# Patient Record
Sex: MALE | Race: WHITE | NOT HISPANIC OR LATINO | Employment: OTHER
[De-identification: names, ages, dates, MRNs, and addresses within clinical notes are randomized per-mention and may not be internally consistent; named-entity substitution may affect disease eponyms.]

---

## 2017-09-01 ENCOUNTER — RECORDS - HEALTHEAST (OUTPATIENT)
Dept: ADMINISTRATIVE | Facility: OTHER | Age: 62
End: 2017-09-01

## 2017-09-01 ENCOUNTER — RECORDS - HEALTHEAST (OUTPATIENT)
Dept: LAB | Facility: CLINIC | Age: 62
End: 2017-09-01

## 2017-09-01 LAB
CHOLEST SERPL-MCNC: 197 MG/DL
FASTING STATUS PATIENT QL REPORTED: NORMAL
HDLC SERPL-MCNC: 73 MG/DL
LDLC SERPL CALC-MCNC: 112 MG/DL
PSA SERPL-MCNC: 5.1 NG/ML (ref 0–4.5)
TRIGL SERPL-MCNC: 60 MG/DL

## 2017-09-11 ENCOUNTER — HOSPITAL ENCOUNTER (OUTPATIENT)
Dept: RESPIRATORY THERAPY | Facility: HOSPITAL | Age: 62
Discharge: HOME | End: 2017-09-11
Attending: FAMILY MEDICINE

## 2017-09-11 DIAGNOSIS — J44.9 COPD (CHRONIC OBSTRUCTIVE PULMONARY DISEASE) (H): ICD-10-CM

## 2017-10-23 ENCOUNTER — RECORDS - HEALTHEAST (OUTPATIENT)
Dept: ADMINISTRATIVE | Facility: OTHER | Age: 62
End: 2017-10-23

## 2017-11-30 ENCOUNTER — RECORDS - HEALTHEAST (OUTPATIENT)
Dept: ADMINISTRATIVE | Facility: OTHER | Age: 62
End: 2017-11-30

## 2017-11-30 ENCOUNTER — OFFICE VISIT - HEALTHEAST (OUTPATIENT)
Dept: PULMONOLOGY | Facility: OTHER | Age: 62
End: 2017-11-30

## 2017-11-30 DIAGNOSIS — J44.9 CHRONIC OBSTRUCTIVE PULMONARY DISEASE, UNSPECIFIED COPD TYPE (H): ICD-10-CM

## 2017-11-30 ASSESSMENT — MIFFLIN-ST. JEOR: SCORE: 1519.28

## 2017-12-14 ENCOUNTER — COMMUNICATION - HEALTHEAST (OUTPATIENT)
Dept: PULMONOLOGY | Facility: OTHER | Age: 62
End: 2017-12-14

## 2017-12-14 DIAGNOSIS — J44.9 COPD, SEVERE (H): ICD-10-CM

## 2017-12-19 ENCOUNTER — AMBULATORY - HEALTHEAST (OUTPATIENT)
Dept: PULMONOLOGY | Facility: OTHER | Age: 62
End: 2017-12-19

## 2017-12-22 ENCOUNTER — AMBULATORY - HEALTHEAST (OUTPATIENT)
Dept: PULMONOLOGY | Facility: OTHER | Age: 62
End: 2017-12-22

## 2017-12-27 ENCOUNTER — OFFICE VISIT - HEALTHEAST (OUTPATIENT)
Dept: PULMONOLOGY | Facility: OTHER | Age: 62
End: 2017-12-27

## 2017-12-27 DIAGNOSIS — J44.9 COPD, SEVERE (H): ICD-10-CM

## 2017-12-28 ENCOUNTER — AMBULATORY - HEALTHEAST (OUTPATIENT)
Dept: PULMONOLOGY | Facility: OTHER | Age: 62
End: 2017-12-28

## 2018-10-24 ENCOUNTER — RECORDS - HEALTHEAST (OUTPATIENT)
Dept: PULMONOLOGY | Facility: OTHER | Age: 63
End: 2018-10-24

## 2018-10-24 ENCOUNTER — OFFICE VISIT - HEALTHEAST (OUTPATIENT)
Dept: PULMONOLOGY | Facility: OTHER | Age: 63
End: 2018-10-24

## 2018-10-24 ENCOUNTER — RECORDS - HEALTHEAST (OUTPATIENT)
Dept: ADMINISTRATIVE | Facility: OTHER | Age: 63
End: 2018-10-24

## 2018-10-24 DIAGNOSIS — J44.9 COPD (CHRONIC OBSTRUCTIVE PULMONARY DISEASE) (H): ICD-10-CM

## 2018-10-24 DIAGNOSIS — J44.9 CHRONIC OBSTRUCTIVE PULMONARY DISEASE, UNSPECIFIED (H): ICD-10-CM

## 2018-10-24 RX ORDER — ALBUTEROL SULFATE 90 UG/1
1-2 AEROSOL, METERED RESPIRATORY (INHALATION) EVERY 4 HOURS PRN
Qty: 2 INHALER | Refills: 5 | Status: SHIPPED | OUTPATIENT
Start: 2018-10-24

## 2018-10-24 ASSESSMENT — MIFFLIN-ST. JEOR: SCORE: 1569.17

## 2018-12-24 ENCOUNTER — COMMUNICATION - HEALTHEAST (OUTPATIENT)
Dept: PULMONOLOGY | Facility: OTHER | Age: 63
End: 2018-12-24

## 2018-12-31 ENCOUNTER — COMMUNICATION - HEALTHEAST (OUTPATIENT)
Dept: PULMONOLOGY | Facility: OTHER | Age: 63
End: 2018-12-31

## 2018-12-31 DIAGNOSIS — J44.9 COPD, SEVERE (H): ICD-10-CM

## 2019-02-26 ENCOUNTER — RECORDS - HEALTHEAST (OUTPATIENT)
Dept: LAB | Facility: CLINIC | Age: 64
End: 2019-02-26

## 2019-02-26 ENCOUNTER — RECORDS - HEALTHEAST (OUTPATIENT)
Dept: ADMINISTRATIVE | Facility: OTHER | Age: 64
End: 2019-02-26

## 2019-02-27 LAB
ALBUMIN SERPL-MCNC: 3.7 G/DL (ref 3.5–5)
ANION GAP SERPL CALCULATED.3IONS-SCNC: 12 MMOL/L (ref 5–18)
BUN SERPL-MCNC: 22 MG/DL (ref 8–22)
CALCIUM SERPL-MCNC: 9.3 MG/DL (ref 8.5–10.5)
CHLORIDE BLD-SCNC: 106 MMOL/L (ref 98–107)
CO2 SERPL-SCNC: 23 MMOL/L (ref 22–31)
CREAT SERPL-MCNC: 1.14 MG/DL (ref 0.7–1.3)
GFR SERPL CREATININE-BSD FRML MDRD: >60 ML/MIN/1.73M2
GLUCOSE BLD-MCNC: 86 MG/DL (ref 70–125)
PHOSPHATE SERPL-MCNC: 3.5 MG/DL (ref 2.5–4.5)
POTASSIUM BLD-SCNC: 4.1 MMOL/L (ref 3.5–5)
SODIUM SERPL-SCNC: 141 MMOL/L (ref 136–145)

## 2019-03-01 ENCOUNTER — OFFICE VISIT - HEALTHEAST (OUTPATIENT)
Dept: PULMONOLOGY | Facility: OTHER | Age: 64
End: 2019-03-01

## 2019-03-01 DIAGNOSIS — J44.9 CHRONIC OBSTRUCTIVE PULMONARY DISEASE, UNSPECIFIED COPD TYPE (H): ICD-10-CM

## 2019-03-01 DIAGNOSIS — F17.200 TOBACCO DEPENDENCE SYNDROME: ICD-10-CM

## 2019-03-01 DIAGNOSIS — E88.01 ALPHA-1-ANTITRYPSIN DEFICIENCY (H): ICD-10-CM

## 2019-03-01 DIAGNOSIS — J43.9 PULMONARY EMPHYSEMA, UNSPECIFIED EMPHYSEMA TYPE (H): ICD-10-CM

## 2019-03-01 ASSESSMENT — MIFFLIN-ST. JEOR: SCORE: 1555.57

## 2019-03-12 ENCOUNTER — HOSPITAL ENCOUNTER (OUTPATIENT)
Dept: RESPIRATORY THERAPY | Facility: HOSPITAL | Age: 64
Discharge: HOME | End: 2019-03-12
Attending: INTERNAL MEDICINE

## 2019-03-12 ENCOUNTER — HOSPITAL ENCOUNTER (OUTPATIENT)
Dept: RESPIRATORY THERAPY | Facility: HOSPITAL | Age: 64
Discharge: HOME | End: 2019-03-12

## 2019-03-12 DIAGNOSIS — J44.9 CHRONIC OBSTRUCTIVE PULMONARY DISEASE, UNSPECIFIED COPD TYPE (H): ICD-10-CM

## 2019-03-12 DIAGNOSIS — E88.01 ALPHA-1-ANTITRYPSIN DEFICIENCY (H): ICD-10-CM

## 2019-03-12 DIAGNOSIS — J43.9 PULMONARY EMPHYSEMA, UNSPECIFIED EMPHYSEMA TYPE (H): ICD-10-CM

## 2019-03-12 LAB — HGB BLD-MCNC: 17.9 G/DL (ref 14–18)

## 2019-03-12 ASSESSMENT — MIFFLIN-ST. JEOR: SCORE: 1572.57

## 2019-03-16 ENCOUNTER — COMMUNICATION - HEALTHEAST (OUTPATIENT)
Dept: PULMONOLOGY | Facility: OTHER | Age: 64
End: 2019-03-16

## 2019-03-19 ENCOUNTER — ANESTHESIA - HEALTHEAST (OUTPATIENT)
Dept: SURGERY | Facility: HOSPITAL | Age: 64
End: 2019-03-19

## 2019-03-19 ENCOUNTER — SURGERY - HEALTHEAST (OUTPATIENT)
Dept: SURGERY | Facility: HOSPITAL | Age: 64
End: 2019-03-19

## 2019-03-19 ASSESSMENT — MIFFLIN-ST. JEOR
SCORE: 1582.78
SCORE: 1585.04

## 2019-03-20 ASSESSMENT — MIFFLIN-ST. JEOR: SCORE: 1625.41

## 2019-03-21 ASSESSMENT — MIFFLIN-ST. JEOR: SCORE: 1641.74

## 2019-07-10 ENCOUNTER — COMMUNICATION - HEALTHEAST (OUTPATIENT)
Dept: ONCOLOGY | Facility: CLINIC | Age: 64
End: 2019-07-10

## 2019-12-24 ENCOUNTER — AMBULATORY - HEALTHEAST (OUTPATIENT)
Dept: PULMONOLOGY | Facility: OTHER | Age: 64
End: 2019-12-24

## 2019-12-24 DIAGNOSIS — J44.9 COPD, SEVERE (H): ICD-10-CM

## 2020-10-29 ENCOUNTER — RECORDS - HEALTHEAST (OUTPATIENT)
Dept: LAB | Facility: CLINIC | Age: 65
End: 2020-10-29

## 2020-10-29 LAB — PSA SERPL-MCNC: 0.2 NG/ML (ref 0–4.5)

## 2020-11-11 ENCOUNTER — RECORDS - HEALTHEAST (OUTPATIENT)
Dept: LAB | Facility: CLINIC | Age: 65
End: 2020-11-11

## 2020-11-11 LAB — PSA SERPL-MCNC: 0.2 NG/ML (ref 0–4.5)

## 2021-01-06 ENCOUNTER — AMBULATORY - HEALTHEAST (OUTPATIENT)
Dept: PULMONOLOGY | Facility: OTHER | Age: 66
End: 2021-01-06

## 2021-01-06 DIAGNOSIS — J44.9 COPD, SEVERE (H): ICD-10-CM

## 2021-01-11 ENCOUNTER — AMBULATORY - HEALTHEAST (OUTPATIENT)
Dept: PULMONOLOGY | Facility: OTHER | Age: 66
End: 2021-01-11

## 2021-01-11 DIAGNOSIS — J44.9 COPD, SEVERE (H): ICD-10-CM

## 2021-03-08 ENCOUNTER — OFFICE VISIT - HEALTHEAST (OUTPATIENT)
Dept: PULMONOLOGY | Facility: OTHER | Age: 66
End: 2021-03-08

## 2021-03-08 DIAGNOSIS — F17.200 TOBACCO DEPENDENCE SYNDROME: ICD-10-CM

## 2021-03-08 DIAGNOSIS — J44.9 COPD, SEVERE (H): ICD-10-CM

## 2021-03-08 ASSESSMENT — MIFFLIN-ST. JEOR: SCORE: 1566.9

## 2021-03-09 RX ORDER — NICOTINE 4 MG/1
INHALANT RESPIRATORY (INHALATION)
Qty: 168 CARTRIDGE | Refills: 11 | Status: SHIPPED | OUTPATIENT
Start: 2021-03-09 | End: 2023-05-04

## 2021-04-12 ENCOUNTER — AMBULATORY - HEALTHEAST (OUTPATIENT)
Dept: PULMONOLOGY | Facility: OTHER | Age: 66
End: 2021-04-12

## 2021-04-12 DIAGNOSIS — J44.9 COPD, SEVERE (H): ICD-10-CM

## 2021-05-31 VITALS — BODY MASS INDEX: 21.98 KG/M2 | HEIGHT: 71 IN | WEIGHT: 157 LBS

## 2021-05-31 VITALS — WEIGHT: 161.2 LBS | BODY MASS INDEX: 22.48 KG/M2

## 2021-05-31 NOTE — TELEPHONE ENCOUNTER
I left a final message looking for interest in receiving a SCP. I again left my contact information should he have time/interest. I otherwise congratulated him on his survivorship. Love

## 2021-06-02 VITALS — HEIGHT: 72 IN | BODY MASS INDEX: 24.45 KG/M2 | WEIGHT: 180.5 LBS

## 2021-06-02 VITALS — HEIGHT: 71 IN | WEIGHT: 165 LBS | BODY MASS INDEX: 23.1 KG/M2

## 2021-06-02 VITALS — HEIGHT: 71 IN | BODY MASS INDEX: 23.52 KG/M2 | WEIGHT: 168 LBS

## 2021-06-05 VITALS
SYSTOLIC BLOOD PRESSURE: 110 MMHG | OXYGEN SATURATION: 95 % | DIASTOLIC BLOOD PRESSURE: 74 MMHG | HEIGHT: 72 IN | WEIGHT: 164 LBS | BODY MASS INDEX: 22.21 KG/M2 | HEART RATE: 86 BPM

## 2021-06-12 NOTE — PROGRESS NOTES
Cpft with pre and post spirometry done using albuterol 2.5 mg neb.  Ats standards met, patient terrence well, results scanned into patient chart.

## 2021-06-14 NOTE — PROGRESS NOTES
Left VM message for Noemy that we will need to submit a PA for Joon pina to find out what medicines Armin's insurance will cover.  This process may take a week (or longer with the Holidays).  If more samples are needed to get through this time, they could certainly come and pick more up at our clinic.  Instructed Noemy to just call if more sample needed.

## 2021-06-14 NOTE — PROGRESS NOTES
Call from patient's wife, Noemy.  Stated that the Trelegy ellipta that Dr. Kenney gave Alfred to try is working really well for him, but his insurance will not cover.  Asking for something else equivalent to this medication.  Spoke with Noemy.  Armin is private insurance only (no medicare) so mailed out a coupon card for them to try.  Will need to be activated and then pharmacy needs to run numbers on the card to receive for $10/month.  She will let me know if this does not work or if more samples needed until we can figure out what to do.

## 2021-06-14 NOTE — PROGRESS NOTES
Pulmonary Clinic Outpatient Consultation    Assessment and Plan:   Armin Terrell is a 62 y.o. male with a history of tobacco dependence in remission, elevated PSA, and COPD, presenting for evaluation of COPD.    Chronic obstructive pulmonary disease: GOLD group B, severe airflow obstruction and emphysema (FEV1 0.79 L [21% predicted], corrected DLCO 63% predicted).  CAT score today is 4+5+3+3+3+0+4/5+3=25/26  No urgent care/ED visits or prednisone that he is aware of, but severely symptomatic with frequent dyspnea and productive cough.  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    Plan:  - congratulated on quitting smoking 3 months ago; his wife smokes but he is committed to remaining tobacco-free  - stop umeclidinium-vilanterol and start fluticasone-umeclidinium-vilanterol one inhalation daily; rinse/gargle/spit water after use; 14 days of samples given and I will call him in 10 days to check in and prescribe it if good response  - continue nebulized or HFA albuterol as needed  - check A1AT level and genotype  - received his annual influenza vaccine  - administered Pneumovax 23 today in clinic  - pulmonary rehab will not be feasible given his full-time work schedule, which is quite physical already so he is getting daily exercise  - follow-up in 6 months or sooner as needed  - encouraged him to call any time with questions or concerning symptoms    I appreciate the opportunity to participate in the care of Mr. Terrell.  Please feel free to contact me at any time.    CCx: severe COPD    HPI: Armin Terrell is a 62 y.o. male with a history of tobacco dependence in remission, elevated PSA, and COPD, presenting for evaluation of COPD. Started smoking at age 15, up to 2 ppd, and quit 3 months ago.  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  Works full-time in a very physical job.  Sleeps poorly.  Lying down causes dyspnea, as does most activity, requiring frequent breaks.  Uses albuterol HFA or nebs as needed, and on umeclidinium-vilanterol.  Has ongoing frequent cough productive of \"lots of phlegm,\" which is yellow-white and like \"hairballs.\"  Denies any prior ED or urgent care visits due to respiratory symptoms, and does not recall having used prednisone.  Received his influenza vaccine for this season.  He is getting a prostate biopsy soon for elevated PSA.    ROS:  A 12-system review was obtained and was negative with the exception of the symptoms endorsed in the history of present illness.    PMH:  Severe COPD  Elevated PSA    PSH:  No past surgical history on file.    Allergies:  No Known Allergies    Family HX:  No family history on file.    Social Hx:  Social History     Social History     Marital status:      Spouse name: N/A     Number of children: N/A     Years of education: N/A     Occupational History     Not on file.     Social History Main Topics     Smoking status: Current Every Day Smoker     Smokeless tobacco: Not on file     Alcohol use 2.4 - 3.0 oz/week     4 - 5 Cans of beer per week      Comment: drinks on weekends.     Drug use: Not on file     Sexual activity: Not on file     Other Topics Concern     Not on file     Social History Narrative       Current Meds:  Current Outpatient Prescriptions   Medication Sig Dispense Refill     albuterol (PROAIR HFA) 90 mcg/actuation inhaler INHALE 1-2 PUFFS 3 TIMES A DAY FOR 2 WEEKS       albuterol (PROAIR HFA;PROVENTIL HFA;VENTOLIN HFA) 90 mcg/actuation inhaler Inhale 2 puffs every 6 (six) hours as needed for wheezing.       umeclidinium-vilanterol (ANORO ELLIPTA) 62.5-25 mcg/actuation inhaler 1 puff(s)       No current facility-administered medications for this visit.        Physical Exam:  BP (!) 122/92  Pulse 72  Resp 16  Ht 5' 11\" (1.803 m)  Wt 157 lb (71.2 " kg)  SpO2 96% Comment: RA at rest  BMI 21.9 kg/m2  Gen: alert, oriented, no distress  HEENT: nasal turbinates are unremarkable, no oropharyngeal lesions, no cervical or supraclavicular lymphadenopathy  CV: RRR, no M/G/R  Resp: moderately diminished d/t emphysema  Abd: soft, nontender, no palpable organomegaly  Skin: no apparent rashes  Ext: no cyanosis, clubbing or edema  Neuro: alert, nonfocal    Labs:  reviewed    Imaging studies:  CXR (July 2016):  - overall clear with no focal infiltrate    CT abdomen (July 2011):  - significant emphysema at bilateral lung bases    PFT's (September 2017):  FVC 2.69 FEV1 0.79 ratio 29%.  Significant improvement with bronchodilators.  TLC and RV to TLC ratio increased.  DLCO corrected decreased at 63% predicted.  Assessment: Very severe obstruction with significant response to bronchodilators.  Air trapping on lung volumes.  Mild to moderate diffusion defect.    Jono Kenney MD  [facility] [place]  Cell [phone]  Office [phone]  Pager [phone]

## 2021-06-15 NOTE — PROGRESS NOTES
Pulmonary Clinic Follow-up Visit    Assessment and Plan:   65 year old male with a history of longstanding and active tobacco dependence, elevated PSA, COPD, and alpha-1 antitrypsin deficiency, prostate cancer s/p robotic assisted radical prostatectomy and repair of right inguinal hernia, presenting for follow-up.      Tobacco dependence, chronic obstructive pulmonary disease, alpha-1 antitrypsin deficiency: Has severe A1AT deficiency (genotype ZZ with severely reduced A1AT level of 21.6 mg/dL by nephelometry). He continues to smoke 5-6 cigarettes on weekend days. His wife smokes inside the house and he states that she will not quit. We have previously discussed alpha-1 antitrypsin enzyme replacement and he has not been interested; his response is the same today. Works part-time at [employer] 4-5 hours daily with significant lifting and does okay with this. Occasional phlegm, clear to yellow, about once daily, but minimal cough. States that his breathing is okay. CAT score today is 13 (2,3,1,1,1,1,2,2).     Plan:  - use nicotine inhaler for cravings and quit smoking  - advised him to ask his wife not to smoke inside the house  - again discussed A1AT replacement therapy but he declines  - again advised consideration of A1AT replacement therapy, assuming he is still a candidate depending on upcoming PFTs; he will think about this  - based on his performance status, there is no clear contraindication to the planned procedure, acknowledging some increase in risk of perioperative pulmonary complications, though not prohibitive  - continue fluticasone furoate-umeclidinium-vilanterol 100-62.5-25 one inhalation daily; rinse/gargle/spit water after use  - albuterol HFA needed  - annual high-dose influenza vaccination; received for this season  - received Pneumovax-23 in November 2017; consider Prevnar-13 at follow-up visit if not already received  - advised him to receive COVID-19 vaccination when he is able  - he has  previously declined pulmonary rehab  - follow up in one year with spirometry/DLCO  - encouraged him to call any time with questions or concerning symptoms    Jono Kenney MD  Pulmonary and Critical Care Medicine  [facility]  Cell [phone]  Office [phone]  Pager [phone]    CCx: tobacco dependence, chronic obstructive pulmonary disease, alpha-1 antitrypsin deficiency    HPI: 65 year old male with a history of longstanding and active tobacco dependence, elevated PSA, COPD, and alpha-1 antitrypsin deficiency, prostate cancer s/p robotic assisted radical prostatectomy and repair of right inguinal hernia, presenting for follow-up. Has severe A1AT deficiency (genotype ZZ with severely reduced A1AT level of 21.6 mg/dL by nephelometry). He continues to smoke 5-6 cigarettes on weekend days. His wife smokes inside the house and he states that she will not quit. We have previously discussed alpha-1 antitrypsin enzyme replacement and he has not been interested; his response is the same today. Works part-time at [employer] 4-5 hours daily with significant lifting and does okay with this. Occasional phlegm, clear to yellow, about once daily, but minimal cough. States that his breathing is okay. CAT score today is 13 (2,3,1,1,1,1,2,2).    ROS:  A 12-system review was obtained and was negative with the exception of the symptoms endorsed in the history of present illness.    PMH:  Severe COPD  Elevated PSA  Active tobacco dependence  A1AT deficiency - ZZ phenotype with level 21.6 mg/dL by nephelometry    PSH:  Past Surgical History:   Procedure Laterality Date     IA LAP,PROSTATECTOMY,RADICAL,W/NERVE SPARE,INCL ROBOTIC Bilateral 3/19/2019    Procedure: ROBOTIC ASSISTED RADICAL RETROPUBIC PROSTATECTOMY BILATERAL PELVIC LYMPH NODE DISSECTION, LYSIS OF ADHESIONS, REPAIR OF RIGHT INGUINAL HERNIA;  Surgeon: Candido Angelo MD;  Location: [facility];  Service: Urology       Allergies:  No Known  Allergies    Family HX:  No family history on file.    Social Hx:  Social History     Socioeconomic History     Marital status:      Spouse name: Not on file     Number of children: Not on file     Years of education: Not on file     Highest education level: Not on file   Occupational History     Not on file   Social Needs     Financial resource strain: Not on file     Food insecurity     Worry: Not on file     Inability: Not on file     Transportation needs     Medical: Not on file     Non-medical: Not on file   Tobacco Use     Smoking status: Current Some Day Smoker     Packs/day: 0.50     Years: 30.00     Pack years: 15.00     Last attempt to quit: 9/27/2017     Years since quitting: 3.4     Smokeless tobacco: Never Used     Tobacco comment: quit 4 days ago   Substance and Sexual Activity     Alcohol use: Yes     Alcohol/week: 4.0 - 5.0 standard drinks     Types: 4 - 5 Cans of beer per week     Comment: drinks on weekends.     Drug use: No     Sexual activity: Not on file   Lifestyle     Physical activity     Days per week: Not on file     Minutes per session: Not on file     Stress: Not on file   Relationships     Social connections     Talks on phone: Not on file     Gets together: Not on file     Attends Yarsani service: Not on file     Active member of club or organization: Not on file     Attends meetings of clubs or organizations: Not on file     Relationship status: Not on file     Intimate partner violence     Fear of current or ex partner: Not on file     Emotionally abused: Not on file     Physically abused: Not on file     Forced sexual activity: Not on file   Other Topics Concern     Not on file   Social History Narrative     Not on file       Current Meds:  Current Outpatient Medications   Medication Sig Dispense Refill     albuterol (PROAIR HFA;PROVENTIL HFA;VENTOLIN HFA) 90 mcg/actuation inhaler Inhale 1-2 puffs every 4 (four) hours as needed for wheezing or shortness of breath. 2 Inhaler  5     fluticasone-umeclidinium-vilanterol (TRELEGY ELLIPTA) 100-62.5-25 mcg DsDv inhaler Inhale 1 Inhalation daily. OFFICE VISIT NEEDED FOR ANY FURTHER REFILLS 180 each 0     acetaminophen (TYLENOL) 500 MG tablet Take 1-2 tablets (500-1,000 mg total) by mouth every 4 (four) hours as needed.  0     albuterol (PROVENTIL) 2.5 mg /3 mL (0.083 %) nebulizer solution Take 2.5 mg by nebulization every 4 (four) hours as needed for wheezing (for wheezing).       nicotine (NICOTROL) 10 mg inhaler Inhale 1 puff as needed for smoking cessation. 168 Cartridge 11     No current facility-administered medications for this visit.        Physical Exam:  /74   Pulse 86   Ht 6' (1.829 m)   Wt 164 lb (74.4 kg)   SpO2 95%   BMI 22.24 kg/m    Gen: alert, oriented, no distress  HEENT: no cervical or supraclavicular lymphadenopathy  CV: RRR, no M/G/R  Resp: moderately diminished with prolonged expiratory phase  Abd: soft, nontender, no palpable organomegaly  Skin: no apparent rashes  Ext: no cyanosis, clubbing or edema  Neuro: alert, nonfocal    Labs:  genotype ZZ with severely reduced A1AT level of 21.6 mg/dL by nephelometry    Imaging studies:  CXR (July 2016):  - overall clear with no focal infiltrate      CT abdomen (July 2011):  - significant emphysema at bilateral lung bases      PFT's  September 2017:  FVC 2.69 FEV1 0.79 ratio 29%.  Significant improvement with bronchodilators.  TLC and RV to TLC ratio increased.  DLCO corrected decreased at 63% predicted.  Assessment: Very severe obstruction with significant response to bronchodilators.  Air trapping on lung volumes.  Mild to moderate diffusion defect.     October 2018:  Note the patient took albuterol prior to testing, therefore this represents post-bronchodilator spirometry.  FEV1/FVC is 0.36 and is reduced.  FEV1 is 46% predicted and is reduced.  FVC is 98% predicted and is normal.  DLCO is 49% predicted and is reduced when it   is corrected for hemoglobin.  The flow  volume loop is normal No. There is consistent flattening of the inspiratory limb.    March 2019:  FEV1/FVC is 28 and is reduced.  FEV1 is 38% predicted and is reduced.  FVC is 107% predicted and is normal.  There was no improvement in spirometry after a single inhaled dose of bronchodilator.  DLCO is 73% predicted and is reduced when it   is corrected for hemoglobin.    The patient walked a total of 450 meters. Breathing room air, SpO2 veronica was 86% and HRmax was 122 bpm. BP and HR responses were appropriate.

## 2021-06-15 NOTE — PROGRESS NOTES
Pulmonary Clinic Follow-up Visit    Assessment and Plan:   Armin Terrell is a 62 y.o. male with a history of tobacco dependence in remission, elevated PSA, COPD, and recently diagnosed alpha-1 antitrypsin deficiency, presenting for follow-up.     Chronic obstructive pulmonary disease and alpha-1 antitrypsin deficiency: GOLD group B, severe airflow obstruction and emphysema (FEV1 0.79 L [21% predicted], corrected DLCO 63% predicted).  A1AT assessment at last visit revealed genotype ZZ with severely reduced A1AT level of 21.6 mg/dL by nephelometry.  Unfortunately, the severity of his FEV1 reduction at this stage makes augmentation therapy less likely to be beneficial, though still a potential option given his young age.  However, after careful discussion, given the inconvenience and uncertain benefit, the patient elects not to pursue this at this point.  Will continue with flutcasone-umeclidinium-vilanterol 100-62.5-25 mcg one inhalation daily and see him in follow-up with spirometry and DLCO.     Plan:  - again congratulated on quitting smoking several months ago  - continue fluticasone-umeclidinium-vilanterol 100-62.5-25 one inhalation daily; rinse/gargle/spit water after use; gave more samples and still working on prior authorization  - continue nebulized or HFA albuterol as needed  - no A1AT augmentation at this point after careful discussion  - received his annual influenza vaccine  - received Pneumovax 23 today in November  - pulmonary rehab will not be feasible given his full-time work schedule, which is quite physical already so he is getting daily exercise  - follow-up in 6 months with pre/post spirometry and DLCO  - encouraged him to call any time with questions or concerning symptoms     I appreciate the opportunity to participate in the care of Mr. Terrell.  Please feel free to contact me at any time.     CCx: severe COPD and alpha-1 antitrypsin deficiency    HPI: Armin Terrell is a 62 y.o. male with a  history of tobacco dependence in remission, elevated PSA, COPD, and recently diagnosed alpha-1 antitrypsin deficiency, presenting for follow-up.  Had him come in because the A1AT assessment showed ZZ phenotype with severely decreased A1AT level of 21.6 mg/dL by nephelometry.  He has quit smoking so progression should be mitigated, but still may have faster progression with this severe deficiency even without smoking.  He works full-time in a physical job.  We discussed that with his severe FEV1 reduction, the benefit of augmentation is unproven, but still could be considered given his relative youth.  Given the inconvenience of infusions and uncertain benefit, he elects to defer at this time.  He has found benefit from the Trelegy but having trouble getting it approved by his insurance.    ROS:  A 12-system review was obtained and was negative with the exception of the symptoms endorsed in the history of present illness.    PMH:  Severe COPD  Elevated PSA  Tobacco dependence in remission  A1AT deficiency - ZZ phenotype with level 21.6 mg/dL by nephelometry    PSH:  No past surgical history on file.    Allergies:  No Known Allergies    Family HX:  No family history on file.    Social Hx:  Social History     Social History     Marital status:      Spouse name: N/A     Number of children: N/A     Years of education: N/A     Occupational History     Not on file.     Social History Main Topics     Smoking status: Former Smoker     Quit date: 9/27/2017     Smokeless tobacco: Never Used     Alcohol use 2.4 - 3.0 oz/week     4 - 5 Cans of beer per week      Comment: drinks on weekends.     Drug use: Not on file     Sexual activity: Not on file     Other Topics Concern     Not on file     Social History Narrative       Current Meds:  Current Outpatient Prescriptions   Medication Sig Dispense Refill     albuterol (PROAIR HFA;PROVENTIL HFA;VENTOLIN HFA) 90 mcg/actuation inhaler Inhale 2 puffs every 6 (six) hours as  needed for wheezing.       fluticasone-umeclidinium-vilanterol (TRELEGY ELLIPTA) 100-62.5-25 mcg DsDv Inhale 1 Inhalation daily. 60 each 11     No current facility-administered medications for this visit.        Physical Exam:  /76  Pulse 96  Resp 24  Wt 161 lb 3.2 oz (73.1 kg)  SpO2 93% Comment: RA  BMI 22.48 kg/m2  Gen: alert, oriented, no distress  HEENT: nasal turbinates are unremarkable, no oropharyngeal lesions, no cervical or supraclavicular lymphadenopathy  CV: tachy, regular, no M/G/R  Resp: moderately diminished bilaterally with prolonged expiratory phase  Abd: soft, nontender, no palpable organomegaly  Skin: no apparent rashes  Ext: no cyanosis, clubbing or edema  Neuro: alert, nonfocal    Labs:  reviewed    Imaging studies:  CXR (July 2016):  - overall clear with no focal infiltrate     CT abdomen (July 2011):  - significant emphysema at bilateral lung bases     PFT's (September 2017):  FVC 2.69 FEV1 0.79 ratio 29%.  Significant improvement with bronchodilators.  TLC and RV to TLC ratio increased.  DLCO corrected decreased at 63% predicted.  Assessment: Very severe obstruction with significant response to bronchodilators.  Air trapping on lung volumes.  Mild to moderate diffusion defect.    Jono Kenney MD  Pulmonary and Critical Care Medicine  [facility]  Cell [phone]  Office [phone]  Pager [phone]

## 2021-06-15 NOTE — PROGRESS NOTES
We received a PA for pt's Trelegy 100/62.5/25 mcg inhaler.  I started a PA on CMM's, will watch for response.

## 2021-06-15 NOTE — PATIENT INSTRUCTIONS - HE
It was good to see you in clinic today. This is what we discussed:    1. You do have severe COPD and alpha-1 antitrypsin deficiency (low level of an enzyme that helps repair the lungs) and we need to prevent the lung function from worsening.  2. Use the nicotine inhaler and quit smoking completely.  3. Your wife should not smoke inside the house.  4. Continue Trelegy one inhalation daily. Rinse, gargle, and spit water after use.  5. Use the albuterol inhaler as needed.  6. I recommend that you get a COVID-19 vaccine.  7. I will see you in one year with basic lung function testing.  8. Call any time with questions or concerning symptoms.    Jono Kenney MD  Pulmonary and Critical Care Medicine  [facility]  Office [phone]

## 2021-06-15 NOTE — PROGRESS NOTES
I called [facility] and they said that the formulary alternatives for the Trelegy Ellipta are: Advair, Anoro and Symbicort.  I sent a message to Dr. Kenney to see what he wants to do (and if he knows if pt's has tried and failed any of these alternatives).

## 2021-06-15 NOTE — PROGRESS NOTES
PA for the [facility] was approved from 1/5/18-1/5/19.  I called pharmacy to let them know this and they said that it did go through but they don't have any in stock right now so they will order it and it should be in on Monday.  I called pt and talked to his wife and informed her of this approval and to check with pharmacy on Monday (pt was given samples while in clinic this past week so he will have enough until after Monday).

## 2021-06-15 NOTE — PROGRESS NOTES
Dr. Kenney responded saying that the pt did not do as well on the Anoro as he has on the Trelegy.  The alternatives that were given are not the combination that he wants so he would like us to go ahead and try the PA.  Restarted the PA with this info included.  Will wait for response.

## 2021-06-16 PROBLEM — C61 PROSTATE CA (H): Status: ACTIVE | Noted: 2019-03-20

## 2021-06-16 NOTE — TELEPHONE ENCOUNTER
Telephone Encounter by Dona Washington at 1/7/2019 12:08 PM     Author: Dona Washington Service: -- Author Type: --    Filed: 1/7/2019 12:09 PM Encounter Date: 12/24/2018 Status: Signed    : Dona Washington       A PA is not needed at this time for medication.               - - -

## 2021-06-18 NOTE — LETTER
Letter by Jono Kenney MD at      Author: Jono Kenney MD Service: -- Author Type: --    Filed:  Encounter Date: 3/1/2019 Status: (Other)       Armin Woods MD  [address]  [facility] [address]                                  March 4, 2019    Patient: Armin Terrell   MR Number: [id number]   YOB: 1955   Date of Visit: 3/1/2019     Dear Jason Woods and Gi:    I saw Armin Terrell on the above date at the [facility]. Below are the relevant portions of my assessment and plan of care.    If you have questions, please do not hesitate to call me. I look forward to following Armin along with you.    Sincerely,        Jono Kenney MD          CC  MD Pablito Red Andrew P, MD  3/4/2019  3:25 PM  Sign at close encounter  Pulmonary Clinic Follow-up Visit    Assessment and Plan:   63 year old male with a history of longstanding and active tobacco dependence, elevated PSA, COPD, and recently diagnosed alpha-1 antitrypsin deficiency, recently diagnosed prostate cancer, presenting for preoperative pulmonary evaluation.      Chronic obstructive pulmonary disease and alpha-1 antitrypsin deficiency: In October 2018, post-bronchodilator FEV1 was 1.66 L (46%), DLCO 49% (down from 63% in October 2017). Has severe A1AT deficiency (genotype ZZ with severely reduced A1AT level of 21.6 mg/dL by nephelometry). He continues to smoke and has continued to decline A1AT replacement therapy. Currently smokes 1-2 cigarettes daily, and his wife smokes 0.5 ppd. Neither of them are very motivated to quit smoking. He endorses a good performance status, stating that with the fluticasone-umeclidinium-vilanterol (Trelegy Ellipta) he feels improved, and can shovel snow and walk without limitation (walked for miles at the [facility] [place] over the summer without difficulty. Resting SpO2 on room air is 91%. He is scheduled for robotic-asseded radical retropubic  prostatectomy and bilateral pelvic lymph node dissection on 3/19/19.     Plan:  - repeat pre- and post-bronchodilator spirometry and DLCO  - again advised him to quit smoking; he is noncommittal  - again advised consideration of A1AT replacement therapy, assuming he is still a candidate depending on upcoming PFTs; he will think about this  - based on his performance status, there is no clear contraindication to the planned procedure, acknowledging some increase in risk of perioperative pulmonary complications, though not prohibitive  - continue fluticasone-umeclidinium-vilanterol (Trelegy Ellipta) 100-62.5-25 one inhalation daily in the perioperative period, can use home supply; rinse/gargle/spit water after use  - continue nebulized or HFA albuterol as needed, including perioperatively  - advised annual influenza vaccination  - received Pneumovax-23 in November 2017  - he has previously declined pulmonary rehab  - follow up in 3 months  - encouraged him to call any time with questions or concerning symptoms      I appreciate the opportunity to participate in the care of Mr. Terrell.  Please feel free to contact me at any time.      CCx: severe COPD and alpha-1 antitrypsin deficiency    HPI: 63 year old male with a history of longstanding and active tobacco dependence, elevated PSA, COPD, and recently diagnosed alpha-1 antitrypsin deficiency, recently diagnosed prostate cancer, presenting for preoperative pulmonary evaluation. In October 2018, post-bronchodilator FEV1 was 1.66 L (46%), DLCO 49% (down from 63% in October 2017). Has severe A1AT deficiency (genotype ZZ with severely reduced A1AT level of 21.6 mg/dL by nephelometry). He continues to smoke and has continued to decline A1AT replacement therapy. Currently smokes 1-2 cigarettes daily, and his wife smokes 0.5 ppd. Neither of them are very motivated to quit smoking. He endorses a good performance status, stating that with the  fluticasone-umeclidinium-vilanterol (Trelegy Ellipta) he feels improved, and can shovel snow and walk without limitation (walked for miles at the [facility] [place] over the summer without difficulty. Resting SpO2 on room air is 91%. He is scheduled for robotic-asseded radical retropubic prostatectomy and bilateral pelvic lymph node dissection on 3/19/19.    ROS:  A 12-system review was obtained and was negative with the exception of the symptoms endorsed in the history of present illness.    PMH:  Severe COPD  Elevated PSA  Active tobacco dependence  A1AT deficiency - ZZ phenotype with level 21.6 mg/dL by nephelometry    PSH:  No past surgical history on file.    Allergies:  No Known Allergies    Family HX:  No family history on file.    Social Hx:  Social History     Socioeconomic History   ? Marital status:      Spouse name: Not on file   ? Number of children: Not on file   ? Years of education: Not on file   ? Highest education level: Not on file   Occupational History   ? Not on file   Social Needs   ? Financial resource strain: Not on file   ? Food insecurity:     Worry: Not on file     Inability: Not on file   ? Transportation needs:     Medical: Not on file     Non-medical: Not on file   Tobacco Use   ? Smoking status: Current Some Day Smoker     Last attempt to quit: 2017     Years since quittin.4   ? Smokeless tobacco: Never Used   Substance and Sexual Activity   ? Alcohol use: Yes     Alcohol/week: 2.4 - 3.0 oz     Types: 4 - 5 Cans of beer per week     Comment: drinks on weekends.   ? Drug use: Not on file   ? Sexual activity: Not on file   Lifestyle   ? Physical activity:     Days per week: Not on file     Minutes per session: Not on file   ? Stress: Not on file   Relationships   ? Social connections:     Talks on phone: Not on file     Gets together: Not on file     Attends Taoist service: Not on file     Active member of club or organization: Not on file     Attends meetings of clubs or  "organizations: Not on file     Relationship status: Not on file   ? Intimate partner violence:     Fear of current or ex partner: Not on file     Emotionally abused: Not on file     Physically abused: Not on file     Forced sexual activity: Not on file   Other Topics Concern   ? Not on file   Social History Narrative   ? Not on file       Current Meds:  Current Outpatient Medications   Medication Sig Dispense Refill   ? albuterol (PROAIR HFA;PROVENTIL HFA;VENTOLIN HFA) 90 mcg/actuation inhaler Inhale 1-2 puffs every 4 (four) hours as needed for wheezing or shortness of breath. 2 Inhaler 5   ? albuterol (PROVENTIL) 2.5 mg /3 mL (0.083 %) nebulizer solution Take 2.5 mg by nebulization every 4 (four) hours as needed for wheezing (for wheezing).     ? fluticasone-umeclidinium-vilanterol (TRELEGY ELLIPTA) 100-62.5-25 mcg DsDv inhaler Inhale 1 Inhalation daily. 60 each 11   ? nicotine (NICOTROL) 10 mg inhaler Inhale 1 puff as needed for smoking cessation. 168 Cartridge 11     No current facility-administered medications for this visit.        Physical Exam:  /70   Pulse 91   Resp 10   Ht 5' 11\" (1.803 m)   Wt 165 lb (74.8 kg)   SpO2 91%   BMI 23.01 kg/m     Gen: alert, oriented, no distress  HEENT: nasal turbinates are unremarkable, no oropharyngeal lesions, no cervical or supraclavicular lymphadenopathy  CV: tachy, regular, no M/G/R  Resp: diminished bilaterally with prolonged expiratory phase  Abd: soft, nontender, no palpable organomegaly  Skin: no apparent rashes  Ext: no cyanosis, clubbing or edema  Neuro: alert, nonfocal    Labs:  reviewed    Imaging studies:  CXR (July 2016):  - overall clear with no focal infiltrate      CT abdomen (July 2011):  - significant emphysema at bilateral lung bases      PFT's  September 2017:  FVC 2.69 FEV1 0.79 ratio 29%.  Significant improvement with bronchodilators.  TLC and RV to TLC ratio increased.  DLCO corrected decreased at 63% predicted.  Assessment: Very severe " obstruction with significant response to bronchodilators.  Air trapping on lung volumes.  Mild to moderate diffusion defect.     October 2018:  Note the patient took albuterol prior to testing, therefore this represents post-bronchodilator spirometry.  FEV1/FVC is 0.36 and is reduced.  FEV1 is 46% predicted and is reduced.  FVC is 98% predicted and is normal.  DLCO is 49% predicted and is reduced when it   is corrected for hemoglobin.  The flow volume loop is normal No. There is consistent flattening of the inspiratory limb.    Jono Kenney MD  Pulmonary and Critical Care Medicine  [facility]  Cell [phone]  Office [phone]  Pager [phone]

## 2021-06-19 NOTE — LETTER
Letter by Jono Kenney MD at      Author: Jono Kenney MD Service: -- Author Type: --    Filed:  Encounter Date: 3/16/2019 Status: (Other)       2019    Dear Jason Woods and Gi,    See below for documentation of a telephone conversation that I had today with Armin Terrell ( 1955).  Please feel free to call me at any time if needed.    Pulmonary Telephone Note     Called Mr. Terrell to discuss his PFT results. FEV1 is down from 2018 but still better than 2017, likely as a result of therapy. DLCO is actually up a little bit. He has reduced 6MWT distance and hypoxia with ambulation. He quit smoking after our clinic visit and just filled the nicotine inhaler but has not started using it; I encouraged him to use it for cravings and continue to stay away from cigarettes leading up to his surgery. He had no further questions. See my clinic notes for further details. Encouraged him to call any time with questions or concerning symptoms.    Sincerely,    Jono Kenney MD  Pulmonary and Critical Care Medicine  [facility]  Cell [phone]  Office [phone]  Pager [phone]

## 2021-06-21 NOTE — PROGRESS NOTES
Pulmonary Clinic Follow-up Visit    Assessment and Plan:   63 year old male with a history of longstanding and active tobacco dependence, elevated PSA, COPD, and recently diagnosed alpha-1 antitrypsin deficiency, presenting for follow-up.      Chronic obstructive pulmonary disease and alpha-1 antitrypsin deficiency:  Over the last year, post-bronchodilator FEV1 has improved from 1.17 L to 1.66 L, but DLCO has declined from 63% to 49%.  I am very worried about the DLCO change, especially with his severe A1AT deficiency (genotype ZZ with severely reduced A1AT level of 21.6 mg/dL by nephelometry) and ongoing tobacco dependence.  We discussed the absolute need to quit smoking, and his potential improvement from A1AT replacement therapy.  At this point, he is noncommittal on both points, and I emphasized the risk of severe decline in lung function over the next 5-10 years if he continues to smoke.  He currently feels that he is breathing well and denies limitation due to dyspnea; has minimal occasional cough.    Plan:  - I very strongly advised him to quit smoking, and offered pharmacologic interventions and counseling resources, which he declined  - advised consideration of A1AT replacement therapy; he wants to think about it at this point  - continue fluticasone-umeclidinium-vilanterol 100-62.5-25 one inhalation daily; rinse/gargle/spit water after use  - continue nebulized or HFA albuterol as needed  - advised annual influenza vaccination  - received Pneumovax 23 in November 2017  - pulmonary rehab will not be feasible given his full-time work schedule, but he is retiring in January  - follow-up in 6 months with pre/post spirometry and DLCO  - encouraged him to call any time with questions or concerning symptoms      I appreciate the opportunity to participate in the care of Mr. Terrell.  Please feel free to contact me at any time.      CCx: severe COPD and alpha-1 antitrypsin deficiency    HPI: 63 year old male with a  history of longstanding and active tobacco dependence, elevated PSA, COPD, and recently diagnosed alpha-1 antitrypsin deficiency, presenting for follow-up. Over the last year, post-bronchodilator FEV1 has improved from 1.17 L to 1.66 L, but DLCO has declined from 63% to 49%.  I am very worried about the DLCO change, especially with his severe A1AT deficiency (genotype ZZ with severely reduced A1AT level of 21.6 mg/dL by nephelometry) and ongoing tobacco dependence.  He is currently smoking, though not every day.  We discussed the absolute need to quit smoking, and his potential improvement from A1AT replacement therapy.  At this point, he is noncommittal on both points, and I emphasized the risk of severe decline in lung function over the next 5-10 years if he continues to smoke.  He currently feels that he is breathing well and denies limitation due to dyspnea; has minimal occasional cough.    ROS:  A 12-system review was obtained and was negative with the exception of the symptoms endorsed in the history of present illness.    PMH:  Severe COPD  Elevated PSA  Active tobacco dependence  A1AT deficiency - ZZ phenotype with level 21.6 mg/dL by nephelometry    PSH:  No past surgical history on file.    Allergies:  No Known Allergies    Family HX:  No family history on file.    Social Hx:  Social History     Social History     Marital status:      Spouse name: N/A     Number of children: N/A     Years of education: N/A     Occupational History     Not on file.     Social History Main Topics     Smoking status: Former Smoker     Quit date: 9/27/2017     Smokeless tobacco: Never Used     Alcohol use 2.4 - 3.0 oz/week     4 - 5 Cans of beer per week      Comment: drinks on weekends.     Drug use: Not on file     Sexual activity: Not on file     Other Topics Concern     Not on file     Social History Narrative       Current Meds:  Current Outpatient Prescriptions   Medication Sig Dispense Refill     albuterol  "(PROAIR HFA;PROVENTIL HFA;VENTOLIN HFA) 90 mcg/actuation inhaler Inhale 1-2 puffs every 4 (four) hours as needed for wheezing or shortness of breath. 2 Inhaler 5     fluticasone-umeclidinium-vilanterol (TRELEGY ELLIPTA) 100-62.5-25 mcg DsDv Inhale 1 Inhalation daily. 60 each 11     No current facility-administered medications for this visit.        Physical Exam:  /82  Pulse 70  Resp 10  Ht 5' 11\" (1.803 m)  Wt 168 lb (76.2 kg)  SpO2 95%  BMI 23.43 kg/m2  Gen: alert, oriented, no distress  HEENT: nasal turbinates are unremarkable, no oropharyngeal lesions, no cervical or supraclavicular lymphadenopathy  CV: RRR, no M/G/R  Resp: markedly diminished especially at the bases, prolonged expiratory phase, no crackles or wheezes  Abd: soft, nontender, no palpable organomegaly  Skin: no apparent rashes  Ext: no cyanosis, clubbing or edema  Neuro: alert, nonfocal    Labs:  reviewed    Imaging studies:  CXR (July 2016):  - overall clear with no focal infiltrate      CT abdomen (July 2011):  - significant emphysema at bilateral lung bases      PFT's  September 2017:  FVC 2.69 FEV1 0.79 ratio 29%.  Significant improvement with bronchodilators.  TLC and RV to TLC ratio increased.  DLCO corrected decreased at 63% predicted.  Assessment: Very severe obstruction with significant response to bronchodilators.  Air trapping on lung volumes.  Mild to moderate diffusion defect.    October 2018:  Note the patient took albuterol prior to testing, therefore this represents post-bronchodilator spirometry.  FEV1/FVC is 0.36 and is reduced.  FEV1 is 46% predicted and is reduced.  FVC is 98% predicted and is normal.  DLCO is 49% predicted and is reduced when it   is corrected for hemoglobin.  The flow volume loop is normal No. There is consistent flattening of the inspiratory limb.    Jono Kenney MD  Pulmonary and Critical Care Medicine  [facility]  Cell [phone]  Office [phone]  Pager [phone]    "

## 2021-06-21 NOTE — LETTER
Letter by Jono Kenney MD at      Author: Jono Kenney MD Service: -- Author Type: --    Filed:  Encounter Date: 3/8/2021 Status: (Other)         Armin Woods MD  [address] ,  [address]  [facility] [address]                                  March 8, 2021    Patient: Armin Terrell   MR Number: [id number]   YOB: 1955   Date of Visit: 3/8/2021     Dear Dr. Peggy MD:    I saw Armin Terrell at the [facility]. Below are the relevant portions of my assessment and plan of care.    If you have questions, please do not hesitate to call me. I look forward to following Armin along with you.    Sincerely,        Jono Kenney MD          CC  No Recipients  Jono Kenney MD  3/8/2021  6:58 PM  Sign when Signing Visit  Pulmonary Clinic Follow-up Visit    Assessment and Plan:   65 year old male with a history of longstanding and active tobacco dependence, elevated PSA, COPD, and alpha-1 antitrypsin deficiency, prostate cancer s/p robotic assisted radical prostatectomy and repair of right inguinal hernia, presenting for follow-up.      Tobacco dependence, chronic obstructive pulmonary disease, alpha-1 antitrypsin deficiency: Has severe A1AT deficiency (genotype ZZ with severely reduced A1AT level of 21.6 mg/dL by nephelometry). He continues to smoke 5-6 cigarettes on weekend days. His wife smokes inside the house and he states that she will not quit. We have previously discussed alpha-1 antitrypsin enzyme replacement and he has not been interested; his response is the same today. Works part-time at [employer] 4-5 hours daily with significant lifting and does okay with this. Occasional phlegm, clear to yellow, about once daily, but minimal cough. States that his breathing is okay. CAT score today is 13 (2,3,1,1,1,1,2,2).     Plan:  - use nicotine inhaler for cravings and quit smoking  - advised him to ask his wife not to smoke inside the house  - again discussed  A1AT replacement therapy but he declines  - again advised consideration of A1AT replacement therapy, assuming he is still a candidate depending on upcoming PFTs; he will think about this  - based on his performance status, there is no clear contraindication to the planned procedure, acknowledging some increase in risk of perioperative pulmonary complications, though not prohibitive  - continue fluticasone furoate-umeclidinium-vilanterol 100-62.5-25 one inhalation daily; rinse/gargle/spit water after use  - albuterol HFA needed  - annual high-dose influenza vaccination; received for this season  - received Pneumovax-23 in November 2017; consider Prevnar-13 at follow-up visit if not already received  - advised him to receive COVID-19 vaccination when he is able  - he has previously declined pulmonary rehab  - follow up in one year with spirometry/DLCO  - encouraged him to call any time with questions or concerning symptoms    Jono Kenney MD  Pulmonary and Critical Care Medicine  [facility]  Cell [phone]  Office [phone]  Pager [phone]    CCx: tobacco dependence, chronic obstructive pulmonary disease, alpha-1 antitrypsin deficiency    HPI: 65 year old male with a history of longstanding and active tobacco dependence, elevated PSA, COPD, and alpha-1 antitrypsin deficiency, prostate cancer s/p robotic assisted radical prostatectomy and repair of right inguinal hernia, presenting for follow-up. Has severe A1AT deficiency (genotype ZZ with severely reduced A1AT level of 21.6 mg/dL by nephelometry). He continues to smoke 5-6 cigarettes on weekend days. His wife smokes inside the house and he states that she will not quit. We have previously discussed alpha-1 antitrypsin enzyme replacement and he has not been interested; his response is the same today. Works part-time at [employer] 4-5 hours daily with significant lifting and does okay with this. Occasional phlegm, clear to yellow, about  once daily, but minimal cough. States that his breathing is okay. CAT score today is 13 (2,3,1,1,1,1,2,2).    ROS:  A 12-system review was obtained and was negative with the exception of the symptoms endorsed in the history of present illness.    PMH:  Severe COPD  Elevated PSA  Active tobacco dependence  A1AT deficiency - ZZ phenotype with level 21.6 mg/dL by nephelometry    PSH:  Past Surgical History:   Procedure Laterality Date   ? MS LAP,PROSTATECTOMY,RADICAL,W/NERVE SPARE,INCL ROBOTIC Bilateral 3/19/2019    Procedure: ROBOTIC ASSISTED RADICAL RETROPUBIC PROSTATECTOMY BILATERAL PELVIC LYMPH NODE DISSECTION, LYSIS OF ADHESIONS, REPAIR OF RIGHT INGUINAL HERNIA;  Surgeon: Candido Angelo MD;  Location: [facility];  Service: Urology       Allergies:  No Known Allergies    Family HX:  No family history on file.    Social Hx:  Social History     Socioeconomic History   ? Marital status:      Spouse name: Not on file   ? Number of children: Not on file   ? Years of education: Not on file   ? Highest education level: Not on file   Occupational History   ? Not on file   Social Needs   ? Financial resource strain: Not on file   ? Food insecurity     Worry: Not on file     Inability: Not on file   ? Transportation needs     Medical: Not on file     Non-medical: Not on file   Tobacco Use   ? Smoking status: Current Some Day Smoker     Packs/day: 0.50     Years: 30.00     Pack years: 15.00     Last attempt to quit: 9/27/2017     Years since quitting: 3.4   ? Smokeless tobacco: Never Used   ? Tobacco comment: quit 4 days ago   Substance and Sexual Activity   ? Alcohol use: Yes     Alcohol/week: 4.0 - 5.0 standard drinks     Types: 4 - 5 Cans of beer per week     Comment: drinks on weekends.   ? Drug use: No   ? Sexual activity: Not on file   Lifestyle   ? Physical activity     Days per week: Not on file     Minutes per session: Not on file   ? Stress: Not on file   Relationships   ? Social connections      Talks on phone: Not on file     Gets together: Not on file     Attends Congregation service: Not on file     Active member of club or organization: Not on file     Attends meetings of clubs or organizations: Not on file     Relationship status: Not on file   ? Intimate partner violence     Fear of current or ex partner: Not on file     Emotionally abused: Not on file     Physically abused: Not on file     Forced sexual activity: Not on file   Other Topics Concern   ? Not on file   Social History Narrative   ? Not on file       Current Meds:  Current Outpatient Medications   Medication Sig Dispense Refill   ? albuterol (PROAIR HFA;PROVENTIL HFA;VENTOLIN HFA) 90 mcg/actuation inhaler Inhale 1-2 puffs every 4 (four) hours as needed for wheezing or shortness of breath. 2 Inhaler 5   ? fluticasone-umeclidinium-vilanterol (TRELEGY ELLIPTA) 100-62.5-25 mcg DsDv inhaler Inhale 1 Inhalation daily. OFFICE VISIT NEEDED FOR ANY FURTHER REFILLS 180 each 0   ? acetaminophen (TYLENOL) 500 MG tablet Take 1-2 tablets (500-1,000 mg total) by mouth every 4 (four) hours as needed.  0   ? albuterol (PROVENTIL) 2.5 mg /3 mL (0.083 %) nebulizer solution Take 2.5 mg by nebulization every 4 (four) hours as needed for wheezing (for wheezing).     ? nicotine (NICOTROL) 10 mg inhaler Inhale 1 puff as needed for smoking cessation. 168 Cartridge 11     No current facility-administered medications for this visit.        Physical Exam:  /74   Pulse 86   Ht 6' (1.829 m)   Wt 164 lb (74.4 kg)   SpO2 95%   BMI 22.24 kg/m    Gen: alert, oriented, no distress  HEENT: no cervical or supraclavicular lymphadenopathy  CV: RRR, no M/G/R  Resp: moderately diminished with prolonged expiratory phase  Abd: soft, nontender, no palpable organomegaly  Skin: no apparent rashes  Ext: no cyanosis, clubbing or edema  Neuro: alert, nonfocal    Labs:  genotype ZZ with severely reduced A1AT level of 21.6 mg/dL by nephelometry    Imaging studies:  CXR (July  2016):  - overall clear with no focal infiltrate      CT abdomen (July 2011):  - significant emphysema at bilateral lung bases      PFT's  September 2017:  FVC 2.69 FEV1 0.79 ratio 29%.  Significant improvement with bronchodilators.  TLC and RV to TLC ratio increased.  DLCO corrected decreased at 63% predicted.  Assessment: Very severe obstruction with significant response to bronchodilators.  Air trapping on lung volumes.  Mild to moderate diffusion defect.     October 2018:  Note the patient took albuterol prior to testing, therefore this represents post-bronchodilator spirometry.  FEV1/FVC is 0.36 and is reduced.  FEV1 is 46% predicted and is reduced.  FVC is 98% predicted and is normal.  DLCO is 49% predicted and is reduced when it   is corrected for hemoglobin.  The flow volume loop is normal No. There is consistent flattening of the inspiratory limb.    March 2019:  FEV1/FVC is 28 and is reduced.  FEV1 is 38% predicted and is reduced.  FVC is 107% predicted and is normal.  There was no improvement in spirometry after a single inhaled dose of bronchodilator.  DLCO is 73% predicted and is reduced when it   is corrected for hemoglobin.    The patient walked a total of 450 meters. Breathing room air, SpO2 veronica was 86% and HRmax was 122 bpm. BP and HR responses were appropriate.

## 2021-06-22 NOTE — TELEPHONE ENCOUNTER
Central PA team  [phone]    PA has been initiated.   PA paperwork has been faxed to insurance.

## 2021-06-24 NOTE — PATIENT INSTRUCTIONS - HE
It was good to see you in clinic today. This is what we discussed:    1. I recommend that you quit smoking before your surgery.  2. Use the nicotine inhaler when you have a cigarette craving.  3. Continue Trelegy one inhalation daily. Rinse, gargle, and spit water after use.  4. We will repeat lung functions tests including a walking test.  5. I will call you with the results.  6. You have alpha-1 antitrypsin deficiency. This is a low protein level that makes your lungs much more susceptible to emphysema from cigarette smoke.  7. I will see you in about 3 months.  8. Call any time with questions or concerning symptoms.    Jono Kenney MD  Pulmonary and Critical Care Medicine  [facility]  Office [phone]

## 2021-06-24 NOTE — PROGRESS NOTES
Pt here for pre and post spirometry with DLCO.  Great pt efforts, best were saved.  HGB drawn.  Albuterol 2.5 mg nebulizer used for bronchodilation.  Results meet ATS standards for repeatability and acceptability.  Pt left without distress.

## 2021-06-24 NOTE — PROGRESS NOTES
Pt here for a six minute walk.  Pt was on room air, walked briskly without stops and any walking aide.  Pt walked a total of 450 meters.  Pt left in no distress.

## 2021-06-24 NOTE — PROGRESS NOTES
Pulmonary Clinic Follow-up Visit    Assessment and Plan:   63 year old male with a history of longstanding and active tobacco dependence, elevated PSA, COPD, and recently diagnosed alpha-1 antitrypsin deficiency, recently diagnosed prostate cancer, presenting for preoperative pulmonary evaluation.      Chronic obstructive pulmonary disease and alpha-1 antitrypsin deficiency: In October 2018, post-bronchodilator FEV1 was 1.66 L (46%), DLCO 49% (down from 63% in October 2017). Has severe A1AT deficiency (genotype ZZ with severely reduced A1AT level of 21.6 mg/dL by nephelometry). He continues to smoke and has continued to decline A1AT replacement therapy. Currently smokes 1-2 cigarettes daily, and his wife smokes 0.5 ppd. Neither of them are very motivated to quit smoking. He endorses a good performance status, stating that with the fluticasone-umeclidinium-vilanterol (Trelegy Ellipta) he feels improved, and can shovel snow and walk without limitation (walked for miles at the [facility] [place] over the summer without difficulty. Resting SpO2 on room air is 91%. He is scheduled for robotic-asseded radical retropubic prostatectomy and bilateral pelvic lymph node dissection on 3/19/19.     Plan:  - repeat pre- and post-bronchodilator spirometry and DLCO  - again advised him to quit smoking; he is noncommittal  - again advised consideration of A1AT replacement therapy, assuming he is still a candidate depending on upcoming PFTs; he will think about this  - based on his performance status, there is no clear contraindication to the planned procedure, acknowledging some increase in risk of perioperative pulmonary complications, though not prohibitive  - continue fluticasone-umeclidinium-vilanterol (Trelegy Ellipta) 100-62.5-25 one inhalation daily in the perioperative period, can use home supply; rinse/gargle/spit water after use  - continue nebulized or HFA albuterol as needed, including perioperatively  - advised annual influenza  vaccination  - received Pneumovax-23 in November 2017  - he has previously declined pulmonary rehab  - follow up in 3 months  - encouraged him to call any time with questions or concerning symptoms      I appreciate the opportunity to participate in the care of Mr. Terrell.  Please feel free to contact me at any time.      CCx: severe COPD and alpha-1 antitrypsin deficiency    HPI: 63 year old male with a history of longstanding and active tobacco dependence, elevated PSA, COPD, and recently diagnosed alpha-1 antitrypsin deficiency, recently diagnosed prostate cancer, presenting for preoperative pulmonary evaluation. In October 2018, post-bronchodilator FEV1 was 1.66 L (46%), DLCO 49% (down from 63% in October 2017). Has severe A1AT deficiency (genotype ZZ with severely reduced A1AT level of 21.6 mg/dL by nephelometry). He continues to smoke and has continued to decline A1AT replacement therapy. Currently smokes 1-2 cigarettes daily, and his wife smokes 0.5 ppd. Neither of them are very motivated to quit smoking. He endorses a good performance status, stating that with the fluticasone-umeclidinium-vilanterol (Trelegy Ellipta) he feels improved, and can shovel snow and walk without limitation (walked for miles at the [facility] over the summer without difficulty. Resting SpO2 on room air is 91%. He is scheduled for robotic-asseded radical retropubic prostatectomy and bilateral pelvic lymph node dissection on 3/19/19.    ROS:  A 12-system review was obtained and was negative with the exception of the symptoms endorsed in the history of present illness.    PMH:  Severe COPD  Elevated PSA  Active tobacco dependence  A1AT deficiency - ZZ phenotype with level 21.6 mg/dL by nephelometry    PSH:  No past surgical history on file.    Allergies:  No Known Allergies    Family HX:  No family history on file.    Social Hx:  Social History     Socioeconomic History     Marital status:      Spouse name: Not on file      Number of children: Not on file     Years of education: Not on file     Highest education level: Not on file   Occupational History     Not on file   Social Needs     Financial resource strain: Not on file     Food insecurity:     Worry: Not on file     Inability: Not on file     Transportation needs:     Medical: Not on file     Non-medical: Not on file   Tobacco Use     Smoking status: Current Some Day Smoker     Last attempt to quit: 2017     Years since quittin.4     Smokeless tobacco: Never Used   Substance and Sexual Activity     Alcohol use: Yes     Alcohol/week: 2.4 - 3.0 oz     Types: 4 - 5 Cans of beer per week     Comment: drinks on weekends.     Drug use: Not on file     Sexual activity: Not on file   Lifestyle     Physical activity:     Days per week: Not on file     Minutes per session: Not on file     Stress: Not on file   Relationships     Social connections:     Talks on phone: Not on file     Gets together: Not on file     Attends [employer] service: Not on file     Active member of club or organization: Not on file     Attends meetings of clubs or organizations: Not on file     Relationship status: Not on file     Intimate partner violence:     Fear of current or ex partner: Not on file     Emotionally abused: Not on file     Physically abused: Not on file     Forced sexual activity: Not on file   Other Topics Concern     Not on file   Social History Narrative     Not on file       Current Meds:  Current Outpatient Medications   Medication Sig Dispense Refill     albuterol (PROAIR HFA;PROVENTIL HFA;VENTOLIN HFA) 90 mcg/actuation inhaler Inhale 1-2 puffs every 4 (four) hours as needed for wheezing or shortness of breath. 2 Inhaler 5     albuterol (PROVENTIL) 2.5 mg /3 mL (0.083 %) nebulizer solution Take 2.5 mg by nebulization every 4 (four) hours as needed for wheezing (for wheezing).       fluticasone-umeclidinium-vilanterol (TRELEGY ELLIPTA) 100-62.5-25 mcg DsDv inhaler Inhale 1  "Inhalation daily. 60 each 11     nicotine (NICOTROL) 10 mg inhaler Inhale 1 puff as needed for smoking cessation. 168 Cartridge 11     No current facility-administered medications for this visit.        Physical Exam:  /70   Pulse 91   Resp 10   Ht 5' 11\" (1.803 m)   Wt 165 lb (74.8 kg)   SpO2 91%   BMI 23.01 kg/m    Gen: alert, oriented, no distress  HEENT: nasal turbinates are unremarkable, no oropharyngeal lesions, no cervical or supraclavicular lymphadenopathy  CV: tachy, regular, no M/G/R  Resp: diminished bilaterally with prolonged expiratory phase  Abd: soft, nontender, no palpable organomegaly  Skin: no apparent rashes  Ext: no cyanosis, clubbing or edema  Neuro: alert, nonfocal    Labs:  reviewed    Imaging studies:  CXR (July 2016):  - overall clear with no focal infiltrate      CT abdomen (July 2011):  - significant emphysema at bilateral lung bases      PFT's  September 2017:  FVC 2.69 FEV1 0.79 ratio 29%.  Significant improvement with bronchodilators.  TLC and RV to TLC ratio increased.  DLCO corrected decreased at 63% predicted.  Assessment: Very severe obstruction with significant response to bronchodilators.  Air trapping on lung volumes.  Mild to moderate diffusion defect.     October 2018:  Note the patient took albuterol prior to testing, therefore this represents post-bronchodilator spirometry.  FEV1/FVC is 0.36 and is reduced.  FEV1 is 46% predicted and is reduced.  FVC is 98% predicted and is normal.  DLCO is 49% predicted and is reduced when it   is corrected for hemoglobin.  The flow volume loop is normal No. There is consistent flattening of the inspiratory limb.    Jono Kenney MD  Pulmonary and Critical Care Medicine  [facility]  Cell [phone]  Office [phone]  Pager [phone]    "

## 2021-06-25 NOTE — TELEPHONE ENCOUNTER
Pulmonary Telephone Note    Called Mr. Terrell to discuss his PFT results. FEV1 is down from October 2018 but still better than September 2017, likely as a result of therapy. DLCO is actually up a little bit. He has reduced 6MWT distance and hypoxia with ambulation. He quit smoking after our clinic visit and just filled the nicotine inhaler but has not started using it; I encouraged him to use it for cravings and continue to stay away from cigarettes leading up to his surgery. He had no further questions. See my clinic notes for further details. Encouraged him to call any time with questions or concerning symptoms.    Jono Kenney MD  Pulmonary and Critical Care Medicine  [facility]  Cell [phone]  Office [phone]  Pager [phone]

## 2021-06-25 NOTE — ANESTHESIA CARE TRANSFER NOTE
Last vitals:   Vitals:    03/19/19 0724   BP: (!) 138/96   Pulse: 89   Resp: 18   Temp: 36.4  C (97.6  F)   SpO2: 95%     Patient's level of consciousness is drowsy  Spontaneous respirations: yes  Maintains airway independently: yes  Dentition unchanged: yes  Oropharynx: oropharynx clear of all foreign objects    QCDR Measures:  ASA# 20 - Surgical Safety Checklist: WHO surgical safety checklist completed prior to induction    PQRS# 430 - Adult PONV Prevention: 4558F - Pt received => 2 anti-emetic agents (different classes) preop & intraop  ASA# 8 - Peds PONV Prevention: NA - Not pediatric patient, not GA or 2 or more risk factors NOT present  PQRS# 424 - Criselda-op Temp Management: 4559F - At least one body temp DOCUMENTED => 35.5C or 95.9F within required timeframe  PQRS# 426 - PACU Transfer Protocol: - Transfer of care checklist used  ASA# 14 - Acute Post-op Pain: ASA14B - Patient did NOT experience pain >= 7 out of 10   19-May-2021 07:32:41

## 2021-06-25 NOTE — ANESTHESIA PREPROCEDURE EVALUATION
Anesthesia Evaluation      Patient summary reviewed   No history of anesthetic complications     Airway   Mallampati: II   Pulmonary - normal exam   (+) COPD moderate,                          Cardiovascular - negative ROS and normal exam  Exercise tolerance: > or = 4 METS  Rhythm: regular  Rate: normal,         Neuro/Psych - negative ROS     Endo/Other - negative ROS      GI/Hepatic/Renal - negative ROS           Dental - normal exam                        Anesthesia Plan  Planned anesthetic: general endotracheal  GAETT  Antiemetics  Toradol at the end of case if okay with surgeon  Soft bite block  ASA 2   Induction: intravenous   Anesthetic plan and risks discussed with: patient    Post-op plan: routine recovery

## 2021-07-04 NOTE — ADDENDUM NOTE
Addendum Note by Derek Haider MD at 3/8/2021 10:30 AM     Author: Derek Haider MD Service: -- Author Type: Physician    Filed: 3/9/2021  1:52 PM Encounter Date: 3/8/2021 Status: Signed    : Derek Haider MD (Physician)    Addended by: DEREK HAIDER on: 3/9/2021 01:52 PM        Modules accepted: Orders

## 2021-07-21 ENCOUNTER — TELEPHONE (OUTPATIENT)
Dept: PULMONOLOGY | Facility: OTHER | Age: 66
End: 2021-07-21

## 2021-07-21 NOTE — TELEPHONE ENCOUNTER
"Phone call from patient's wife, Trini. States the cost of Armin's inhaler has been going up each month, now over $400.  They cannot afford.  Asking about other replacements?    She states the price has been going up over the past couple of months, so my thought is that he is in the \"donut hole\" with their medicare.  Asked her to check in with her insurance provider to explain why the cost is increasing and if anything less expensive for them.  Explained that alternative may be nebulized medications if nothing affordable for him.      She will call insurance and let us know if something else may be more affordable for them.  "

## 2022-02-21 ENCOUNTER — LAB REQUISITION (OUTPATIENT)
Dept: LAB | Facility: CLINIC | Age: 67
End: 2022-02-21

## 2022-02-21 DIAGNOSIS — Z13.220 ENCOUNTER FOR SCREENING FOR LIPOID DISORDERS: ICD-10-CM

## 2022-02-21 DIAGNOSIS — R18.8 OTHER ASCITES: ICD-10-CM

## 2022-02-21 LAB
ALBUMIN SERPL-MCNC: 3.7 G/DL (ref 3.5–5)
ALP SERPL-CCNC: 105 U/L (ref 45–120)
ALT SERPL W P-5'-P-CCNC: 23 U/L (ref 0–45)
ANION GAP SERPL CALCULATED.3IONS-SCNC: 9 MMOL/L (ref 5–18)
AST SERPL W P-5'-P-CCNC: 24 U/L (ref 0–40)
BASOPHILS # BLD AUTO: 0.1 10E3/UL (ref 0–0.2)
BASOPHILS NFR BLD AUTO: 1 %
BILIRUB SERPL-MCNC: 1.7 MG/DL (ref 0–1)
BUN SERPL-MCNC: 25 MG/DL (ref 8–22)
CALCIUM SERPL-MCNC: 9.2 MG/DL (ref 8.5–10.5)
CHLORIDE BLD-SCNC: 106 MMOL/L (ref 98–107)
CHOLEST SERPL-MCNC: 194 MG/DL
CO2 SERPL-SCNC: 25 MMOL/L (ref 22–31)
CREAT SERPL-MCNC: 0.99 MG/DL (ref 0.7–1.3)
EOSINOPHIL # BLD AUTO: 0.4 10E3/UL (ref 0–0.7)
EOSINOPHIL NFR BLD AUTO: 6 %
ERYTHROCYTE [DISTWIDTH] IN BLOOD BY AUTOMATED COUNT: 13.2 % (ref 10–15)
FASTING STATUS PATIENT QL REPORTED: NORMAL
GFR SERPL CREATININE-BSD FRML MDRD: 84 ML/MIN/1.73M2
GLUCOSE BLD-MCNC: 89 MG/DL (ref 70–125)
HCT VFR BLD AUTO: 52.5 % (ref 40–53)
HDLC SERPL-MCNC: 55 MG/DL
HGB BLD-MCNC: 17.7 G/DL (ref 13.3–17.7)
IMM GRANULOCYTES # BLD: 0 10E3/UL
IMM GRANULOCYTES NFR BLD: 1 %
LDLC SERPL CALC-MCNC: 124 MG/DL
LYMPHOCYTES # BLD AUTO: 2.4 10E3/UL (ref 0.8–5.3)
LYMPHOCYTES NFR BLD AUTO: 38 %
MCH RBC QN AUTO: 31.2 PG (ref 26.5–33)
MCHC RBC AUTO-ENTMCNC: 33.7 G/DL (ref 31.5–36.5)
MCV RBC AUTO: 93 FL (ref 78–100)
MONOCYTES # BLD AUTO: 0.6 10E3/UL (ref 0–1.3)
MONOCYTES NFR BLD AUTO: 9 %
NEUTROPHILS # BLD AUTO: 2.8 10E3/UL (ref 1.6–8.3)
NEUTROPHILS NFR BLD AUTO: 45 %
NRBC # BLD AUTO: 0 10E3/UL
NRBC BLD AUTO-RTO: 0 /100
PLATELET # BLD AUTO: 239 10E3/UL (ref 150–450)
POTASSIUM BLD-SCNC: 4.3 MMOL/L (ref 3.5–5)
PROT SERPL-MCNC: 6.5 G/DL (ref 6–8)
RBC # BLD AUTO: 5.67 10E6/UL (ref 4.4–5.9)
SODIUM SERPL-SCNC: 140 MMOL/L (ref 136–145)
TRIGL SERPL-MCNC: 74 MG/DL
WBC # BLD AUTO: 6.2 10E3/UL (ref 4–11)

## 2022-02-21 PROCEDURE — 80061 LIPID PANEL: CPT | Performed by: STUDENT IN AN ORGANIZED HEALTH CARE EDUCATION/TRAINING PROGRAM

## 2022-02-21 PROCEDURE — 80053 COMPREHEN METABOLIC PANEL: CPT | Performed by: STUDENT IN AN ORGANIZED HEALTH CARE EDUCATION/TRAINING PROGRAM

## 2022-02-21 PROCEDURE — 85025 COMPLETE CBC W/AUTO DIFF WBC: CPT | Performed by: STUDENT IN AN ORGANIZED HEALTH CARE EDUCATION/TRAINING PROGRAM

## 2022-02-21 PROCEDURE — 86803 HEPATITIS C AB TEST: CPT | Performed by: STUDENT IN AN ORGANIZED HEALTH CARE EDUCATION/TRAINING PROGRAM

## 2022-02-22 LAB — HCV AB SERPL QL IA: NONREACTIVE

## 2022-03-09 DIAGNOSIS — J44.9 COPD (CHRONIC OBSTRUCTIVE PULMONARY DISEASE) (H): Primary | ICD-10-CM

## 2022-04-12 DIAGNOSIS — J44.9 COPD, SEVERE (H): ICD-10-CM

## 2022-05-03 ENCOUNTER — OFFICE VISIT (OUTPATIENT)
Dept: PULMONOLOGY | Facility: OTHER | Age: 67
End: 2022-05-03
Payer: COMMERCIAL

## 2022-05-03 ENCOUNTER — ALLIED HEALTH/NURSE VISIT (OUTPATIENT)
Dept: PULMONOLOGY | Facility: OTHER | Age: 67
End: 2022-05-03
Payer: COMMERCIAL

## 2022-05-03 VITALS
SYSTOLIC BLOOD PRESSURE: 171 MMHG | BODY MASS INDEX: 22.72 KG/M2 | DIASTOLIC BLOOD PRESSURE: 107 MMHG | OXYGEN SATURATION: 92 % | HEART RATE: 73 BPM | WEIGHT: 167.5 LBS

## 2022-05-03 DIAGNOSIS — J44.9 COPD (CHRONIC OBSTRUCTIVE PULMONARY DISEASE) (H): ICD-10-CM

## 2022-05-03 DIAGNOSIS — J44.9 CHRONIC OBSTRUCTIVE PULMONARY DISEASE, UNSPECIFIED COPD TYPE (H): Primary | ICD-10-CM

## 2022-05-03 LAB
DLCOCOR-%PRED-PRE: 40 %
DLCOCOR-PRE: 11.42 ML/MIN/MMHG
DLCOUNC-%PRED-PRE: 42 %
DLCOUNC-PRE: 12.04 ML/MIN/MMHG
DLCOUNC-PRED: 28.26 ML/MIN/MMHG
ERV-%PRED-PRE: 84 %
ERV-PRE: 1.34 L
ERV-PRED: 1.58 L
EXPTIME-PRE: 19.63 SEC
FEF2575-%PRED-POST: 12 %
FEF2575-%PRED-PRE: 11 %
FEF2575-POST: 0.36 L/SEC
FEF2575-PRE: 0.31 L/SEC
FEF2575-PRED: 2.78 L/SEC
FEFMAX-%PRED-PRE: 40 %
FEFMAX-PRE: 3.7 L/SEC
FEFMAX-PRED: 9.19 L/SEC
FEV1-%PRED-PRE: 33 %
FEV1-PRE: 1.2 L
FEV1FEV6-PRE: 38 %
FEV1FEV6-PRED: 78 %
FEV1FVC-PRE: 24 %
FEV1FVC-PRED: 76 %
FEV1SVC-PRE: 22 %
FEV1SVC-PRED: 69 %
FIFMAX-PRE: 3.48 L/SEC
FRCPLETH-%PRED-PRE: 159 %
FRCPLETH-PRE: 6.04 L
FRCPLETH-PRED: 3.78 L
FVC-%PRED-PRE: 104 %
FVC-PRE: 4.93 L
FVC-PRED: 4.72 L
HGB BLD-MCNC: 16.7 G/DL
IC-%PRED-PRE: 110 %
IC-PRE: 4.02 L
IC-PRED: 3.62 L
RVPLETH-%PRED-PRE: 175 %
RVPLETH-PRE: 4.61 L
RVPLETH-PRED: 2.62 L
TLCPLETH-%PRED-PRE: 133 %
TLCPLETH-PRE: 10.06 L
TLCPLETH-PRED: 7.53 L
VA-%PRED-PRE: 97 %
VA-PRE: 6.76 L
VC-%PRED-PRE: 104 %
VC-PRE: 5.45 L
VC-PRED: 5.2 L

## 2022-05-03 PROCEDURE — 94726 PLETHYSMOGRAPHY LUNG VOLUMES: CPT | Performed by: INTERNAL MEDICINE

## 2022-05-03 PROCEDURE — 94729 DIFFUSING CAPACITY: CPT | Performed by: INTERNAL MEDICINE

## 2022-05-03 PROCEDURE — 85018 HEMOGLOBIN: CPT

## 2022-05-03 PROCEDURE — 99214 OFFICE O/P EST MOD 30 MIN: CPT | Mod: 25 | Performed by: INTERNAL MEDICINE

## 2022-05-03 PROCEDURE — 94060 EVALUATION OF WHEEZING: CPT | Performed by: INTERNAL MEDICINE

## 2022-05-03 NOTE — PATIENT INSTRUCTIONS
It was good to see you in clinic today. This is what we discussed:    Continue Trelegy one inhalation daily. Rinse, gargle, and spit water after use.  Congratulations on quitting smoking! Continue to stay away from cigarettes!'  Use albuterol (Proair) as needed.  I will see you in one year.  Call any time with questions or concerns.    Jono Kenney MD  Pulmonary and Critical Care Medicine  [facility]  Office [phone]

## 2022-05-03 NOTE — PROGRESS NOTES
Pulmonary Clinic Follow-up Visit    Assessment and Plan:   66 year old male with a history of tobacco dependence in remission, COPD, and alpha-1 antitrypsin deficiency, prostate cancer s/p robotic assisted radical prostatectomy and repair of right inguinal hernia, presenting for follow-up.      Tobacco dependence, chronic obstructive pulmonary disease, alpha-1 antitrypsin deficiency: Quit smoking in March 2021 after our last visit for which he is congratulated! Continues to use nicotine inhaler occasionally but minimal cravings for cigarettes. Breathing feels stable, minimal cough. Feels that he can accomplish his daytime activities without significant limitation. Baseline severe A1AT deficiency (genotype ZZ with severely reduced A1AT level of 21.6 mg/dL by nephelometry) and has previously declined alpha-1 antitrypsin enzyme replacement therapy. Continues  fluticasone furoate-umeclidinium-vilanterol 100-62.5-25 one inhalation daily. Severe obstruction with FEV1 1.20 L (33%) and DLCOc 40%. At this level of FEV1 reduction, unlikely to benefit from enzyme replacement even if he changed his mind. CAT score today is 7 (2,2,0,0,0,0,2,1), improved from 13 one year ago.     Plan:  - congratulated on quitting smoking in March 2021!  - encouraged to stay tobacco-free; still uses nicotine inhaler occasionally for cravings  - previously declined A1AT enzyme replacement, and now that his FEV1 is <35% the benefit is unclear  - continue fluticasone furoate-umeclidinium-vilanterol 100-62.5-25 one inhalation daily; rinse/gargle/spit water after use  - albuterol HFA needed  - annual high-dose influenza vaccination  - up to date on pneumococcal vaccination  - COVID-19 vaccination: Moderna April 2021, May 2021, November 2021  - he has previously declined pulmonary rehab; does treadmill one mile, 4 times/week  - follow up in one year  - encouraged him to call any time with questions or concerning symptoms    Jono Kenney MD  Pulmonary  and Critical Care Medicine  [facility]  Office [phone]  Pager [phone]  (he/him/his)    CCx: tobacco dependence, chronic obstructive pulmonary disease, alpha-1 antitrypsin deficiency    HPI: 66 year old male with a history of tobacco dependence in remission, COPD, and alpha-1 antitrypsin deficiency, prostate cancer s/p robotic assisted radical prostatectomy and repair of right inguinal hernia, presenting for follow-up.66 year old male with a history of tobacco dependence in remission, COPD, and alpha-1 antitrypsin deficiency, prostate cancer s/p robotic assisted radical prostatectomy and repair of right inguinal hernia, presenting for follow-up. Quit smoking in March 2021 after our last visit for which he is congratulated! Continues to use nicotine inhaler occasionally but minimal cravings for cigarettes. Breathing feels stable, minimal cough. Feels that he can accomplish his daytime activities without significant limitation. Baseline severe A1AT deficiency (genotype ZZ with severely reduced A1AT level of 21.6 mg/dL by nephelometry) and has previously declined alpha-1 antitrypsin enzyme replacement therapy. Continues  fluticasone furoate-umeclidinium-vilanterol 100-62.5-25 one inhalation daily. Severe obstruction with FEV1 1.20 L (33%) and DLCOc 40%. At this level of FEV1 reduction, unlikely to benefit from enzyme replacement even if he changed his mind. CAT score today is 7 (2,2,0,0,0,0,2,1), improved from 13 one year ago.    ROS:  A 12-system review was obtained and was negative with the exception of the symptoms endorsed in the history of present illness.    PMH:  Severe COPD  Elevated PSA  Active tobacco dependence  A1AT deficiency - ZZ phenotype with level 21.6 mg/dL by nephelometry    PSH:  Past Surgical History:   Procedure Laterality Date     [place] LAP,PROSTATECTOMY,RADICAL,W/NERVE SPARE,INCL ROBOTIC Bilateral 3/19/2019    Procedure: ROBOTIC ASSISTED RADICAL RETROPUBIC PROSTATECTOMY  BILATERAL PELVIC LYMPH NODE DISSECTION, LYSIS OF ADHESIONS, REPAIR OF RIGHT INGUINAL HERNIA;  Surgeon: Candido Angelo MD;  Location: [facility];  Service: Urology       Allergies:  No Known Allergies    Family HX:  No family history on file.    Social Hx:  Social History     Socioeconomic History     Marital status:      Spouse name: Not on file     Number of children: Not on file     Years of education: Not on file     Highest education level: Not on file   Occupational History     Not on file   Tobacco Use     Smoking status: Current Some Day Smoker     Packs/day: 0.50     Years: 30.00     Pack years: 15.00     Last attempt to quit: 2017     Years since quittin.6     Smokeless tobacco: Never Used     Tobacco comment: quit 4 days ago   Substance and Sexual Activity     Alcohol use: Yes     Alcohol/week: 4.0 - 5.0 standard drinks     Comment: Alcoholic Drinks/day: drinks on weekends.     Drug use: No     Sexual activity: Not on file   Other Topics Concern     Not on file   Social History Narrative     Not on file     Social Determinants of Health     Financial Resource Strain: Not on file   Food Insecurity: Not on file   Transportation Needs: Not on file   Physical Activity: Not on file   Stress: Not on file   Social Connections: Not on file   Intimate Partner Violence: Not on file   Housing Stability: Not on file       Current Meds:  Current Outpatient Medications   Medication Sig Dispense Refill     acetaminophen (TYLENOL) 500 MG tablet [ACETAMINOPHEN (TYLENOL) 500 MG TABLET] Take 1-2 tablets (500-1,000 mg total) by mouth every 4 (four) hours as needed.  0     albuterol (PROAIR HFA;PROVENTIL HFA;VENTOLIN HFA) 90 mcg/actuation inhaler [ALBUTEROL (PROAIR HFA;PROVENTIL HFA;VENTOLIN HFA) 90 MCG/ACTUATION INHALER] Inhale 1-2 puffs every 4 (four) hours as needed for wheezing or shortness of breath. 2 Inhaler 5     Fluticasone-Umeclidin-Vilanterol (TRELEGY ELLIPTA) 100-62.5-25 MCG/INH oral  inhaler Inhale 1 puff into the lungs in the morning. 60 each 0     albuterol (PROVENTIL) 2.5 mg /3 mL (0.083 %) nebulizer solution [ALBUTEROL (PROVENTIL) 2.5 MG /3 ML (0.083 %) NEBULIZER SOLUTION] Take 2.5 mg by nebulization every 4 (four) hours as needed for wheezing (for wheezing). (Patient not taking: Reported on 5/3/2022)       nicotine (NICOTROL) 10 mg inhaler [NICOTINE (NICOTROL) 10 MG INHALER] Place a cartridge in the inhaler and take frequent puffs for 20 minutes as needed for tobacco craving. Use up to 16 cartridges per day. (Patient not taking: Reported on 5/3/2022) 168 Cartridge 11       Physical Exam:  BP (!) 171/107   Pulse 73   Wt 76 kg (167 lb 8 oz)   SpO2 92%   BMI 22.72 kg/m    Gen: alert, oriented, no distress  HEENT: no cervical or supraclavicular lymphadenopathy  CV: RRR, no M/G/R  Resp: moderately diminished with prolonged expiratory phase  Skin: no apparent rashes  Ext: no cyanosis, clubbing or edema  Neuro: alert, nonfocal    Labs:  genotype ZZ with severely reduced A1AT level of 21.6 mg/dL by nephelometry     Imaging studies:  CXR (July 2016):  - overall clear with no focal infiltrate      CT abdomen (July 2011):  - significant emphysema at bilateral lung bases      PFT's  September 2017:  FVC 2.69 FEV1 0.79 ratio 29%.  Significant improvement with bronchodilators.  TLC and RV to TLC ratio increased.  DLCO corrected decreased at 63% predicted.  Assessment: Very severe obstruction with significant response to bronchodilators.  Air trapping on lung volumes.  Mild to moderate diffusion defect.     October 2018:  Note the patient took albuterol prior to testing, therefore this represents post-bronchodilator spirometry.  FEV1/FVC is 0.36 and is reduced.  FEV1 is 46% predicted and is reduced.  FVC is 98% predicted and is normal.  DLCO is 49% predicted and is reduced when it   is corrected for hemoglobin.  The flow volume loop is normal No. There is consistent flattening of the inspiratory  limb.     March 2019:  FEV1/FVC is 28 and is reduced.  FEV1 is 38% predicted and is reduced.  FVC is 107% predicted and is normal.  There was no improvement in spirometry after a single inhaled dose of bronchodilator.  DLCO is 73% predicted and is reduced when it   is corrected for hemoglobin.     The patient walked a total of 450 meters. Breathing room air, SpO2 veronica was 86% and HRmax was 122 bpm. BP and HR responses were appropriate.    May 2022  FVC 4.93 (104%)  FEV1 1.20 (33%)  Ratio 0.24  RV 4.61 (175%)  TLC 10.06 (133%)  DLCOc 40%

## 2022-05-03 NOTE — LETTER
5/3/2022         RE: Armin Terrell  [address]  [facility] [address]        Dear Colleague,    Thank you for referring your patient, Armin Terrell, to the [facility]. Please see a copy of my visit note below.    Pulmonary Clinic Follow-up Visit    Assessment and Plan:   66 year old male with a history of tobacco dependence in remission, COPD, and alpha-1 antitrypsin deficiency, prostate cancer s/p robotic assisted radical prostatectomy and repair of right inguinal hernia, presenting for follow-up.      Tobacco dependence, chronic obstructive pulmonary disease, alpha-1 antitrypsin deficiency: Quit smoking in March 2021 after our last visit for which he is congratulated! Continues to use nicotine inhaler occasionally but minimal cravings for cigarettes. Breathing feels stable, minimal cough. Feels that he can accomplish his daytime activities without significant limitation. Baseline severe A1AT deficiency (genotype ZZ with severely reduced A1AT level of 21.6 mg/dL by nephelometry) and has previously declined alpha-1 antitrypsin enzyme replacement therapy. Continues  fluticasone furoate-umeclidinium-vilanterol 100-62.5-25 one inhalation daily. Severe obstruction with FEV1 1.20 L (33%) and DLCOc 40%. At this level of FEV1 reduction, unlikely to benefit from enzyme replacement even if he changed his mind. CAT score today is 7 (2,2,0,0,0,0,2,1), improved from 13 one year ago.     Plan:  - congratulated on quitting smoking in March 2021!  - encouraged to stay tobacco-free; still uses nicotine inhaler occasionally for cravings  - previously declined A1AT enzyme replacement, and now that his FEV1 is <35% the benefit is unclear  - continue fluticasone furoate-umeclidinium-vilanterol 100-62.5-25 one inhalation daily; rinse/gargle/spit water after use  - albuterol HFA needed  - annual high-dose influenza vaccination  - up to date on pneumococcal vaccination  - COVID-19 vaccination:  Moderna April 2021, May 2021, November 2021  - he has previously declined pulmonary rehab; does treadmill one mile, 4 times/week  - follow up in one year  - encouraged him to call any time with questions or concerning symptoms    Jono Kenney MD  Pulmonary and Critical Care Medicine  [facility]  Office [phone]  Pager [phone]  (he/him/his)    CCx: tobacco dependence, chronic obstructive pulmonary disease, alpha-1 antitrypsin deficiency    HPI: 66 year old male with a history of tobacco dependence in remission, COPD, and alpha-1 antitrypsin deficiency, prostate cancer s/p robotic assisted radical prostatectomy and repair of right inguinal hernia, presenting for follow-up.66 year old male with a history of tobacco dependence in remission, COPD, and alpha-1 antitrypsin deficiency, prostate cancer s/p robotic assisted radical prostatectomy and repair of right inguinal hernia, presenting for follow-up. Quit smoking in March 2021 after our last visit for which he is congratulated! Continues to use nicotine inhaler occasionally but minimal cravings for cigarettes. Breathing feels stable, minimal cough. Feels that he can accomplish his daytime activities without significant limitation. Baseline severe A1AT deficiency (genotype ZZ with severely reduced A1AT level of 21.6 mg/dL by nephelometry) and has previously declined alpha-1 antitrypsin enzyme replacement therapy. Continues  fluticasone furoate-umeclidinium-vilanterol 100-62.5-25 one inhalation daily. Severe obstruction with FEV1 1.20 L (33%) and DLCOc 40%. At this level of FEV1 reduction, unlikely to benefit from enzyme replacement even if he changed his mind. CAT score today is 7 (2,2,0,0,0,0,2,1), improved from 13 one year ago.    ROS:  A 12-system review was obtained and was negative with the exception of the symptoms endorsed in the history of present illness.    PMH:  Severe COPD  Elevated PSA  Active tobacco dependence  A1AT  deficiency - ZZ phenotype with level 21.6 mg/dL by nephelometry    PSH:  Past Surgical History:   Procedure Laterality Date     [place] LAP,PROSTATECTOMY,RADICAL,W/NERVE SPARE,INCL ROBOTIC Bilateral 3/19/2019    Procedure: ROBOTIC ASSISTED RADICAL RETROPUBIC PROSTATECTOMY BILATERAL PELVIC LYMPH NODE DISSECTION, LYSIS OF ADHESIONS, REPAIR OF RIGHT INGUINAL HERNIA;  Surgeon: Candido Angelo MD;  [place] [facility] [place]  Service: Urology       Allergies:  No Known Allergies    Family HX:  No family history on file.    Social Hx:  Social History     Socioeconomic History     Marital status:      Spouse name: Not on file     Number of children: Not on file     Years of education: Not on file     Highest education level: Not on file   Occupational History     Not on file   Tobacco Use     Smoking status: Current Some Day Smoker     Packs/day: 0.50     Years: 30.00     Pack years: 15.00     Last attempt to quit: 2017     Years since quittin.6     Smokeless tobacco: Never Used     Tobacco comment: quit 4 days ago   Substance and Sexual Activity     Alcohol use: Yes     Alcohol/week: 4.0 - 5.0 standard drinks     Comment: Alcoholic Drinks/day: drinks on weekends.     Drug use: No     Sexual activity: Not on file   Other Topics Concern     Not on file   Social History Narrative     Not on file     Social Determinants of Health     Financial Resource Strain: Not on file   Food Insecurity: Not on file   Transportation Needs: Not on file   Physical Activity: Not on file   Stress: Not on file   Social Connections: Not on file   Intimate Partner Violence: Not on file   Housing Stability: Not on file       Current Meds:  Current Outpatient Medications   Medication Sig Dispense Refill     acetaminophen (TYLENOL) 500 MG tablet [ACETAMINOPHEN (TYLENOL) 500 MG TABLET] Take 1-2 tablets (500-1,000 mg total) by mouth every 4 (four) hours as needed.  0     albuterol (PROAIR HFA;PROVENTIL HFA;VENTOLIN HFA) 90  mcg/actuation inhaler [ALBUTEROL (PROAIR HFA;PROVENTIL HFA;VENTOLIN HFA) 90 MCG/ACTUATION INHALER] Inhale 1-2 puffs every 4 (four) hours as needed for wheezing or shortness of breath. 2 Inhaler 5     Fluticasone-Umeclidin-Vilanterol (TRELEGY ELLIPTA) 100-62.5-25 MCG/INH oral inhaler Inhale 1 puff into the lungs in the morning. 60 each 0     albuterol (PROVENTIL) 2.5 mg /3 mL (0.083 %) nebulizer solution [ALBUTEROL (PROVENTIL) 2.5 MG /3 ML (0.083 %) NEBULIZER SOLUTION] Take 2.5 mg by nebulization every 4 (four) hours as needed for wheezing (for wheezing). (Patient not taking: Reported on 5/3/2022)       nicotine (NICOTROL) 10 mg inhaler [NICOTINE (NICOTROL) 10 MG INHALER] Place a cartridge in the inhaler and take frequent puffs for 20 minutes as needed for tobacco craving. Use up to 16 cartridges per day. (Patient not taking: Reported on 5/3/2022) 168 Cartridge 11       Physical Exam:  BP (!) 171/107   Pulse 73   Wt 76 kg (167 lb 8 oz)   SpO2 92%   BMI 22.72 kg/m    Gen: alert, oriented, no distress  HEENT: no cervical or supraclavicular lymphadenopathy  CV: RRR, no M/G/R  Resp: moderately diminished with prolonged expiratory phase  Skin: no apparent rashes  Ext: no cyanosis, clubbing or edema  Neuro: alert, nonfocal    Labs:  genotype ZZ with severely reduced A1AT level of 21.6 mg/dL by nephelometry     Imaging studies:  CXR (July 2016):  - overall clear with no focal infiltrate      CT abdomen (July 2011):  - significant emphysema at bilateral lung bases      PFT's  September 2017:  FVC 2.69 FEV1 0.79 ratio 29%.  Significant improvement with bronchodilators.  TLC and RV to TLC ratio increased.  DLCO corrected decreased at 63% predicted.  Assessment: Very severe obstruction with significant response to bronchodilators.  Air trapping on lung volumes.  Mild to moderate diffusion defect.     October 2018:  Note the patient took albuterol prior to testing, therefore this represents post-bronchodilator  spirometry.  FEV1/FVC is 0.36 and is reduced.  FEV1 is 46% predicted and is reduced.  FVC is 98% predicted and is normal.  DLCO is 49% predicted and is reduced when it   is corrected for hemoglobin.  The flow volume loop is normal No. There is consistent flattening of the inspiratory limb.     March 2019:  FEV1/FVC is 28 and is reduced.  FEV1 is 38% predicted and is reduced.  FVC is 107% predicted and is normal.  There was no improvement in spirometry after a single inhaled dose of bronchodilator.  DLCO is 73% predicted and is reduced when it   is corrected for hemoglobin.     The patient walked a total of 450 meters. Breathing room air, SpO2 veronica was 86% and HRmax was 122 bpm. BP and HR responses were appropriate.    May 2022  FVC 4.93 (104%)  FEV1 1.20 (33%)  Ratio 0.24  RV 4.61 (175%)  TLC 10.06 (133%)  DLCOc 40%      Again, thank you for allowing me to participate in the care of your patient.        Sincerely,        Jono Kenney MD

## 2022-05-23 DIAGNOSIS — J44.9 COPD, SEVERE (H): ICD-10-CM

## 2023-05-03 NOTE — PROGRESS NOTES
Pulmonary Clinic Follow-up Visit  May 4, 2023       Assessment and Plan:   67 year old male with a history of tobacco dependence in remission, COPD, and alpha-1 antitrypsin deficiency, prostate cancer s/p robotic assisted radical prostatectomy and repair of right inguinal hernia, presenting for follow-up.      Tobacco dependence, chronic obstructive pulmonary disease, alpha-1 antitrypsin deficiency: Severe obstruction with FEV1 1.20 L (33%) and DLCOc 40%. Quit smoking in March 2021.  Baseline severe A1AT deficiency and has previously declined alpha-1 antitrypsin enzyme replacement therapy, and now that his FEV1 is <35% the benefit is unclear. Continues fluticasone furoate-umeclidinium-vilanterol 100-62.5-25 one inhalation daily with good control and no exacerbations.     Plan:  - congratulated on quitting smoking in March 2021!  - continue fluticasone furoate-umeclidinium-vilanterol 100-62.5-25 one inhalation daily; rinse/gargle/spit water after use  - albuterol HFA needed  -Trial of guaifenesin 1-2 times daily when chest congestion seems difficult to clear.  - annual high-dose influenza vaccination  - up to date on pneumococcal vaccination  - encouraged him to try melatonin supplement at night before sleep.  He declines sleep referral at this time.  -Repeat PFTs before next visit  - COVID-19 vaccination: UTD with last booster in 04/2022. Suggest continued boosters.   - he has previously declined pulmonary rehab; does treadmill one mile, 4 times/week  - follow up in one year  - encouraged him to call any time with questions or concerning symptoms      COPD ACTION PLAN:  Continue using your inhaler medications as prescribed. Your maintenance medications should be used every day at the frequency they are prescribed. You should increase your use of the rescue medications (albuterol) -- up to 2-4 puffs every 4-6 hours. Drink plenty of fluids to help loosen the mucus in your chest and get plenty of rest.  If you are  not feeling better within a day, you should start you COPD Action Plan:    Start PREDNISONE 40 mg once a day for 5 days    Start your prescribed antibiotic: azithromycin z pack for 5 days    Continue taking your steroid and antibiotic for the full course even if you start to feel better or return back to normal.    You should expect for your symptoms to slowly start improving in 2-3 days after starting your prednisone and antibiotic.    You should call our clinic at [phone] to notify us that you are having a COPD exacerbation. Let us know if you are feeling better or not, and we will determine if there are any more steps you would need to take or if you should be seen in clinic (not always necessary).    If your breathing gets worse despite treatment and you cannot breathe despite using your albuterol nebulizer, call 911 or go to the Emergency Room for an evaluation.    Giovana Lopes, [facility]  Pulmonary Medicine  [facility]   [phone]    CCx: alpha-1 antitrypsin deficiency  Chief Complaint   Patient presents with     Follow Up     COPD        HPI:  Armin has been feeling well since his last visit 1 year ago. He quit smoking in March 2021. Minimal cough that is occasionally productive of thick white/clear sputum.  This is very mild for the most part but he does admit to occasionally having difficulty clearing secretions. He will become winded after about 30 mins on the treadmill, this will happen much a quicker with exertion but will rebound quickly with breaks. He reports specific difficulty doing something like mowing the lawn where there is an incline and he has to push the mower. Feels that he can accomplish his daytime activities without significant limitation.   Continues  fluticasone furoate-umeclidinium-vilanterol 100-62.5-25 one inhalation daily.   He is also complaining of difficulty staying asleep at night.  Denies waking with gasping, being told he snores, daytime headaches, or significant  daytime fatigue.  He worked night shift for 50 years and believes this to be related to issues with his sleep rhythm.    Patient supplied answers from flow sheet for:  COPD Assessment Test (CAT)   [employer]. All rights reserved.      3/8/2021    10:00 AM 5/3/2022     9:00 AM 2023     7:00 AM   COPD assessment test (CAT)   Cough 2 2 1   Phlegm 3 2 3   Chest tightness 1 0 1   Walk up hill 1 0 1   Limited activities 1 0 3   Leaving my home 1 0 0   Sleep 2 2 0   Energy 2 1 3   Total Score 13 7 12      ROS:  A 10-system review was obtained and was negative with the exception of the symptoms endorsed in the history of present illness.    PMH:  Severe COPD  Elevated PSA  Active tobacco dependence  A1AT deficiency - ZZ phenotype with level 21.6 mg/dL by nephelometry    PSH:  Past Surgical History:   Procedure Laterality Date     [place] LAP,PROSTATECTOMY,RADICAL,W/NERVE SPARE,INCL ROBOTIC Bilateral 3/19/2019    Procedure: ROBOTIC ASSISTED RADICAL RETROPUBIC PROSTATECTOMY BILATERAL PELVIC LYMPH NODE DISSECTION, LYSIS OF ADHESIONS, REPAIR OF RIGHT INGUINAL HERNIA;  Surgeon: Candido Angelo MD;  Location: [facility] [place];  Service: Urology       Allergies:  No Known Allergies    Family HX:  No family history on file.    Social Hx:  Social History     Socioeconomic History     Marital status:      Spouse name: Not on file     Number of children: Not on file     Years of education: Not on file     Highest education level: Not on file   Occupational History     Not on file   Tobacco Use     Smoking status: Former     Packs/day: 0.50     Years: 30.00     Pack years: 15.00     Types: Cigarettes     Quit date: 2017     Years since quittin.6     Smokeless tobacco: Former   Vaping Use     Vaping status: Never Used   Substance and Sexual Activity     Alcohol use: Yes     Alcohol/week: 4.0 - 5.0 standard drinks of alcohol     Comment: Alcoholic Drinks/day: drinks on weekends.     Drug use: No     Sexual activity: Not  on file   Other Topics Concern     Not on file   Social History Narrative     Not on file     Social Determinants of Health     Financial Resource Strain: Not on file   Food Insecurity: Not on file   Transportation Needs: Not on file   Physical Activity: Not on file   Stress: Not on file   Social Connections: Not on file   Intimate Partner Violence: Not on file   Housing Stability: Not on file       Current Meds:  Current Outpatient Medications   Medication Sig Dispense Refill     acetaminophen (TYLENOL) 500 MG tablet [ACETAMINOPHEN (TYLENOL) 500 MG TABLET] Take 1-2 tablets (500-1,000 mg total) by mouth every 4 (four) hours as needed.  0     albuterol (PROAIR HFA;PROVENTIL HFA;VENTOLIN HFA) 90 mcg/actuation inhaler [ALBUTEROL (PROAIR HFA;PROVENTIL HFA;VENTOLIN HFA) 90 MCG/ACTUATION INHALER] Inhale 1-2 puffs every 4 (four) hours as needed for wheezing or shortness of breath. 2 Inhaler 5     Fluticasone-Umeclidin-Vilanterol (TRELEGY ELLIPTA) 100-62.5-25 MCG/INH oral inhaler Inhale 1 puff into the lungs daily 60 each 11     albuterol (PROVENTIL) 2.5 mg /3 mL (0.083 %) nebulizer solution Take 2.5 mg by nebulization every 4 hours as needed       nicotine (NICOTROL) 10 mg inhaler [NICOTINE (NICOTROL) 10 MG INHALER] Place a cartridge in the inhaler and take frequent puffs for 20 minutes as needed for tobacco craving. Use up to 16 cartridges per day. 168 Cartridge 11       Physical Exam:  /86 (BP Location: Right arm, Patient Position: Chair, Cuff Size: Adult Regular)   Pulse 80   Wt 73.9 kg (163 lb)   SpO2 95%   BMI 22.11 kg/m      Physical Exam  Constitutional:       General: He is not in acute distress.     Appearance: He is not ill-appearing or diaphoretic.   HENT:      Nose: Nose normal.   Cardiovascular:      Rate and Rhythm: Normal rate and regular rhythm.      Pulses: Normal pulses.      Heart sounds: Normal heart sounds.   Pulmonary:      Effort: Pulmonary effort is normal. No respiratory distress.       Breath sounds: Normal breath sounds. No wheezing or rhonchi.   Musculoskeletal:      Right lower leg: No edema.      Left lower leg: No edema.   Skin:     General: Skin is warm and dry.      Findings: No rash.   Neurological:      Mental Status: He is alert.   Psychiatric:         Behavior: Behavior normal.       Labs:  genotype ZZ with severely reduced A1AT level of 21.6 mg/dL by nephelometry     Imaging studies:  CXR (July 2016):  - overall clear with no focal infiltrate      CT abdomen (July 2011):  - significant emphysema at bilateral lung bases      PFT's  September 2017:  FVC 2.69 FEV1 0.79 ratio 29%.  Significant improvement with bronchodilators.  TLC and RV to TLC ratio increased.  DLCO corrected decreased at 63% predicted.  Assessment: Very severe obstruction with significant response to bronchodilators.  Air trapping on lung volumes.  Mild to moderate diffusion defect.     October 2018:  Note the patient took albuterol prior to testing, therefore this represents post-bronchodilator spirometry.  FEV1/FVC is 0.36 and is reduced.  FEV1 is 46% predicted and is reduced.  FVC is 98% predicted and is normal.  DLCO is 49% predicted and is reduced when it   is corrected for hemoglobin.  The flow volume loop is normal No. There is consistent flattening of the inspiratory limb.     March 2019:  FEV1/FVC is 28 and is reduced.  FEV1 is 38% predicted and is reduced.  FVC is 107% predicted and is normal.  There was no improvement in spirometry after a single inhaled dose of bronchodilator.  DLCO is 73% predicted and is reduced when it   is corrected for hemoglobin.  6MWT: The patient walked a total of 450 meters. Breathing room air, SpO2 veronica was 86% and HRmax was 122 bpm. BP and HR responses were appropriate.    May 2022  FVC 4.93 (104%)  FEV1 1.20 (33%)  Ratio 0.24  RV 4.61 (175%)  TLC 10.06 (133%)  DLCOc 40%

## 2023-05-04 ENCOUNTER — OFFICE VISIT (OUTPATIENT)
Dept: PULMONOLOGY | Facility: CLINIC | Age: 68
End: 2023-05-04
Payer: COMMERCIAL

## 2023-05-04 VITALS
SYSTOLIC BLOOD PRESSURE: 128 MMHG | WEIGHT: 163 LBS | DIASTOLIC BLOOD PRESSURE: 86 MMHG | HEART RATE: 80 BPM | OXYGEN SATURATION: 95 % | BODY MASS INDEX: 22.11 KG/M2

## 2023-05-04 DIAGNOSIS — E88.01 ALPHA-1-ANTITRYPSIN DEFICIENCY (H): ICD-10-CM

## 2023-05-04 DIAGNOSIS — J44.9 CHRONIC OBSTRUCTIVE PULMONARY DISEASE, UNSPECIFIED COPD TYPE (H): Primary | ICD-10-CM

## 2023-05-04 PROCEDURE — 99214 OFFICE O/P EST MOD 30 MIN: CPT | Performed by: NURSE PRACTITIONER

## 2023-05-04 RX ORDER — GUAIFENESIN 600 MG/1
1200 TABLET, EXTENDED RELEASE ORAL 2 TIMES DAILY
Qty: 60 TABLET | Refills: 1 | Status: SHIPPED | OUTPATIENT
Start: 2023-05-04 | End: 2023-07-17

## 2023-05-04 NOTE — PATIENT INSTRUCTIONS
- if your chest congestion is hard to clear try OTC guaifenesin (mucinex) twice daily for a few days. This will thin your mucous.   - try melatonin for sleep. Take this 1-2 hours before you go to bed.   -  a oxygen monitor for your finger. When you are feeing short of breath, check this.     If you have worsening symptoms, questions, or need to speak with the nurse please call [phone].

## 2023-07-17 DIAGNOSIS — J44.9 CHRONIC OBSTRUCTIVE PULMONARY DISEASE, UNSPECIFIED COPD TYPE (H): ICD-10-CM

## 2023-07-17 RX ORDER — GUAIFENESIN 600 MG/1
TABLET ORAL
Qty: 60 TABLET | Refills: 1 | Status: SHIPPED | OUTPATIENT
Start: 2023-07-17 | End: 2024-05-02

## 2023-09-26 ENCOUNTER — LAB REQUISITION (OUTPATIENT)
Dept: LAB | Facility: CLINIC | Age: 68
End: 2023-09-26

## 2023-09-26 DIAGNOSIS — Z13.1 ENCOUNTER FOR SCREENING FOR DIABETES MELLITUS: ICD-10-CM

## 2023-09-26 PROCEDURE — 80053 COMPREHEN METABOLIC PANEL: CPT | Performed by: STUDENT IN AN ORGANIZED HEALTH CARE EDUCATION/TRAINING PROGRAM

## 2023-09-27 LAB
ALBUMIN SERPL BCG-MCNC: 3.4 G/DL (ref 3.5–5.2)
ALP SERPL-CCNC: 147 U/L (ref 40–129)
ALT SERPL W P-5'-P-CCNC: 24 U/L (ref 0–70)
ANION GAP SERPL CALCULATED.3IONS-SCNC: 14 MMOL/L (ref 7–15)
AST SERPL W P-5'-P-CCNC: 33 U/L (ref 0–45)
BILIRUB SERPL-MCNC: 0.9 MG/DL
BUN SERPL-MCNC: 21.2 MG/DL (ref 8–23)
CALCIUM SERPL-MCNC: 8.6 MG/DL (ref 8.8–10.2)
CHLORIDE SERPL-SCNC: 103 MMOL/L (ref 98–107)
CREAT SERPL-MCNC: 0.96 MG/DL (ref 0.67–1.17)
DEPRECATED HCO3 PLAS-SCNC: 21 MMOL/L (ref 22–29)
EGFRCR SERPLBLD CKD-EPI 2021: 87 ML/MIN/1.73M2
GLUCOSE SERPL-MCNC: 88 MG/DL (ref 70–99)
POTASSIUM SERPL-SCNC: 4 MMOL/L (ref 3.4–5.3)
PROT SERPL-MCNC: 6.6 G/DL (ref 6.4–8.3)
SODIUM SERPL-SCNC: 138 MMOL/L (ref 135–145)

## 2024-05-02 DIAGNOSIS — J44.9 CHRONIC OBSTRUCTIVE PULMONARY DISEASE, UNSPECIFIED COPD TYPE (H): ICD-10-CM

## 2024-05-02 RX ORDER — GUAIFENESIN 600 MG/1
TABLET ORAL
Qty: 60 TABLET | Refills: 1 | Status: SHIPPED | OUTPATIENT
Start: 2024-05-02 | End: 2024-05-06

## 2024-05-06 ENCOUNTER — OFFICE VISIT (OUTPATIENT)
Dept: PULMONOLOGY | Facility: CLINIC | Age: 69
End: 2024-05-06
Payer: COMMERCIAL

## 2024-05-06 VITALS
HEART RATE: 95 BPM | SYSTOLIC BLOOD PRESSURE: 124 MMHG | BODY MASS INDEX: 21.73 KG/M2 | OXYGEN SATURATION: 92 % | WEIGHT: 160.2 LBS | DIASTOLIC BLOOD PRESSURE: 80 MMHG

## 2024-05-06 DIAGNOSIS — J44.9 CHRONIC OBSTRUCTIVE PULMONARY DISEASE, UNSPECIFIED COPD TYPE (H): ICD-10-CM

## 2024-05-06 DIAGNOSIS — Z87.891 PERSONAL HISTORY OF TOBACCO USE: ICD-10-CM

## 2024-05-06 DIAGNOSIS — R06.09 DYSPNEA ON EXERTION: ICD-10-CM

## 2024-05-06 DIAGNOSIS — J44.9 CHRONIC OBSTRUCTIVE PULMONARY DISEASE, UNSPECIFIED COPD TYPE (H): Primary | ICD-10-CM

## 2024-05-06 LAB
DLCOCOR-%PRED-PRE: 42 %
DLCOCOR-PRE: 11.63 ML/MIN/MMHG
DLCOUNC-%PRED-PRE: 45 %
DLCOUNC-PRE: 12.64 ML/MIN/MMHG
DLCOUNC-PRED: 27.66 ML/MIN/MMHG
ERV-%PRED-PRE: 108 %
ERV-PRE: 1.73 L
ERV-PRED: 1.59 L
EXPTIME-PRE: 16.03 SEC
FEF2575-%PRED-POST: 15 %
FEF2575-%PRED-PRE: 14 %
FEF2575-POST: 0.4 L/SEC
FEF2575-PRE: 0.35 L/SEC
FEF2575-PRED: 2.51 L/SEC
FEFMAX-%PRED-PRE: 41 %
FEFMAX-PRE: 3.7 L/SEC
FEFMAX-PRED: 8.96 L/SEC
FEV1-%PRED-PRE: 35 %
FEV1-PRE: 1.16 L
FEV1FEV6-PRE: 38 %
FEV1FEV6-PRED: 78 %
FEV1FVC-PRE: 26 %
FEV1FVC-PRED: 77 %
FEV1SVC-PRE: 23 %
FEV1SVC-PRED: 73 %
FIFMAX-PRE: 4.27 L/SEC
FRCPLETH-%PRED-PRE: 162 %
FRCPLETH-PRE: 6.19 L
FRCPLETH-PRED: 3.81 L
FVC-%PRED-PRE: 104 %
FVC-PRE: 4.41 L
FVC-PRED: 4.24 L
HGB BLD-MCNC: 18.1 G/DL
IC-%PRED-PRE: 103 %
IC-PRE: 3.3 L
IC-PRED: 3.18 L
RVPLETH-%PRED-PRE: 166 %
RVPLETH-PRE: 4.46 L
RVPLETH-PRED: 2.67 L
TLCPLETH-%PRED-PRE: 125 %
TLCPLETH-PRE: 9.49 L
TLCPLETH-PRED: 7.53 L
VA-%PRED-PRE: 91 %
VA-PRE: 6.34 L
VC-%PRED-PRE: 112 %
VC-PRE: 5.03 L
VC-PRED: 4.46 L

## 2024-05-06 PROCEDURE — 85018 HEMOGLOBIN: CPT | Mod: QW | Performed by: INTERNAL MEDICINE

## 2024-05-06 PROCEDURE — 94060 EVALUATION OF WHEEZING: CPT | Mod: 26 | Performed by: INTERNAL MEDICINE

## 2024-05-06 PROCEDURE — G0296 VISIT TO DETERM LDCT ELIG: HCPCS | Performed by: NURSE PRACTITIONER

## 2024-05-06 PROCEDURE — 94729 DIFFUSING CAPACITY: CPT | Mod: 26 | Performed by: INTERNAL MEDICINE

## 2024-05-06 PROCEDURE — 94726 PLETHYSMOGRAPHY LUNG VOLUMES: CPT | Mod: 26 | Performed by: INTERNAL MEDICINE

## 2024-05-06 PROCEDURE — 99214 OFFICE O/P EST MOD 30 MIN: CPT | Performed by: NURSE PRACTITIONER

## 2024-05-06 RX ORDER — GUAIFENESIN 600 MG/1
1200 TABLET, EXTENDED RELEASE ORAL 2 TIMES DAILY
Qty: 60 TABLET | Refills: 11 | Status: SHIPPED | OUTPATIENT
Start: 2024-05-06

## 2024-05-06 NOTE — PROGRESS NOTES
Pulmonary Clinic Follow-up Visit  May 6, 2024      Assessment and Plan:   68 year old male with a history of tobacco dependence in remission, COPD, and alpha-1 antitrypsin deficiency, prostate cancer s/p robotic assisted radical prostatectomy and repair of right inguinal hernia, presenting for follow-up.      Tobacco dependence, chronic obstructive pulmonary disease, alpha-1 antitrypsin deficiency: Severe obstruction with FEV1 1.20 L (33%) and DLCOc 40%. Quit smoking in March 2021.  Baseline severe A1AT deficiency and has previously declined alpha-1 antitrypsin enzyme replacement therapy, and now that his FEV1 is <35% the benefit is unclear. Continues fluticasone furoate-umeclidinium-vilanterol 100-62.5-25 one inhalation daily with good control and no exacerbations. CAT is 7 today.   Quit smoking 6 years ago. Was not previously interested in lung cancer screening but as his wife did have a nodule that required intervention he would like to have this done. See MDM below     PFTs show some improvement since 2022. FEV1 is 35% with a DLCOc of 42%.      Plan:  - congratulated on quitting smoking in March 2021!  - continue fluticasone furoate-umeclidinium-vilanterol 100-62.5-25 one inhalation daily; rinse/gargle/spit water after use  - albuterol HFA needed  -Continue guaifenesin 1-2 times daily when chest congestion seems difficult to clear.  - annual high-dose influenza vaccination  - up to date on pneumococcal vaccination  - COVID-19 vaccination: UTD with last booster in 04/2022. Suggest continued boosters.   - he has previously declined pulmonary rehab; does treadmill one mile, 4 times/week  - encouraged him to call any time with questions or concerning symptoms    Follow up: 1 year    COPD ACTION PLAN:  Continue using your inhaler medications as prescribed. Your maintenance medications should be used every day at the frequency they are prescribed. You should increase your use of the rescue medications (albuterol) --  up to 2-4 puffs every 4-6 hours. Drink plenty of fluids to help loosen the mucus in your chest and get plenty of rest.  If you are not feeling better within a day, you should start you COPD Action Plan:  Start PREDNISONE 40 mg once a day for 5 days  Start your prescribed antibiotic: azithromycin z pack for 5 days  Continue taking your steroid and antibiotic for the full course even if you start to feel better or return back to normal.  You should expect for your symptoms to slowly start improving in 2-3 days after starting your prednisone and antibiotic.  You should call our clinic at [phone] to notify us that you are having a COPD exacerbation. Let us know if you are feeling better or not, and we will determine if there are any more steps you would need to take or if you should be seen in clinic (not always necessary).  If your breathing gets worse despite treatment and you cannot breathe despite using your albuterol nebulizer, call 911 or go to the Emergency Room for an evaluation.    Giovana Lopes, CNP  Pulmonary Medicine  [facility]   [phone]    CCx: alpha-1 antitrypsin deficiency  Chief Complaint   Patient presents with    Follow Up    COPD       HPI:  Armin was last seen in clinic in 05/2023.  Since that time he has done well and reports stable breathing with no exacerbations.    He quit smoking in March 2021.   Continues to have minimal cough that is occasionally productive of thick white/clear sputum.  He has been taking guaifenesin 1-2 times daily and finds it very helpful to thin mucus.    He will become winded after about 30 mins on the treadmill, this will happen much a quicker with exertion but will rebound quickly with breaks. He reports specific difficulty doing something like mowing the lawn where there is an incline and he has to push the mower. Feels that he can accomplish his daytime activities without significant limitation.     He is also complaining of difficulty staying asleep  at night.  Denies waking with gasping, being told he snores, daytime headaches, or significant daytime fatigue.  He worked night shift for 50 years and believes this to be related to issues with his sleep rhythm.    Patient supplied answers from flow sheet for:  COPD Assessment Test (CAT)   [employer]. All rights reserved.      3/8/2021    10:00 AM 5/3/2022     9:00 AM 2023     7:00 AM 2024    10:58 AM   COPD assessment test (CAT)   Cough 2 2 1 2   Phlegm 3 2 3 3   Chest tightness 1 0 1 0   Walk up hill 1 0 1 1   Limited activities 1 0 3 0   Leaving my home 1 0 0 0   Sleep 2 2 0 0   Energy 2 1 3 1   Total Score 13 7 12 7      ROS:  A 10-system review was obtained and was negative with the exception of the symptoms endorsed in the history of present illness.    PMH:  Severe COPD  Elevated PSA  Active tobacco dependence  A1AT deficiency - ZZ phenotype with level 21.6 mg/dL by nephelometry    PSH:  Past Surgical History:   Procedure Laterality Date    [place] LAP,PROSTATECTOMY,RADICAL,W/NERVE SPARE,INCL ROBOTIC Bilateral 3/19/2019    Procedure: ROBOTIC ASSISTED RADICAL RETROPUBIC PROSTATECTOMY BILATERAL PELVIC LYMPH NODE DISSECTION, LYSIS OF ADHESIONS, REPAIR OF RIGHT INGUINAL HERNIA;  Surgeon: Candido Angelo MD;  Location: [facility] [place];  Service: Urology     Allergies:  No Known Allergies    Family HX:  No family history on file.    Social Hx:  Social History     Socioeconomic History    Marital status:      Spouse name: Not on file    Number of children: Not on file    Years of education: Not on file    Highest education level: Not on file   Occupational History    Not on file   Tobacco Use    Smoking status: Former     Current packs/day: 0.00     Average packs/day: 0.8 packs/day for 30.0 years (22.5 ttl pk-yrs)     Types: Cigarettes     Start date: 1987     Quit date: 2017     Years since quittin.6    Smokeless tobacco: Former   Vaping Use    Vaping status: Never Used   Substance and  Sexual Activity    Alcohol use: Yes     Alcohol/week: 4.0 - 5.0 standard drinks of alcohol     Comment: Alcoholic Drinks/day: drinks on weekends.    Drug use: No    Sexual activity: Not on file   Other Topics Concern    Not on file   Social History Narrative    Not on file     Social Determinants of Health     Financial Resource Strain: Not on file   Food Insecurity: Not on file   Transportation Needs: Not on file   Physical Activity: Not on file   Stress: Not on file   Social Connections: Not on file   Interpersonal Safety: Not on file   Housing Stability: Not on file     Current Meds:  Current Outpatient Medications   Medication Sig Dispense Refill    acetaminophen (TYLENOL) 500 MG tablet [ACETAMINOPHEN (TYLENOL) 500 MG TABLET] Take 1-2 tablets (500-1,000 mg total) by mouth every 4 (four) hours as needed.  0    albuterol (PROAIR HFA;PROVENTIL HFA;VENTOLIN HFA) 90 mcg/actuation inhaler [ALBUTEROL (PROAIR HFA;PROVENTIL HFA;VENTOLIN HFA) 90 MCG/ACTUATION INHALER] Inhale 1-2 puffs every 4 (four) hours as needed for wheezing or shortness of breath. 2 Inhaler 5    albuterol (PROVENTIL) 2.5 mg /3 mL (0.083 %) nebulizer solution Take 2.5 mg by nebulization every 4 hours as needed      Fluticasone-Umeclidin-Vilant (TRELEGY ELLIPTA) 100-62.5-25 MCG/ACT oral inhaler Inhale 1 puff into the lungs daily 60 each 11    guaiFENesin (SM MUCUS RELIEF) 600 MG 12 hr tablet Take 2 tablets (1,200 mg) by mouth 2 times daily 60 tablet 11     Physical Exam:  /80 (BP Location: Left arm, Patient Position: Sitting, Cuff Size: Adult Regular)   Pulse 95   Wt 72.7 kg (160 lb 3.2 oz)   SpO2 92%   BMI 21.73 kg/m      Physical Exam  Constitutional:       General: He is not in acute distress.     Appearance: He is not ill-appearing or diaphoretic.   HENT:      Nose: Nose normal.   Cardiovascular:      Rate and Rhythm: Normal rate and regular rhythm.      Pulses: Normal pulses.      Heart sounds: Normal heart sounds.   Pulmonary:       Effort: Pulmonary effort is normal. No respiratory distress.      Breath sounds: Normal breath sounds. No wheezing or rhonchi.   Musculoskeletal:      Right lower leg: No edema.      Left lower leg: No edema.   Skin:     General: Skin is warm and dry.      Findings: No rash.   Neurological:      Mental Status: He is alert.   Psychiatric:         Behavior: Behavior normal.       Labs:  genotype ZZ with severely reduced A1AT level of 21.6 mg/dL by nephelometry     Imaging studies:  CXR (July 2016):  - overall clear with no focal infiltrate      CT abdomen (July 2011):  - significant emphysema at bilateral lung bases      PFT's  September 2017:  FVC 2.69 FEV1 0.79 ratio 29%.  Significant improvement with bronchodilators.  TLC and RV to TLC ratio increased.  DLCO corrected decreased at 63% predicted.  Assessment: Very severe obstruction with significant response to bronchodilators.  Air trapping on lung volumes.  Mild to moderate diffusion defect.     October 2018:  Note the patient took albuterol prior to testing, therefore this represents post-bronchodilator spirometry.  FEV1/FVC is 0.36 and is reduced.  FEV1 is 46% predicted and is reduced.  FVC is 98% predicted and is normal.  DLCO is 49% predicted and is reduced when it   is corrected for hemoglobin.  The flow volume loop is normal No. There is consistent flattening of the inspiratory limb.     March 2019:  FEV1/FVC is 28 and is reduced.  FEV1 is 38% predicted and is reduced.  FVC is 107% predicted and is normal.  There was no improvement in spirometry after a single inhaled dose of bronchodilator.  DLCO is 73% predicted and is reduced when it   is corrected for hemoglobin.  6MWT: The patient walked a total of 450 meters. Breathing room air, SpO2 veronica was 86% and HRmax was 122 bpm. BP and HR responses were appropriate.    May 2022  FVC 4.93 (104%)  FEV1 1.20 (33%)  Ratio 0.24  RV 4.61 (175%)  TLC 10.06 (133%)  DLCOc 40%    May 2024:  FVC 4.41L, 104%  predicted  FEV1 1.16L, 35% predicted  FEV1/FVC 26% predicted  There was no significant response postbronchodilator  RV 4.46L, 166% predicted  TLC 9.49L, 125% predicted  DLCOc 42%    Lung Cancer Screening Shared Decision Making Visit     Armin Terrell, a 68 year old male, is eligible for lung cancer screening    History   Smoking Status    Former    Types: Cigarettes   Smokeless Tobacco    Former       I have discussed with patient the risks and benefits of screening for lung cancer with low-dose CT.     The risks include:    radiation exposure: one low dose chest CT has as much ionizing radiation as about 15 chest x-rays, or 6 months of background radiation living in [place]      false positives: most findings/nodules are NOT cancer, but some might still require additional diagnostic evaluation, including biopsy    over-diagnosis: some slow growing cancers that might never have been clinically significant will be detected and treated unnecessarily     The benefit of early detection of lung cancer is contingent upon adherence to annual screening or more frequent follow up if indicated.     Furthermore, to benefit from screening, Armin must be willing and able to undergo diagnostic procedures, if indicated. Although no specific guide is available for determining severity of comorbidities, it is reasonable to withhold screening in patients who have greater mortality risk from other diseases.     We did discuss that the best way to prevent lung cancer is to not smoke.    Some patients may value a numeric estimation of lung cancer risk when evaluating if lung cancer screening is right for them, here is one calculator:    ShouldIScreen

## 2024-05-06 NOTE — PATIENT INSTRUCTIONS
It was a pleasure seeing you in clinic today. This is what we discussed:    We will get a CT scan to screen for lung cancer.  Continue the Trelegy every day no matter what. Remember to rinse and gargle after each use.   Use your albuterol every 4 hours as needed for cough, shortness of breath, wheeze, or chest tightness.   Follow up 1 year, sooner if needed   If you have worsening symptoms, questions, or need to speak with the nurse please call [phone].     Lung Cancer Screening   Frequently Asked Questions  If you are at high-risk for lung cancer, getting screened with low-dose computed tomography (LDCT) every year can help save your life. This handout offers answers to some of the most common questions about lung cancer screening. If you have other questions, please call [phone] ([phone]).     What is it?  Lung cancer screening uses special X-ray technology to create an image of your lung tissue. The exam is quick and easy and takes less than 10 seconds. We don t give you any medicine or use any needles. You can eat before and after the exam. You don t need to change your clothes as long as the clothing on your chest doesn t contain metal. But, you do need to be able to hold your breath for at least 6 seconds during the exam.    What is the goal of lung cancer screening?  The goal of lung cancer screening is to save lives. Many times, lung cancer is not found until a person starts having physical symptoms. Lung cancer screening can help detect lung cancer in the earliest stages when it may be easier to treat.    Who should be screened for lung cancer?  We suggest lung cancer screening for anyone who is at high-risk for lung cancer. You are in the high-risk group if you:     are between the ages of 55 and 79, and   have smoked at least 1 pack of cigarettes a day for 20 or more years, and   still smoke or have quit within the past 15 years.    However, if you have a new cough or shortness of  breath, you should talk to your doctor before being screened.    Why does it matter if I have symptoms?  Certain symptoms can be a sign that you have a condition in your lungs that should be checked and treated by your doctor. These symptoms include fever, chest pain, a new or changing cough, shortness of breath that you have never felt before, coughing up blood or unexplained weight loss. Having any of these symptoms can greatly affect the results of lung cancer screening.       Should all smokers get an LDCT lung cancer screening exam?  It depends. Lung cancer screening is for a very specific group of men and women who have a history of heavy smoking over a long period of time (see  Who should be screened for lung cancer  above).  I am in the high-risk group, but have been diagnosed with cancer in the past. Is LDCT lung cancer screening right for me?  In some cases, you should not have LDCT lung screening, such as when your doctor is already following your cancer with CT scan studies. Your doctor will help you decide if LDCT lung screening is right for you.  Do I need to have a screening exam every year?  Yes. If you are in the high-risk group described earlier, you should get an LDCT lung cancer screening exam every year until you are 79, or are no longer willing or able to undergo screening and possible procedures to diagnose and treat lung cancer.  How effective is LDCT at preventing death from lung cancer?  Studies have shown that LDCT lung cancer screening can lower the risk of death from lung cancer by 20 percent in people who are at high-risk.  What are the risks?  There are some risks and limitations of LDCT lung cancer screening. We want to make sure you understand the risks and benefits, so please let us know if you have any questions. Your doctor may want to talk with you more about these risks.   Radiation exposure: As with any exam that uses radiation, there is a very small increased risk of cancer.  The amount of radiation in LDCT is small--about the same amount a person would get from a mammogram. Your doctor orders the exam when he or she feels the potential benefits outweigh the risks.   False negatives: No test is perfect, including LDCT. It is possible that you may have a medical condition, including lung cancer, that is not found during your exam. This is called a false negative result.   False positives and more testing: LDCT very often finds something in the lung that could be cancer, but in fact is not. This is called a false positive result. False positive tests often cause anxiety. To make sure these findings are not cancer, you may need to have more tests. These tests will be done only if you give us permission. Sometimes patients need a treatment that can have side effects, such as a biopsy. For more information on false positives, see  What can I expect from the results?    Findings not related to lung cancer: Your LDCT exam also takes pictures of areas of your body next to your lungs. In a very small number of cases, the CT scan will show an abnormal finding in one of these areas, such as your kidneys, adrenal glands, liver or thyroid. This finding may not be serious, but you may need more tests. Your doctor can help you decide what other tests you may need, if any.  What can I expect from the results?  About 1 out of 4 LDCT exams will find something that may need more tests. Most of the time, these findings are lung nodules. Lung nodules are very small collections of tissue in the lung. These nodules are very common, and the vast majority--more than 97 percent--are not cancer (benign). Most are normal lymph nodes or small areas of scarring from past infections.  But, if a small lung nodule is found to be cancer, the cancer can be cured more than 90 percent of the time. To know if the nodule is cancer, we may need to get more images before your next yearly screening exam. If the nodule has  suspicious features (for example, it is large, has an odd shape or grows over time), we will refer you to a specialist for further testing.  Will my doctor also get the results?  Yes. Your doctor will get a copy of your results.  Is it okay to keep smoking now that there s a cancer screening exam?  No. Tobacco is one of the strongest cancer-causing agents. It causes not only lung cancer, but other cancers and cardiovascular (heart) diseases as well. The damage caused by smoking builds over time. This means that the longer you smoke, the higher your risk of disease. While it is never too late to quit, the sooner you quit, the better.  Where can I find help to quit smoking?  The best way to prevent lung cancer is to stop smoking. If you have already quit smoking, congratulations and keep it up! For help on quitting smoking, please call [employer] at 3-618-QUITNOW (1-567.746.5494) or the [employer] at 1-422.271.2968 to find local resources near you.  One-on-one health coaching:  If you d prefer to work individually with a health care provider on tobacco cessation, we offer:     Medication Therapy Management:  Our specially trained pharmacists work closely with you and your doctor to help you quit smoking.  Call [phone] or [phone] (toll free).

## 2024-05-14 ENCOUNTER — HOSPITAL ENCOUNTER (OUTPATIENT)
Dept: CT IMAGING | Facility: HOSPITAL | Age: 69
Discharge: HOME | End: 2024-05-14
Attending: NURSE PRACTITIONER | Admitting: NURSE PRACTITIONER
Payer: COMMERCIAL

## 2024-05-14 ENCOUNTER — TELEPHONE (OUTPATIENT)
Dept: PULMONOLOGY | Facility: CLINIC | Age: 69
End: 2024-05-14
Payer: COMMERCIAL

## 2024-05-14 DIAGNOSIS — Z87.891 PERSONAL HISTORY OF TOBACCO USE: ICD-10-CM

## 2024-05-14 PROCEDURE — 71271 CT THORAX LUNG CANCER SCR C-: CPT

## 2024-05-14 NOTE — TELEPHONE ENCOUNTER
[facility] Call Center    Phone Message    May a detailed message be left on voicemail: yes     Reason for Call: Other: .     Yoli is wanting to get a call back. Yoli is calling to report an Urgent result. Writer contacted backline and reached nurses voicemail. Please advise.     Action Taken: Message routed to:  [facility] ([facility]): Lung    Travel Screening: Not Applicable

## 2024-05-14 NOTE — TELEPHONE ENCOUNTER
Spoke with Armin. Set up phone visit for him tomorrow  at 8:30am with Giovana Lopes CNP to review results of CT scan .

## 2024-05-15 ENCOUNTER — VIRTUAL VISIT (OUTPATIENT)
Dept: PULMONOLOGY | Facility: CLINIC | Age: 69
End: 2024-05-15
Payer: COMMERCIAL

## 2024-05-15 DIAGNOSIS — R91.8 PULMONARY NODULES: Primary | ICD-10-CM

## 2024-05-15 DIAGNOSIS — Z87.891 PERSONAL HISTORY OF TOBACCO USE: ICD-10-CM

## 2024-05-15 PROCEDURE — 99441 PR PHYSICIAN TELEPHONE EVALUATION 5-10 MIN: CPT | Mod: 93 | Performed by: NURSE PRACTITIONER

## 2024-05-15 ASSESSMENT — PAIN SCALES - GENERAL: PAINLEVEL: NO PAIN (0)

## 2024-05-15 NOTE — PROGRESS NOTES
Virtual Visit Details    Type of service:  Telephone Visit   Phone call duration: 10 minutes   Originating Location (pt. Location): Home    Distant Location (provider location):  On-site    Spoke with Armin regarding his chest CT results.  As there are multiple nodules including a left upper lobe 8 x 13 mm solid nodule and a 4 x 6 left lower lobe nodule, he has a history of prostate cancer, and has a 23-pack-year smoking history he is keen to proceed with PET scan.  Risk of malignancy calculated to 12 to 16% based on age and history.  Denies any current exacerbation symptoms.  At his baseline shortness of breath with activity and occasional cough.  He has no further questions at this time and is agreeable to this plan.  I will contact him with PET scan results.    Givoana Lopes, CNP  Pulmonary Medicine  [facility]   [phone]        EXAM: LOW DOSE LUNG CANCER SCREENING CT CHEST  LOCATION: [facility]  DATE: 5/14/2024     INDICATION: Lung cancer screening. History of smoking. High risk patient.  COMPARISON: PA and lateral views of the chest 7/31/2016; no prior cross-sectional imaging of the chest.     TECHNIQUE: Low-dose lung cancer screening non-contrast CT chest. Dose reduction techniques were used. Images assessed using LungRADS 2022 criteria.     FINDINGS:     NODULES: Solid, circumscribed nodule in the anterior left lower lobe measures 8 x 13 mm (series 4, image 174). Several smaller nodules are also present within the anterior basal segment of the left lower lobe the next largest of which is 4 x 6 mm (series   4, image 187).     LUNGS AND PLEURA: Advanced emphysema and related hyperinflation. Subsegmental scarring in the anterior basal left lower lobe. A more substantial band of atelectasis is present in the in the basilar right lower lobe involving multiple segments. Mild wall   thickening of central airways. No pleural effusion.     MEDIASTINUM: Cardiac chambers are normal in  size. No pericardial effusion. Nonaneurysmal thoracic aorta. Trace aortic and great vessel atheromatous calcifications. Esophagus is decompressed. No lymphadenopathy.     CORONARY ARTERY CALCIFICATION: None.     LIMITED UPPER ABDOMEN: No actionable findings in the imaged upper abdomen.     MUSCULOSKELETAL: Mild generalized bone demineralization. Slight anterior wedging of L1. No aggressive or destructive bone lesions.                                                                      IMPRESSION:     1.  Several solid pulmonary nodules are present in the left lower lobe largest of which measures 8 x 13 mm.  2.  Advanced emphysema.     LungRADS CATEGORY: 4A : Suspicious.     RADIOLOGIST RECOMMENDATION(S): Low-dose CT chest versus PET/CT in 3 months. August 2024.

## 2024-05-15 NOTE — NURSING NOTE
Is the patient currently in the state of [place]? YES    Visit mode:TELEPHONE    If the visit is dropped, the patient can be reconnected by: TELEPHONE VISIT: Phone number:   Telephone Information:   Mobile [phone]       Will anyone else be joining the visit? YES: How would they like to receive their invitation? Text to cell phone: NA  (If patient encounters technical issues they should call [phone])    How would you like to obtain your AVS? MyChart    Are changes needed to the allergy or medication list? Yes No longer using Proventil neb or guaifenesin and Pt stated no changes to allergies    Are refills needed on medications prescribed by this physician? NO    Reason for visit: RECHECK    Rocael RAMSEYF

## 2024-06-04 ENCOUNTER — HOSPITAL ENCOUNTER (OUTPATIENT)
Dept: PET IMAGING | Facility: HOSPITAL | Age: 69
Discharge: HOME | End: 2024-06-04
Attending: NURSE PRACTITIONER | Admitting: NURSE PRACTITIONER
Payer: COMMERCIAL

## 2024-06-04 DIAGNOSIS — R91.8 PULMONARY NODULES: ICD-10-CM

## 2024-06-04 DIAGNOSIS — Z87.891 PERSONAL HISTORY OF TOBACCO USE: ICD-10-CM

## 2024-06-04 LAB — GLUCOSE BLDC GLUCOMTR-MCNC: 82 MG/DL (ref 70–99)

## 2024-06-04 PROCEDURE — 78816 PET IMAGE W/CT FULL BODY: CPT | Mod: PI

## 2024-06-04 PROCEDURE — 343N000001 HC RX 343: Performed by: NURSE PRACTITIONER

## 2024-06-04 PROCEDURE — A9552 F18 FDG: HCPCS | Performed by: NURSE PRACTITIONER

## 2024-06-04 PROCEDURE — 82962 GLUCOSE BLOOD TEST: CPT

## 2024-06-04 RX ORDER — FLUDEOXYGLUCOSE F 18 200 MCI/ML
7-16 INJECTION, SOLUTION INTRAVENOUS ONCE
Status: COMPLETED | OUTPATIENT
Start: 2024-06-04 | End: 2024-06-04

## 2024-06-04 RX ADMIN — FLUDEOXYGLUCOSE F 18 10.18 MILLICURIE: 200 INJECTION, SOLUTION INTRAVENOUS at 08:20

## 2024-06-05 ENCOUNTER — TELEPHONE (OUTPATIENT)
Dept: PULMONOLOGY | Facility: CLINIC | Age: 69
End: 2024-06-05
Payer: COMMERCIAL

## 2024-06-05 NOTE — TELEPHONE ENCOUNTER
----- Message from Pippa Womack NP sent at 6/4/2024  3:44 PM CDT -----  Can you contact patient and let him know Giovana is out of town, but his PET scan does not show any concern for cancer, as the nodules did not 'light up'.  The nodules may need further chest CT scans in the future, but he can discuss the timing of these with Giovana.  Thanks.  ----- Message -----  From: Logan Trejo Ib  Sent: 6/4/2024   1:32 PM CDT  To: JORDAN Demarco CNP

## 2024-06-05 NOTE — TELEPHONE ENCOUNTER
Per Pippa Womack CNP:  Can you contact patient and let him know Giovana is out of town, but his PET scan does not show any concern for cancer, as the nodules did not 'light up'.  The nodules may need further chest CT scans in the future, but he can discuss the timing of these with Giovana.  Thanks.          Spoke with Armin. Informed him of PET scan results and follow up recommendations. Will send to Giovana Moses NUNES to follow up with him when she returns.

## 2024-06-11 ENCOUNTER — TELEPHONE (OUTPATIENT)
Dept: PULMONOLOGY | Facility: CLINIC | Age: 69
End: 2024-06-11
Payer: COMMERCIAL

## 2024-06-11 DIAGNOSIS — R91.8 PULMONARY NODULES: Primary | ICD-10-CM

## 2024-06-11 NOTE — TELEPHONE ENCOUNTER
Attempted to contact patient regarding PET scan results.  Left voicemail with plans for 6-month follow-up CT scan per recommendations.  He was contacted by a RN in the clinic last week regarding results.  I asked that he call the clinic with specific questions or concerns if he has any.  Will order follow-up scan in 6 months now.    Giovana Lopes, CNP  Pulmonary Medicine  [facility]   [phone]    EXAM: PET ONCOLOGY WHOLE BODY  LOCATION: [facility]  DATE: 6/4/2024     INDICATION: Other nonspecific abnormal finding of lung field. Solitary pulmonary nodule. Pulmonary nodules, left lower lobe. Initial treatment strategy.  COMPARISON: CT chest 5/14/2024.  TECHNIQUE: Serum glucose level 82 mg/dL. One hour post intravenous administration of 10.2 mCi F-18 FDG, PET imaging was performed from the skull vertex to feet, utilizing attenuation correction with concurrent axial CT and PET/CT image fusion. Dose   reduction techniques were used.     FINDINGS: An irregular circumscribed solid pulmonary nodule in the anterior left lower lobe measuring 0.8 x 1.3 cm, unchanged from 5/14/2024, demonstrates no focal FDG activity above background level. An additional smaller subcentimeter nodule in the   more inferior left lower lobe also demonstrates no increased FDG uptake above background. Background advanced emphysema. Moderate bronchial wall thickening with areas of bandlike scarring and/or atelectasis in the lower lobes, similar. No FDG avid   adenopathy in the chest or elsewhere. No suspicious focal uptake in the liver or skeleton.     Mild senescent intracranial changes. Minimal mucosal thickening left maxillary sinus. Mild atherosclerotic calcifications. Few tiny gallstones. Few colonic diverticula. Prostatectomy. Tiny bilateral knee joint effusions. Mild thoracolumbar curve.   Moderate scattered hypertrophic degenerative changes in the spine.                                                                       IMPRESSION:  1. No evidence of FDG avid malignancy.  2. A couple of solid irregular nodules in the left lower lobe demonstrate no increased FDG activity, favoring a benign or indolent process. However, these remain indeterminate and continued chest CT imaging surveillance is recommended, consider next in   3-6 months.

## 2024-06-13 LAB — RADIOLOGIST FLAGS: ABNORMAL

## 2024-07-10 ENCOUNTER — MEDICAL CORRESPONDENCE (OUTPATIENT)
Dept: HEALTH INFORMATION MANAGEMENT | Facility: CLINIC | Age: 69
End: 2024-07-10

## 2024-07-10 ENCOUNTER — OFFICE VISIT (OUTPATIENT)
Dept: PULMONOLOGY | Facility: CLINIC | Age: 69
End: 2024-07-10
Payer: COMMERCIAL

## 2024-07-10 ENCOUNTER — TRANSFERRED RECORDS (OUTPATIENT)
Dept: HEALTH INFORMATION MANAGEMENT | Facility: CLINIC | Age: 69
End: 2024-07-10

## 2024-07-10 VITALS
OXYGEN SATURATION: 90 % | WEIGHT: 150 LBS | DIASTOLIC BLOOD PRESSURE: 74 MMHG | SYSTOLIC BLOOD PRESSURE: 116 MMHG | BODY MASS INDEX: 20.34 KG/M2 | HEART RATE: 68 BPM

## 2024-07-10 DIAGNOSIS — R91.8 PULMONARY NODULES: ICD-10-CM

## 2024-07-10 DIAGNOSIS — E88.01 ALPHA-1-ANTITRYPSIN DEFICIENCY (H): ICD-10-CM

## 2024-07-10 DIAGNOSIS — R06.09 DYSPNEA ON EXERTION: Primary | ICD-10-CM

## 2024-07-10 DIAGNOSIS — J44.9 CHRONIC OBSTRUCTIVE PULMONARY DISEASE, UNSPECIFIED COPD TYPE (H): ICD-10-CM

## 2024-07-10 DIAGNOSIS — J43.9 PULMONARY EMPHYSEMA, UNSPECIFIED EMPHYSEMA TYPE (H): ICD-10-CM

## 2024-07-10 PROCEDURE — 99214 OFFICE O/P EST MOD 30 MIN: CPT | Performed by: NURSE PRACTITIONER

## 2024-07-10 RX ORDER — FLUTICASONE FUROATE, UMECLIDINIUM BROMIDE AND VILANTEROL TRIFENATATE 200; 62.5; 25 UG/1; UG/1; UG/1
1 POWDER RESPIRATORY (INHALATION) DAILY
Qty: 60 EACH | Refills: 3 | Status: SHIPPED | OUTPATIENT
Start: 2024-07-10

## 2024-07-10 NOTE — PATIENT INSTRUCTIONS
It was a pleasure seeing you in clinic today. This is what we discussed:    We will increase the dose of your Trelegy. Try this for 2 months. If there is no difference we can go back to the lower dose.   Schedule the CT scan for Nov-Dec. With a follow up appointment afterward.   Use your albuterol every 4 hours as needed for cough, shortness of breath, wheeze, or chest tightness.   If you have worsening symptoms, questions, or need to speak with the nurse please call [phone].

## 2024-07-10 NOTE — PROGRESS NOTES
Patient instructed in use of Overnight Oximetry Testing equipment from [facility].  Patient states good understanding ofhow to use the Overnight Testing equipment.  All paperwork signed, faxed to [facility], and copy scanned to chart.   Phone numbers given to patient to call with any questions.

## 2024-07-10 NOTE — PROGRESS NOTES
Patient oxygen saturation on RA at rest is 92% pulse 74  Oxygen saturation after ambulating 400ft on RA is 90% pulse 61        Patient is ambulatory within his/her home.      Latoya Harvey LPN

## 2024-07-10 NOTE — PROGRESS NOTES
Pulmonary Clinic Follow-up Visit  July 10, 2024      Assessment and Plan:   68 year old male with a history of tobacco dependence in remission, COPD, and alpha-1 antitrypsin deficiency, prostate cancer s/p robotic assisted radical prostatectomy and repair of right inguinal hernia, presenting for follow-up.      Tobacco dependence, chronic obstructive pulmonary disease, alpha-1 antitrypsin deficiency, pulmonary nodules: Severe obstruction with FEV1 1.20 L (33%) and DLCOc 40%. Quit smoking in March 2021.  Baseline severe A1AT deficiency and has previously declined alpha-1 antitrypsin enzyme replacement therapy, and now that his FEV1 is <35% the benefit is unclear. Continues fluticasone furoate-umeclidinium-vilanterol 100-62.5-25 one inhalation daily with good control and no exacerbations.   Quit smoking in March 2021    PFTs show some improvement since 2022. FEV1 is 35% with a DLCOc of 42%.   CT chest in 05/2024 showed several solid pulmonary nodules are present in the left lower lobe largest of which measures 8 x 13 mm. PET showed no FDG avidity. Will monitor with repeat imaging in 6 months (due 11/2024).      Plan:  - echocardiogram and cardiology referral to investigate sudden worsening of SOB with activity.   - MICHELLE to assess for nocturnal hypoxia. If abnormal pursue supplemental oxygen at night.   UPDATE 7/16/2024: MICHELLE showed SpO2 <88% for 4.5 hours. Given extensive emphysema he requires supplemantal oxygen at night. He is agreeable to start this.   Oxygen Documentation  I certify that this patient, Armin Terrell has been under my care (or a nurse practitioner or physican's assistant working with me). This is the face-to-face encounter for oxygen medical necessity.    At the time of this encounter, I have reviewed the qualifying testing and have determined that supplemental oxygen is reasonable and necessary and is expected to improve the patient's condition in a home setting.       Patient has continued  oxygen desaturation due to COPD J44.9  Emphysema J43.9.  If portability is ordered, is the patient mobile within the home? yes  Was this visit performed as a telehealth visit: No  - consider pulmonary rehab referral at follow up. He has previously declined pulmonary rehab.   - Increase dose of fluticasone furoate-umeclidinium-vilanterol to 200-62.5-25 one inhalation daily; rinse/gargle/spit water after use  - albuterol HFA needed  -Continue guaifenesin 1-2 times daily when chest congestion seems difficult to clear.  - annual high-dose influenza vaccination  - up to date on pneumococcal vaccination  - COVID-19 vaccination: UTD with last booster in 04/2022. Suggest continued boosters.   - encouraged him to call any time with questions or concerning symptoms    Follow up: following CT scan in Nov or Dec    COPD ACTION PLAN:  Start PREDNISONE 40 mg once a day for 5 days  Start your prescribed antibiotic: azithromycin z pack for 5 days  Continue taking your steroid and antibiotic for the full course even if you start to feel better or return back to normal.  You should expect for your symptoms to slowly start improving in 2-3 days after starting your prednisone and antibiotic.  You should call our clinic at [phone] to notify us that you are having a COPD exacerbation. Let us know if you are feeling better or not, and we will determine if there are any more steps you would need to take or if you should be seen in clinic (not always necessary).  If your breathing gets worse despite treatment and you cannot breathe despite using your albuterol nebulizer, call 911 or go to the Emergency Room for an evaluation.    Giovana Lopes, DES  Pulmonary Medicine  [facility]   [phone]    CCx: alpha-1 antitrypsin deficiency  Chief Complaint   Patient presents with    Follow Up     Increased shortness of breath x 2 month        HPI:  Armin was last seen in clinic in 06/2023. He was last seen via video visit. Since that  time he has noticed worsening SOB over the past few months. This is only with activity. Even with minimal activity. Is now having difficulty with simple ADLs. He reports specific difficulty doing something like mowing the lawn where there is an incline and he has to push the mower. He has had to stop mowing and hire this out.    Also reporting early satiety and increased SOB when eating. He is feeling bloated. Has lost 10lbs since May 6th at his last in office visit. Denies dysphagia or reflux.     He quit smoking in March 2021.   Continues to have minimal cough that is occasionally productive of thick white/clear sputum.  He has been taking guaifenesin 1-2 times daily and finds it very helpful to thin mucus.    He is also complaining of difficulty staying asleep at night.  Denies waking with gasping, being told he snores, daytime headaches, or significant daytime fatigue.  He worked night shift for 50 years and believes this to be related to issues with his sleep rhythm.    Patient supplied answers from flow sheet for:  COPD Assessment Test (CAT)  2009 [employer]. All rights reserved.      3/8/2021    10:00 AM 5/3/2022     9:00 AM 5/4/2023     7:00 AM 5/6/2024    10:58 AM   COPD assessment test (CAT)   Cough 2 2 1 2   Phlegm 3 2 3 3   Chest tightness 1 0 1 0   Walk up hill 1 0 1 1   Limited activities 1 0 3 0   Leaving my home 1 0 0 0   Sleep 2 2 0 0   Energy 2 1 3 1   Total Score 13 7 12 7      ROS:  A 10-system review was obtained and was negative with the exception of the symptoms endorsed in the history of present illness.    PMH:  Severe COPD  Elevated PSA  Active tobacco dependence  A1AT deficiency - ZZ phenotype with level 21.6 mg/dL by nephelometry    PSH:  Past Surgical History:   Procedure Laterality Date    SC LAP,PROSTATECTOMY,RADICAL,W/NERVE SPARE,INCL ROBOTIC Bilateral 3/19/2019    Procedure: ROBOTIC ASSISTED RADICAL RETROPUBIC PROSTATECTOMY BILATERAL PELVIC LYMPH NODE DISSECTION, LYSIS OF ADHESIONS, REPAIR  OF RIGHT INGUINAL HERNIA;  Surgeon: Candido Angelo MD;  Location: [facility];  Service: Urology     Allergies:  No Known Allergies    Family HX:  No family history on file.    Social Hx:  Social History     Socioeconomic History    Marital status:      Spouse name: Not on file    Number of children: Not on file    Years of education: Not on file    Highest education level: Not on file   Occupational History    Not on file   Tobacco Use    Smoking status: Former     Current packs/day: 0.00     Average packs/day: 0.8 packs/day for 30.0 years (22.5 ttl pk-yrs)     Types: Cigarettes     Start date: 1987     Quit date: 2017     Years since quittin.7    Smokeless tobacco: Former   Vaping Use    Vaping status: Never Used   Substance and Sexual Activity    Alcohol use: Yes     Alcohol/week: 4.0 - 5.0 standard drinks of alcohol     Comment: Alcoholic Drinks/day: drinks on weekends.    Drug use: No    Sexual activity: Not on file   Other Topics Concern    Not on file   Social History Narrative    Not on file     Social Determinants of Health     Financial Resource Strain: Not on file   Food Insecurity: Not on file   Transportation Needs: Not on file   Physical Activity: Not on file   Stress: Not on file   Social Connections: Not on file   Interpersonal Safety: Not on file   Housing Stability: Not on file     Current Meds:  Current Outpatient Medications   Medication Sig Dispense Refill    acetaminophen (TYLENOL) 500 MG tablet [ACETAMINOPHEN (TYLENOL) 500 MG TABLET] Take 1-2 tablets (500-1,000 mg total) by mouth every 4 (four) hours as needed.  0    albuterol (PROAIR HFA;PROVENTIL HFA;VENTOLIN HFA) 90 mcg/actuation inhaler [ALBUTEROL (PROAIR HFA;PROVENTIL HFA;VENTOLIN HFA) 90 MCG/ACTUATION INHALER] Inhale 1-2 puffs every 4 (four) hours as needed for wheezing or shortness of breath. 2 Inhaler 5    Fluticasone-Umeclidin-Vilant (TRELEGY ELLIPTA) 100-62.5-25 MCG/ACT oral inhaler Inhale 1 puff into  the lungs daily 60 each 11    Fluticasone-Umeclidin-Vilanterol (TRELEGY ELLIPTA) 200-62.5-25 MCG/ACT oral inhaler Inhale 1 puff into the lungs daily 60 each 3    albuterol (PROVENTIL) 2.5 mg /3 mL (0.083 %) nebulizer solution Take 2.5 mg by nebulization every 4 hours as needed (Patient not taking: Reported on 5/15/2024)      guaiFENesin (SM MUCUS RELIEF) 600 MG 12 hr tablet Take 2 tablets (1,200 mg) by mouth 2 times daily (Patient not taking: Reported on 5/15/2024) 60 tablet 11     Physical Exam:  /74   Pulse 68   Wt 68 kg (150 lb)   SpO2 90%   BMI 20.34 kg/m      Physical Exam  Constitutional:       General: He is not in acute distress.     Appearance: He is not ill-appearing or diaphoretic.   HENT:      Nose: Nose normal.   Cardiovascular:      Rate and Rhythm: Normal rate and regular rhythm.      Pulses: Normal pulses.      Heart sounds: Normal heart sounds.   Pulmonary:      Effort: Pulmonary effort is normal. No respiratory distress.      Breath sounds: Normal breath sounds. No wheezing or rhonchi.   Musculoskeletal:      Right lower leg: No edema.      Left lower leg: No edema.   Skin:     General: Skin is warm and dry.      Findings: No rash.   Neurological:      Mental Status: He is alert.   Psychiatric:         Behavior: Behavior normal.       Labs:  genotype ZZ with severely reduced A1AT level of 21.6 mg/dL by nephelometry     Imaging studies:    PET ONCOLOGY WHOLE BODY, DATE: 6/4/2024  INDICATION: Other nonspecific abnormal finding of lung field. Solitary pulmonary nodule. Pulmonary nodules, left lower lobe. Initial treatment strategy.  COMPARISON: CT chest 5/14/2024.  FINDINGS: An irregular circumscribed solid pulmonary nodule in the anterior left lower lobe measuring 0.8 x 1.3 cm, unchanged from 5/14/2024, demonstrates no focal FDG activity above background level. An additional smaller subcentimeter nodule in the   more inferior left lower lobe also demonstrates no increased FDG uptake above  background. Background advanced emphysema. Moderate bronchial wall thickening with areas of bandlike scarring and/or atelectasis in the lower lobes, similar. No FDG avid adenopathy in the chest or elsewhere. No suspicious focal uptake in the liver or skeleton.  Mild senescent intracranial changes. Minimal mucosal thickening left maxillary sinus. Mild atherosclerotic calcifications. Few tiny gallstones. Few colonic diverticula. Prostatectomy. Tiny bilateral knee joint effusions. Mild thoracolumbar curve. Moderate scattered hypertrophic degenerative changes in the spine.                                                                IMPRESSION:  1. No evidence of FDG avid malignancy.  2. A couple of solid irregular nodules in the left lower lobe demonstrate no increased FDG activity, favoring a benign or indolent process. However, these remain indeterminate and continued chest CT imaging surveillance is recommended, consider next in   3-6 months.    LOW DOSE LUNG CANCER SCREENING CT CHEST, DATE: 5/14/2024  INDICATION: Lung cancer screening. History of smoking. High risk patient.  COMPARISON: PA and lateral views of the chest 7/31/2016; no prior cross-sectional imaging of the chest.  FINDINGS:  NODULES: Solid, circumscribed nodule in the anterior left lower lobe measures 8 x 13 mm (series 4, image 174). Several smaller nodules are also present within the anterior basal segment of the left lower lobe the next largest of which is 4 x 6 mm (series 4, image 187).  LUNGS AND PLEURA: Advanced emphysema and related hyperinflation. Subsegmental scarring in the anterior basal left lower lobe. A more substantial band of atelectasis is present in the in the basilar right lower lobe involving multiple segments. Mild wall thickening of central airways. No pleural effusion.  MEDIASTINUM: Cardiac chambers are normal in size. No pericardial effusion. Nonaneurysmal thoracic aorta. Trace aortic and great vessel atheromatous  calcifications. Esophagus is decompressed. No lymphadenopathy.                                                       IMPRESSION:  1.  Several solid pulmonary nodules are present in the left lower lobe largest of which measures 8 x 13 mm.  2.  Advanced emphysema.    CXR (July 2016):  - overall clear with no focal infiltrate      CT abdomen (July 2011):  - significant emphysema at bilateral lung bases      PFT's  September 2017:  FVC 2.69 FEV1 0.79 ratio 29%.  Significant improvement with bronchodilators.  TLC and RV to TLC ratio increased.  DLCO corrected decreased at 63% predicted.  Assessment: Very severe obstruction with significant response to bronchodilators.  Air trapping on lung volumes.  Mild to moderate diffusion defect.     October 2018:  Note the patient took albuterol prior to testing, therefore this represents post-bronchodilator spirometry.  FEV1/FVC is 0.36 and is reduced.  FEV1 is 46% predicted and is reduced.  FVC is 98% predicted and is normal.  DLCO is 49% predicted and is reduced when it   is corrected for hemoglobin.  The flow volume loop is normal No. There is consistent flattening of the inspiratory limb.     March 2019:  FEV1/FVC is 28 and is reduced.  FEV1 is 38% predicted and is reduced.  FVC is 107% predicted and is normal.  There was no improvement in spirometry after a single inhaled dose of bronchodilator.  DLCO is 73% predicted and is reduced when it   is corrected for hemoglobin.  6MWT: The patient walked a total of 450 meters. Breathing room air, SpO2 veronica was 86% and HRmax was 122 bpm. BP and HR responses were appropriate.    May 2022  FVC 4.93 (104%)  FEV1 1.20 (33%)  Ratio 0.24  RV 4.61 (175%)  TLC 10.06 (133%)  DLCOc 40%    May 2024:  FVC 4.41L, 104% predicted  FEV1 1.16L, 35% predicted  FEV1/FVC 26% predicted  There was no significant response postbronchodilator  RV 4.46L, 166% predicted  TLC 9.49L, 125% predicted  DLCOc 42%

## 2024-07-16 ENCOUNTER — TELEPHONE (OUTPATIENT)
Dept: PULMONOLOGY | Facility: CLINIC | Age: 69
End: 2024-07-16
Payer: COMMERCIAL

## 2024-07-16 NOTE — TELEPHONE ENCOUNTER
Per Giovana Lopes CNP:  MICHELLE results: can you call and let him know his oxygen was low with sleep (<88% for 4.5 hours). We should start oxygen at night if he is ok with it.    Called and spoke with Armin. He is ok with starting the oxygen at night. He was a little reluctant at first and wanted to see if the increase in Trelegy would be effective. Will send to Giovana NUNES to order. [facility] is ok him.

## 2024-07-17 ENCOUNTER — TELEPHONE (OUTPATIENT)
Dept: PULMONOLOGY | Facility: CLINIC | Age: 69
End: 2024-07-17
Payer: COMMERCIAL

## 2024-07-17 NOTE — TELEPHONE ENCOUNTER
DATE: 07/17/2024  DME PROVIDER: Sandy   SUPPLY ORDERED: nocturnal oxygen  PROVIDER: Moses    Called customer service and spoke to Lelia.    Latoya Harvey LPN

## 2024-07-18 ENCOUNTER — TELEPHONE (OUTPATIENT)
Dept: PULMONOLOGY | Facility: CLINIC | Age: 69
End: 2024-07-18
Payer: COMMERCIAL

## 2024-07-18 NOTE — TELEPHONE ENCOUNTER
Armin called today to ask how long he would have to wear the nocturnal oxygen he received today. Informed him he will need to wear this until his follow up appt with Giovana NUNES in November 2024 and it can be discussed then. He is ok with this plan.    Neuro: Patient alert and oriented x4.    Cardiac: NSR 90's, BP's stable, afebrile.   Respiratory: Sating >95% on RA. Required 2L O2 overnight.   GI/: GUOP. 1 BM this shift.   Diet/appetite: NPO. TF has been on hold throughout the day and will continue to be on hold tonight. D5 1/2 NS running at 50ml/hr in place of TF for now. All meds in tube. No nausea/vomiting this shift.   Activity: Stand by assist. Steady on feet.   Pain: Complaining of lower abdominal pain. Oxycodone and atarax given.   Skin: No new deficits noted. Preventative mepilex on coccyx.   LDA's: 1 PIV - IVF infusing.      Plan: Mag replaced. Video swallow and GI emptying study completed. Will continue with plan of care.

## 2024-07-30 ENCOUNTER — HOSPITAL ENCOUNTER (OUTPATIENT)
Dept: CARDIOLOGY | Facility: HOSPITAL | Age: 69
Discharge: HOME | End: 2024-07-30
Attending: NURSE PRACTITIONER | Admitting: NURSE PRACTITIONER
Payer: COMMERCIAL

## 2024-07-30 ENCOUNTER — TELEPHONE (OUTPATIENT)
Dept: PULMONOLOGY | Facility: CLINIC | Age: 69
End: 2024-07-30

## 2024-07-30 DIAGNOSIS — R06.09 DYSPNEA ON EXERTION: ICD-10-CM

## 2024-07-30 LAB — LVEF ECHO: NORMAL

## 2024-07-30 PROCEDURE — 93306 TTE W/DOPPLER COMPLETE: CPT | Mod: 26 | Performed by: INTERNAL MEDICINE

## 2024-07-30 PROCEDURE — 93306 TTE W/DOPPLER COMPLETE: CPT

## 2024-11-03 DIAGNOSIS — J44.9 CHRONIC OBSTRUCTIVE PULMONARY DISEASE, UNSPECIFIED COPD TYPE (H): ICD-10-CM

## 2024-11-04 ENCOUNTER — HOSPITAL ENCOUNTER (OUTPATIENT)
Dept: CT IMAGING | Facility: HOSPITAL | Age: 69
Discharge: HOME | End: 2024-11-04
Attending: NURSE PRACTITIONER | Admitting: NURSE PRACTITIONER
Payer: COMMERCIAL

## 2024-11-04 DIAGNOSIS — R91.8 PULMONARY NODULES: ICD-10-CM

## 2024-11-04 PROCEDURE — 71250 CT THORAX DX C-: CPT

## 2024-11-04 RX ORDER — FLUTICASONE FUROATE, UMECLIDINIUM BROMIDE AND VILANTEROL TRIFENATATE 200; 62.5; 25 UG/1; UG/1; UG/1
1 POWDER RESPIRATORY (INHALATION) DAILY
Qty: 60 EACH | Refills: 8 | Status: SHIPPED | OUTPATIENT
Start: 2024-11-04 | End: 2024-11-07

## 2024-11-07 ENCOUNTER — OFFICE VISIT (OUTPATIENT)
Dept: PULMONOLOGY | Facility: CLINIC | Age: 69
End: 2024-11-07
Attending: NURSE PRACTITIONER
Payer: COMMERCIAL

## 2024-11-07 VITALS
SYSTOLIC BLOOD PRESSURE: 130 MMHG | WEIGHT: 155.6 LBS | OXYGEN SATURATION: 95 % | DIASTOLIC BLOOD PRESSURE: 78 MMHG | HEART RATE: 68 BPM | BODY MASS INDEX: 21.1 KG/M2

## 2024-11-07 DIAGNOSIS — J44.9 CHRONIC OBSTRUCTIVE PULMONARY DISEASE, UNSPECIFIED COPD TYPE (H): ICD-10-CM

## 2024-11-07 PROCEDURE — 99214 OFFICE O/P EST MOD 30 MIN: CPT | Performed by: NURSE PRACTITIONER

## 2024-11-07 RX ORDER — FLUTICASONE FUROATE, UMECLIDINIUM BROMIDE AND VILANTEROL TRIFENATATE 200; 62.5; 25 UG/1; UG/1; UG/1
1 POWDER RESPIRATORY (INHALATION) DAILY
Qty: 60 EACH | Refills: 11 | Status: SHIPPED | OUTPATIENT
Start: 2024-11-07

## 2024-11-07 RX ORDER — SIMETHICONE 80 MG
TABLET,CHEWABLE ORAL PRN
COMMUNITY
Start: 2024-10-03

## 2024-11-07 NOTE — PROGRESS NOTES
Pulmonary Clinic Follow-up Visit  November 7, 2024             Assessment and Plan:   69 year old male with a history of tobacco dependence in remission, COPD, and alpha-1 antitrypsin deficiency, prostate cancer s/p robotic assisted radical prostatectomy and repair of right inguinal hernia, presenting for follow-up.      Tobacco dependence, chronic obstructive pulmonary disease, alpha-1 antitrypsin deficiency, pulmonary nodules: Severe obstruction with FEV1 1.20 L (33%) and DLCOc 40%. Quit smoking in March 2021.  Baseline severe A1AT deficiency and has previously declined alpha-1 antitrypsin enzyme replacement therapy, and now that his FEV1 is <35% the benefit is unclear. Continues fluticasone furoate-umeclidinium-vilanterol 200-62.5-25 one inhalation daily with good control and no exacerbations.   MICHELLE showed SpO2 <88% for 4.5 hours. Given extensive emphysema he requires supplemantal oxygen at night.  Has not noticed much change in symptoms since starting this.  Quit smoking in March 2021    PFTs show some improvement since 2022. FEV1 is 35% with a DLCOc of 42%.   Echocardiogram in 07/2024 was normal.   CT chest in 05/2024 showed several solid pulmonary nodules are present in the left lower lobe largest of which measures 8 x 13 mm. PET showed no FDG avidity. Repeat imaging on 11/4 showed increase in nodule. Images reviewed by Dr. Genao who has scheduled his case to be discussed at team conference on 11/15.  We will contact him with neck steps after this.     Plan:  - consider pulmonary rehab referral at follow up. He has previously declined pulmonary rehab.   -Continue fluticasone furoate-umeclidinium-vilanterol 200-62.5-25 one inhalation daily; rinse/gargle/spit water after use  - albuterol HFA needed  -Continue guaifenesin 1-2 times daily when chest congestion seems difficult to clear.  - annual high-dose influenza vaccination  - up to date on pneumococcal vaccination  - COVID-19 vaccination: UTD with last  booster in 04/2022. Suggest continued boosters.   - encouraged him to call any time with questions or concerning symptoms    Follow up: In 6 months, sooner if needed    COPD ACTION PLAN:  Start PREDNISONE 40 mg once a day for 5 days  Start your prescribed antibiotic: azithromycin z pack for 5 days  Continue taking your steroid and antibiotic for the full course even if you start to feel better or return back to normal.  You should expect for your symptoms to slowly start improving in 2-3 days after starting your prednisone and antibiotic.  You should call our clinic at [phone] to notify us that you are having a COPD exacerbation. Let us know if you are feeling better or not, and we will determine if there are any more steps you would need to take or if you should be seen in clinic (not always necessary).  If your breathing gets worse despite treatment and you cannot breathe despite using your albuterol nebulizer, call 911 or go to the Emergency Room for an evaluation.    Giovana Lopes, DES  Pulmonary Medicine  [facility]   [phone]    CCx:   Chief Complaint   Patient presents with    Follow Up     3 month follow up:  Dyspnea on exertion  Chronic obstructive pulmonary disease, unspecified COPD type (H)  Alpha-1-antitrypsin deficiency (H)  Pulmonary nodules  Pulmonary emphysema, unspecified emphysema type (H)  CT CHEST 11/4/24  ECHO COMPLETE 7/30/24  MICHELLE 7/10/24          HPI:  Armin was last seen in clinic in 05/2024.  Using albuterol 2-3 times per day. Feels like it helps loosen stuff up in lungs.   He continues to have worse SOB. This is only with activity. Even with minimal activity. Is now having difficulty with simple ADLs.  Will have to take frequent breaks but is able to complete tasks.  Also reporting early satiety and increased SOB when eating. He is feeling bloated.  Continues to have minimal cough that is occasionally productive of thick white/clear sputum.  He has been taking guaifenesin  1-2 times daily and finds it very helpful to thin mucus.      He quit smoking in March 2021.   He is also complaining of difficulty staying asleep at night.  Denies waking with gasping, being told he snores, daytime headaches, or significant daytime fatigue.  He worked night shift for 50 years and believes this to be related to issues with his sleep rhythm.    Patient supplied answers from flow sheet for:  COPD Assessment Test (CAT)  2009 [place] [employer]. All rights reserved.      3/8/2021    10:00 AM 5/3/2022     9:00 AM 5/4/2023     7:00 AM 5/6/2024    10:58 AM 11/7/2024     9:01 AM   COPD assessment test (CAT)   Cough 2 2 1 2  2    Phlegm 3 2 3 3  3    Chest tightness 1 0 1 0  1    Walk up hill 1 0 1 1  2    Limited activities 1 0 3 0  4    Leaving my home 1 0 0 0  0    Sleep 2 2 0 0  3    Energy 2 1 3 1  3    Total Score 13 7 12 7 18        Patient-reported      ROS:  A 10-system review was obtained and was negative with the exception of the symptoms endorsed in the history of present illness.    PMH:  Severe COPD  Elevated PSA  Active tobacco dependence  A1AT deficiency - ZZ phenotype with level 21.6 mg/dL by nephelometry    PSH:  Past Surgical History:   Procedure Laterality Date    [place] LAP,PROSTATECTOMY,RADICAL,W/NERVE SPARE,INCL ROBOTIC Bilateral 3/19/2019    Procedure: ROBOTIC ASSISTED RADICAL RETROPUBIC PROSTATECTOMY BILATERAL PELVIC LYMPH NODE DISSECTION, LYSIS OF ADHESIONS, REPAIR OF RIGHT INGUINAL HERNIA;  Surgeon: Candido Angelo MD;  Location: [facility] - [place];  Service: Urology     Allergies:  No Known Allergies    Family HX:  No family history on file.    Social Hx:  Social History     Socioeconomic History    Marital status:      Spouse name: Not on file    Number of children: Not on file    Years of education: Not on file    Highest education level: Not on file   Occupational History    Not on file   Tobacco Use    Smoking status: Former     Current packs/day: 0.00     Average packs/day: 0.8  packs/day for 30.0 years (22.5 ttl pk-yrs)     Types: Cigarettes     Start date: 1987     Quit date: 2017     Years since quittin.1     Passive exposure: Current (Wife smokes)    Smokeless tobacco: Former   Vaping Use    Vaping status: Never Used   Substance and Sexual Activity    Alcohol use: Yes     Alcohol/week: 4.0 - 5.0 standard drinks of alcohol     Comment: Alcoholic Drinks/day: drinks on weekends.    Drug use: No    Sexual activity: Not on file   Other Topics Concern    Not on file   Social History Narrative    Not on file     Social Drivers of Health     Financial Resource Strain: Not on file   Food Insecurity: Not on file   Transportation Needs: Not on file   Physical Activity: Not on file   Stress: Not on file   Social Connections: Not on file   Interpersonal Safety: Not on file   Housing Stability: Not on file     Current Meds:  Current Outpatient Medications   Medication Sig Dispense Refill    acetaminophen (TYLENOL) 500 MG tablet [ACETAMINOPHEN (TYLENOL) 500 MG TABLET] Take 1-2 tablets (500-1,000 mg total) by mouth every 4 (four) hours as needed.  0    albuterol (PROAIR HFA;PROVENTIL HFA;VENTOLIN HFA) 90 mcg/actuation inhaler [ALBUTEROL (PROAIR HFA;PROVENTIL HFA;VENTOLIN HFA) 90 MCG/ACTUATION INHALER] Inhale 1-2 puffs every 4 (four) hours as needed for wheezing or shortness of breath. 2 Inhaler 5    albuterol (PROVENTIL) 2.5 mg /3 mL (0.083 %) nebulizer solution Take 2.5 mg by nebulization every 4 hours as needed.      Fluticasone-Umeclidin-Vilanterol (TRELEGY ELLIPTA) 200-62.5-25 MCG/ACT oral inhaler TAKE 1 PUFF BY MOUTH EVERY DAY 60 each 8    guaiFENesin (SM MUCUS RELIEF) 600 MG 12 hr tablet Take 2 tablets (1,200 mg) by mouth 2 times daily 60 tablet 11    omeprazole (PRILOSEC) 20 MG DR capsule TAKE 1 CAPSULE BY MOUTH EVERY DAY 30 MINUTES BEFORE MORNING MEAL      simethicone (MYLICON) 80 MG chewable tablet as needed.      Fluticasone-Umeclidin-Vilant (TRELEGY ELLIPTA) 100-62.5-25 MCG/ACT  oral inhaler Inhale 1 puff into the lungs daily (Patient not taking: Reported on 11/7/2024) 60 each 11     Physical Exam:  /78 (BP Location: Left arm, Patient Position: Sitting, Cuff Size: Adult Regular)   Pulse 68   Wt 70.6 kg (155 lb 9.6 oz)   SpO2 95%   BMI 21.10 kg/m      Physical Exam  Constitutional:       General: He is not in acute distress.     Appearance: He is not ill-appearing or diaphoretic.   HENT:      Nose: Nose normal.   Cardiovascular:      Rate and Rhythm: Normal rate and regular rhythm.      Pulses: Normal pulses.      Heart sounds: Normal heart sounds.   Pulmonary:      Effort: Pulmonary effort is normal. No respiratory distress.      Breath sounds: Normal breath sounds. No wheezing or rhonchi.   Musculoskeletal:      Right lower leg: No edema.      Left lower leg: No edema.   Skin:     General: Skin is warm and dry.      Findings: No rash.   Neurological:      Mental Status: He is alert.   Psychiatric:         Behavior: Behavior normal.     Labs:  genotype ZZ with severely reduced A1AT level of 21.6 mg/dL by nephelometry     Imaging studies:    CT CHEST W/O CONTRAST, DATE: 11/4/2024  INDICATION: follow up nodules  COMPARISON: 5/14/2024  FINDINGS:   LUNGS AND PLEURA: Advanced emphysematous change. Increase in size of the previously described nodule in the anterior left lower lobe measuring 1.8 x 1.5 cm to 1.3 x 0.8 cm. There is now surrounding fibronodular soft tissue extending to the pleural surface there is some new regions of mucous plugging in the lung bases with atelectasis. New fibronodular atelectasis within the right upper lobe. New 5 mm nodule in the subpleural left lower lobe (image 197). Previously described clustered nodules in the anterior left lower lobe have largely resolved with a 4 mm nodule remaining. Additional new 5 mm nodule in the left lower lobe cephalad to the dominant nodule.  MEDIASTINUM/AXILLAE: No lymphadenopathy. No thoracic aortic aneurysm.                                                                IMPRESSION:   1.  Increase in size of an irregular nodule in the left lower lobe now measuring 1.8 x 1.5 cm in comparison to 1.3 x 0.8 cm. In addition there are are fibronodular regions of extension now seen extending to the pleural surface.  2.  New 5 mm subpleural nodule in the left lower lobe with an additional new 5 mm nodule cephalad to the larger described nodule in the left lower lobe.  3.  Advanced emphysematous change. New regions of mucous plugging in the lung bases.  4.  New fibronodular atelectasis in the right upper lobe.  5.  The previously described clustered nodularity in the anterior left lower lobeh as largely resolved with a 4 mm nodule remaining.  6.  Cholelithiasis.    PET ONCOLOGY WHOLE BODY, DATE: 6/4/2024  INDICATION: Other nonspecific abnormal finding of lung field. Solitary pulmonary nodule. Pulmonary nodules, left lower lobe. Initial treatment strategy.  COMPARISON: CT chest 5/14/2024.  FINDINGS: An irregular circumscribed solid pulmonary nodule in the anterior left lower lobe measuring 0.8 x 1.3 cm, unchanged from 5/14/2024, demonstrates no focal FDG activity above background level. An additional smaller subcentimeter nodule in the   more inferior left lower lobe also demonstrates no increased FDG uptake above background. Background advanced emphysema. Moderate bronchial wall thickening with areas of bandlike scarring and/or atelectasis in the lower lobes, similar. No FDG avid adenopathy in the chest or elsewhere. No suspicious focal uptake in the liver or skeleton.  Mild senescent intracranial changes. Minimal mucosal thickening left maxillary sinus. Mild atherosclerotic calcifications. Few tiny gallstones. Few colonic diverticula. Prostatectomy. Tiny bilateral knee joint effusions. Mild thoracolumbar curve. Moderate scattered hypertrophic degenerative changes in the spine.                                                                 IMPRESSION:  1. No evidence of FDG avid malignancy.  2. A couple of solid irregular nodules in the left lower lobe demonstrate no increased FDG activity, favoring a benign or indolent process. However, these remain indeterminate and continued chest CT imaging surveillance is recommended, consider next in   3-6 months.    LOW DOSE LUNG CANCER SCREENING CT CHEST, DATE: 5/14/2024  INDICATION: Lung cancer screening. History of smoking. High risk patient.  COMPARISON: PA and lateral views of the chest 7/31/2016; no prior cross-sectional imaging of the chest.  FINDINGS:  NODULES: Solid, circumscribed nodule in the anterior left lower lobe measures 8 x 13 mm (series 4, image 174). Several smaller nodules are also present within the anterior basal segment of the left lower lobe the next largest of which is 4 x 6 mm (series 4, image 187).  LUNGS AND PLEURA: Advanced emphysema and related hyperinflation. Subsegmental scarring in the anterior basal left lower lobe. A more substantial band of atelectasis is present in the in the basilar right lower lobe involving multiple segments. Mild wall thickening of central airways. No pleural effusion.  MEDIASTINUM: Cardiac chambers are normal in size. No pericardial effusion. Nonaneurysmal thoracic aorta. Trace aortic and great vessel atheromatous calcifications. Esophagus is decompressed. No lymphadenopathy.                                                       IMPRESSION:  1.  Several solid pulmonary nodules are present in the left lower lobe largest of which measures 8 x 13 mm.  2.  Advanced emphysema.    CXR (July 2016):  - overall clear with no focal infiltrate      CT abdomen (July 2011):  - significant emphysema at bilateral lung bases      PFT's  September 2017:  FVC 2.69 FEV1 0.79 ratio 29%.  Significant improvement with bronchodilators.  TLC and RV to TLC ratio increased.  DLCO corrected decreased at 63% predicted.  Assessment: Very severe obstruction with significant  response to bronchodilators.  Air trapping on lung volumes.  Mild to moderate diffusion defect.     October 2018:  Note the patient took albuterol prior to testing, therefore this represents post-bronchodilator spirometry.  FEV1/FVC is 0.36 and is reduced.  FEV1 is 46% predicted and is reduced.  FVC is 98% predicted and is normal.  DLCO is 49% predicted and is reduced when it   is corrected for hemoglobin.  The flow volume loop is normal No. There is consistent flattening of the inspiratory limb.     March 2019:  FEV1/FVC is 28 and is reduced.  FEV1 is 38% predicted and is reduced.  FVC is 107% predicted and is normal.  There was no improvement in spirometry after a single inhaled dose of bronchodilator.  DLCO is 73% predicted and is reduced when it   is corrected for hemoglobin.  6MWT: The patient walked a total of 450 meters. Breathing room air, SpO2 veronica was 86% and HRmax was 122 bpm. BP and HR responses were appropriate.    May 2022  FVC 4.93 (104%)  FEV1 1.20 (33%)  Ratio 0.24  RV 4.61 (175%)  TLC 10.06 (133%)  DLCOc 40%    May 2024:  FVC 4.41L, 104% predicted  FEV1 1.16L, 35% predicted  FEV1/FVC 26% predicted  There was no significant response postbronchodilator  RV 4.46L, 166% predicted  TLC 9.49L, 125% predicted  DLCOc 42%

## 2024-11-07 NOTE — PATIENT INSTRUCTIONS
It was a pleasure seeing you in clinic today. This is what we discussed:    Continue the Trelegy as you have been. Rinse and gargle.   Use your albuterol every 4 hours as needed for cough, shortness of breath, wheeze, or chest tightness.   Follow up 6 months, sooner if needed   If you have worsening symptoms, questions, or need to speak with the nurse please call [phone].

## 2024-11-12 ENCOUNTER — TELEPHONE (OUTPATIENT)
Dept: PULMONOLOGY | Facility: CLINIC | Age: 69
End: 2024-11-12
Payer: COMMERCIAL

## 2024-11-12 PROBLEM — N52.9 ERECTILE DYSFUNCTION: Status: ACTIVE | Noted: 2024-11-12

## 2024-11-12 PROBLEM — J44.9 SEVERE CHRONIC OBSTRUCTIVE PULMONARY DISEASE (H): Status: ACTIVE | Noted: 2024-11-12

## 2024-11-12 PROBLEM — C61 CARCINOMA OF PROSTATE (H): Status: ACTIVE | Noted: 2024-11-12

## 2024-11-12 NOTE — TELEPHONE ENCOUNTER
----- Message from Giovana Lopes sent at 11/12/2024  2:57 PM CST -----  Can you call and let Alfred know that Dr. Genao will review his images on Friday with a team of pulmonology and radiology doctors.  They will talk about things together and form a plan and should be in touch.  Did not want him sitting all week waiting for a call. Thanks!

## 2024-11-13 NOTE — NURSING NOTE
Pulmonary Nodule Conference - 11/15/2024    Patient: Armin Terrell    Referring Provider: Dr. Robert Genao    History: 69 year old. Male. Former smoker - quit 7 years ago, 22.5 ppy history. Hx of COPD, alpha-1 antitrypsin deficiency, prostate cancer s/p robotic assisted radical prostatectomy and repair of right inguinal hernia     Imaging:   - May 2024: CT chest showed several solid nodules in the LLL, largest measuring 1.3 x 0.8 cm.   - June 2024: PET showed NO avidity in any nodules.   - Nov. 2024: repeat CT chest showed LLL nodule increased in size to 1.8 x 1.5 cm.     PFT: May 2024       Pertinent Histology:  Hx of prostate cancer s/p resection.    Question: Dr. Genao is wondering if the team agrees that the LLL nodules in the two CT scans are the same and that they are getting larger?      Team Consensus:  Team agrees there is a waxing/waning component to the nodules, so would recommend a short term follow-up (3 months) to keep an eye on them.       Presented by: Dr. Robert Genao / Nidia Velez, RNCC    Multidisciplinary Team:  Interventional Pulmonology: Dr. Robert Genao  Thoracic Surgery: Dr. Bradford  Interventional Radiology:  Dr. Alla Low  Radiologist: Dr. Curtis

## 2024-11-15 ENCOUNTER — TEAM CONFERENCE (OUTPATIENT)
Dept: PULMONOLOGY | Facility: CLINIC | Age: 69
End: 2024-11-15
Payer: COMMERCIAL

## 2024-11-15 DIAGNOSIS — R91.8 PULMONARY NODULES: Primary | ICD-10-CM

## 2024-11-18 ENCOUNTER — TELEPHONE (OUTPATIENT)
Dept: PULMONOLOGY | Facility: CLINIC | Age: 69
End: 2024-11-18
Payer: COMMERCIAL

## 2024-11-18 NOTE — TELEPHONE ENCOUNTER
Phone call from patient.  States he has been scheduled for CT chest and follow up with Dr. Genao but does not know what is going on?      Called and spoke with patient.  Read him results of tumor conference from last Friday.  Recommendation was for 3 month CT chest and follow up with Dr. Genao.  Both have been scheduled..   He had not further questions.

## 2024-12-04 ENCOUNTER — APPOINTMENT (OUTPATIENT)
Dept: CT IMAGING | Facility: HOSPITAL | Age: 69
DRG: 871 | End: 2024-12-04
Attending: EMERGENCY MEDICINE
Payer: COMMERCIAL

## 2024-12-04 ENCOUNTER — HOSPITAL ENCOUNTER (INPATIENT)
Facility: HOSPITAL | Age: 69
DRG: 871 | End: 2024-12-04
Attending: EMERGENCY MEDICINE | Admitting: STUDENT IN AN ORGANIZED HEALTH CARE EDUCATION/TRAINING PROGRAM
Payer: COMMERCIAL

## 2024-12-04 DIAGNOSIS — J44.1 COPD EXACERBATION (H): ICD-10-CM

## 2024-12-04 DIAGNOSIS — J18.9 MULTIFOCAL PNEUMONIA: ICD-10-CM

## 2024-12-04 DIAGNOSIS — A41.9 SEPSIS, DUE TO UNSPECIFIED ORGANISM, UNSPECIFIED WHETHER ACUTE ORGAN DYSFUNCTION PRESENT (H): Primary | ICD-10-CM

## 2024-12-04 DIAGNOSIS — J44.9 SEVERE CHRONIC OBSTRUCTIVE PULMONARY DISEASE (H): ICD-10-CM

## 2024-12-04 DIAGNOSIS — J96.01 ACUTE RESPIRATORY FAILURE WITH HYPOXIA (H): ICD-10-CM

## 2024-12-04 LAB
ANION GAP SERPL CALCULATED.3IONS-SCNC: 15 MMOL/L (ref 7–15)
BASE EXCESS BLDV CALC-SCNC: -3.9 MMOL/L (ref -3–3)
BASOPHILS # BLD AUTO: 0 10E3/UL (ref 0–0.2)
BASOPHILS NFR BLD AUTO: 0 %
BUN SERPL-MCNC: 49.1 MG/DL (ref 8–23)
CALCIUM SERPL-MCNC: 8.7 MG/DL (ref 8.8–10.4)
CHLORIDE SERPL-SCNC: 99 MMOL/L (ref 98–107)
CREAT SERPL-MCNC: 2.19 MG/DL (ref 0.67–1.17)
EGFRCR SERPLBLD CKD-EPI 2021: 32 ML/MIN/1.73M2
EOSINOPHIL # BLD AUTO: 0 10E3/UL (ref 0–0.7)
EOSINOPHIL NFR BLD AUTO: 0 %
ERYTHROCYTE [DISTWIDTH] IN BLOOD BY AUTOMATED COUNT: 12.6 % (ref 10–15)
FLUAV RNA SPEC QL NAA+PROBE: NEGATIVE
FLUBV RNA RESP QL NAA+PROBE: NEGATIVE
GLUCOSE SERPL-MCNC: 105 MG/DL (ref 70–99)
HCO3 BLDV-SCNC: 24 MMOL/L (ref 21–28)
HCO3 SERPL-SCNC: 21 MMOL/L (ref 22–29)
HCT VFR BLD AUTO: 54.2 % (ref 40–53)
HGB BLD-MCNC: 18.1 G/DL (ref 13.3–17.7)
IMM GRANULOCYTES # BLD: 0.4 10E3/UL
IMM GRANULOCYTES NFR BLD: 2 %
LACTATE SERPL-SCNC: 1.6 MMOL/L (ref 0.7–2)
LACTATE SERPL-SCNC: 2.8 MMOL/L (ref 0.7–2)
LYMPHOCYTES # BLD AUTO: 1.1 10E3/UL (ref 0.8–5.3)
LYMPHOCYTES NFR BLD AUTO: 4 %
MAGNESIUM SERPL-MCNC: 2.1 MG/DL (ref 1.7–2.3)
MCH RBC QN AUTO: 29.7 PG (ref 26.5–33)
MCHC RBC AUTO-ENTMCNC: 33.4 G/DL (ref 31.5–36.5)
MCV RBC AUTO: 89 FL (ref 78–100)
MONOCYTES # BLD AUTO: 2 10E3/UL (ref 0–1.3)
MONOCYTES NFR BLD AUTO: 7 %
NEUTROPHILS # BLD AUTO: 26.6 10E3/UL (ref 1.6–8.3)
NEUTROPHILS NFR BLD AUTO: 88 %
NRBC # BLD AUTO: 0 10E3/UL
NRBC BLD AUTO-RTO: 0 /100
NT-PROBNP SERPL-MCNC: 227 PG/ML (ref 0–900)
O2/TOTAL GAS SETTING VFR VENT: 45 %
OXYHGB MFR BLDV: 19 % (ref 70–75)
PCO2 BLDV: 52 MM HG (ref 40–50)
PH BLDV: 7.27 [PH] (ref 7.32–7.43)
PLAT MORPH BLD: NORMAL
PLATELET # BLD AUTO: 385 10E3/UL (ref 150–450)
PO2 BLDV: 18 MM HG (ref 25–47)
POTASSIUM SERPL-SCNC: 4.2 MMOL/L (ref 3.4–5.3)
PROCALCITONIN SERPL IA-MCNC: 4.03 NG/ML
RBC # BLD AUTO: 6.09 10E6/UL (ref 4.4–5.9)
RBC MORPH BLD: NORMAL
RSV RNA SPEC NAA+PROBE: NEGATIVE
SAO2 % BLDV: 19.3 % (ref 70–75)
SARS-COV-2 RNA RESP QL NAA+PROBE: NEGATIVE
SODIUM SERPL-SCNC: 135 MMOL/L (ref 135–145)
TSH SERPL DL<=0.005 MIU/L-ACNC: 2.76 UIU/ML (ref 0.3–4.2)
WBC # BLD AUTO: 30.2 10E3/UL (ref 4–11)

## 2024-12-04 PROCEDURE — 83735 ASSAY OF MAGNESIUM: CPT | Performed by: EMERGENCY MEDICINE

## 2024-12-04 PROCEDURE — 250N000009 HC RX 250: Performed by: EMERGENCY MEDICINE

## 2024-12-04 PROCEDURE — 94640 AIRWAY INHALATION TREATMENT: CPT | Mod: 76

## 2024-12-04 PROCEDURE — 87040 BLOOD CULTURE FOR BACTERIA: CPT | Performed by: EMERGENCY MEDICINE

## 2024-12-04 PROCEDURE — 96375 TX/PRO/DX INJ NEW DRUG ADDON: CPT

## 2024-12-04 PROCEDURE — 71250 CT THORAX DX C-: CPT

## 2024-12-04 PROCEDURE — 99285 EMERGENCY DEPT VISIT HI MDM: CPT | Mod: 25

## 2024-12-04 PROCEDURE — 83605 ASSAY OF LACTIC ACID: CPT | Performed by: EMERGENCY MEDICINE

## 2024-12-04 PROCEDURE — 87637 SARSCOV2&INF A&B&RSV AMP PRB: CPT | Performed by: EMERGENCY MEDICINE

## 2024-12-04 PROCEDURE — 258N000003 HC RX IP 258 OP 636: Performed by: EMERGENCY MEDICINE

## 2024-12-04 PROCEDURE — 83880 ASSAY OF NATRIURETIC PEPTIDE: CPT | Performed by: EMERGENCY MEDICINE

## 2024-12-04 PROCEDURE — 80048 BASIC METABOLIC PNL TOTAL CA: CPT | Performed by: EMERGENCY MEDICINE

## 2024-12-04 PROCEDURE — 84443 ASSAY THYROID STIM HORMONE: CPT | Performed by: EMERGENCY MEDICINE

## 2024-12-04 PROCEDURE — 999N000157 HC STATISTIC RCP TIME EA 10 MIN

## 2024-12-04 PROCEDURE — 84145 PROCALCITONIN (PCT): CPT | Performed by: EMERGENCY MEDICINE

## 2024-12-04 PROCEDURE — 99223 1ST HOSP IP/OBS HIGH 75: CPT | Mod: AI | Performed by: STUDENT IN AN ORGANIZED HEALTH CARE EDUCATION/TRAINING PROGRAM

## 2024-12-04 PROCEDURE — 93005 ELECTROCARDIOGRAM TRACING: CPT | Performed by: EMERGENCY MEDICINE

## 2024-12-04 PROCEDURE — 96361 HYDRATE IV INFUSION ADD-ON: CPT

## 2024-12-04 PROCEDURE — 250N000011 HC RX IP 250 OP 636: Performed by: EMERGENCY MEDICINE

## 2024-12-04 PROCEDURE — 120N000001 HC R&B MED SURG/OB

## 2024-12-04 PROCEDURE — 258N000003 HC RX IP 258 OP 636: Performed by: STUDENT IN AN ORGANIZED HEALTH CARE EDUCATION/TRAINING PROGRAM

## 2024-12-04 PROCEDURE — 36415 COLL VENOUS BLD VENIPUNCTURE: CPT | Performed by: EMERGENCY MEDICINE

## 2024-12-04 PROCEDURE — 250N000013 HC RX MED GY IP 250 OP 250 PS 637: Performed by: STUDENT IN AN ORGANIZED HEALTH CARE EDUCATION/TRAINING PROGRAM

## 2024-12-04 PROCEDURE — 82805 BLOOD GASES W/O2 SATURATION: CPT | Performed by: EMERGENCY MEDICINE

## 2024-12-04 PROCEDURE — 250N000011 HC RX IP 250 OP 636: Performed by: STUDENT IN AN ORGANIZED HEALTH CARE EDUCATION/TRAINING PROGRAM

## 2024-12-04 PROCEDURE — 85025 COMPLETE CBC W/AUTO DIFF WBC: CPT | Performed by: EMERGENCY MEDICINE

## 2024-12-04 PROCEDURE — 96365 THER/PROPH/DIAG IV INF INIT: CPT | Mod: 59

## 2024-12-04 RX ORDER — ALBUTEROL SULFATE 90 UG/1
1-2 INHALANT RESPIRATORY (INHALATION) EVERY 4 HOURS PRN
Status: DISCONTINUED | OUTPATIENT
Start: 2024-12-04 | End: 2024-12-05

## 2024-12-04 RX ORDER — METHYLPREDNISOLONE SODIUM SUCCINATE 125 MG/2ML
125 INJECTION INTRAMUSCULAR; INTRAVENOUS ONCE
Status: COMPLETED | OUTPATIENT
Start: 2024-12-04 | End: 2024-12-04

## 2024-12-04 RX ORDER — GUAIFENESIN 600 MG/1
1200 TABLET, EXTENDED RELEASE ORAL 2 TIMES DAILY PRN
Status: DISCONTINUED | OUTPATIENT
Start: 2024-12-04 | End: 2024-12-05

## 2024-12-04 RX ORDER — METHYLPREDNISOLONE SODIUM SUCCINATE 40 MG/ML
40 INJECTION INTRAMUSCULAR; INTRAVENOUS EVERY 12 HOURS
Status: DISCONTINUED | OUTPATIENT
Start: 2024-12-05 | End: 2024-12-05

## 2024-12-04 RX ORDER — ENOXAPARIN SODIUM 100 MG/ML
40 INJECTION SUBCUTANEOUS EVERY 24 HOURS
Status: DISCONTINUED | OUTPATIENT
Start: 2024-12-04 | End: 2024-12-05

## 2024-12-04 RX ORDER — IPRATROPIUM BROMIDE AND ALBUTEROL SULFATE 2.5; .5 MG/3ML; MG/3ML
3 SOLUTION RESPIRATORY (INHALATION) ONCE
Status: COMPLETED | OUTPATIENT
Start: 2024-12-04 | End: 2024-12-04

## 2024-12-04 RX ORDER — LIDOCAINE 40 MG/G
CREAM TOPICAL
Status: DISCONTINUED | OUTPATIENT
Start: 2024-12-04 | End: 2024-12-05

## 2024-12-04 RX ORDER — AMOXICILLIN 250 MG
2 CAPSULE ORAL 2 TIMES DAILY PRN
Status: DISCONTINUED | OUTPATIENT
Start: 2024-12-04 | End: 2024-12-05

## 2024-12-04 RX ORDER — FLUTICASONE FUROATE AND VILANTEROL 200; 25 UG/1; UG/1
1 POWDER RESPIRATORY (INHALATION) DAILY
Status: DISCONTINUED | OUTPATIENT
Start: 2024-12-05 | End: 2024-12-05

## 2024-12-04 RX ORDER — PANTOPRAZOLE SODIUM 40 MG/1
40 TABLET, DELAYED RELEASE ORAL DAILY PRN
Status: DISCONTINUED | OUTPATIENT
Start: 2024-12-04 | End: 2024-12-05

## 2024-12-04 RX ORDER — CEFTRIAXONE 2 G/1
2 INJECTION, POWDER, FOR SOLUTION INTRAMUSCULAR; INTRAVENOUS ONCE
Status: COMPLETED | OUTPATIENT
Start: 2024-12-04 | End: 2024-12-04

## 2024-12-04 RX ORDER — AMOXICILLIN 250 MG
1 CAPSULE ORAL 2 TIMES DAILY PRN
Status: DISCONTINUED | OUTPATIENT
Start: 2024-12-04 | End: 2024-12-05

## 2024-12-04 RX ORDER — ACETAMINOPHEN 650 MG/1
650 SUPPOSITORY RECTAL EVERY 4 HOURS PRN
Status: DISCONTINUED | OUTPATIENT
Start: 2024-12-04 | End: 2024-12-05

## 2024-12-04 RX ORDER — ACETAMINOPHEN 325 MG/1
650 TABLET ORAL EVERY 4 HOURS PRN
Status: DISCONTINUED | OUTPATIENT
Start: 2024-12-04 | End: 2024-12-05

## 2024-12-04 RX ORDER — AZITHROMYCIN 250 MG/1
500 TABLET, FILM COATED ORAL DAILY
Status: DISCONTINUED | OUTPATIENT
Start: 2024-12-05 | End: 2024-12-05

## 2024-12-04 RX ORDER — ACETAMINOPHEN 500 MG
500-1000 TABLET ORAL EVERY 4 HOURS PRN
COMMUNITY

## 2024-12-04 RX ORDER — GUAIFENESIN 600 MG/1
1200 TABLET, EXTENDED RELEASE ORAL 2 TIMES DAILY PRN
COMMUNITY

## 2024-12-04 RX ORDER — CEFTRIAXONE 1 G/1
1 INJECTION, POWDER, FOR SOLUTION INTRAMUSCULAR; INTRAVENOUS EVERY 24 HOURS
Status: DISCONTINUED | OUTPATIENT
Start: 2024-12-05 | End: 2024-12-05

## 2024-12-04 RX ORDER — SIMETHICONE 80 MG
80 TABLET,CHEWABLE ORAL 4 TIMES DAILY PRN
Status: DISCONTINUED | OUTPATIENT
Start: 2024-12-04 | End: 2024-12-05

## 2024-12-04 RX ORDER — SODIUM CHLORIDE 9 MG/ML
INJECTION, SOLUTION INTRAVENOUS CONTINUOUS
Status: DISCONTINUED | OUTPATIENT
Start: 2024-12-04 | End: 2024-12-05

## 2024-12-04 RX ORDER — CALCIUM CARBONATE 500 MG/1
1000 TABLET, CHEWABLE ORAL 4 TIMES DAILY PRN
Status: DISCONTINUED | OUTPATIENT
Start: 2024-12-04 | End: 2024-12-05

## 2024-12-04 RX ADMIN — IPRATROPIUM BROMIDE AND ALBUTEROL SULFATE 3 ML: .5; 3 SOLUTION RESPIRATORY (INHALATION) at 13:29

## 2024-12-04 RX ADMIN — ENOXAPARIN SODIUM 40 MG: 40 INJECTION SUBCUTANEOUS at 18:32

## 2024-12-04 RX ADMIN — METHYLPREDNISOLONE SODIUM SUCCINATE 125 MG: 125 INJECTION, POWDER, FOR SOLUTION INTRAMUSCULAR; INTRAVENOUS at 13:37

## 2024-12-04 RX ADMIN — AZITHROMYCIN MONOHYDRATE 500 MG: 500 INJECTION, POWDER, LYOPHILIZED, FOR SOLUTION INTRAVENOUS at 13:24

## 2024-12-04 RX ADMIN — CEFTRIAXONE SODIUM 2 G: 2 INJECTION, POWDER, FOR SOLUTION INTRAMUSCULAR; INTRAVENOUS at 14:35

## 2024-12-04 RX ADMIN — SODIUM CHLORIDE: 9 INJECTION, SOLUTION INTRAVENOUS at 21:09

## 2024-12-04 RX ADMIN — ACETAMINOPHEN 650 MG: 325 TABLET ORAL at 20:18

## 2024-12-04 RX ADMIN — SODIUM CHLORIDE 2082 ML: 9 INJECTION, SOLUTION INTRAVENOUS at 13:39

## 2024-12-04 ASSESSMENT — ACTIVITIES OF DAILY LIVING (ADL)
ADLS_ACUITY_SCORE: 42
ADLS_ACUITY_SCORE: 41

## 2024-12-04 ASSESSMENT — COLUMBIA-SUICIDE SEVERITY RATING SCALE - C-SSRS
1. IN THE PAST MONTH, HAVE YOU WISHED YOU WERE DEAD OR WISHED YOU COULD GO TO SLEEP AND NOT WAKE UP?: NO
2. HAVE YOU ACTUALLY HAD ANY THOUGHTS OF KILLING YOURSELF IN THE PAST MONTH?: NO
6. HAVE YOU EVER DONE ANYTHING, STARTED TO DO ANYTHING, OR PREPARED TO DO ANYTHING TO END YOUR LIFE?: NO

## 2024-12-04 NOTE — ED TRIAGE NOTES
Pt states he has had flu sx x 9 days.  Has had to increase home O2 use from 2L at hs to 4 L at all times.  Went to primary clinic and was found to be hypoxic so he was sent here by EMS     Triage Assessment (Adult)       Row Name 12/04/24 1254          Triage Assessment    Airway WDL WDL        Respiratory WDL    Respiratory WDL X;all        Skin Circulation/Temperature WDL    Skin Circulation/Temperature WDL WDL        Cardiac WDL    Cardiac WDL X;chest pain        Chest Pain Assessment    Chest Pain Location anterior chest, left

## 2024-12-04 NOTE — ED TRIAGE NOTES
Patient here with shortness of breath and chest pain for the past 9 days. History of COPD, uses oxygen at home at nighttime, has been having to use it during the day. In triage sats 80% on RA, tachycardic in 120s. Reports fever and chills,  appetite, generalized fatigue for the past 9 days.      Triage Assessment (Adult)       Row Name 24 1254          Triage Assessment    Airway WDL WDL        Respiratory WDL    Respiratory WDL X;all        Skin Circulation/Temperature WDL    Skin Circulation/Temperature WDL WDL        Cardiac WDL    Cardiac WDL X;chest pain        Chest Pain Assessment    Chest Pain Location anterior chest, left

## 2024-12-04 NOTE — H&P
[facility]    History and Physical - Hospitalist Service       Date of Admission:  12/4/2024    Assessment & Plan    Armin eTrrell is a 69 year old male admitted on 12/4/2024. He presented with cough, generalized malaise and weakness of few days duration.  Found to have a multi focal pneumonia. Past medical history significant for COPD, severe emphysema, alpha-1 antitrypsin deficiency, history of prostatic CA s/p radical prostatectomy.    Sepsis  Acute hypoxic respiratory failure due to COPD exacerbation  Multifocal pneumonia  Lactic acidosis  -Upon presentation patient was hypoxic saturating in the 70s.  -On 5 L oxygen via nasal cannula.  -VBG : with mildly increased pCO2  -CT chest reported with multifocal pneumonia areas of consolidation in the left lower lobe, lingula and right lower lobe  -patient had lactic acidosis, leukocytosis and increased procalcitonin  -Received a dose of ceftriaxone and azithromycin.  Will continue same antibiotic  -Continue Solu-Medrol 40 mg IV twice daily  -PTA inhaler and DuoNeb nebulization as needed  -Pulmonary hygiene  -Follow blood culture, sputum culture and respiratory panel PCR  -Pulmonary consult for patient has a history of severe emphysema and alpha-1 antitrypsin deficiency  -Patient is an appropriate patient for hospital at home. He can possibly transfer tomorrow a.m.    SHAKIRA  -Creatinine has increased to 2.19 from a normal baseline  -Appears to have a prerenal pattern  -IV fluid infusion  -Monitor input and output  -Check BMP tomorrow a.m.    Pulmonary nodule  History of severe emphysema  Alpha-1 antitrypsin deficiency  -Follows with pulmonary as an outpatient          Diet: Combination Diet Regular Diet AdultRegular diet  DVT Prophylaxis: Enoxaparin (Lovenox) SQ  Neff Catheter: Not present  Lines: None     Cardiac Monitoring: ACTIVE order. Indication: hypoxia  Code Status: Full CodeFull code    Clinically Significant Risk Factors Present on  Admission                                        Disposition Plan     Medically Ready for Discharge: Anticipated in 2-4 Days           Cordell Solano MD  Hospitalist Service  [facility]  Securely message with [employer] (more info)  Text page via [employer] Paging/Directory     ______________________________________________________________________    Chief Complaint   Cough, malaise/few days duration    History is obtained from the patient    History of Present Illness   Armin Terrell is a 69 year old male who presented with the above complaint.Past medical history significant for COPD, severe emphysema, alpha-1 antitrypsin deficiency, history of prostatic CA s/p radical prostatectomy.  Patient presented with cough productive of scanty sputum, malaise, generalized body weakness of few days duration.  He also reports having pleuritic chest pain.  He has chronic lung issues including pulmonary nodule, severe emphysema and alpha-1 antitrypsin deficiency syndrome.  CT chest was significant for multifocal pneumonia.  Labs significant for leukocytosis, elevated lactic acid, elevated procalcitonin and elevated creatinine.  Patient will be admitted for management of COPD exacerbation      Past Medical History    No past medical history on file.    Past Surgical History   Past Surgical History:   Procedure Laterality Date    PA LAP,PROSTATECTOMY,RADICAL,W/NERVE SPARE,INCL ROBOTIC Bilateral 3/19/2019    Procedure: ROBOTIC ASSISTED RADICAL RETROPUBIC PROSTATECTOMY BILATERAL PELVIC LYMPH NODE DISSECTION, LYSIS OF ADHESIONS, REPAIR OF RIGHT INGUINAL HERNIA;  Surgeon: Candido Angelo MD;  [place] [facility] [place]  Service: Urology       Prior to Admission Medications   Prior to Admission Medications   Prescriptions Last Dose Informant Patient Reported? Taking?   Fluticasone-Umeclidin-Vilanterol (TRELEGY ELLIPTA) 200-62.5-25 MCG/ACT oral inhaler 12/4/2024 Morning  No Yes   Sig: Inhale 1 puff into  the lungs daily.   acetaminophen (TYLENOL) 500 MG tablet   Yes Yes   Sig: Take 500-1,000 mg by mouth every 4 hours as needed for mild pain.   albuterol (PROAIR HFA;PROVENTIL HFA;VENTOLIN HFA) 90 mcg/actuation inhaler   No Yes   Sig: [ALBUTEROL (PROAIR HFA;PROVENTIL HFA;VENTOLIN HFA) 90 MCG/ACTUATION INHALER] Inhale 1-2 puffs every 4 (four) hours as needed for wheezing or shortness of breath.   guaiFENesin (MUCINEX) 600 MG 12 hr tablet   Yes Yes   Sig: Take 1,200 mg by mouth 2 times daily as needed for cough.   omeprazole (PRILOSEC) 20 MG DR capsule   Yes Yes   Sig: Take 20 mg by mouth daily as needed.   simethicone (MYLICON) 80 MG chewable tablet   Yes Yes   Sig: Take 80 mg by mouth 4 times daily as needed for flatulence.      Facility-Administered Medications: None           Physical Exam   Vital Signs: Temp: 98  F (36.7  C) Temp src: Oral BP: 127/77 Pulse: 92   Resp: 28 SpO2: 91 % O2 Device: Oxymask Oxygen Delivery: 5 LPM  Weight: 153 lbs 0 oz    General Appearance: Mild to moderate respiratory distress noted  Respiratory: Good air entry bilaterally.  No wheezes  Cardiovascular: S1 and S2 well heard, no murmur or gallop  GI: Soft abdomen, no tenderness, normoactive bowel sounds  Skin: Intact and warm       Medical Decision Making       75 MINUTES SPENT BY ME on the date of service doing chart review, history, exam, documentation & further activities per the note.      Data

## 2024-12-04 NOTE — ED NOTES
Expected Patient Referral to ED  12:19 PM    Referring Clinic/Provider:  [facility]    Reason for referral/Clinical facts:  LLL pneumonia on imaging and hypoxia with exertion.  Has not gotten any meds    Recommendations provided:  Send to ED for further evaluation    Caller was informed that this institution does possess the capabilities and/or resources to provide for patient and should be transferred to our facility.    Discussed that if direct admit is sought and any hurdles are encountered, this ED would be happy to see the patient and evaluate.    Informed caller that recommendations provided are recommendations based only on the facts provided and that they responsible to accept or reject the advice, or to seek a formal in person consultation as needed and that this ED will see/treat patient should they arrive.      Angelica Reid DO  [facility] EMERGENCY DEPARTMENT  [address] [place] [id number] [phone]       Angelica Reid DO  12/04/24 [id number]

## 2024-12-04 NOTE — ED PROVIDER NOTES
"EMERGENCY DEPARTMENT ENCOUNTER      NAME: Armin Terrell  AGE: 69 year old male  YOB: 1955  MRN: [id number]  EVALUATION DATE & TIME: 12/4/2024 12:59 PM    PCP: Júnior Salvador    ED PROVIDER: Melina Schaefer M.D.      Chief Complaint   Patient presents with    Flu Symptoms    Shortness of Breath         FINAL IMPRESSION:  1. COPD exacerbation (H)    2. Acute respiratory failure with hypoxia (H)    3. Multifocal pneumonia    4. Sepsis, due to unspecified organism, unspecified whether acute organ dysfunction present (H)          ED COURSE & MEDICAL DECISION MAKING:    ED Course as of 12/04/24 1550   Wed Dec 04, 2024   1254 Pt with known COPD and denies smoking (smells of tobacco) BIB wife with SOB, used his Triligy today but did not use his nebs, uses O2 at night with COPD and with occasional exacerbations, no recent steroid therapy or antibiotics, went today to [facility] and with known LLL lung nodule, Xray there with written read by [facility] of \"LLL pneumonia\" but with known LLL lung nodule most recently appreciated per my chart review 11/4/2024 and follows with pulmonology with planned f/u in the next 3 months per their note, with SOB with sat 77% reported in their clinic, not nearly that low here reassuringly, pending nebs, steroid therapy, CT chest withotu contrast to help differentiate between knwon nodule vs. New PNA, IVF and abx begun with nebs and steroid therapy with lactic acid and cultures pending, BNP and EKG although no leg edema, JVD or HJR to suggest acute CHF and no rales. Viral PCR pending with TSH as well, patietn and wife ok with plan. Supplemental 2L NC O2 begun here with 30cc/kg IVF with tachycardia and and IV antibiotic therapy ordered ceftriaxone and azithromycin for likely CAP   1524 VS markedly improving with CT showing expected multifocal PNA with WBC 30, viral PCR negative, procalcitonin elevated and no wheezing currently, hosptialist paged for admission, highest risk " group for mortality with PNA with CURB 65 score = 4 with 27.8% risk of mortality in 30 days, patient and family updated   1549 Pt endorsed to hospitalist to med surg tele       Pertinent Labs & Imaging studies reviewed. (See chart for details)    Medical Decision Making  Obtained supplemental history:Supplemental history obtained?: Documented in chart and Family Member/Significant Other  Reviewed external records: External records reviewed?: Documented in chart and Other: Chest CT done at [facility] on 11/7/2024 and Visit to [facility] for a pulmonary nodule conference regarding her pulmonary nodule diagnosis on 11/15/2024  Care impacted by chronic illness:Chronic Lung Disease and Other: prostate cancer history  Did you consider but not order tests?: Work up considered but not performed and documented in chart, if applicable  Did you interpret images independently?: Independent interpretation of ECG and images noted in documentation, when applicable.  Consultation discussion with other provider:Did you involve another provider (consultant, , pharmacy, etc.)?: I discussed the care with another health care provider, see documentation for details.  Admit.    Asthma or COPD Exacerbation:Smoking Cessation Counseling: Patient is NOT a current smoker.      Critical Care time was 45 minutes for this patient excluding procedures.    At the conclusion of the encounter I discussed the results of all of the tests and the disposition. The questions were answered. The patient or family acknowledged understanding and was agreeable with the care plan.     MEDICATIONS GIVEN IN THE EMERGENCY:  Medications   ipratropium - albuterol 0.5 mg/2.5 mg/3 mL (DUONEB) neb solution 3 mL (3 mLs Nebulization $Given 12/4/24 1329)   methylPREDNISolone Na Suc (solu-MEDROL) injection 125 mg (125 mg Intravenous $Given 12/4/24 1337)   cefTRIAXone (ROCEPHIN) 2 g vial to attach to  ml bag  "for ADULTS or NS 50 ml bag for PEDS (0 g Intravenous Stopped 12/4/24 1517)   azithromycin (ZITHROMAX) 500 mg in sodium chloride 0.9 % 250 mL intermittent infusion (0 mg Intravenous Stopped 12/4/24 1435)   sodium chloride 0.9% BOLUS 2,082 mL (2,082 mLs Intravenous $New Bag 12/4/24 1339)       NEW PRESCRIPTIONS STARTED AT TODAY'S ER VISIT  New Prescriptions    No medications on file          =================================================================    HPI      Armin Terrell is a 69 year old male with PMHx of COPD and prostate cancer, who presents to the ED today with wife via walk-in with flu symptoms and shortness of breath.    Patient comes in from [facility] and was reported to have a LLL pneumonia on imaging and hypoxia with exertion. He wasn't given any medications. Patient was referred to go to the ER. We are unable to see the note and imaging done at [facility].       Per chart review, the patient had a chest CT done at [facility] on 11/7/2024. This CT chest showed an \"increase in size of an irregular nodule in the left lower lobe now measuring 1.8 x 1.5 cm in comparison to 1.3 x 0.8 cm. In addition there are are fibronodular regions of extension now seen extending to the pleural surface. New 5 mm subpleural nodule in the left lower lobe with an additional new 5 mm nodule cephalad to the larger described nodule in the left lower lobe. Advanced emphysematous change. New regions of mucous plugging in the lung bases. New fibronodular atelectasis in the right upper lobe. The previously described clustered nodularity in the anterior left lower lobeh as largely resolved with a 4 mm nodule remaining. Cholelithiasis.\"    Per chart review, the patient presented to [facility] for a pulmonary nodule conference regarding her pulmonary nodule diagnosis on 11/15/2024. It was noted that the team \"agrees there is a waxing/waning component to the nodules, so " "would recommend a short term follow-up (3 months) to keep an eye on them.\"    Patient reports he's been experiencing shortness of breath, no appetite, fever, and chills. Notes he went to [facility] clinic earlier today and had chest imaging done. Per wife, when the patient walks, his O2 saturations drop very low to the 70s. At the clinic the patient was at earlier today, it was noted the patient's O2 sats dropped to 77% with exertion. Patient denies he still smokes. He's been using his Trelegy and notes he didn't try nebulizer treatments today. He denies he's been using abx or steroids. He indicates he's been coughing and shortness of breath the last 8-9 days. No sick contacts at home, per patient. States he hasn't been tested for COVID recently and notes he's been vaccinated for COVID. He usually uses oxygen supplementation normally at night but he's been using O2 supplementation during the day due to increase of shortness of breath. He endorses chills and shakes and then becomes super warm later. No measured fevers at home. He last took liquid Advil at 3:30-4 AM. No other reported complaints or concerns at this time.      REVIEW OF SYSTEMS   All other systems reviewed and are negative except as noted above in HPI.    PAST MEDICAL HISTORY:  No past medical history on file.    PAST SURGICAL HISTORY:  Past Surgical History:   Procedure Laterality Date    TN LAP,PROSTATECTOMY,RADICAL,W/NERVE SPARE,INCL ROBOTIC Bilateral 3/19/2019    Procedure: ROBOTIC ASSISTED RADICAL RETROPUBIC PROSTATECTOMY BILATERAL PELVIC LYMPH NODE DISSECTION, LYSIS OF ADHESIONS, REPAIR OF RIGHT INGUINAL HERNIA;  Surgeon: Candido Angelo MD;  Location: [facility];  Service: Urology       CURRENT MEDICATIONS:    acetaminophen (TYLENOL) 500 MG tablet  albuterol (PROAIR HFA;PROVENTIL HFA;VENTOLIN HFA) 90 mcg/actuation inhaler  Fluticasone-Umeclidin-Vilanterol (TRELEGY ELLIPTA) 200-62.5-25 MCG/ACT oral inhaler  guaiFENesin (MUCINEX) 600 MG 12 " hr tablet  omeprazole (PRILOSEC) 20 MG DR capsule  simethicone (MYLICON) 80 MG chewable tablet        ALLERGIES:  No Known Allergies    FAMILY HISTORY:  No family history on file.    SOCIAL HISTORY:   Social History     Socioeconomic History    Marital status:    Tobacco Use    Smoking status: Former     Current packs/day: 0.00     Average packs/day: 0.8 packs/day for 30.0 years (22.5 ttl pk-yrs)     Types: Cigarettes     Start date: 1987     Quit date: 2017     Years since quittin.1     Passive exposure: Current (Wife smokes)    Smokeless tobacco: Former   Vaping Use    Vaping status: Never Used   Substance and Sexual Activity    Alcohol use: Yes     Alcohol/week: 4.0 - 5.0 standard drinks of alcohol     Comment: Alcoholic Drinks/day: drinks on weekends.    Drug use: No       VITALS:  Patient Vitals for the past 24 hrs:   BP Temp Temp src Pulse Resp SpO2 Height Weight   24 1530 120/66 -- -- 97 26 91 % -- --   24 1356 118/73 -- -- 108 25 96 % -- --   24 1345 110/75 -- -- 107 (!) 32 94 % -- --   24 1330 110/76 -- -- 109 17 94 % -- --   24 1300 130/80 -- -- -- -- -- -- --   24 1257 -- -- -- -- -- (!) 88 % -- --   24 1252 127/81 98.5  F (36.9  C) Temporal (!) 123 30 (!) 80 % 1.829 m (6') 69.4 kg (153 lb)       PHYSICAL EXAM    GENERAL: Awake, alert.  In no acute distress.   HEENT: Normocephalic, atraumatic.  Pupils equal, round and reactive.  Conjunctiva normal.  EOMI.  NECK: No stridor or apparent deformity.  PULMONARY: Lungs clear to auscultation bilaterally without rhonchi or rales. Prolonged expiratory phrase with wheezing and diminished breath sounds.  CARDIO: Regular rate and rhythm.  No significant murmur, rub or gallop.  Radial pulses strong and symmetrical.  ABDOMINAL: Abdomen soft, non-distended and non-tender to palpation.  No CVAT, no palpable hepatosplenomegaly.  EXTREMITIES: No lower extremity swelling or edema.    NEURO: Alert and oriented to  person, place and time.  Cranial nerves grossly intact.  No focal motor deficit.  PSYCH: Normal mood and affect  SKIN: No rashes      LAB:  All pertinent labs reviewed and interpreted.  Results for orders placed or performed during the hospital encounter of 12/04/24   Chest CT w/o contrast    Impression    IMPRESSION:   1.  Multifocal pneumonia with areas of consolidation in the left lower lobe, lingula and right lower lobe. This obscures previously seen left lower lobe nodule.  2.  Severe emphysema.  3.  Small pericardial effusion.       Basic metabolic panel   Result Value Ref Range    Sodium 135 135 - 145 mmol/L    Potassium 4.2 3.4 - 5.3 mmol/L    Chloride 99 98 - 107 mmol/L    Carbon Dioxide (CO2) 21 (L) 22 - 29 mmol/L    Anion Gap 15 7 - 15 mmol/L    Urea Nitrogen 49.1 (H) 8.0 - 23.0 mg/dL    Creatinine 2.19 (H) 0.67 - 1.17 mg/dL    GFR Estimate 32 (L) >60 mL/min/1.73m2    Calcium 8.7 (L) 8.8 - 10.4 mg/dL    Glucose 105 (H) 70 - 99 mg/dL   Result Value Ref Range    Procalcitonin 4.03 (H) <0.50 ng/mL   Result Value Ref Range    Magnesium 2.1 1.7 - 2.3 mg/dL   TSH with free T4 reflex   Result Value Ref Range    TSH 2.76 0.30 - 4.20 uIU/mL   Blood gas venous   Result Value Ref Range    pH Venous 7.27 (L) 7.32 - 7.43    pCO2 Venous 52 (H) 40 - 50 mm Hg    pO2 Venous 18 (L) 25 - 47 mm Hg    Bicarbonate Venous 24 21 - 28 mmol/L    Base Excess/Deficit Venous -3.9 (L) -3.0 - 3.0 mmol/L    FIO2 45     Oxyhemoglobin Venous 19 (L) 70 - 75 %    O2 Sat, Venous 19.3 (L) 70.0 - 75.0 %   Nt probnp inpatient (BNP)   Result Value Ref Range    N terminal Pro BNP Inpatient 227 0 - 900 pg/mL   Lactic acid whole blood with 1x repeat in 2 hr when >2   Result Value Ref Range    Lactic Acid, Initial 2.8 (H) 0.7 - 2.0 mmol/L   Influenza A/B, RSV and SARS-CoV2 PCR (COVID-19) Nasopharyngeal    Specimen: Nasopharyngeal; Swab   Result Value Ref Range    Influenza A PCR Negative Negative    Influenza B PCR Negative Negative    RSV PCR  Negative Negative    SARS CoV2 PCR Negative Negative   CBC with platelets and differential   Result Value Ref Range    WBC Count 30.2 (H) 4.0 - 11.0 10e3/uL    RBC Count 6.09 (H) 4.40 - 5.90 10e6/uL    Hemoglobin 18.1 (H) 13.3 - 17.7 g/dL    Hematocrit 54.2 (H) 40.0 - 53.0 %    MCV 89 78 - 100 fL    MCH 29.7 26.5 - 33.0 pg    MCHC 33.4 31.5 - 36.5 g/dL    RDW 12.6 10.0 - 15.0 %    Platelet Count 385 150 - 450 10e3/uL    % Neutrophils 88 %    % Lymphocytes 4 %    % Monocytes 7 %    % Eosinophils 0 %    % Basophils 0 %    % Immature Granulocytes 2 %    NRBCs per 100 WBC 0 <1 /100    Absolute Neutrophils 26.6 (H) 1.6 - 8.3 10e3/uL    Absolute Lymphocytes 1.1 0.8 - 5.3 10e3/uL    Absolute Monocytes 2.0 (H) 0.0 - 1.3 10e3/uL    Absolute Eosinophils 0.0 0.0 - 0.7 10e3/uL    Absolute Basophils 0.0 0.0 - 0.2 10e3/uL    Absolute Immature Granulocytes 0.4 <=0.4 10e3/uL    Absolute NRBCs 0.0 10e3/uL   Lactic acid whole blood   Result Value Ref Range    Lactic Acid 1.6 0.7 - 2.0 mmol/L   RBC and Platelet Morphology   Result Value Ref Range    RBC Morphology Confirmed RBC Indices     Platelet Assessment  Automated Count Confirmed. Platelet morphology is normal.     Automated Count Confirmed. Platelet morphology is normal.       RADIOLOGY:  Reviewed all pertinent imaging. Please see official radiology report.  Chest CT w/o contrast   Final Result   IMPRESSION:    1.  Multifocal pneumonia with areas of consolidation in the left lower lobe, lingula and right lower lobe. This obscures previously seen left lower lobe nodule.   2.  Severe emphysema.   3.  Small pericardial effusion.                  EKG:    Reviewed and interpreted as: 12/4/2024 13:17:32. Vent. Rate: 108 BPM. Sinus tachycardia. Otherwise normal ECG. When compared with ECG of 12/4/2024 13:08. It is similar appearing.      I have independently reviewed and interpreted the EKG(s) documented above.        I, Galilea Perry, am serving as a scribe to document services  personally performed by Dr. Melina Schaefer based on my observation and the provider's statements to me. I, Melina Schaefer MD attest that Galilea Perry is acting in a scribe capacity, has observed my performance of the services and has documented them in accordance with my direction.      Melina Schaefer MD  12/04/24 [id number]

## 2024-12-04 NOTE — PHARMACY-ADMISSION MEDICATION HISTORY
Pharmacist Admission Medication History    Admission medication history is complete. The information provided in this note is only as accurate as the sources available at the time of the update.    Information Source(s): Patient, Family member, Clinic records, and CareEverywhere/SureScripts via in-person    Pertinent Information: none    Changes made to PTA medication list:  Added: None  Deleted: albuterol nebulizer solution  Changed: guaifenesin, omeprazole, simethicone     Allergies reviewed with patient and updates made in EHR: yes    Medication History Completed By: Chaim Paulino LAURO 12/4/2024 2:19 PM    PTA Med List   Medication Sig Last Dose/Taking    acetaminophen (TYLENOL) 500 MG tablet Take 500-1,000 mg by mouth every 4 hours as needed for mild pain. Taking As Needed    albuterol (PROAIR HFA;PROVENTIL HFA;VENTOLIN HFA) 90 mcg/actuation inhaler [ALBUTEROL (PROAIR HFA;PROVENTIL HFA;VENTOLIN HFA) 90 MCG/ACTUATION INHALER] Inhale 1-2 puffs every 4 (four) hours as needed for wheezing or shortness of breath. Taking As Needed    Fluticasone-Umeclidin-Vilanterol (TRELEGY ELLIPTA) 200-62.5-25 MCG/ACT oral inhaler Inhale 1 puff into the lungs daily. 12/4/2024 Morning    guaiFENesin (MUCINEX) 600 MG 12 hr tablet Take 1,200 mg by mouth 2 times daily as needed for cough. Taking As Needed    omeprazole (PRILOSEC) 20 MG DR capsule Take 20 mg by mouth daily as needed. Taking As Needed    simethicone (MYLICON) 80 MG chewable tablet Take 80 mg by mouth 4 times daily as needed for flatulence. Taking As Needed

## 2024-12-05 VITALS
DIASTOLIC BLOOD PRESSURE: 87 MMHG | WEIGHT: 153 LBS | HEART RATE: 105 BPM | HEIGHT: 72 IN | RESPIRATION RATE: 25 BRPM | SYSTOLIC BLOOD PRESSURE: 136 MMHG | BODY MASS INDEX: 20.72 KG/M2 | OXYGEN SATURATION: 91 % | TEMPERATURE: 97.9 F

## 2024-12-05 LAB
ALBUMIN SERPL BCG-MCNC: 2.4 G/DL (ref 3.5–5.2)
ALP SERPL-CCNC: 128 U/L (ref 40–150)
ALT SERPL W P-5'-P-CCNC: 28 U/L (ref 0–70)
ANION GAP SERPL CALCULATED.3IONS-SCNC: 11 MMOL/L (ref 7–15)
AST SERPL W P-5'-P-CCNC: 47 U/L (ref 0–45)
BACTERIA BLD CULT: NORMAL
BILIRUB SERPL-MCNC: 0.4 MG/DL
BUN SERPL-MCNC: 53.1 MG/DL (ref 8–23)
C PNEUM DNA SPEC QL NAA+PROBE: NOT DETECTED
CALCIUM SERPL-MCNC: 7.8 MG/DL (ref 8.8–10.4)
CHLORIDE SERPL-SCNC: 108 MMOL/L (ref 98–107)
CREAT SERPL-MCNC: 1.53 MG/DL (ref 0.67–1.17)
EGFRCR SERPLBLD CKD-EPI 2021: 49 ML/MIN/1.73M2
ERYTHROCYTE [DISTWIDTH] IN BLOOD BY AUTOMATED COUNT: 12.8 % (ref 10–15)
FLUAV H1 2009 PAND RNA SPEC QL NAA+PROBE: NOT DETECTED
FLUAV H1 RNA SPEC QL NAA+PROBE: NOT DETECTED
FLUAV H3 RNA SPEC QL NAA+PROBE: NOT DETECTED
FLUAV RNA SPEC QL NAA+PROBE: NOT DETECTED
FLUBV RNA SPEC QL NAA+PROBE: NOT DETECTED
GLUCOSE SERPL-MCNC: 117 MG/DL (ref 70–99)
GRAM STAIN RESULT: NORMAL
HADV DNA SPEC QL NAA+PROBE: NOT DETECTED
HCO3 SERPL-SCNC: 19 MMOL/L (ref 22–29)
HCOV PNL SPEC NAA+PROBE: NOT DETECTED
HCT VFR BLD AUTO: 44.3 % (ref 40–53)
HGB BLD-MCNC: 14.9 G/DL (ref 13.3–17.7)
HMPV RNA SPEC QL NAA+PROBE: NOT DETECTED
HPIV1 RNA SPEC QL NAA+PROBE: NOT DETECTED
HPIV2 RNA SPEC QL NAA+PROBE: NOT DETECTED
HPIV3 RNA SPEC QL NAA+PROBE: NOT DETECTED
HPIV4 RNA SPEC QL NAA+PROBE: NOT DETECTED
L PNEUMO1 AG UR QL IA: NEGATIVE
M PNEUMO DNA SPEC QL NAA+PROBE: NOT DETECTED
MCH RBC QN AUTO: 29.9 PG (ref 26.5–33)
MCHC RBC AUTO-ENTMCNC: 33.6 G/DL (ref 31.5–36.5)
MCV RBC AUTO: 89 FL (ref 78–100)
PLATELET # BLD AUTO: 332 10E3/UL (ref 150–450)
POTASSIUM SERPL-SCNC: 4.2 MMOL/L (ref 3.4–5.3)
PROT SERPL-MCNC: 5.2 G/DL (ref 6.4–8.3)
RBC # BLD AUTO: 4.99 10E6/UL (ref 4.4–5.9)
RSV RNA SPEC QL NAA+PROBE: NOT DETECTED
RSV RNA SPEC QL NAA+PROBE: NOT DETECTED
RV+EV RNA SPEC QL NAA+PROBE: NOT DETECTED
S PNEUM AG SPEC QL: NEGATIVE
SODIUM SERPL-SCNC: 138 MMOL/L (ref 135–145)
SPECIMEN TYPE: NORMAL
WBC # BLD AUTO: 25.9 10E3/UL (ref 4–11)

## 2024-12-05 PROCEDURE — 87899 AGENT NOS ASSAY W/OPTIC: CPT | Performed by: STUDENT IN AN ORGANIZED HEALTH CARE EDUCATION/TRAINING PROGRAM

## 2024-12-05 PROCEDURE — 85041 AUTOMATED RBC COUNT: CPT | Performed by: STUDENT IN AN ORGANIZED HEALTH CARE EDUCATION/TRAINING PROGRAM

## 2024-12-05 PROCEDURE — 99222 1ST HOSP IP/OBS MODERATE 55: CPT | Performed by: INTERNAL MEDICINE

## 2024-12-05 PROCEDURE — 94640 AIRWAY INHALATION TREATMENT: CPT

## 2024-12-05 PROCEDURE — 999N000157 HC STATISTIC RCP TIME EA 10 MIN

## 2024-12-05 PROCEDURE — 94799 UNLISTED PULMONARY SVC/PX: CPT

## 2024-12-05 PROCEDURE — 84155 ASSAY OF PROTEIN SERUM: CPT | Performed by: STUDENT IN AN ORGANIZED HEALTH CARE EDUCATION/TRAINING PROGRAM

## 2024-12-05 PROCEDURE — 82040 ASSAY OF SERUM ALBUMIN: CPT | Performed by: STUDENT IN AN ORGANIZED HEALTH CARE EDUCATION/TRAINING PROGRAM

## 2024-12-05 PROCEDURE — 120N000001 HC R&B MED SURG/OB

## 2024-12-05 PROCEDURE — 250N000009 HC RX 250: Performed by: PHYSICIAN ASSISTANT

## 2024-12-05 PROCEDURE — 87581 M.PNEUMON DNA AMP PROBE: CPT | Performed by: INTERNAL MEDICINE

## 2024-12-05 PROCEDURE — 87385 HISTOPLASMA CAPSUL AG IA: CPT | Performed by: INTERNAL MEDICINE

## 2024-12-05 PROCEDURE — 250N000013 HC RX MED GY IP 250 OP 250 PS 637: Performed by: STUDENT IN AN ORGANIZED HEALTH CARE EDUCATION/TRAINING PROGRAM

## 2024-12-05 PROCEDURE — 36415 COLL VENOUS BLD VENIPUNCTURE: CPT | Performed by: STUDENT IN AN ORGANIZED HEALTH CARE EDUCATION/TRAINING PROGRAM

## 2024-12-05 PROCEDURE — 85014 HEMATOCRIT: CPT | Performed by: STUDENT IN AN ORGANIZED HEALTH CARE EDUCATION/TRAINING PROGRAM

## 2024-12-05 PROCEDURE — 87070 CULTURE OTHR SPECIMN AEROBIC: CPT | Performed by: STUDENT IN AN ORGANIZED HEALTH CARE EDUCATION/TRAINING PROGRAM

## 2024-12-05 PROCEDURE — 82247 BILIRUBIN TOTAL: CPT | Performed by: STUDENT IN AN ORGANIZED HEALTH CARE EDUCATION/TRAINING PROGRAM

## 2024-12-05 PROCEDURE — 87486 CHLMYD PNEUM DNA AMP PROBE: CPT | Performed by: INTERNAL MEDICINE

## 2024-12-05 PROCEDURE — 272N000202 HC AEROBIKA WITH MANOMETER

## 2024-12-05 PROCEDURE — 250N000011 HC RX IP 250 OP 636: Performed by: STUDENT IN AN ORGANIZED HEALTH CARE EDUCATION/TRAINING PROGRAM

## 2024-12-05 PROCEDURE — 84520 ASSAY OF UREA NITROGEN: CPT | Performed by: STUDENT IN AN ORGANIZED HEALTH CARE EDUCATION/TRAINING PROGRAM

## 2024-12-05 PROCEDURE — 250N000009 HC RX 250: Performed by: HOSPITALIST

## 2024-12-05 PROCEDURE — 999N000156 HC STATISTIC RCP CONSULT EA 30 MIN

## 2024-12-05 PROCEDURE — 94640 AIRWAY INHALATION TREATMENT: CPT | Mod: 76

## 2024-12-05 RX ORDER — IPRATROPIUM BROMIDE AND ALBUTEROL SULFATE 2.5; .5 MG/3ML; MG/3ML
3 SOLUTION RESPIRATORY (INHALATION)
Status: DISCONTINUED
Start: 2024-12-05 | End: 2024-12-08 | Stop reason: HOSPADM

## 2024-12-05 RX ORDER — IPRATROPIUM BROMIDE AND ALBUTEROL SULFATE 2.5; .5 MG/3ML; MG/3ML
3 SOLUTION RESPIRATORY (INHALATION) EVERY 4 HOURS PRN
Status: DISCONTINUED | OUTPATIENT
Start: 2024-12-05 | End: 2024-12-05

## 2024-12-05 RX ORDER — CEFTRIAXONE 1 G/1
1 INJECTION, POWDER, FOR SOLUTION INTRAMUSCULAR; INTRAVENOUS EVERY 24 HOURS
Status: DISCONTINUED
Start: 2024-12-06 | End: 2024-12-07

## 2024-12-05 RX ORDER — METHYLPREDNISOLONE SODIUM SUCCINATE 40 MG/ML
40 INJECTION INTRAMUSCULAR; INTRAVENOUS EVERY 12 HOURS
Status: DISCONTINUED
Start: 2024-12-06 | End: 2024-12-06 | Stop reason: ALTCHOICE

## 2024-12-05 RX ORDER — ALBUTEROL SULFATE 90 UG/1
1-2 INHALANT RESPIRATORY (INHALATION) EVERY 4 HOURS PRN
Status: DISCONTINUED
Start: 2024-12-05 | End: 2024-12-08 | Stop reason: HOSPADM

## 2024-12-05 RX ORDER — IPRATROPIUM BROMIDE AND ALBUTEROL SULFATE 2.5; .5 MG/3ML; MG/3ML
3 SOLUTION RESPIRATORY (INHALATION)
Status: DISCONTINUED | OUTPATIENT
Start: 2024-12-05 | End: 2024-12-05

## 2024-12-05 RX ORDER — AZITHROMYCIN 250 MG/1
500 TABLET, FILM COATED ORAL DAILY
Qty: 4 TABLET | Refills: 1 | Status: COMPLETED
Start: 2024-12-06 | End: 2024-12-08

## 2024-12-05 RX ORDER — PANTOPRAZOLE SODIUM 40 MG/1
40 TABLET, DELAYED RELEASE ORAL DAILY PRN
Status: DISCONTINUED
Start: 2024-12-05 | End: 2024-12-08 | Stop reason: HOSPADM

## 2024-12-05 RX ORDER — FLUTICASONE FUROATE AND VILANTEROL 200; 25 UG/1; UG/1
1 POWDER RESPIRATORY (INHALATION) DAILY
Status: DISCONTINUED
Start: 2024-12-06 | End: 2024-12-08 | Stop reason: HOSPADM

## 2024-12-05 RX ORDER — SIMETHICONE 80 MG
80 TABLET,CHEWABLE ORAL 4 TIMES DAILY PRN
Status: DISCONTINUED
Start: 2024-12-05 | End: 2024-12-08 | Stop reason: HOSPADM

## 2024-12-05 RX ORDER — GUAIFENESIN 600 MG/1
1200 TABLET, EXTENDED RELEASE ORAL 2 TIMES DAILY PRN
Status: DISCONTINUED
Start: 2024-12-05 | End: 2024-12-07

## 2024-12-05 RX ORDER — ACETAMINOPHEN 500 MG
500 TABLET ORAL EVERY 6 HOURS PRN
Qty: 8 TABLET | Refills: 0 | Status: DISCONTINUED | OUTPATIENT
Start: 2024-12-05 | End: 2024-12-08 | Stop reason: HOSPADM

## 2024-12-05 RX ADMIN — FLUTICASONE FUROATE AND VILANTEROL TRIFENATATE 1 PUFF: 200; 25 POWDER RESPIRATORY (INHALATION) at 08:14

## 2024-12-05 RX ADMIN — IPRATROPIUM BROMIDE AND ALBUTEROL SULFATE 3 ML: .5; 3 SOLUTION RESPIRATORY (INHALATION) at 11:16

## 2024-12-05 RX ADMIN — METHYLPREDNISOLONE SODIUM SUCCINATE 40 MG: 40 INJECTION, POWDER, FOR SOLUTION INTRAMUSCULAR; INTRAVENOUS at 00:42

## 2024-12-05 RX ADMIN — AZITHROMYCIN DIHYDRATE 500 MG: 250 TABLET ORAL at 08:14

## 2024-12-05 RX ADMIN — UMECLIDINIUM 1 PUFF: 62.5 AEROSOL, POWDER ORAL at 08:15

## 2024-12-05 RX ADMIN — IPRATROPIUM BROMIDE AND ALBUTEROL SULFATE 3 ML: .5; 3 SOLUTION RESPIRATORY (INHALATION) at 15:06

## 2024-12-05 RX ADMIN — CEFTRIAXONE SODIUM 1 G: 1 INJECTION, POWDER, FOR SOLUTION INTRAMUSCULAR; INTRAVENOUS at 14:06

## 2024-12-05 RX ADMIN — METHYLPREDNISOLONE SODIUM SUCCINATE 40 MG: 40 INJECTION, POWDER, FOR SOLUTION INTRAMUSCULAR; INTRAVENOUS at 14:04

## 2024-12-05 RX ADMIN — IPRATROPIUM BROMIDE AND ALBUTEROL SULFATE 3 ML: .5; 3 SOLUTION RESPIRATORY (INHALATION) at 19:49

## 2024-12-05 ASSESSMENT — ACTIVITIES OF DAILY LIVING (ADL)
ADLS_ACUITY_SCORE: 42
ADLS_ACUITY_SCORE: 41
ADLS_ACUITY_SCORE: 42

## 2024-12-05 NOTE — PROGRESS NOTES
Report called to [facility]. Pt's son transporting pt home. All belongings sent with pt. Answering any questions son and pt have.     SEDA RAINES RN

## 2024-12-05 NOTE — PROGRESS NOTES
Pt A&Ox4. VSS. Denies pain. On 5 L of Oxygen via oxymask. Oxygen saturation in low 90s. NS infusing 75 ml per hour. Regular diet. SBA.

## 2024-12-05 NOTE — CONSULTS
.. [facility]    Liaison Communication     Patient: Armin Terrell        MRN: [id number]  : 1955  Age: 69 year old     Hospital [facility] Home service location and phone number:   [phone]  [facility] [address]  [phone] (home)     Is this a secure facility and can you get to the door to receive visits i.e., medication delivery? Yes pvt home     to schedule appointments: Patient or his wife Trini.  Can the pt make a cell phone call in the home or have a cellular connection?(must have cellular connection or WiFi):  Yes  Does the patient or a 24-hour caregiver have a smartphone with video capability that works inside the residence?   YesTHIS IS A REQUIREMENT- PATIENT OR FAMILY MEMBER IN THE HOME (that resides in the home  ) MUST HAVE A WORKING SMART-PHONE AND VERBALLY CONFIRM THEY ARE WILLING TO USE AS BACKUP FOR VIDEO VISITS (WAIVER ONLY)   Patient caregiver during Home Hospital episode: Self and spouse Trini.  Patient consented to discuss care with caregiver: Yes  Verified caregiver willing/able to assist with patient cares and biometrics, if needed: Yes  Patient/caregiver able to  prescription medications after hospital discharge, if needed: Yes  Does patient have transportation to medical appointments, if needed: Yes son Jamey will transport.     Patient was informed and in agreement that [employer] medical transportation is the required mode for transport home  Yes     Does patient have transportation to home and to medical appointments, if needed: yes    Primary care provider verified:  Júnior Salvador   [phone]      Reason patient admitting to Home Hospital:  COPD exacerbation, CAP, Sepsis d/t unspecified organism.  [facility] consult provider that reviewed case: Jennifer Denney NP  Admitting physician (Hospitalist): Antoinette Finnegan MD    Additional Medical Needs:   IV antibiotic, Iv steroid,increase O2 needs. PT  (CHF, hx CHF pts or pts that need daily weights)  DOES THE PATIENT HAVE A SCALE USED CONSISTENTLY TO OBTAIN DAILY WEIGHT IF NEEDED: n/a    Additional Care Management Needs:  Are there pets in the home:   Yes  Patient was informed that [facility] policy is that all pets and fire arms are secured prior to our staff coming to your home:   Yes If patient is not agreeable to securing pets or firearms, the clinical team were notified. Pt has a cat name shellie.no firearms.  Patient's Primary Language:  English   required:  No    Does patient have a home care agency providing services prior to admission? No    Chart review completed. Met with  patient and his wife Trini, and son Jamey to discuss purpose and process of [facility] in Home. Answered patient/family questions regarding program. Patient verbally consents to Hospital in Home program: Yes    Car Skelton RN  Hospital in Home Liaison

## 2024-12-05 NOTE — PROGRESS NOTES
"[facility] ([facility]) TRANSFER NOTE     Patient transferred to [facility] at Home today. Transfer was discussed and coordinated with attending Dr. Finnegan, as well as pulmonologist Dr. Morales.  Please see their notes regarding full A/P.     Orders and transfer details:   IV medications:  Will continue IV Solumedrol 40 mgs BID with next dose scheduled at 0800 tomorrow. Pending clinical status, consider transitioning to oral prednisone 12/6.   Will continue IV ceftriaxone 1 gram Q24 H, next dose tomorrow at 1400.  Also continue to assess for transition to oral cephalosporin. Consider 7 day course?   Other medications  Continue oral Zithromax for three more doses (total 5 days of therapy)   Breo Ellipta and Incruse Ellipta ordered per IP formulary. Patient takes Trelegy Ellipta at home.   Continue Q4H while awake Duonebs. Nebulizer machine will be sent with patient at transport.    Continue PTA PRN Mucinex, simethicone, and PPI (omeprazole changed to pantoprazole per formulary)  Other respiratory treatments  Continue flutter valve 4x daily per RT and Pulm. Patient sent with flutter valve.   Transferring on 3 L of supplemental oxygen via nasal canula . Titrate to keep O2 90-93% (COPD). Patient is on O2 at home at night. PTA, was utilizing up to 4 L during the day. Will be sent with transport oxygen from [facility].   Mobility  \"Easy\" 1 assist per RN, wife able to support at home. Patient's son is transferring home.  Will arrange PT to see in the home.   Labs  CBC and BMP tomorrow, following creatinine and WBC particularly. SHAKIRA thought to be pre-renal, creatinine responding to fluids. IV fluids were discontinued this AM. WBC downtrending (25 from 30). Will likely remain elevated while on steroids.   Pending labs  Blood cultures drawn in ED (12/4 at 13:23) NGTD  Histoplasma Urine Antigen ordered by Pulm today. This was sent to [place] lab and will not result for a day or two. In discussion with " Dr. Morales, she has low suspision for histoplasmosis.     I will be available until 17:00 today for questions regarding this patient via Teams or secure chat. My colleague Nova Moon will be available this evening and overnight for provider needs, then my colleague Nava Lorenz will see the patient tomorrow.      Jennifer Denney PA-C  [facility]     [phone]

## 2024-12-05 NOTE — PROGRESS NOTES
[facility] Coordination Note    Report received from SAULO bangura    Diagnosis: COPD exacerbation    Oxygen needs: Yes Currently Using 3 LPM    Scheduled/standing labs: Yes, tbd    Therapy orders: Physical Therapy    DME needs: Nebulizer    Ambulation status: independently    Lung sounds: diminished    IV medications:Yes IV Catheter type: L AC    TPN: No    IV access: Yes Peripheral     Pain: No    Wound Care: No    Tube Feeding: No     Food Insecurities: No      60 y/o male full code, came in yesterday after having SOB at clinic and sating in the 70s. Currently on 3L NC, baseline RA but uses 2L NC at night. DX PNA and getting antibx. Ambulates independently. Going home with wife. Denies pain. Getting nebs. Left AC currently leaking but RN will see if it can be used, if not another IV will be placed. No other concerns at this time.     Tere Rush RN on 12/5/2024 at 3:54 PM

## 2024-12-05 NOTE — PROGRESS NOTES
[facility]    Medicine Progress Note - Hospitalist Service    Date of Admission:  12/4/2024    Assessment & Plan                Armin Terrell is a 69 year old male with COPD, severe emphysema, alpha-1 antitrypsin deficiency, history of prostatic CA s/p radical prostatectomy here with cough, generalized malaise and weakness found to have a multi focal pneumonia and COPD exacerbation. Transferring to [facility] today. Hospital Day: 2       Sepsis present on admission  Acute hypoxic respiratory failure due to COPD exacerbation  Multifocal pneumonia  Lactic acidosis  Alpha-1 antitrypsin deficiency  -Upon presentation patient was hypoxic saturating in the 70s.  -On 5 L oxygen via nasal cannula.  -VBG : with mildly increased pCO2  -CT chest reported with multifocal pneumonia areas of consolidation in the left lower lobe, lingula and right lower lobe  -patient had lactic acidosis, leukocytosis and increased procalcitonin  -on ceftriaxone and azithromycin  -Continue Solu-Medrol 40 mg IV twice daily  -scheduled Duonebs  -Pulmonary hygiene, mucinex  -Follow blood culture, sputum culture and respiratory panel PCR  -Trend WBC- prelim coming down  -Pulmonary saw here for patient has a history of severe emphysema and alpha-1 antitrypsin deficiency     SHAKIRA  -Creatinine has increased to 2.19 from a normal baseline  -Appears to have a prerenal pattern  -received IVF, Cr better now 1.5  -Monitor input and output  -recommend following BMP     Pulmonary nodule  -Follows with pulmonary as an outpatient, repeat CT scan planned in February    Hypoalbuminemia  -2.4 likely mild malnutrition, appetite has been poor          Diet: Combination Diet Regular Diet Adult    DVT Prophylaxis: Moderate risk.   Enoxaparin (Lovenox) SQ  Neff Catheter: Not present  Lines: None     Cardiac Monitoring: None  Code Status: Full Code      Clinically Significant Risk Factors Present on Admission          # Hyperchloremia:  Highest Cl = 108 mmol/L in last 2 days, will monitor as appropriate          # Hypoalbuminemia: Lowest albumin = 2.4 g/dL at 12/5/2024  7:49 AM, will monitor as appropriate                          Disposition Plan     Medically Ready for Discharge: Anticipated in 2-4 Days         Discharge barrier(s): IV abx, steroids, hypoxia  Care discussed with: patient, spouse, RN      Antoinette Finnegan MD  Hospitalist Service  [facility]  Securely message with [email or web address] (more info)  Text page via [employer] Paging/Directory   ______________________________________________________________________      Physical Exam   Vital Signs: Temp: 97.4  F (36.3  C) Temp src: Oral BP: (!) 145/78 Pulse: 97   Resp: 27 SpO2: 91 % O2 Device: Nasal cannula Oxygen Delivery: 6 LPM  Weight: 153 lbs 0 oz    General: in no apparent distress, non-toxic, and alert male lying in hospital bed oriented x3  HEENT: Head normocephalic atraumatic, oral mucosa moist. Sclerae anicteric  CV: Regular rhythm, normal rate, no murmurs  Resp: No wheezes, diminished BS bilateral bases  GI: Belly soft, nondistended, nontender, bowel sounds present  Skin: No rashes or lesions  Extremities: No peripheral edema  Psych: Normal affect, mood euthymic  Neuro: Grossly normal      Medical Decision Making               Data   Recent Results (from the past 16 hours)   Legionella Urinary Antigen and Streptococcus pneumoniae antigen    Collection Time: 12/05/24 12:39 AM    Specimen: Urine, Voided   Result Value Ref Range    Legionella pneumophila serogroup 1 urinary antigen Negative Negative    Streptococcus pneumoniae antigen Negative Negative    Legionella pneumophila Urinary/Strep pneumoniae Antigen Specimen Type Urine    CBC with platelets    Collection Time: 12/05/24  7:49 AM   Result Value Ref Range    WBC Count 25.9 (H) 4.0 - 11.0 10e3/uL    RBC Count 4.99 4.40 - 5.90 10e6/uL    Hemoglobin 14.9 13.3 - 17.7 g/dL    Hematocrit 44.3 40.0 - 53.0 %    MCV 89 78 - 100  fL    MCH 29.9 26.5 - 33.0 pg    MCHC 33.6 31.5 - 36.5 g/dL    RDW 12.8 10.0 - 15.0 %    Platelet Count 332 150 - 450 10e3/uL   Comprehensive metabolic panel    Collection Time: 12/05/24  7:49 AM   Result Value Ref Range    Sodium 138 135 - 145 mmol/L    Potassium 4.2 3.4 - 5.3 mmol/L    Carbon Dioxide (CO2) 19 (L) 22 - 29 mmol/L    Anion Gap 11 7 - 15 mmol/L    Urea Nitrogen 53.1 (H) 8.0 - 23.0 mg/dL    Creatinine 1.53 (H) 0.67 - 1.17 mg/dL    GFR Estimate 49 (L) >60 mL/min/1.73m2    Calcium 7.8 (L) 8.8 - 10.4 mg/dL    Chloride 108 (H) 98 - 107 mmol/L    Glucose 117 (H) 70 - 99 mg/dL    Alkaline Phosphatase 128 40 - 150 U/L    AST 47 (H) 0 - 45 U/L    ALT 28 0 - 70 U/L    Protein Total 5.2 (L) 6.4 - 8.3 g/dL    Albumin 2.4 (L) 3.5 - 5.2 g/dL    Bilirubin Total 0.4 <=1.2 mg/dL       Interval History     Patient doing well this morning.   is currently on 5 to 6 L nasal cannula.  Tolerated breakfast, not much appetite.  Appears in no distress and seems to have a good energy level.  We discussed hospital at home, patient is open to this.  Wife updated at bedside.  Patient to be cleared by pulmonology and also I would like him to ambulate to the bathroom and make sure his oxygen saturation does not plummet but otherwise he would be cleared for hospital at home.

## 2024-12-05 NOTE — TREATMENT PLAN
RCAT Treatment Plan    Patient Score: 13  Patient Acuity: 3    Clinical Indication for Therapy: history of bronchospasm, productive cough, and history of mucous producing disease    Therapy Ordered: Duoneb Q4 while awake, Duoneb Q6 prn, Flutter QID.    Assessment Summary: PT currently on 3L NC with an SpO2 of 93%  Congested cough. Encourage to cough up secretions.  RT will re-eval RCAT in x72 hours.    Chapo Cortez, RT  12/5/2024

## 2024-12-05 NOTE — PROGRESS NOTES
Ambulated pt approximately 150ft. Maintained 93% on 3L O2 via nasal canula, pt tolerated okay. Continuing to work on titration O2.    SEDA RAINES RN

## 2024-12-05 NOTE — PLAN OF CARE
Problem: Pneumonia  Goal: Effective Oxygenation and Ventilation  Outcome: Progressing  Intervention: Promote Airway Secretion Clearance  Pt remains on 5L O2 via oxymask. Oxygen saturations in low-mid 90s. Pt having a infrequent, nonproductive, dry cough. Lung sounds diminished.    VSS. Tele NSR. A&Ox4. SBA. Pt updated on POC, any questions he had answered.     Still needing urine and respiratory sample.     SEDA RAINES RN

## 2024-12-06 LAB
ANION GAP SERPL CALCULATED.3IONS-SCNC: 11 MMOL/L (ref 7–15)
BUN SERPL-MCNC: 55.9 MG/DL (ref 8–23)
CALCIUM SERPL-MCNC: 8.8 MG/DL (ref 8.8–10.4)
CHLORIDE SERPL-SCNC: 105 MMOL/L (ref 98–107)
CREAT SERPL-MCNC: 1.29 MG/DL (ref 0.67–1.17)
EGFRCR SERPLBLD CKD-EPI 2021: 60 ML/MIN/1.73M2
ERYTHROCYTE [DISTWIDTH] IN BLOOD BY AUTOMATED COUNT: 13.1 % (ref 10–15)
GLUCOSE SERPL-MCNC: 97 MG/DL (ref 70–99)
H CAPSUL AG UR QL IA: NOT DETECTED
H CAPSUL AG UR-MCNC: NOT DETECTED NG/ML
HCO3 SERPL-SCNC: 23 MMOL/L (ref 22–29)
HCT VFR BLD AUTO: 47.5 % (ref 40–53)
HGB BLD-MCNC: 16.5 G/DL (ref 13.3–17.7)
MCH RBC QN AUTO: 31 PG (ref 26.5–33)
MCHC RBC AUTO-ENTMCNC: 34.7 G/DL (ref 31.5–36.5)
MCV RBC AUTO: 89 FL (ref 78–100)
PLATELET # BLD AUTO: 432 10E3/UL (ref 150–450)
POTASSIUM SERPL-SCNC: 4.4 MMOL/L (ref 3.4–5.3)
RBC # BLD AUTO: 5.33 10E6/UL (ref 4.4–5.9)
SODIUM SERPL-SCNC: 139 MMOL/L (ref 135–145)
WBC # BLD AUTO: 29.6 10E3/UL (ref 4–11)

## 2024-12-06 PROCEDURE — 250N000009 HC RX 250: Performed by: PHYSICIAN ASSISTANT

## 2024-12-06 PROCEDURE — 99418 PROLNG IP/OBS E/M EA 15 MIN: CPT | Performed by: NURSE PRACTITIONER

## 2024-12-06 PROCEDURE — 84295 ASSAY OF SERUM SODIUM: CPT | Performed by: PHYSICIAN ASSISTANT

## 2024-12-06 PROCEDURE — 250N000013 HC RX MED GY IP 250 OP 250 PS 637: Performed by: PHYSICIAN ASSISTANT

## 2024-12-06 PROCEDURE — 120N000001 HC R&B MED SURG/OB

## 2024-12-06 PROCEDURE — 85027 COMPLETE CBC AUTOMATED: CPT | Performed by: PHYSICIAN ASSISTANT

## 2024-12-06 PROCEDURE — 99233 SBSQ HOSP IP/OBS HIGH 50: CPT | Performed by: NURSE PRACTITIONER

## 2024-12-06 PROCEDURE — 250N000011 HC RX IP 250 OP 636: Performed by: PHYSICIAN ASSISTANT

## 2024-12-06 PROCEDURE — 80048 BASIC METABOLIC PNL TOTAL CA: CPT | Performed by: PHYSICIAN ASSISTANT

## 2024-12-06 RX ORDER — PREDNISONE 20 MG/1
40 TABLET ORAL DAILY
Status: DISCONTINUED
Start: 2024-12-07 | End: 2024-12-08 | Stop reason: HOSPADM

## 2024-12-06 RX ADMIN — IPRATROPIUM BROMIDE AND ALBUTEROL SULFATE 3 ML: .5; 3 SOLUTION RESPIRATORY (INHALATION) at 19:57

## 2024-12-06 RX ADMIN — UMECLIDINIUM 1 PUFF: 62.5 AEROSOL, POWDER ORAL at 09:02

## 2024-12-06 RX ADMIN — IPRATROPIUM BROMIDE AND ALBUTEROL SULFATE 3 ML: .5; 3 SOLUTION RESPIRATORY (INHALATION) at 09:04

## 2024-12-06 RX ADMIN — FLUTICASONE FUROATE AND VILANTEROL TRIFENATATE 1 PUFF: 200; 25 POWDER RESPIRATORY (INHALATION) at 08:59

## 2024-12-06 RX ADMIN — IPRATROPIUM BROMIDE AND ALBUTEROL SULFATE 3 ML: .5; 3 SOLUTION RESPIRATORY (INHALATION) at 12:45

## 2024-12-06 RX ADMIN — METHYLPREDNISOLONE SODIUM SUCCINATE 40 MG: 40 INJECTION INTRAMUSCULAR; INTRAVENOUS at 09:16

## 2024-12-06 RX ADMIN — CEFTRIAXONE 1 G: 1 INJECTION, POWDER, FOR SOLUTION INTRAMUSCULAR; INTRAVENOUS at 14:46

## 2024-12-06 RX ADMIN — AZITHROMYCIN 500 MG: 250 TABLET, FILM COATED ORAL at 09:01

## 2024-12-06 RX ADMIN — IPRATROPIUM BROMIDE AND ALBUTEROL SULFATE 3 ML: .5; 3 SOLUTION RESPIRATORY (INHALATION) at 15:30

## 2024-12-06 ASSESSMENT — ACTIVITIES OF DAILY LIVING (ADL)
ADLS_ACUITY_SCORE: 19
ADLS_ACUITY_SCORE: 42
ADLS_ACUITY_SCORE: 19
ADLS_ACUITY_SCORE: 42
ADLS_ACUITY_SCORE: 42
ADLS_ACUITY_SCORE: 19
ADLS_ACUITY_SCORE: 42
ADLS_ACUITY_SCORE: 19
ADLS_ACUITY_SCORE: 42
ADLS_ACUITY_SCORE: 19
ADLS_ACUITY_SCORE: 42
ADLS_ACUITY_SCORE: 19
ADLS_ACUITY_SCORE: 19
ADLS_ACUITY_SCORE: 42
ADLS_ACUITY_SCORE: 42

## 2024-12-06 NOTE — PROGRESS NOTES
Home Hospital Care Note    Yuliana DURAN contacted triage to ensure ipad is working properly. Writer contacted pt ensure video call was working properly. Pt and spouse have no questions/concerns at this time.     Tere Rush RN on 12/5/2024 at 7:47 PM

## 2024-12-06 NOTE — PROGRESS NOTES
Home Hospital Care:    Reason for Visit: Medication Administration    Video visit with patient for medication administration. Successful self-administration of medications. Duoneb  Most recent biometrics reviewed. Patient without acute concerns.  MAR updated.     Jessie Crow RN on 12/6/2024 at 12:48 PM

## 2024-12-06 NOTE — PROGRESS NOTES
Home Hospital Care:    Reason for Visit: Medication Administration    Video visit with patient for medication administration. Successful self-administration of medication:duoneb.   Most recent biometrics reviewed. Patient without acute concerns.  MAR updated.     Tere Rush RN on 12/5/2024 at 7:50 PM

## 2024-12-06 NOTE — PROGRESS NOTES
[facility] in Home    Date of Service: 12/06/2024    Background:  Armin Terrell is a 69 year old male who was admitted on 12/4/2024. Past medical history significant for COPD, severe emphysema, alpha-1 antitrypsin deficiency, history of prostatic CA s/p radical prostatectomy.  Patient presented with cough productive of scanty sputum, malaise, generalized body weakness of few days duration.  He also reports having pleuritic chest pain.  He has chronic lung issues including pulmonary nodule, severe emphysema and alpha-1 antitrypsin deficiency syndrome.  CT chest was significant for multifocal pneumonia.  Labs significant for leukocytosis, elevated lactic acid, elevated procalcitonin and elevated creatinine.  Patient admitted for management of COPD exacerbation. Required 5L NC oxygen, started on ceftriaxone and azith. Provided with IV solumedrol, continued on neb therapy and in PTA inhalers. Micah noted, received IVF. Transferred to hospital at home for ongoing management on 12/5.        Interval History:     Doing well today, up in video in good spirits.     Blood cultures: NGTD  Histoplasmosis pending.   Labs today:  CBC WBC up to 29K in setting of IV steroid therapy, no acute decompensation- no fever.BMP Cr down trending lytes stbale.   Home health: Declines home health   Shower this am didg great  Change to prednisone in am.   3LPM NC oxygen, no acute wheezing, using flutter valve.   Not much for mucous, harsh cough noted on video, didn't cough overnight.   Denies chest pressure or pain   No abd pain   Voiding urine ok   Last BM this am- Looser and darker- just started last night. Check HGB today at 16.5. Will watch stools, no bright red blood. Darker color possibly food/med related.   Feet and ankles this am a little edema  At home walks per self, will elevate legs. No unsteady events. PT today at 1330. Pt declines home health need on discharge.     Confirmed full code status.     Discussed appt time  "tomorrow.     Wife in home for support.     All questions answered.       Exam:   Vitals:        GENERAL: alert and no distress  EYES: Eyes grossly normal to inspection.  No discharge or erythema, or obvious scleral/conjunctival abnormalities.  RESP: No audible wheeze, cough, or visible cyanosis.    SKIN: Visible skin clear. No significant rash, abnormal pigmentation or lesions.  NEURO: Cranial nerves grossly intact.  Mentation and speech appropriate for age.  PSYCH: Appropriate affect, tone, and pace of words    Data:    Recent Labs   Lab 12/06/24  0942 12/05/24  0749 12/04/24  1317   WBC 29.6* 25.9* 30.2*   HGB 16.5 14.9 18.1*   MCV 89 89 89    332 385    138 135   POTASSIUM 4.4 4.2 4.2   CHLORIDE 105 108* 99   CO2 23 19* 21*   BUN 55.9* 53.1* 49.1*   CR 1.29* 1.53* 2.19*   ANIONGAP 11 11 15   PIYUSH 8.8 7.8* 8.7*   GLC 97 117* 105*   ALBUMIN  --  2.4*  --    PROTTOTAL  --  5.2*  --    BILITOTAL  --  0.4  --    ALKPHOS  --  128  --    ALT  --  28  --    AST  --  47*  --        Imaging:  Results for orders placed or performed during the hospital encounter of 12/04/24   Chest CT w/o contrast    Narrative    EXAM: CT CHEST W/O CONTRAST  LOCATION: [facility]  DATE: 12/4/2024    INDICATION: Known LLL pulmonary nodule with last CT 11 4 2024, followed by pulmonology, with COPD, with SOB and hypoxia and cough, [facility] not [facility] by me with \"LLL Pneumonia\", may represent new PNA vs. known nodule, please assess for PNA   vs. known  COMPARISON: Chest CT 11/4/2024, chest x-ray 12/4/2024  TECHNIQUE: CT chest without IV contrast. Multiplanar reformats were obtained. Dose reduction techniques were used.  CONTRAST: None.    FINDINGS:   LUNGS AND PLEURA: Large areas of consolidation worst in the left lower lobe, also involving the lingula and right lower lobe. This obscures previously seen left lower lobe nodule. No pleural effusion or pneumothorax. Severe centrilobular " emphysema.    MEDIASTINUM/AXILLAE: No lymphadenopathy. Small pericardial effusion. No thoracic aortic aneurysm.    CORONARY ARTERY CALCIFICATION: None.    UPPER ABDOMEN: Tiny calcified gallstones.    MUSCULOSKELETAL: Unremarkable.      Impression    IMPRESSION:   1.  Multifocal pneumonia with areas of consolidation in the left lower lobe, lingula and right lower lobe. This obscures previously seen left lower lobe nodule.  2.  Severe emphysema.  3.  Small pericardial effusion.           ASSESSMENT/PLAN:  Armin Terrell is a 69 year old male with COPD, severe emphysema, alpha-1 antitrypsin deficiency, history of prostatic CA s/p radical prostatectomy here with cough, generalized malaise and weakness found to have a multi focal pneumonia and COPD exacerbation. Transferried to Hospital [facility] Home on 12/5.         Sepsis present on admission  Acute hypoxic respiratory failure due to COPD exacerbation  Multifocal pneumonia  Leukocytosis  Lactic acidosis  Alpha-1 antitrypsin deficiency  -Upon presentation patient was hypoxic saturating in the 70s.  -On 5 L oxygen via nasal cannula, weaned down to 3lpm today   -VBG : with mildly increased pCO2  -CT chest reported with multifocal pneumonia areas of consolidation in the left lower lobe, lingula and right lower lobe  -patient had lactic acidosis, leukocytosis and increased procalcitonin  -on ceftriaxone and azithromycin, continue   -Solu-Medrol 40 mg IV twice daily- last dose am 12/6. Start prednisone 40mg daily in am on 12/7   -scheduled Duonebs  -Pulmonary hygiene, mucinex  -Follow blood culture, sputum culture and respiratory panel PCR  -Trend WBC- up today post IV steroid, no decompensation in sx or fever today   -CBC in am   -Pulmonary saw here for patient has a history of severe emphysema and alpha-1 antitrypsin deficiency     SHAKIRA  -Creatinine has increased to 2.19 from a normal baseline  -Appears to have a prerenal pattern  -received IVF, Cr improved to 1.5-->  1.29  -Monitor input and output  -BMP in am     Dark stools  -x 2 last night and this am, no other s/s of bleeding, no acute abd pain, no fever HGB with out drop in 24 hours. Vitals stable.   -Likely diet/med related given stability in HGB  -CBC in am  -Monitor for change in stools      Pulmonary nodule  -Follows with pulmonary as an outpatient, repeat CT scan planned in February     Hypoalbuminemia  -2.4 likely mild malnutrition, appetite has been poor  -Discuss  with PCP, consider protien supplement      Diet:  Combination Diet Regular Diet  Neff Catheter: Neff Catheter: Not present     IV/Lines: PIV  DVT Prophylaxis: Mobilize  Code Status: Full Code  Disposition: Anticipated in 2-4 Days 12/8 planned self care, patient declines home health on discharge    Telehealth Visit Details  Patient/Surrogate participated in the telehealth encounter via audiovisual communication. Identity of patient was verified. Verbal consent was provided for the visit by patient/surrogate.   Video Start Time: 0930  Video End Time: 0950   Originating Location (pt. location): Home  Distant Location (provider location): Home    Medical Decision Making       65 MINUTES SPENT BY ME on the date of service doing chart review, history, exam, documentation & further activities per the note.        Nava Lorenz, [facility] in Home  [phone]

## 2024-12-06 NOTE — PROGRESS NOTES
[facility]     Assessment Location: Home  Admit diagnosis:  Pneumonia/COPD  Admission date: 12/5/24 to Home Hospital     Time arrived: 1820  Time left: 1955    Environment: patient resides With Family Has assist of Spouse    Physical Assessment:  Vital signs:  Blood Pressure: 124/87  Heart Rate: 100  Pulse Ox: 92%  Temperature: 98.8    Physical assessment completed. Notable findings: Pt has no new complaints. Pts HR , SPO2 running low 90's, normal per pt.    Activity: Ambulates Independently     Medications:  Current medications: See MAR    New Medications: Yes  Medication Access: Medications sent home with patient  Medications bundled and placed in a waiver med box:  All medications were checked against the MAR and were present. PRN meds bundled together, AM med marked and place towards front of waiver med box. Pt understood to contact Nurse Triage via red button on tablet or by personal phone . Pt does have early AM appt with Inbound RN.   Waiver lockbox left with patient:  yes  Glucometer left with patient:  no  Medication Instructions reviewed with patient or caregiver Yes:   Further review needed: Possibly. Somewhat overwhelming for pt and spouse. Refresher.    Additional Comments:  Understanding of illness: Yes  Safety concerns: No  Number of steps into home (accessibility for equipment): 2  Are there pets in the home:  Yes: cat-stays to itself  Patient informed that all pets and fire arms are secured prior to our staff coming into home:  No.  If patient is not agreeable to securing pets or firearms, the clinical team is notified  Patient Vulnerable: No    InHome technology:  Patient/family demonstrates understanding of biometric kit use: Yes  Tablet connected to patient WiFi Yes Where is the patient located? Living room/kitchen  Doxy.Me setup and demonstrated on patient smartphone Yes   Guided Access Mode to lock tablet: Yes   Clinical Coordination:  Patient advised of  upcoming nurse and telehealth provider appointments: Yes Where is the patient located? Home  Provided Home Hospital triage phone number [phone]: Yes Where is the patient located? home  Called and spoke with Inbound Triage [phone] for handoff of patient care: Yes Where is the patient located? home      Yuliana Cornejo NRP, CP on 12/5/2024 at 10:00 PM

## 2024-12-06 NOTE — PROGRESS NOTES
[facility]ospital [facility] Home Biometric Monitoring Alert    Biometric monitoring alert for: Low Oxygen      Most Recent Vitals  Pulse Ox: 86% 89%    Vitals Recheck  Oxygen: 92% on 3L    Symptoms  NO SYMPTOMS    Assessment  Patient is currently on home oxygen: Currently using 3 LPM  Baseline: 3 LPM    Additional Assessment  Rest with deep breaths, came up to 92% on 3L. Dx: COPD as well. Denies any SOB. Fingers are warm.    Action taken  Patient and/or caregiver instructed to call triage with any new or worsening symptoms [phone]. and Resolved      Jessie Crow RN on 12/6/2024 at 7:24 AM

## 2024-12-07 LAB
ANION GAP SERPL CALCULATED.3IONS-SCNC: 11 MMOL/L (ref 7–15)
BACTERIA SPT CULT: NORMAL
BUN SERPL-MCNC: 42.7 MG/DL (ref 8–23)
CALCIUM SERPL-MCNC: 8.7 MG/DL (ref 8.8–10.4)
CHLORIDE SERPL-SCNC: 101 MMOL/L (ref 98–107)
CREAT SERPL-MCNC: 1 MG/DL (ref 0.67–1.17)
EGFRCR SERPLBLD CKD-EPI 2021: 81 ML/MIN/1.73M2
ERYTHROCYTE [DISTWIDTH] IN BLOOD BY AUTOMATED COUNT: 13.2 % (ref 10–15)
GLUCOSE SERPL-MCNC: 93 MG/DL (ref 70–99)
GRAM STAIN RESULT: NORMAL
HCO3 SERPL-SCNC: 24 MMOL/L (ref 22–29)
HCT VFR BLD AUTO: 46.4 % (ref 40–53)
HGB BLD-MCNC: 15.9 G/DL (ref 13.3–17.7)
MCH RBC QN AUTO: 30.6 PG (ref 26.5–33)
MCHC RBC AUTO-ENTMCNC: 34.3 G/DL (ref 31.5–36.5)
MCV RBC AUTO: 89 FL (ref 78–100)
PLATELET # BLD AUTO: 416 10E3/UL (ref 150–450)
POTASSIUM SERPL-SCNC: 4.6 MMOL/L (ref 3.4–5.3)
RBC # BLD AUTO: 5.19 10E6/UL (ref 4.4–5.9)
SODIUM SERPL-SCNC: 136 MMOL/L (ref 135–145)
WBC # BLD AUTO: 23.9 10E3/UL (ref 4–11)

## 2024-12-07 PROCEDURE — 250N000013 HC RX MED GY IP 250 OP 250 PS 637: Performed by: NURSE PRACTITIONER

## 2024-12-07 PROCEDURE — 120N000001 HC R&B MED SURG/OB

## 2024-12-07 PROCEDURE — 85014 HEMATOCRIT: CPT | Performed by: NURSE PRACTITIONER

## 2024-12-07 PROCEDURE — 80048 BASIC METABOLIC PNL TOTAL CA: CPT | Performed by: NURSE PRACTITIONER

## 2024-12-07 PROCEDURE — 250N000009 HC RX 250: Performed by: PHYSICIAN ASSISTANT

## 2024-12-07 PROCEDURE — 250N000013 HC RX MED GY IP 250 OP 250 PS 637: Performed by: PHYSICIAN ASSISTANT

## 2024-12-07 PROCEDURE — 250N000011 HC RX IP 250 OP 636: Performed by: NURSE PRACTITIONER

## 2024-12-07 PROCEDURE — 84132 ASSAY OF SERUM POTASSIUM: CPT | Performed by: NURSE PRACTITIONER

## 2024-12-07 PROCEDURE — 99233 SBSQ HOSP IP/OBS HIGH 50: CPT | Performed by: NURSE PRACTITIONER

## 2024-12-07 PROCEDURE — 250N000012 HC RX MED GY IP 250 OP 636 PS 637: Performed by: NURSE PRACTITIONER

## 2024-12-07 RX ORDER — GUAIFENESIN 600 MG/1
1200 TABLET, EXTENDED RELEASE ORAL 2 TIMES DAILY
Status: DISCONTINUED
Start: 2024-12-07 | End: 2024-12-08 | Stop reason: HOSPADM

## 2024-12-07 RX ORDER — CEFTRIAXONE 1 G/1
1 INJECTION, POWDER, FOR SOLUTION INTRAMUSCULAR; INTRAVENOUS EVERY 24 HOURS
Status: COMPLETED
Start: 2024-12-07 | End: 2024-12-08

## 2024-12-07 RX ADMIN — IPRATROPIUM BROMIDE AND ALBUTEROL SULFATE 3 ML: .5; 3 SOLUTION RESPIRATORY (INHALATION) at 20:31

## 2024-12-07 RX ADMIN — AZITHROMYCIN 500 MG: 250 TABLET, FILM COATED ORAL at 08:46

## 2024-12-07 RX ADMIN — CEFTRIAXONE 1 G: 1 INJECTION, POWDER, FOR SOLUTION INTRAMUSCULAR; INTRAVENOUS at 14:38

## 2024-12-07 RX ADMIN — FLUTICASONE FUROATE AND VILANTEROL TRIFENATATE 1 PUFF: 200; 25 POWDER RESPIRATORY (INHALATION) at 08:38

## 2024-12-07 RX ADMIN — GUAIFENESIN 1200 MG: 600 TABLET, EXTENDED RELEASE ORAL at 20:29

## 2024-12-07 RX ADMIN — IPRATROPIUM BROMIDE AND ALBUTEROL SULFATE 3 ML: .5; 3 SOLUTION RESPIRATORY (INHALATION) at 08:43

## 2024-12-07 RX ADMIN — IPRATROPIUM BROMIDE AND ALBUTEROL SULFATE 3 ML: .5; 3 SOLUTION RESPIRATORY (INHALATION) at 15:32

## 2024-12-07 RX ADMIN — IPRATROPIUM BROMIDE AND ALBUTEROL SULFATE 3 ML: .5; 3 SOLUTION RESPIRATORY (INHALATION) at 11:59

## 2024-12-07 RX ADMIN — UMECLIDINIUM 1 PUFF: 62.5 AEROSOL, POWDER ORAL at 08:40

## 2024-12-07 RX ADMIN — PREDNISONE 40 MG: 20 TABLET ORAL at 08:46

## 2024-12-07 ASSESSMENT — ACTIVITIES OF DAILY LIVING (ADL)
ADLS_ACUITY_SCORE: 19

## 2024-12-07 NOTE — PROGRESS NOTES
Home Hospital Care:    Reason for Visit: Medication Administration    Video visit with patient for medication administration. Successful self-administration of medications.   Most recent biometrics reviewed. Patient without acute concerns.  MAR updated.     Madhavi Real RN on 12/6/2024 at 8:01 PM

## 2024-12-07 NOTE — PLAN OF CARE
Goal Outcome Evaluation:      Plan of Care Reviewed With: patient, spouse    Overall Patient Progress: improvingOverall Patient Progress: improving    Full head to toe assessment including skin check performed. Medications administered. Assisted with telehealth visit with NP. Education provided. Patient denies pain, pleased with progress. Slept well last night and felt rested this morning. Shows good safety awareness with regard to oxygen equipment both for self and others in environment. Patient has active spouse and couple demonstrates an easy rapport with each other. Surroundings are neat, clean, and uncluttered. Education provided, including having spouse listen to breath sounds before and after nebulizer treatment and explanation of adventitious sounds including wheezing and crackles. Patient and spouse deny further needs or concerns at this time. Left in stable condition.

## 2024-12-07 NOTE — PLAN OF CARE
Goal Outcome Evaluation:      Plan of Care Reviewed With: patient, spouse    Overall Patient Progress: improvingOverall Patient Progress: improving    Head to toe assessment provided, including skin check. Medications administered including IV Abx, and nebulizer. Pharmacy contacted to request 2 additional doses of Abx due to container failure and unremovable air in line. Administered without further incident. Education provided. Patient and spouse have no further needs or concerns at this time. Left in stable condition.

## 2024-12-07 NOTE — PROGRESS NOTES
[facility]    Date of Service: 12/07/2024    Background:  Armin Terrell is a 69 year old male who was admitted on 12/4/2024. Past medical history significant for COPD, severe emphysema, alpha-1 antitrypsin deficiency, history of prostatic CA s/p radical prostatectomy.  Patient presented with cough productive of scanty sputum, malaise, generalized body weakness of few days duration.  He also reports having pleuritic chest pain.  He has chronic lung issues including pulmonary nodule, severe emphysema and alpha-1 antitrypsin deficiency syndrome.  CT chest was significant for multifocal pneumonia.  Labs significant for leukocytosis, elevated lactic acid, elevated procalcitonin and elevated creatinine.  Patient admitted for management of COPD exacerbation. Required 5L NC oxygen, started on ceftriaxone and azith. Provided with IV solumedrol, continued on neb therapy and in PTA inhalers. Micah noted, received IVF. Transferred to hospital at home for ongoing management on 12/5.      Interval History:     Doing well today, up in video in good spirits.     Blood cultures: NGTD  Sputum: negative  Leg/Strep P: negative   Histoplasmosis Negative  Labs today,  did not , RN re-drawing at PM visit.   CBC WBC down trending  BMP MICAH resolved.    Home health: Declines home health, passed PT   3LPM NC oxygen, no acute wheezing, using flutter valve.   Not much for mucous, didn't cough overnight, would like BID mucinex- ordered  Denies chest pressure or pain   No abd pain   Voiding urine ok   Last BM this am. More formed today.  Feet and ankles- no edema  At home walks per self, will elevate legs. No unsteady events.     Confirmed full code status 12/6 Monday 2pm [employer] PCP appt    Wife in home for support.     All questions answered.     Post visit RN notes oxygen tank with error satting 84-87%, air is flowing, gave ok to turn up to get sats to 88% or better, DME company called by [place] to trouble  "shoot in home.     RN was able to troubleshoot sats at 88% on 3LPM at end of visit, patient denied CP, dizziness, alert and oriented at that time as well, discussed with RN post visit.     Afternoon new oxygen in home goes to 10L, Current on 4L satting 92%. Feeling good. Per RN lungs clear this late afternoon.       Exam:   Vitals:        GENERAL: alert and no distress  EYES: Eyes grossly normal to inspection.  No discharge or erythema, or obvious scleral/conjunctival abnormalities.  RESP: No audible wheeze, cough, or visible cyanosis.    SKIN: Visible skin clear. No significant rash, abnormal pigmentation or lesions.  NEURO: Cranial nerves grossly intact.  Mentation and speech appropriate for age.  PSYCH: Appropriate affect, tone, and pace of words    Data:    Recent Labs   Lab 12/07/24  1522 12/07/24  0940 12/06/24  0942 12/05/24  0749   WBC  --  23.9* 29.6* 25.9*   HGB  --  15.9 16.5 14.9   MCV  --  89 89 89   PLT  --  416 432 332     --  139 138   POTASSIUM 4.6  --  4.4 4.2   CHLORIDE 101  --  105 108*   CO2 24  --  23 19*   BUN 42.7*  --  55.9* 53.1*   CR 1.00  --  1.29* 1.53*   ANIONGAP 11  --  11 11   PIYUSH 8.7*  --  8.8 7.8*   GLC 93  --  97 117*   ALBUMIN  --   --   --  2.4*   PROTTOTAL  --   --   --  5.2*   BILITOTAL  --   --   --  0.4   ALKPHOS  --   --   --  128   ALT  --   --   --  28   AST  --   --   --  47*       Imaging:  Results for orders placed or performed during the hospital encounter of 12/04/24   Chest CT w/o contrast    Narrative    EXAM: CT CHEST W/O CONTRAST  LOCATION: [facility]  DATE: 12/4/2024    INDICATION: Known LLL pulmonary nodule with last CT 11 4 2024, followed by pulmonology, with COPD, with SOB and hypoxia and cough, XR at [facility] today not viewable by me with \"LLL Pneumonia\", may represent new PNA vs. known nodule, please assess for PNA   vs. known  COMPARISON: Chest CT 11/4/2024, chest x-ray 12/4/2024  TECHNIQUE: CT chest without IV contrast. " Multiplanar reformats were obtained. Dose reduction techniques were used.  CONTRAST: None.    FINDINGS:   LUNGS AND PLEURA: Large areas of consolidation worst in the left lower lobe, also involving the lingula and right lower lobe. This obscures previously seen left lower lobe nodule. No pleural effusion or pneumothorax. Severe centrilobular emphysema.    MEDIASTINUM/AXILLAE: No lymphadenopathy. Small pericardial effusion. No thoracic aortic aneurysm.    CORONARY ARTERY CALCIFICATION: None.    UPPER ABDOMEN: Tiny calcified gallstones.    MUSCULOSKELETAL: Unremarkable.      Impression    IMPRESSION:   1.  Multifocal pneumonia with areas of consolidation in the left lower lobe, lingula and right lower lobe. This obscures previously seen left lower lobe nodule.  2.  Severe emphysema.  3.  Small pericardial effusion.           ASSESSMENT/PLAN:  Armin Terrell is a 69 year old male with COPD, severe emphysema, alpha-1 antitrypsin deficiency, history of prostatic CA s/p radical prostatectomy here with cough, generalized malaise and weakness found to have a multi focal pneumonia and COPD exacerbation. Transferried to Hospital at Home on 12/5.         Sepsis present on admission  Acute hypoxic respiratory failure due to COPD exacerbation  Multifocal pneumonia  Leukocytosis  Lactic acidosis  Alpha-1 antitrypsin deficiency  -Upon presentation patient was hypoxic saturating in the 70s.  -On 5 L oxygen via nasal cannula, weaned down to 3lpm  -Keep sats 88-94%   -VBG : with mildly increased pCO2  -CT chest reported with multifocal pneumonia areas of consolidation in the left lower lobe, lingula and right lower lobe  -patient had lactic acidosis, leukocytosis and increased procalcitonin  -on ceftriaxone and azithromycin, continue through 12/8, transition to Po abx x 5 days for 10 full days of coverage  -Solu-Medrol 40 mg IV twice daily- last dose am 12/6. Start prednisone 40mg daily in am on 12/7   -scheduled Duonebs  -Pulmonary  hygiene, mucinex  -Follow blood culture, sputum culture and respiratory panel PCR  -Trend WBC, down tending daily,  no decompensation in sx or fever today   -CBC with PCP visit on Monday   -Pulmonary saw here for patient has a history of severe emphysema and alpha-1 antitrypsin deficiency     SHAKIRA  -Creatinine has increased to 2.19 from a normal baseline  -Appears to have a prerenal pattern  -received IVF, Cr improved to 1.5--> 1.29-->1.00  -Monitor input and output      Darker stools-Resolved   no other s/s of bleeding, no acute abd pain, no fever, HGB with out drop in 24 hours. Vitals stable.   -Likely diet/med related given stability in HGB  -HGB today stable  -Monitor for change in stools      Pulmonary nodule  -Follows with pulmonary as an outpatient, repeat CT scan planned in February       Hypoalbuminemia  -2.4 likely mild malnutrition, appetite has been poor  -Discuss  with PCP, consider protien supplement      Diet:  Combination Diet Regular Diet  Neff Catheter: Neff Catheter: Not present     IV/Lines: PIV  DVT Prophylaxis: Mobilize  Code Status: Full Code  Disposition: Anticipated Tomorrow 12/8 planned self care, patient declines home health on discharge has PCP appt on Monday in place     Telehealth Visit Details  Patient/Surrogate participated in the telehealth encounter via audiovisual communication. Identity of patient was verified. Verbal consent was provided for the visit by patient/surrogate.   Video Start Time: 0900  Video End Time: 0933   Originating Location (pt. location): Home  Distant Location (provider location): Home    Medical Decision Making       65 MINUTES SPENT BY ME on the date of service doing chart review, history, exam, documentation & further activities per the note.        Nava Lorenz, [facility] in Home  [phone]

## 2024-12-07 NOTE — PROGRESS NOTES
Home Hospital Care:    Reason for Visit: Medication Administration Nebulizer    Video visit with patient for medication administration. Successful self-administration of Duoneb. Patient without acute concerns.  MAR updated.     Paula Castillo RN on 12/7/2024 at 12:03 PM

## 2024-12-08 ENCOUNTER — TELEPHONE (OUTPATIENT)
Dept: MEDSURG UNIT | Facility: HOSPITAL | Age: 69
End: 2024-12-08
Payer: COMMERCIAL

## 2024-12-08 VITALS
DIASTOLIC BLOOD PRESSURE: 102 MMHG | SYSTOLIC BLOOD PRESSURE: 154 MMHG | HEIGHT: 72 IN | HEART RATE: 89 BPM | BODY MASS INDEX: 20.72 KG/M2 | TEMPERATURE: 97.7 F | RESPIRATION RATE: 20 BRPM | WEIGHT: 153 LBS | OXYGEN SATURATION: 89 %

## 2024-12-08 PROBLEM — J18.9 MULTIFOCAL PNEUMONIA: Status: RESOLVED | Noted: 2024-12-04 | Resolved: 2024-12-08

## 2024-12-08 PROBLEM — J96.01 ACUTE RESPIRATORY FAILURE WITH HYPOXIA (H): Status: RESOLVED | Noted: 2024-12-04 | Resolved: 2024-12-08

## 2024-12-08 PROBLEM — A41.9 SEPSIS, DUE TO UNSPECIFIED ORGANISM, UNSPECIFIED WHETHER ACUTE ORGAN DYSFUNCTION PRESENT (H): Status: RESOLVED | Noted: 2024-12-04 | Resolved: 2024-12-08

## 2024-12-08 PROBLEM — J44.1 COPD EXACERBATION (H): Status: RESOLVED | Noted: 2024-12-04 | Resolved: 2024-12-08

## 2024-12-08 PROCEDURE — 250N000012 HC RX MED GY IP 250 OP 636 PS 637: Performed by: NURSE PRACTITIONER

## 2024-12-08 PROCEDURE — 250N000013 HC RX MED GY IP 250 OP 250 PS 637: Performed by: NURSE PRACTITIONER

## 2024-12-08 PROCEDURE — 250N000009 HC RX 250: Performed by: PHYSICIAN ASSISTANT

## 2024-12-08 PROCEDURE — 250N000011 HC RX IP 250 OP 636: Performed by: NURSE PRACTITIONER

## 2024-12-08 PROCEDURE — 99239 HOSP IP/OBS DSCHRG MGMT >30: CPT | Performed by: NURSE PRACTITIONER

## 2024-12-08 PROCEDURE — 250N000013 HC RX MED GY IP 250 OP 250 PS 637: Performed by: PHYSICIAN ASSISTANT

## 2024-12-08 RX ORDER — CEFDINIR 300 MG/1
300 CAPSULE ORAL 2 TIMES DAILY
Qty: 10 CAPSULE | Refills: 0 | Status: SHIPPED | OUTPATIENT
Start: 2024-12-09 | End: 2024-12-14

## 2024-12-08 RX ORDER — IPRATROPIUM BROMIDE AND ALBUTEROL SULFATE 2.5; .5 MG/3ML; MG/3ML
3 SOLUTION RESPIRATORY (INHALATION) EVERY 6 HOURS PRN
Qty: 90 ML | Refills: 0 | Status: SHIPPED | OUTPATIENT
Start: 2024-12-08

## 2024-12-08 RX ORDER — PREDNISONE 20 MG/1
40 TABLET ORAL DAILY
Qty: 2 TABLET | Refills: 0 | Status: SHIPPED | OUTPATIENT
Start: 2024-12-09

## 2024-12-08 RX ADMIN — GUAIFENESIN 1200 MG: 600 TABLET, EXTENDED RELEASE ORAL at 08:47

## 2024-12-08 RX ADMIN — PREDNISONE 40 MG: 20 TABLET ORAL at 08:46

## 2024-12-08 RX ADMIN — AZITHROMYCIN 500 MG: 250 TABLET, FILM COATED ORAL at 08:47

## 2024-12-08 RX ADMIN — FLUTICASONE FUROATE AND VILANTEROL TRIFENATATE 1 PUFF: 200; 25 POWDER RESPIRATORY (INHALATION) at 08:49

## 2024-12-08 RX ADMIN — IPRATROPIUM BROMIDE AND ALBUTEROL SULFATE 3 ML: .5; 3 SOLUTION RESPIRATORY (INHALATION) at 08:20

## 2024-12-08 RX ADMIN — IPRATROPIUM BROMIDE AND ALBUTEROL SULFATE 3 ML: .5; 3 SOLUTION RESPIRATORY (INHALATION) at 12:09

## 2024-12-08 RX ADMIN — CEFTRIAXONE 1 G: 1 INJECTION, POWDER, FOR SOLUTION INTRAMUSCULAR; INTRAVENOUS at 13:45

## 2024-12-08 RX ADMIN — UMECLIDINIUM 1 PUFF: 62.5 AEROSOL, POWDER ORAL at 08:50

## 2024-12-08 RX ADMIN — IPRATROPIUM BROMIDE AND ALBUTEROL SULFATE 3 ML: .5; 3 SOLUTION RESPIRATORY (INHALATION) at 15:01

## 2024-12-08 ASSESSMENT — ACTIVITIES OF DAILY LIVING (ADL)
ADLS_ACUITY_SCORE: 19
ADLS_ACUITY_SCORE: 23
ADLS_ACUITY_SCORE: 19
ADLS_ACUITY_SCORE: 23
ADLS_ACUITY_SCORE: 19
ADLS_ACUITY_SCORE: 23
DEPENDENT_IADLS:: INDEPENDENT
ADLS_ACUITY_SCORE: 19
ADLS_ACUITY_SCORE: 23
ADLS_ACUITY_SCORE: 19

## 2024-12-08 NOTE — DISCHARGE INSTRUCTIONS
You have a post hospital follow up appointment with Dr. Salvador at [facility] at 1400 on 12/9   Have your blood pressure checked at PCP visit SBP has been 140-150  DBP 90-100s yesterday and this morning, given no acute symptoms we will not start on antihypertension medication. Try to monitor sodium intake and keep around 2000mg per day as this may improve your blood pressure readings.

## 2024-12-08 NOTE — PROGRESS NOTES
Home Hospital Care:    Reason for Visit: Duoneb Administration    Video visit with patient for medication administration. Successful self-administration of medication.   Patient appears without acute concerns.  MAR updated.     Paula Castillo RN on 12/8/2024 at 12:10 PM

## 2024-12-08 NOTE — PROGRESS NOTES
Hospital at Home Biometric Monitoring Alert    Biometric monitoring alert for: Elevated Blood Pressure      Most Recent Vitals  Blood Pressure: 156/113    Vitals Recheck  Blood Pressure: 154/102 . Patient said he will wait until the nurse comes out to take it again.    Symptoms  NO SYMPTOMS    Assessment  Activity level before checking: Patient was walking around before he took his blood pressure. Patient also said he was having trouble with operating the blood pressure cuff. He will ask the nurse for help when she comes out today for a visit      Action taken  Patient and/or caregiver instructed to call triage with any new or worsening symptoms [phone].      Wandy Juarez RN on 12/8/2024 at 6:48 AM

## 2024-12-08 NOTE — PROGRESS NOTES
Home Hospital Care Discharge    Patient discharged from Home Hospital Care. [employer] notified to call and coordinate pickup of biometric equipment.    Neli Driscoll RN on 12/8/2024 at 3:45 PM

## 2024-12-08 NOTE — DISCHARGE SUMMARY
HOSPITAL IN HOME DISCHARGE SUMMARY    [facility]     Admission Date: 12/4/2024  Discharge Date: 12/8/2024   PCP:  Júnior Salvador  Code Status: Full Code  Discharge Disposition: Discharged to home.    Principal Diagnosis    COPD exacerbation (H) [J44.1]  Acute respiratory failure with hypoxia (H) [J96.01]  Multifocal pneumonia [J18.9]  Sepsis, due to unspecified organism, unspecified whether acute organ dysfunction present (H) [A41.9]     Hospital Problem List   Active Problems:  COPD exacerbation (H) [J44.1]  Acute respiratory failure with hypoxia (H) [J96.01]  Multifocal pneumonia [J18.9]  Sepsis, due to unspecified organism, unspecified whether acute organ dysfunction present (H) [A41.9]       Hospital In Home Course   Armin Terrell is a 69 year old male who was admitted on 12/4/2024. Past medical history significant for COPD, severe emphysema, alpha-1 antitrypsin deficiency, history of prostatic CA s/p radical prostatectomy.  Patient presented with cough productive of scanty sputum, malaise, generalized body weakness of few days duration.  He also reports having pleuritic chest pain.  He has chronic lung issues including pulmonary nodule, severe emphysema and alpha-1 antitrypsin deficiency syndrome.  CT chest was significant for multifocal pneumonia.  Labs significant for leukocytosis, elevated lactic acid, elevated procalcitonin and elevated creatinine.  Patient admitted for management of COPD exacerbation. Required 5L NC oxygen, started on ceftriaxone and azith. Provided with IV solumedrol, continued on neb therapy and in PTA inhalers. Micah resolved post IVF. Transferred to hospital at home for ongoing management on 12/5. Weaned down to 3-4L NC- notes that home baseline oxygen is 3lpm. Completed IV ceftriaxone on 12/8 and po azithromycin on 12.8. To start cefdinir on 12/9 for 5 days to complete 10 days of therapy total. Transitioned to prednisone on 12/7, last dose on 12/9. Passed therapy, moving well  in home. Declines home health on discharge. Has to  home pulse ox to continue to monitor oxygen levels and titrate oxygen liter flow if needed, wife will  at [facility] today with his meds. WBC down trending on discharge, will require repeat CBC with pcp visit tomorrow. Will follow up as planned with pulmonology. Blood cultures with NGTD, final pending. Follow up PCP, -150 DBP 90-100s, not on antihypertensive at home, ate many chips yesterday- discussed cutting back on salty foods which may improve his pressures. He has no sx at all with these pressures and feels comfortable with his breathing, pulm exercises, medications on discharge today during our visit.     Portable oxygen required in home testing results with RN today:         1. On room air at rest while awake O2 sats 86% 2. On 4 liters/minute at rest O2 sats 90%  3. On room air during activity/with exercise O2 sats 81% 4. On 4 liters/minute during with activity O2 sats 84%-- recovered to 88% with in one minute of rest after activity on 4LPM       Discharge vitals:   BP/HR/MAP/O2 sat/Temp       Sepsis present on admission  Acute hypoxic respiratory failure due to COPD exacerbation  Multifocal pneumonia  Leukocytosis  Lactic acidosis  Alpha-1 antitrypsin deficiency  -Upon presentation patient was hypoxic saturating in the 70s.  -On 5 L oxygen via nasal cannula, weaned down to 3-4LPM by discharge   -Cool fingers contribute to lower oxygen readings at time  -Keep sats 88-94% with underlying obstructive disease  -CT chest reported with multifocal pneumonia areas of consolidation in the left lower lobe, lingula and right lower lobe  -patient had lactic acidosis, leukocytosis and increased procalcitonin  -on ceftriaxone and azithromycin, continue through 12/8, transition to Po abx x 5 days for 10 full days of coverage  -Solu-Medrol 40 mg IV twice daily- last dose am 12/6. Start prednisone 40mg daily in am on 12/7, last dose on 12/9 in am  -scheduled  Duonebs, in patient, discharged with Q 6hour PRN therapy orders   -Pulmonary hygiene  -Mucinex BID PRN  -Blood culture, sputum culture, respiratory panel PCR, strep p/legion all negative to date- final BC pending.   -Trend WBC, down tending daily,  no decompensation in sx or fever on day of discharge  -CBC with PCP visit on Monday   -Pulse ox for home wife  today at [facility]  -Pulmonary consulted inpatient for patient has a history of severe emphysema and alpha-1 antitrypsin deficiency  -Follow up outpatient pulmonology as planned in Feb, may want to see pt earlier, sent inbasket message to his provider Giovana Lopes for follow up- pt would like to discuss inogen use as well.     SHAKIRA- Resolved   -Creatinine has increased to 2.19 from a normal baseline  -received IVF, Cr improved to 1.5--> 1.29-->1.00    Darker stools-Resolved   no other s/s of bleeding, no acute abd pain, no fever, HGB with out drop in 24 hours. Vitals stable.   -Likely diet/med related given stability in HGB  -HGB remained stable in hospital episode   -Monitor for change in stools      Pulmonary nodule  -Follows with pulmonary as an outpatient, repeat CT scan planned in February     Hypoalbuminemia  -2.4 likely mild malnutrition, appetite has been poor  -Discuss  with PCP, consider protien supplement     Hypertension  -Intermittent -150, DBP 90-100s  -Not on antihypertension therapy  -Monitor at home and discuss with PCP, has appt tomorrow          Follow-Up        Specific recommendations to be addressed at the follow up visit: Follow up oxygen needs/resp status, albumin low- consider protein sup. Follow up final blood culture, so far NGTD. Declined home health.     Medication changes: Added cefdinir x 5 days post discharge, PRN duoneb, prednisone 40mg daily last dose on 12/9     Follow up labs/imaging: CBC at post hospital visit, has planned CT chest in place       Other specialty follow-up not included in DC orders:   Follow up as  planned with pulmonology in Feb, sent inbasket to Giovana Lopes, pulm provider with update on current status, request for post hospital check in with pt, and consideration for inogen device.     Discharge Medications         Review of your medicines        START taking        Dose / Directions   cefdinir 300 MG capsule  Commonly known as: OMNICEF      Dose: 300 mg  Start taking on: December 9, 2024  Take 1 capsule (300 mg) by mouth 2 times daily for 5 days.  Quantity: 10 capsule  Refills: 0     ipratropium - albuterol 0.5 mg/2.5 mg/3 mL 0.5-2.5 (3) MG/3ML neb solution  Commonly known as: DUONEB      Dose: 3 mL  Take 1 vial (3 mLs) by nebulization every 6 hours as needed for shortness of breath, wheezing or cough.  Quantity: 90 mL  Refills: 0     predniSONE 20 MG tablet  Commonly known as: DELTASONE      Dose: 40 mg  Start taking on: December 9, 2024  Take 2 tablets (40 mg) by mouth daily.  Quantity: 2 tablet  Refills: 0            CONTINUE these medicines which have NOT CHANGED        Dose / Directions   acetaminophen 500 MG tablet  Commonly known as: TYLENOL      Dose: 500-1,000 mg  Take 500-1,000 mg by mouth every 4 hours as needed for mild pain.  Refills: 0     albuterol 108 (90 Base) MCG/ACT inhaler  Commonly known as: PROAIR HFA/PROVENTIL HFA/VENTOLIN HFA  Used for: COPD (chronic obstructive pulmonary disease) (H)      Dose: 1-2 puff  [ALBUTEROL (PROAIR HFA;PROVENTIL HFA;VENTOLIN HFA) 90 MCG/ACTUATION INHALER] Inhale 1-2 puffs every 4 (four) hours as needed for wheezing or shortness of breath.  Quantity: 2 Inhaler  Refills: 5     guaiFENesin 600 MG 12 hr tablet  Commonly known as: MUCINEX      Dose: 1,200 mg  Take 1,200 mg by mouth 2 times daily as needed for cough.  Refills: 0     omeprazole 20 MG DR capsule  Commonly known as: PriLOSEC      Dose: 20 mg  Take 20 mg by mouth daily as needed.  Refills: 0     simethicone 80 MG chewable tablet  Commonly known as: MYLICON      Dose: 80 mg  Take 80 mg by mouth 4  "times daily as needed for flatulence.  Refills: 0     Trelegy Ellipta 200-62.5-25 MCG/ACT oral inhaler  Used for: Chronic obstructive pulmonary disease, unspecified COPD type (H)  Generic drug: Fluticasone-Umeclidin-Vilanterol      Dose: 1 puff  Inhale 1 puff into the lungs daily.  Quantity: 60 each  Refills: 11               Where to get your medicines        These medications were sent to [facility]/pharmacy #[id number] - [place], [place] - [address]  [address], [facility] [address]      Phone: [phone]   cefdinir 300 MG capsule  ipratropium - albuterol 0.5 mg/2.5 mg/3 mL 0.5-2.5 (3) MG/3ML neb solution  predniSONE 20 MG tablet              Pertinent Findings / Procedures     Results for orders placed or performed during the hospital encounter of 12/04/24   Chest CT w/o contrast    Narrative    EXAM: CT CHEST W/O CONTRAST  LOCATION: [facility]  DATE: 12/4/2024    INDICATION: Known LLL pulmonary nodule with last CT 11 4 2024, followed by pulmonology, with COPD, with SOB and hypoxia and cough, XR at [facility] today not viewable by me with \"LLL Pneumonia\", may represent new PNA vs. known nodule, please assess for PNA   vs. known  COMPARISON: Chest CT 11/4/2024, chest x-ray 12/4/2024  TECHNIQUE: CT chest without IV contrast. Multiplanar reformats were obtained. Dose reduction techniques were used.  CONTRAST: None.    FINDINGS:   LUNGS AND PLEURA: Large areas of consolidation worst in the left lower lobe, also involving the lingula and right lower lobe. This obscures previously seen left lower lobe nodule. No pleural effusion or pneumothorax. Severe centrilobular emphysema.    MEDIASTINUM/AXILLAE: No lymphadenopathy. Small pericardial effusion. No thoracic aortic aneurysm.    CORONARY ARTERY CALCIFICATION: None.    UPPER ABDOMEN: Tiny calcified gallstones.    MUSCULOSKELETAL: Unremarkable.      Impression    IMPRESSION:   1.  Multifocal pneumonia with areas of consolidation in the " left lower lobe, lingula and right lower lobe. This obscures previously seen left lower lobe nodule.  2.  Severe emphysema.  3.  Small pericardial effusion.         Recent Labs   Lab 12/07/24  1522 12/07/24  0940 12/06/24  0942 12/05/24  0749   WBC  --  23.9* 29.6* 25.9*   HGB  --  15.9 16.5 14.9   MCV  --  89 89 89   PLT  --  416 432 332     --  139 138   POTASSIUM 4.6  --  4.4 4.2   CHLORIDE 101  --  105 108*   CO2 24  --  23 19*   BUN 42.7*  --  55.9* 53.1*   CR 1.00  --  1.29* 1.53*   ANIONGAP 11  --  11 11   PIYUSH 8.7*  --  8.8 7.8*   GLC 93  --  97 117*   ALBUMIN  --   --   --  2.4*   PROTTOTAL  --   --   --  5.2*   BILITOTAL  --   --   --  0.4   ALKPHOS  --   --   --  128   ALT  --   --   --  28   AST  --   --   --  47*                       Diet / Activity / Follow-Up     Discharge diet: Regular   Discharge activity: Activity as tolerated   Discharge follow-up: Follow up with primary care provider at planned on 12/9 at 1400      Pending Studies      Unresulted Labs Ordered in the Past 30 Days of this Admission       Date and Time Order Name Status Description    12/4/2024 12:54 PM Blood Culture Peripheral Blood Preliminary             TELEHEALTH ENCOUNTER ATTESTATION: As the provider for this telehealth service, I attest that I introduced myself to the patient, provided my credentials, disclosed my location, and determined that, based on a review of the patient's chart and/or a discussion with members of the patient's treatment team, telemedicine via a real-time, two-way, interactive audio and video platform is an appropriate and effective means of providing this service. The patient and I mutually agree that this visit is appropriate for telemedicine as well.  Originating site (patient location): Patient home, [place]  Distant site (telehealth provider location): Provider home/office, [place]  Visit start time: 0858  Visit end time: 0931  Total time spent on discharge coordination:  71 minutes.  Patient was seen  and examined today.    Nava Lorenz, DNP   [facility] IN HOME

## 2024-12-08 NOTE — PLAN OF CARE
Goal Outcome Evaluation:      Plan of Care Reviewed With: patient, spouse    Overall Patient Progress: improvingOverall Patient Progress: improving    Full head to toe assessment including skin check performed. Results in flowsheets/assessment.     Medications administered per MAR.     Patient and spouse noted that concentrator was beeping. Warning light indicating low O2 level delivery was on. F equipment contacted- [facility] dispatched technician who delivered new unit and provided cleaning instructions to patient.      Blood drawn for labs and labeled per policy. CA contacted, and  company called for pickup and delivery to lab.     Education provided. Instructed on when to call triage, when to call doctor, when to call 911. Patient denies pain, further needs or concerns. Left in stable condition.

## 2024-12-08 NOTE — PROGRESS NOTES
Home Hospital Care:    Reason for Visit: Medication Administration    Unsuccessful video visit attempted x3 within in-home platform and unsuccessful doximity video attempt. RNPCM had patient read off medication label for medication administration. Successful self-administration of medications.   Most recent biometrics reviewed. Patient without acute concerns.  MAR updated.     Paula Castillo RN on 12/7/2024 at 8:32 PM

## 2024-12-08 NOTE — PROGRESS NOTES
Oxygen Documentation  I certify that this patient, Armin Terrell has been under my care (or a nurse practitioner or physican's assistant working with me). This is the face-to-face encounter for oxygen medical necessity.      At the time of this encounter, I have reviewed the qualifying testing and have determined that supplemental oxygen is reasonable and necessary and is expected to improve the patient's condition in a home setting.         Patient has continued oxygen desaturation due to COPD J44.9.    If portability is ordered, is the patient mobile within the home? Yes      1. On room air at rest while awake O2 sats 86% 2. On 4 liters/minute at rest O2 sats 90%  3. On room air during activity/with exercise O2 sats 81% 4. On 4 liters/minute during with activity O2 sats 84%-- recovered to 88% with in one minute of rest after activity on 4LPM     Was this visit performed as a telehealth visit: Yes: What is the reason an in-person visit could not be done: Hospital in home. RN did in home assessment.       Nava Lorenz NP on 12/8/2024 at 4:15 PM

## 2024-12-08 NOTE — PLAN OF CARE
Goal Outcome Evaluation:      Plan of Care Reviewed With: patient, spouse    Overall Patient Progress: improvingOverall Patient Progress: improving    Outcome Evaluation: Progressing towards discharge- plan for afternoon shift today.    Full Head to toe assessment provided including skin check. Medications administered. VS show slightly elevated BP, questionable O2 ratings due to cold hands.  PT is A&Ox4, denies dizziness, vertigo, blurred vision. Provider aware. Parameters explained to patient re: when to call triage, dietary changes to help lower BP. Portable O2 arranged for Monday appointment. Patient left instable condition. Plan for afternoon visit is to discharge pending continued medical stability.

## 2024-12-08 NOTE — PLAN OF CARE
Goal Outcome Evaluation:      Plan of Care Reviewed With: patient, spouse    Overall Patient Progress: improvingOverall Patient Progress: improving    Outcome Evaluation: Progressing towards discharge- plan for afternoon shift today.    Patient is medically ready for discharge. Completed Head to toe assessment including skin check. Final dose of IV Abx administered, final neb treatment administered.  Peripheral IV removed from L forearm. No s/s of infection , infiltration/extravasation. Covered with bandaid. Reviewed printed AVS with patient and wife. They indicate understanding of instructions, follow up appointments, and medication administration. Wife has already obtained discharge prescriptions. O2 tolerance test performed and results given to provider.  (86% at rest on room air, 90 % on 4L at rest; 81% with activity on 4L, 84% on 4L with activity. Printed copy of AVS left with patient and spouse. Medications removed for destruction. Biometric equipment gathered for pickup. Patient and spouse deny further need or questions at this time.  Patient left in stable condition.

## 2024-12-08 NOTE — PLAN OF CARE
Goal Outcome Evaluation:      Plan of Care Reviewed With: patient, spouse    Overall Patient Progress: improvingOverall Patient Progress: improving    Outcome Evaluation: Patient is progressing towards discharge.    Full head to toe assessment including skin check performed. Results in flowsheets. Medications administered per MAR. Vitals taken- diastolic noted to be a bit high- provider notified, and patient instructed to monitor and call with any changes. Patient is asymptomatic- denies headache, chest pain, vision change, etc.     Discovered that  had not picked up sample from AM visit. Contacted lab to determine if any samples were still considered viable, and if any needed to be redrawn. Redrew green top per lab, notified provider who re-ordered to permit processing.     Patient denied any further needs or concerns at this time. Patient left in stable condition, lab samples delivered to lab for processing by this nurse.

## 2024-12-08 NOTE — PROGRESS NOTES
Update  2139  Video in again with pt d/t pulse ox at 80 . RN in home at this time. Pt still looks great, speaking full sentences, no obvious tachypnea, no use of accessory muscles noted on video, calm, pink skin. Had been moving around prior to the repeat BP which pulled the 108. Repeat 103 denies chest pain, dizziness, palpitations at this time. Did do neb tx this am.     Struggling with the pulse ox today to  readings. Does not clinically appear to be at 80%. Suspect device error, no alternative to compare at this time.     Wife picking up a new home pulse ox at [facility] today.     RN will be back for repeat afternoon in home assessment.     Afebrile today. In good spirits, patient is ready to discharge from hospital and see PCP tomorrow. As long as he remains stable, this is not a true low oxygen saturation, this is the plan for post IV abx this afternoon. Will continue to monitor. Patient and spouse verbalize understanding of s/s to call triage for.     Vitals update:         Nava Lorenz, DNP

## 2024-12-09 ENCOUNTER — TRANSFERRED RECORDS (OUTPATIENT)
Dept: HEALTH INFORMATION MANAGEMENT | Facility: CLINIC | Age: 69
End: 2024-12-09
Payer: COMMERCIAL

## 2024-12-09 ENCOUNTER — TELEPHONE (OUTPATIENT)
Dept: PULMONOLOGY | Facility: CLINIC | Age: 69
End: 2024-12-09
Payer: COMMERCIAL

## 2024-12-09 LAB — BACTERIA BLD CULT: NO GROWTH

## 2024-12-09 NOTE — TELEPHONE ENCOUNTER
[facility] Call Center    Phone Message    May a detailed message be left on voicemail: yes     Reason for Call: Other: Pt's wife Trini is calling to see if they can see Giovana or Dr Genao soon. Next available is not until Jan.  Pt was just recently discharged from the hospital. Trini is also wondering about getting a portable oxygen concentrator. Please call Trini back today at ph: [phone]. .     Action Taken: Message routed to:  Other: [facility]     Travel Screening: Not Applicable     Date of Service: 12/9

## 2025-01-01 ENCOUNTER — APPOINTMENT (OUTPATIENT)
Dept: GENERAL RADIOLOGY | Facility: CLINIC | Age: 70
DRG: 870 | End: 2025-01-01
Payer: COMMERCIAL

## 2025-01-01 ENCOUNTER — APPOINTMENT (OUTPATIENT)
Dept: ULTRASOUND IMAGING | Facility: CLINIC | Age: 70
DRG: 870 | End: 2025-01-01
Payer: COMMERCIAL

## 2025-01-01 ENCOUNTER — APPOINTMENT (OUTPATIENT)
Dept: GENERAL RADIOLOGY | Facility: CLINIC | Age: 70
DRG: 870 | End: 2025-01-01
Attending: SURGERY
Payer: COMMERCIAL

## 2025-01-01 ENCOUNTER — APPOINTMENT (OUTPATIENT)
Dept: GENERAL RADIOLOGY | Facility: CLINIC | Age: 70
DRG: 870 | End: 2025-01-01
Attending: NURSE PRACTITIONER
Payer: COMMERCIAL

## 2025-01-01 ENCOUNTER — APPOINTMENT (OUTPATIENT)
Dept: GENERAL RADIOLOGY | Facility: CLINIC | Age: 70
DRG: 870 | End: 2025-01-01
Attending: STUDENT IN AN ORGANIZED HEALTH CARE EDUCATION/TRAINING PROGRAM
Payer: COMMERCIAL

## 2025-01-01 ENCOUNTER — APPOINTMENT (OUTPATIENT)
Dept: CT IMAGING | Facility: CLINIC | Age: 70
DRG: 870 | End: 2025-01-01
Attending: STUDENT IN AN ORGANIZED HEALTH CARE EDUCATION/TRAINING PROGRAM
Payer: COMMERCIAL

## 2025-01-01 ENCOUNTER — APPOINTMENT (OUTPATIENT)
Dept: OCCUPATIONAL THERAPY | Facility: CLINIC | Age: 70
DRG: 870 | End: 2025-01-01
Payer: COMMERCIAL

## 2025-01-01 ENCOUNTER — REFERRAL (OUTPATIENT)
Dept: TRANSPLANT | Facility: CLINIC | Age: 70
End: 2025-01-01

## 2025-01-01 ENCOUNTER — APPOINTMENT (OUTPATIENT)
Dept: CT IMAGING | Facility: CLINIC | Age: 70
DRG: 870 | End: 2025-01-01
Payer: COMMERCIAL

## 2025-01-01 ENCOUNTER — APPOINTMENT (OUTPATIENT)
Dept: PHYSICAL THERAPY | Facility: CLINIC | Age: 70
DRG: 870 | End: 2025-01-01
Payer: COMMERCIAL

## 2025-01-01 PROCEDURE — 93970 EXTREMITY STUDY: CPT

## 2025-01-01 PROCEDURE — 71045 X-RAY EXAM CHEST 1 VIEW: CPT | Mod: 26 | Performed by: RADIOLOGY

## 2025-01-01 PROCEDURE — 999N000065 XR CHEST PORT 1 VIEW

## 2025-01-01 PROCEDURE — 93970 EXTREMITY STUDY: CPT | Mod: 26 | Performed by: RADIOLOGY

## 2025-01-01 PROCEDURE — 71045 X-RAY EXAM CHEST 1 VIEW: CPT | Mod: 26 | Performed by: STUDENT IN AN ORGANIZED HEALTH CARE EDUCATION/TRAINING PROGRAM

## 2025-01-01 PROCEDURE — 71250 CT THORAX DX C-: CPT | Mod: 26 | Performed by: RADIOLOGY

## 2025-01-01 PROCEDURE — 71045 X-RAY EXAM CHEST 1 VIEW: CPT | Mod: 77

## 2025-01-01 PROCEDURE — 71250 CT THORAX DX C-: CPT

## 2025-01-01 PROCEDURE — 71045 X-RAY EXAM CHEST 1 VIEW: CPT | Mod: 76

## 2025-01-01 PROCEDURE — 71045 X-RAY EXAM CHEST 1 VIEW: CPT

## 2025-01-01 PROCEDURE — 71275 CT ANGIOGRAPHY CHEST: CPT | Mod: 26 | Performed by: RADIOLOGY

## 2025-01-01 PROCEDURE — 74018 RADEX ABDOMEN 1 VIEW: CPT

## 2025-01-01 PROCEDURE — 999N000065 XR ABDOMEN PORT 1 VIEW

## 2025-01-01 PROCEDURE — 74018 RADEX ABDOMEN 1 VIEW: CPT | Mod: 26 | Performed by: RADIOLOGY

## 2025-01-01 PROCEDURE — 71275 CT ANGIOGRAPHY CHEST: CPT

## 2025-02-24 ENCOUNTER — HOSPITAL ENCOUNTER (OUTPATIENT)
Dept: CT IMAGING | Facility: HOSPITAL | Age: 70
Discharge: HOME | End: 2025-02-24
Attending: INTERNAL MEDICINE | Admitting: INTERNAL MEDICINE
Payer: COMMERCIAL

## 2025-02-24 DIAGNOSIS — R91.8 PULMONARY NODULES: ICD-10-CM

## 2025-02-24 PROCEDURE — 71250 CT THORAX DX C-: CPT

## 2025-05-05 ENCOUNTER — HOSPITAL ENCOUNTER (OUTPATIENT)
Dept: CT IMAGING | Facility: HOSPITAL | Age: 70
Discharge: HOME | End: 2025-05-05
Attending: INTERNAL MEDICINE | Admitting: INTERNAL MEDICINE
Payer: COMMERCIAL

## 2025-05-05 DIAGNOSIS — R91.8 PULMONARY INFILTRATES: ICD-10-CM

## 2025-05-05 DIAGNOSIS — R91.8 PULMONARY NODULES: ICD-10-CM

## 2025-05-05 PROCEDURE — 71250 CT THORAX DX C-: CPT

## 2025-05-07 ENCOUNTER — OFFICE VISIT (OUTPATIENT)
Dept: PULMONOLOGY | Facility: CLINIC | Age: 70
End: 2025-05-07
Attending: NURSE PRACTITIONER
Payer: COMMERCIAL

## 2025-05-07 ENCOUNTER — TELEPHONE (OUTPATIENT)
Dept: PULMONOLOGY | Facility: CLINIC | Age: 70
End: 2025-05-07

## 2025-05-07 VITALS
SYSTOLIC BLOOD PRESSURE: 132 MMHG | WEIGHT: 166 LBS | DIASTOLIC BLOOD PRESSURE: 74 MMHG | HEART RATE: 70 BPM | BODY MASS INDEX: 22.51 KG/M2 | OXYGEN SATURATION: 91 %

## 2025-05-07 DIAGNOSIS — R13.10 DYSPHAGIA, UNSPECIFIED TYPE: ICD-10-CM

## 2025-05-07 DIAGNOSIS — J44.9 CHRONIC OBSTRUCTIVE PULMONARY DISEASE, UNSPECIFIED COPD TYPE (H): Primary | ICD-10-CM

## 2025-05-07 DIAGNOSIS — R91.8 PULMONARY INFILTRATES: ICD-10-CM

## 2025-05-07 PROCEDURE — 3075F SYST BP GE 130 - 139MM HG: CPT | Performed by: NURSE PRACTITIONER

## 2025-05-07 PROCEDURE — 3078F DIAST BP <80 MM HG: CPT | Performed by: NURSE PRACTITIONER

## 2025-05-07 PROCEDURE — 99214 OFFICE O/P EST MOD 30 MIN: CPT | Performed by: NURSE PRACTITIONER

## 2025-05-07 NOTE — Clinical Note
His CT showed resolution of RUL opacity but he has a new one in RLL.  I am getting a video swallow study and esophagram to check for reflux or aspiration.  When do think we should do repeat imaging?  Symptomatically he is at his baseline currently.

## 2025-05-07 NOTE — TELEPHONE ENCOUNTER
DATE: 05/07/2025  DME PROVIDER: Sandy   SUPPLY ORDERED: nebulizer supply   PROVIDER: Moses    Faxed order    Latoya Harvey LPN

## 2025-05-07 NOTE — PROGRESS NOTES
Pulmonary Clinic Follow-up Visit  May 7, 2025     Assessment and Plan:   69 year old male with a history of tobacco dependence in remission, COPD, and alpha-1 antitrypsin deficiency, prostate cancer s/p robotic assisted radical prostatectomy and repair of right inguinal hernia, presenting for follow-up.      Tobacco dependence, chronic obstructive pulmonary disease, alpha-1 antitrypsin deficiency, pulmonary nodules: Severe obstruction with FEV1 1.20 L (33%) and DLCOc 40%. Quit smoking in March 2021.  Baseline severe A1AT deficiency and has previously declined alpha-1 antitrypsin enzyme replacement therapy, and now that his FEV1 is <35% the benefit is unclear. Continues fluticasone furoate-umeclidinium-vilanterol 200-62.5-25 one inhalation daily with good control and no exacerbations.   MICHELLE showed SpO2 <88% for 4.5 hours. Previous in clinic walk did not demonstrate need for oxygen with activity.  He continues to use 2 L at night. Has not noticed much change in symptoms since starting this.  PFTs show some improvement since 2022. FEV1 is 35% with a DLCOc of 42%.   Echocardiogram in 07/2024 was normal.     Has waxing and waning multiple pulmonary lung nodules. Per Dr. Genao's assessment thought to be intermediate (6-65%) risk for cancer.  Most recent scan showed a new RLL spiculated opacity with improvement of previously noted LLL opacity and resolution of RUL opacity.  Appear to be infectious/inflammatory.  Denies any acute illness symptoms or changes in respiratory symptoms.  As he has never had a swallow assessment and complains of some symptoms of dysphagia we will obtain BSS and esophagram.    Lung exam and SpO2 on room air are normal today.    Plan:  - I will check with Dr. Genao to see when we should do follow-up chest imaging.  Low threshold to obtain this if his symptoms worsen.  - consider pulmonary rehab referral at follow up. He has previously declined pulmonary rehab.   - VSS and esophagram to assess  for aspiration or reflux as cause for waxing and waning lung opacities.  -Continue fluticasone furoate-umeclidinium-vilanterol 200-62.5-25 one inhalation daily; rinse/gargle/spit water after use  -Monitor SpO2 with activity at home.  Encouraged supplemental oxygen use with readings less than 88%.  Continue 2 L continuous at night.  - albuterol as needed.  Encouraged more frequent nebulizer and flutter valve use to promote bronchial hygiene.  -Continue guaifenesin 1-2 times daily when chest congestion seems difficult to clear.  - annual high-dose influenza vaccination  - up to date on pneumococcal vaccination  - COVID-19 vaccination: UTD with last booster in 04/2022. Suggest continued boosters.   - encouraged him to call any time with questions or concerning symptoms    Follow up: In 4 months, sooner if needed    COPD ACTION PLAN: PREDNISONE 40 mg once a day for 5 days.  If sputum amount or thickness increased add azithromycin z pack x 5 days    Giovana Lopes CNP  Pulmonary Medicine  [facility]   [phone]    CCx:   Chief Complaint   Patient presents with    Follow Up     CT and COPD        HPI:  Armin was last seen in clinic in 02/2025 by Dr. Genao for a lung nodule evaluation.  At that time it was thought his opacities were infectious/inflammatory are being managed by this.  Plan was for repeat CT in 3 months which he presents today to go over.  Reports stable breathing symptoms since her last visit.  No changes or concern.    Still coughing up some sputum with activity and after nebs. Otherwise not much cough.   Using DuoNebs 2-3 times per day. Feels like it helps loosen stuff up in lungs.   He continues to have worse SOB. This is only with activity. Even with minimal activity. Is now having difficulty with simple ADLs.  Will have to take frequent breaks but is able to complete tasks. Will get SpO2 in the 90's at home.   He has been taking guaifenesin 1-2 times daily and finds it very helpful to thin  mucus.    He quit smoking in March 2021.     His case was previously presented at tumor board with plan to repeat imaging and follow-up with Dr. Genao in 3 months.  Unfortunately, follow-up imaging again shows a resolution of one opacity with a new RLL opacity.    He is also complaining of difficulty staying asleep at night.  Denies waking with gasping, being told he snores, daytime headaches, or significant daytime fatigue.  He worked night shift for 50 years and believes this to be related to issues with his sleep rhythm.    Previously hospitalized from 12/4-12/8 with COPD exacerbation, sepsis, and acute respiratory failure secondary to pneumonia. Blood culture negative, urine legion/strep Pneumo negative, sputum cultures negative, histoplama negative.  Required 5L NC oxygen, weaned down to 3-4L NC prior to discharge.     Patient supplied answers from flow sheet for:  COPD Assessment Test (CAT)  2009 [employer]. All rights reserved.      5/3/2022     9:00 AM 5/4/2023     7:00 AM 5/6/2024    10:58 AM 11/7/2024     9:01 AM 12/20/2024     9:42 AM 2/24/2025     2:57 PM 5/7/2025     8:01 AM   COPD assessment test (CAT)   Cough 2 1 2 2 2 2 1   Phlegm 2 3 3 3 2 2 1   Chest tightness 0 1 0 1 1 2 0   Walk up hill 0 1 1 2 2 2 2   Limited activities 0 3 0 4 3 3 2   Leaving my home 0 0 0 0 2 2 0   Sleep 2 0 0 3 2 2 3   Energy 1 3 1 3 2 2 4   Total Score 7 12 7 18  16  17  13        Patient-reported      ROS:  A 10-system review was obtained and was negative with the exception of the symptoms endorsed in the history of present illness.    PMH:  Severe COPD  Elevated PSA  Active tobacco dependence  A1AT deficiency - ZZ phenotype with level 21.6 mg/dL by nephelometry    PSH:  Past Surgical History:   Procedure Laterality Date    AR LAP,PROSTATECTOMY,RADICAL,W/NERVE SPARE,INCL ROBOTIC Bilateral 3/19/2019    Procedure: ROBOTIC ASSISTED RADICAL RETROPUBIC PROSTATECTOMY BILATERAL PELVIC LYMPH NODE DISSECTION, LYSIS OF ADHESIONS, REPAIR OF  RIGHT INGUINAL HERNIA;  Surgeon: Candido Angelo MD;  Location: [facility];  Service: Urology     Allergies:  No Known Allergies    Family HX:  No family history on file.    Social Hx:  Social History     Socioeconomic History    Marital status:      Spouse name: Not on file    Number of children: Not on file    Years of education: Not on file    Highest education level: Not on file   Occupational History    Not on file   Tobacco Use    Smoking status: Former     Current packs/day: 0.00     Average packs/day: 0.8 packs/day for 30.0 years (22.5 ttl pk-yrs)     Types: Cigarettes     Start date: 1987     Quit date: 2017     Years since quittin.6     Passive exposure: Current (Wife smokes)    Smokeless tobacco: Former   Vaping Use    Vaping status: Never Used   Substance and Sexual Activity    Alcohol use: Yes     Alcohol/week: 4.0 - 5.0 standard drinks of alcohol     Comment: Alcoholic Drinks/day: drinks on weekends.    Drug use: No    Sexual activity: Not on file   Other Topics Concern    Not on file   Social History Narrative    Not on file     Social Drivers of Health     Financial Resource Strain: Low Risk  (2024)    Financial Resource Strain     Within the past 12 months, have you or your family members you live with been unable to get utilities (heat, electricity) when it was really needed?: No   Food Insecurity: Low Risk  (2024)    Food Insecurity     Within the past 12 months, did you worry that your food would run out before you got money to buy more?: No     Within the past 12 months, did the food you bought just not last and you didn t have money to get more?: No   Transportation Needs: Low Risk  (2024)    Transportation Needs     Within the past 12 months, has lack of transportation kept you from medical appointments, getting your medicines, non-medical meetings or appointments, work, or from getting things that you need?: No   Physical Activity: Not on file    Stress: Not on file   Social Connections: Not on file   Interpersonal Safety: Low Risk  (12/6/2024)    Interpersonal Safety     Do you feel physically and emotionally safe where you currently live?: Yes     Within the past 12 months, have you been hit, slapped, kicked or otherwise physically hurt by someone?: No     Within the past 12 months, have you been humiliated or emotionally abused in other ways by your partner or ex-partner?: No   Housing Stability: Low Risk  (12/7/2024)    Housing Stability     Do you have housing? : Yes     Are you worried about losing your housing?: No     Current Meds:  Current Outpatient Medications   Medication Sig Dispense Refill    acetaminophen (TYLENOL) 500 MG tablet Take 500-1,000 mg by mouth every 4 hours as needed for mild pain.      albuterol (PROAIR HFA/PROVENTIL HFA/VENTOLIN HFA) 108 (90 Base) MCG/ACT inhaler Inhale 1-2 puffs into the lungs every 4 hours as needed for shortness of breath, wheezing or cough. 18 g 3    Fluticasone-Umeclidin-Vilanterol (TRELEGY ELLIPTA) 200-62.5-25 MCG/ACT oral inhaler Inhale 1 puff into the lungs daily. 60 each 11    guaiFENesin (MUCINEX) 600 MG 12 hr tablet Take 1,200 mg by mouth 2 times daily as needed for cough.      ipratropium - albuterol 0.5 mg/2.5 mg/3 mL (DUONEB) 0.5-2.5 (3) MG/3ML neb solution Take 1 vial (3 mLs) by nebulization every 6 hours as needed for shortness of breath, wheezing or cough. 180 mL 11    omeprazole (PRILOSEC) 20 MG DR capsule Take 20 mg by mouth daily as needed.      simethicone (MYLICON) 80 MG chewable tablet Take 80 mg by mouth 4 times daily as needed for flatulence.       Physical Exam:  /74   Pulse 70   Wt 75.3 kg (166 lb)   SpO2 (!) 91%   BMI 22.51 kg/m      Physical Exam  Constitutional:       General: He is not in acute distress.     Appearance: He is not ill-appearing or diaphoretic.   HENT:      Nose: Nose normal.   Cardiovascular:      Rate and Rhythm: Normal rate and regular rhythm.       Heart sounds: Normal heart sounds.   Pulmonary:      Effort: Pulmonary effort is normal. No respiratory distress.      Breath sounds: Normal breath sounds. No wheezing or rhonchi.   Musculoskeletal:      Right lower leg: No edema.      Left lower leg: No edema.   Skin:     General: Skin is warm and dry.      Findings: No rash.   Neurological:      Mental Status: He is alert.   Psychiatric:         Behavior: Behavior normal.     Labs:  genotype ZZ with severely reduced A1AT level of 21.6 mg/dL by nephelometry     Imaging studies:    CT CHEST W/O CONTRAST, DATE: 11/4/2024  INDICATION: follow up nodules  COMPARISON: 5/14/2024  FINDINGS:   LUNGS AND PLEURA: Advanced emphysematous change. Increase in size of the previously described nodule in the anterior left lower lobe measuring 1.8 x 1.5 cm to 1.3 x 0.8 cm. There is now surrounding fibronodular soft tissue extending to the pleural surface there is some new regions of mucous plugging in the lung bases with atelectasis. New fibronodular atelectasis within the right upper lobe. New 5 mm nodule in the subpleural left lower lobe (image 197). Previously described clustered nodules in the anterior left lower lobe have largely resolved with a 4 mm nodule remaining. Additional new 5 mm nodule in the left lower lobe cephalad to the dominant nodule.  MEDIASTINUM/AXILLAE: No lymphadenopathy. No thoracic aortic aneurysm.                                                               IMPRESSION:   1.  Increase in size of an irregular nodule in the left lower lobe now measuring 1.8 x 1.5 cm in comparison to 1.3 x 0.8 cm. In addition there are are fibronodular regions of extension now seen extending to the pleural surface.  2.  New 5 mm subpleural nodule in the left lower lobe with an additional new 5 mm nodule cephalad to the larger described nodule in the left lower lobe.  3.  Advanced emphysematous change. New regions of mucous plugging in the lung bases.  4.  New fibronodular  atelectasis in the right upper lobe.  5.  The previously described clustered nodularity in the anterior left lower lobeh as largely resolved with a 4 mm nodule remaining.  6.  Cholelithiasis.    PET ONCOLOGY WHOLE BODY, DATE: 6/4/2024  INDICATION: Other nonspecific abnormal finding of lung field. Solitary pulmonary nodule. Pulmonary nodules, left lower lobe. Initial treatment strategy.  COMPARISON: CT chest 5/14/2024.  FINDINGS: An irregular circumscribed solid pulmonary nodule in the anterior left lower lobe measuring 0.8 x 1.3 cm, unchanged from 5/14/2024, demonstrates no focal FDG activity above background level. An additional smaller subcentimeter nodule in the   more inferior left lower lobe also demonstrates no increased FDG uptake above background. Background advanced emphysema. Moderate bronchial wall thickening with areas of bandlike scarring and/or atelectasis in the lower lobes, similar. No FDG avid adenopathy in the chest or elsewhere. No suspicious focal uptake in the liver or skeleton.  Mild senescent intracranial changes. Minimal mucosal thickening left maxillary sinus. Mild atherosclerotic calcifications. Few tiny gallstones. Few colonic diverticula. Prostatectomy. Tiny bilateral knee joint effusions. Mild thoracolumbar curve. Moderate scattered hypertrophic degenerative changes in the spine.                                                                IMPRESSION:  1. No evidence of FDG avid malignancy.  2. A couple of solid irregular nodules in the left lower lobe demonstrate no increased FDG activity, favoring a benign or indolent process. However, these remain indeterminate and continued chest CT imaging surveillance is recommended, consider next in   3-6 months.    LOW DOSE LUNG CANCER SCREENING CT CHEST, DATE: 5/14/2024  INDICATION: Lung cancer screening. History of smoking. High risk patient.  COMPARISON: PA and lateral views of the chest 7/31/2016; no prior cross-sectional imaging of the  chest.  FINDINGS:  NODULES: Solid, circumscribed nodule in the anterior left lower lobe measures 8 x 13 mm (series 4, image 174). Several smaller nodules are also present within the anterior basal segment of the left lower lobe the next largest of which is 4 x 6 mm (series 4, image 187).  LUNGS AND PLEURA: Advanced emphysema and related hyperinflation. Subsegmental scarring in the anterior basal left lower lobe. A more substantial band of atelectasis is present in the in the basilar right lower lobe involving multiple segments. Mild wall thickening of central airways. No pleural effusion.  MEDIASTINUM: Cardiac chambers are normal in size. No pericardial effusion. Nonaneurysmal thoracic aorta. Trace aortic and great vessel atheromatous calcifications. Esophagus is decompressed. No lymphadenopathy.                                                       IMPRESSION:  1.  Several solid pulmonary nodules are present in the left lower lobe largest of which measures 8 x 13 mm.  2.  Advanced emphysema.      PFT's  September 2017:  FVC 2.69 FEV1 0.79 ratio 29%.  Significant improvement with bronchodilators.  TLC and RV to TLC ratio increased.  DLCO corrected decreased at 63% predicted.  Assessment: Very severe obstruction with significant response to bronchodilators.  Air trapping on lung volumes.  Mild to moderate diffusion defect.     October 2018:  Note the patient took albuterol prior to testing, therefore this represents post-bronchodilator spirometry.  FEV1/FVC is 0.36 and is reduced.  FEV1 is 46% predicted and is reduced.  FVC is 98% predicted and is normal.  DLCO is 49% predicted and is reduced when it   is corrected for hemoglobin.  The flow volume loop is normal No. There is consistent flattening of the inspiratory limb.     March 2019:  FEV1/FVC is 28 and is reduced.  FEV1 is 38% predicted and is reduced.  FVC is 107% predicted and is normal.  There was no improvement in spirometry after a single inhaled dose of  bronchodilator.  DLCO is 73% predicted and is reduced when it   is corrected for hemoglobin.  6MWT: The patient walked a total of 450 meters. Breathing room air, SpO2 veronica was 86% and HRmax was 122 bpm. BP and HR responses were appropriate.    May 2022  FVC 4.93 (104%)  FEV1 1.20 (33%)  Ratio 0.24  RV 4.61 (175%)  TLC 10.06 (133%)  DLCOc 40%    May 2024:  FVC 4.41L, 104% predicted  FEV1 1.16L, 35% predicted  FEV1/FVC 26% predicted  There was no significant response postbronchodilator  RV 4.46L, 166% predicted  TLC 9.49L, 125% predicted  DLCOc 42%

## 2025-05-07 NOTE — PATIENT INSTRUCTIONS
It was a pleasure seeing you in clinic today. This is what we discussed:    Continue the nebulizer 1-2 times daily if you are having congested cough. Use the flutter valve after each neb treatment.   Continue the Trelegy daily, rinse and gargle.   Use your albuterol every 4 hours as needed for cough, shortness of breath, wheeze, or chest tightness.   Follow up in 4 months   If you have worsening symptoms, questions, or need to speak with the nurse please call [phone].

## 2025-05-14 ENCOUNTER — TELEPHONE (OUTPATIENT)
Dept: PULMONOLOGY | Facility: CLINIC | Age: 70
End: 2025-05-14

## 2025-05-14 ENCOUNTER — HOSPITAL ENCOUNTER (EMERGENCY)
Facility: HOSPITAL | Age: 70
Discharge: HOME | End: 2025-05-14
Attending: STUDENT IN AN ORGANIZED HEALTH CARE EDUCATION/TRAINING PROGRAM | Admitting: STUDENT IN AN ORGANIZED HEALTH CARE EDUCATION/TRAINING PROGRAM
Payer: COMMERCIAL

## 2025-05-14 VITALS
OXYGEN SATURATION: 93 % | HEART RATE: 78 BPM | BODY MASS INDEX: 19.77 KG/M2 | DIASTOLIC BLOOD PRESSURE: 64 MMHG | SYSTOLIC BLOOD PRESSURE: 98 MMHG | WEIGHT: 146 LBS | TEMPERATURE: 97.7 F | HEIGHT: 72 IN | RESPIRATION RATE: 18 BRPM

## 2025-05-14 DIAGNOSIS — J18.9 PNEUMONIA OF RIGHT UPPER LOBE DUE TO INFECTIOUS ORGANISM: ICD-10-CM

## 2025-05-14 DIAGNOSIS — J44.1 COPD WITH ACUTE EXACERBATION (H): ICD-10-CM

## 2025-05-14 LAB
ANION GAP SERPL CALCULATED.3IONS-SCNC: 6 MMOL/L (ref 7–15)
BASE EXCESS BLDV CALC-SCNC: 1.9 MMOL/L (ref -3–3)
BASOPHILS # BLD AUTO: 0.1 10E3/UL (ref 0–0.2)
BASOPHILS NFR BLD AUTO: 0 %
BUN SERPL-MCNC: 22.3 MG/DL (ref 8–23)
BURR CELLS BLD QL SMEAR: SLIGHT
CALCIUM SERPL-MCNC: 9.1 MG/DL (ref 8.8–10.4)
CHLORIDE SERPL-SCNC: 101 MMOL/L (ref 98–107)
CREAT SERPL-MCNC: 1.05 MG/DL (ref 0.67–1.17)
EGFRCR SERPLBLD CKD-EPI 2021: 77 ML/MIN/1.73M2
EOSINOPHIL # BLD AUTO: 0.2 10E3/UL (ref 0–0.7)
EOSINOPHIL NFR BLD AUTO: 1 %
ERYTHROCYTE [DISTWIDTH] IN BLOOD BY AUTOMATED COUNT: 14.4 % (ref 10–15)
FLUAV RNA SPEC QL NAA+PROBE: NEGATIVE
FLUBV RNA RESP QL NAA+PROBE: NEGATIVE
GLUCOSE SERPL-MCNC: 90 MG/DL (ref 70–99)
HCO3 BLDV-SCNC: 28 MMOL/L (ref 21–28)
HCO3 SERPL-SCNC: 27 MMOL/L (ref 22–29)
HCT VFR BLD AUTO: 49.5 % (ref 40–53)
HGB BLD-MCNC: 16.8 G/DL (ref 13.3–17.7)
HOLD SPECIMEN: NORMAL
HOLD SPECIMEN: NORMAL
IMM GRANULOCYTES # BLD: 0.2 10E3/UL
IMM GRANULOCYTES NFR BLD: 1 %
LACTATE SERPL-SCNC: 0.9 MMOL/L (ref 0.7–2)
LYMPHOCYTES # BLD AUTO: 1.6 10E3/UL (ref 0.8–5.3)
LYMPHOCYTES NFR BLD AUTO: 7 %
MCH RBC QN AUTO: 29.4 PG (ref 26.5–33)
MCHC RBC AUTO-ENTMCNC: 33.9 G/DL (ref 31.5–36.5)
MCV RBC AUTO: 87 FL (ref 78–100)
MONOCYTES # BLD AUTO: 1.9 10E3/UL (ref 0–1.3)
MONOCYTES NFR BLD AUTO: 8 %
NEUTROPHILS # BLD AUTO: 18.3 10E3/UL (ref 1.6–8.3)
NEUTROPHILS NFR BLD AUTO: 83 %
NRBC # BLD AUTO: 0 10E3/UL
NRBC BLD AUTO-RTO: 0 /100
O2/TOTAL GAS SETTING VFR VENT: 28 %
OXYHGB MFR BLDV: 28 % (ref 70–75)
PCO2 BLDV: 47 MM HG (ref 40–50)
PH BLDV: 7.38 [PH] (ref 7.32–7.43)
PLAT MORPH BLD: ABNORMAL
PLATELET # BLD AUTO: 296 10E3/UL (ref 150–450)
PO2 BLDV: 18 MM HG (ref 25–47)
POTASSIUM SERPL-SCNC: 3.9 MMOL/L (ref 3.4–5.3)
RBC # BLD AUTO: 5.71 10E6/UL (ref 4.4–5.9)
RBC MORPH BLD: ABNORMAL
RSV RNA SPEC NAA+PROBE: NEGATIVE
SAO2 % BLDV: 28 % (ref 70–75)
SARS-COV-2 RNA RESP QL NAA+PROBE: NEGATIVE
SODIUM SERPL-SCNC: 134 MMOL/L (ref 135–145)
TROPONIN T SERPL HS-MCNC: 7 NG/L
TROPONIN T SERPL HS-MCNC: 8 NG/L
WBC # BLD AUTO: 22.1 10E3/UL (ref 4–11)

## 2025-05-14 PROCEDURE — 96375 TX/PRO/DX INJ NEW DRUG ADDON: CPT | Mod: 59

## 2025-05-14 PROCEDURE — 94640 AIRWAY INHALATION TREATMENT: CPT

## 2025-05-14 PROCEDURE — 250N000011 HC RX IP 250 OP 636: Performed by: STUDENT IN AN ORGANIZED HEALTH CARE EDUCATION/TRAINING PROGRAM

## 2025-05-14 PROCEDURE — 82565 ASSAY OF CREATININE: CPT | Performed by: STUDENT IN AN ORGANIZED HEALTH CARE EDUCATION/TRAINING PROGRAM

## 2025-05-14 PROCEDURE — 82805 BLOOD GASES W/O2 SATURATION: CPT | Performed by: STUDENT IN AN ORGANIZED HEALTH CARE EDUCATION/TRAINING PROGRAM

## 2025-05-14 PROCEDURE — 96365 THER/PROPH/DIAG IV INF INIT: CPT | Mod: 59

## 2025-05-14 PROCEDURE — 85025 COMPLETE CBC W/AUTO DIFF WBC: CPT | Performed by: STUDENT IN AN ORGANIZED HEALTH CARE EDUCATION/TRAINING PROGRAM

## 2025-05-14 PROCEDURE — 93005 ELECTROCARDIOGRAM TRACING: CPT | Performed by: STUDENT IN AN ORGANIZED HEALTH CARE EDUCATION/TRAINING PROGRAM

## 2025-05-14 PROCEDURE — 83605 ASSAY OF LACTIC ACID: CPT | Performed by: STUDENT IN AN ORGANIZED HEALTH CARE EDUCATION/TRAINING PROGRAM

## 2025-05-14 PROCEDURE — 87637 SARSCOV2&INF A&B&RSV AMP PRB: CPT | Performed by: STUDENT IN AN ORGANIZED HEALTH CARE EDUCATION/TRAINING PROGRAM

## 2025-05-14 PROCEDURE — 87040 BLOOD CULTURE FOR BACTERIA: CPT | Performed by: STUDENT IN AN ORGANIZED HEALTH CARE EDUCATION/TRAINING PROGRAM

## 2025-05-14 PROCEDURE — 258N000003 HC RX IP 258 OP 636: Performed by: STUDENT IN AN ORGANIZED HEALTH CARE EDUCATION/TRAINING PROGRAM

## 2025-05-14 PROCEDURE — 84484 ASSAY OF TROPONIN QUANT: CPT | Performed by: STUDENT IN AN ORGANIZED HEALTH CARE EDUCATION/TRAINING PROGRAM

## 2025-05-14 PROCEDURE — 250N000009 HC RX 250: Performed by: STUDENT IN AN ORGANIZED HEALTH CARE EDUCATION/TRAINING PROGRAM

## 2025-05-14 PROCEDURE — 96376 TX/PRO/DX INJ SAME DRUG ADON: CPT | Mod: 59

## 2025-05-14 PROCEDURE — 96367 TX/PROPH/DG ADDL SEQ IV INF: CPT

## 2025-05-14 PROCEDURE — 99285 EMERGENCY DEPT VISIT HI MDM: CPT | Mod: 25

## 2025-05-14 PROCEDURE — 36415 COLL VENOUS BLD VENIPUNCTURE: CPT | Performed by: STUDENT IN AN ORGANIZED HEALTH CARE EDUCATION/TRAINING PROGRAM

## 2025-05-14 RX ORDER — METHYLPREDNISOLONE SODIUM SUCCINATE 125 MG/2ML
125 INJECTION INTRAMUSCULAR; INTRAVENOUS ONCE
Status: COMPLETED | OUTPATIENT
Start: 2025-05-14 | End: 2025-05-14

## 2025-05-14 RX ORDER — IPRATROPIUM BROMIDE AND ALBUTEROL SULFATE 2.5; .5 MG/3ML; MG/3ML
3 SOLUTION RESPIRATORY (INHALATION) ONCE
Status: COMPLETED | OUTPATIENT
Start: 2025-05-14 | End: 2025-05-14

## 2025-05-14 RX ORDER — PREDNISONE 20 MG/1
TABLET ORAL
Qty: 10 TABLET | Refills: 0 | Status: SHIPPED | OUTPATIENT
Start: 2025-05-14 | End: 2025-05-14

## 2025-05-14 RX ORDER — IOPAMIDOL 755 MG/ML
70 INJECTION, SOLUTION INTRAVASCULAR ONCE
Status: COMPLETED | OUTPATIENT
Start: 2025-05-14 | End: 2025-05-14

## 2025-05-14 RX ORDER — PREDNISONE 20 MG/1
TABLET ORAL
Qty: 10 TABLET | Refills: 0 | Status: SHIPPED | OUTPATIENT
Start: 2025-05-14

## 2025-05-14 RX ORDER — CEFTRIAXONE 1 G/1
1 INJECTION, POWDER, FOR SOLUTION INTRAMUSCULAR; INTRAVENOUS ONCE
Status: COMPLETED | OUTPATIENT
Start: 2025-05-14 | End: 2025-05-14

## 2025-05-14 RX ORDER — AZITHROMYCIN 250 MG/1
250 TABLET, FILM COATED ORAL DAILY
Qty: 4 TABLET | Refills: 0 | Status: SHIPPED | OUTPATIENT
Start: 2025-05-15

## 2025-05-14 RX ORDER — AZITHROMYCIN 250 MG/1
250 TABLET, FILM COATED ORAL DAILY
Qty: 4 TABLET | Refills: 0 | Status: SHIPPED | OUTPATIENT
Start: 2025-05-15 | End: 2025-05-14

## 2025-05-14 RX ORDER — OXYCODONE HYDROCHLORIDE 5 MG/1
5 TABLET ORAL EVERY 4 HOURS PRN
Qty: 12 TABLET | Refills: 0 | Status: SHIPPED | OUTPATIENT
Start: 2025-05-14 | End: 2025-05-18

## 2025-05-14 RX ORDER — HYDROMORPHONE HYDROCHLORIDE 1 MG/ML
0.5 INJECTION, SOLUTION INTRAMUSCULAR; INTRAVENOUS; SUBCUTANEOUS
Refills: 0 | Status: DISCONTINUED | OUTPATIENT
Start: 2025-05-14 | End: 2025-05-14 | Stop reason: HOSPADM

## 2025-05-14 RX ORDER — MORPHINE SULFATE 4 MG/ML
4 INJECTION, SOLUTION INTRAMUSCULAR; INTRAVENOUS ONCE
Refills: 0 | Status: COMPLETED | OUTPATIENT
Start: 2025-05-14 | End: 2025-05-14

## 2025-05-14 RX ADMIN — CEFTRIAXONE SODIUM 1 G: 1 INJECTION, POWDER, FOR SOLUTION INTRAMUSCULAR; INTRAVENOUS at 07:00

## 2025-05-14 RX ADMIN — METHYLPREDNISOLONE SODIUM SUCCINATE 125 MG: 125 INJECTION, POWDER, FOR SOLUTION INTRAMUSCULAR; INTRAVENOUS at 05:23

## 2025-05-14 RX ADMIN — MORPHINE SULFATE 4 MG: 4 INJECTION, SOLUTION INTRAMUSCULAR; INTRAVENOUS at 05:21

## 2025-05-14 RX ADMIN — HYDROMORPHONE HYDROCHLORIDE 0.5 MG: 1 INJECTION, SOLUTION INTRAMUSCULAR; INTRAVENOUS; SUBCUTANEOUS at 08:40

## 2025-05-14 RX ADMIN — IOPAMIDOL 70 ML: 755 INJECTION, SOLUTION INTRAVENOUS at 06:02

## 2025-05-14 RX ADMIN — AZITHROMYCIN MONOHYDRATE 500 MG: 500 INJECTION, POWDER, LYOPHILIZED, FOR SOLUTION INTRAVENOUS at 07:46

## 2025-05-14 RX ADMIN — HYDROMORPHONE HYDROCHLORIDE 0.5 MG: 1 INJECTION, SOLUTION INTRAMUSCULAR; INTRAVENOUS; SUBCUTANEOUS at 07:45

## 2025-05-14 RX ADMIN — IPRATROPIUM BROMIDE AND ALBUTEROL SULFATE 3 ML: .5; 3 SOLUTION RESPIRATORY (INHALATION) at 05:23

## 2025-05-14 ASSESSMENT — ACTIVITIES OF DAILY LIVING (ADL)
ADLS_ACUITY_SCORE: 46

## 2025-05-14 ASSESSMENT — COLUMBIA-SUICIDE SEVERITY RATING SCALE - C-SSRS
6. HAVE YOU EVER DONE ANYTHING, STARTED TO DO ANYTHING, OR PREPARED TO DO ANYTHING TO END YOUR LIFE?: NO
1. IN THE PAST MONTH, HAVE YOU WISHED YOU WERE DEAD OR WISHED YOU COULD GO TO SLEEP AND NOT WAKE UP?: NO
2. HAVE YOU ACTUALLY HAD ANY THOUGHTS OF KILLING YOURSELF IN THE PAST MONTH?: NO

## 2025-05-14 NOTE — ED TRIAGE NOTES
Pt arrives by EMS due to right sided chest pain and SOB. Hx pneumonia and COPD. Per EMS, the chest pain increases with inspiration and palpation. Enroute, VSS, 12 Lead EKG unremarkable, 80% room air and 90's with 2L NC. Pt on 2L as a baseline.     Triage Assessment (Adult)       Row Name 05/14/25 0505          Triage Assessment    Airway WDL WDL        Respiratory WDL    Respiratory WDL X;rhythm/pattern     Rhythm/Pattern, Respiratory shortness of breath        Skin Circulation/Temperature WDL    Skin Circulation/Temperature WDL WDL        Cardiac WDL    Cardiac WDL X;chest pain        Cognitive/Neuro/Behavioral WDL    Cognitive/Neuro/Behavioral WDL WDL

## 2025-05-14 NOTE — ED NOTES
Bed: JNED-18  Expected date: 5/14/25  Expected time: 4:59 AM  Means of arrival: Ambulance  Comments:  WBL  70 yo M  R chest pain   Peripheral Block    Patient location during procedure: holding area  Reason for block: post-op pain management and at surgeon's request  Start time: 1/22/2025 11:06 AM  End time: 1/22/2025 11:09 AM  Staffing  Performed: anesthesiologist   Anesthesiologist: Seng Starr MD  Performed by: Seng Starr MD  Authorized by: Seng Starr MD    Preanesthetic Checklist  Completed: patient identified, IV checked, site marked, risks and benefits discussed, surgical/procedural consents, equipment checked, pre-op evaluation, timeout performed, anesthesia consent given, oxygen available and monitors applied/VS acknowledged  Peripheral Block   Patient position: supine  Prep: ChloraPrep  Provider prep: mask and sterile gloves  Patient monitoring: cardiac monitor, continuous pulse ox, frequent blood pressure checks, IV access, responsive to questions and oxygen  Block type: Saphenous (Adductor canal)  Laterality: right  Injection technique: single-shot  Guidance: ultrasound guided    Needle   Needle type: insulated echogenic nerve stimulator needle   Needle gauge: 21 G  Needle localization: ultrasound guidance  Needle length: 8 cm  Assessment   Injection assessment: negative aspiration for heme, no paresthesia on injection, local visualized surrounding nerve on ultrasound and no intravascular symptoms  Paresthesia pain: none  Slow fractionated injection: yes  Hemodynamics: stable  Outcomes: uncomplicated and patient tolerated procedure well    Additional Notes  Ultrasound guidance was used to view and verify medication disbursement and was also used for needle placement.  Medications Administered  fentaNYL (SUBLIMAZE) injection - IntraVENous   100 mcg - 1/22/2025 11:06:00 AM  midazolam (VERSED) injection 2 mg/2mL - IntraVENous   2 mg - 1/22/2025 11:06:00 AM  ropivacaine (NAROPIN) injection 0.5% - Perineural   20 mL - 1/22/2025 11:09:00 AM  dexAMETHasone (DECADRON) (PF) 10 mg/mL injection - Perineural   4 mg - 1/22/2025 11:09:00  No

## 2025-05-14 NOTE — DISCHARGE INSTRUCTIONS
Please return to the emergency department if your symptoms worsen, if you develop difficulty breathing, fever despite antibiotics.

## 2025-05-14 NOTE — TELEPHONE ENCOUNTER
Spoke with Trini, wife. He was in the ER yesterday. They did a CT scan. He has pneumonia. They gave him a z-pack and prednisone to go home with. They wanted him to his his pulmonary provider in a week. The only appt available was on 5/20/25. Scheduled him for this day. No action needed unless you have any other recommendations.

## 2025-05-14 NOTE — ED PROVIDER NOTES
EMERGENCY DEPARTMENT ENCOUNTER      NAME: Armin Terrell  AGE: 69 year old male  YOB: 1955  MRN: [id number]  EVALUATION DATE & TIME: 5/14/2025  5:04 AM    PCP: Júnior Salvador    ED PROVIDER: Pardeep Barney MD      Chief Complaint   Patient presents with    Chest Pain    Shortness of Breath         FINAL IMPRESSION:  1. Pneumonia of right upper lobe due to infectious organism    2. COPD with acute exacerbation (H)          ED COURSE & MEDICAL DECISION MAKING:    Pertinent Labs & Imaging studies reviewed. (See chart for details)  69 year old male presents to the Emergency Department for evaluation of chest pain, SOB    ED Course as of 05/14/25 0759   Wed May 14, 2025   0503 I met with the patient to obtain patient history and performed a physical exam. Discussed plan for ED work up including potential diagnostic studies and interventions.   0511 EKG sinus tachycardia at a rate of 102.  No ST segment changes indicative of emergent ischemia   0512 Patient is a 69-year-old male with a past medical history significant for COPD, alpha-1 antitrypsin deficiency, prostate cancer status post prostatectomy, who presents to the emergency department with shortness of breath, right-sided chest pain.  In December 2024 the patient was hospitalized for sepsis due to pneumonia, and ever since then has been on 3 L nasal cannula at baseline.  For the past 2 days he has been progressively more short of breath and noting pain in his right anterior and lateral chest wall without trauma.  Pain is made worse with breathing and worse with coughing.  No fever.  No evidence of volume overload on exam.  Patient is tachypneic, able to speak in complete breath sounds.  Mild wheeze expiratory on the right lower lung fields.  No reproducible chest wall pain.  No crepitus or instability of the chest wall, no overlying rash or bruising.  Differential diagnosis includes pulmonary embolism, COPD exacerbation, pneumonia, viral  respiratory illness, sepsis, atypical ACS.   0527 Lactic acid normal at 0.9   0610 Patient has a significant leukocytosis of 22.1.  Absolute neutrophil count 18.3.  No anemia.  Troponin normal at 8, will trend at the 2-hour.  Viral swabs negative.  No significant electrolyte abnormalities or kidney injury.  VBG does not show acidosis or pCO2 elevation.   0636 CT scan shows right upper lobe pneumonia, as well as advanced COPD.  Otherwise unchanged from prior study.  No PE or aortic pathology.   0637 Ordered ceftriaxone and azithromycin.   0722 Re-assessed patient who is feeling like he is breathing comfortably, but still has pain at his R chest, likely caused by the pneumonia. Ordered dilaudid. Rx written for oxycodone, augmentin, azithromycin, prednisone, as I anticipate he will discharge and treat at home. CURB-65 is moderate, d/t age and BUN >19. Patient wishes to be discharged.   0739 Troponin unchanged.       Medical Decision Making  I obtained history from Family Member/Significant Other and EMS  I reviewed the EMR: Inpatient Record: prior hospitalization for pneumonia in Dec 2024  Care impacted by Chronic Lung Disease  I independently interpreted the CT PE and note RUL pneumonia. See radiology report for final interpretation.  Discharge. I prescribed additional prescription strength medication(s) as charted. See documentation for any additional details.    MIPS (CTPE, Dental pain, Neff, Sinusitis, Asthma/COPD, Head Trauma): CT Pulmonary Angiogram:Patient is moderate to high risk for PE.    SEPSIS: None        At the conclusion of the encounter I discussed the results of all of the tests and the disposition. The questions were answered. The patient or family acknowledged understanding and was agreeable with the care plan.     0 minutes of critical care time     MEDICATIONS GIVEN IN THE EMERGENCY:  Medications   azithromycin (ZITHROMAX) 500 mg in sodium chloride 0.9 % 250 mL intermittent infusion (500 mg  Intravenous $New Bag 5/14/25 0746)   HYDROmorphone (PF) (DILAUDID) injection 0.5 mg (0.5 mg Intravenous $Given 5/14/25 0725)   morphine (PF) injection 4 mg (4 mg Intravenous $Given 5/14/25 0521)   ipratropium - albuterol 0.5 mg/2.5 mg/3 mL (DUONEB) neb solution 3 mL (3 mLs Nebulization $Given 5/14/25 0523)   methylPREDNISolone Na Suc (solu-MEDROL) injection 125 mg (125 mg Intravenous $Given 5/14/25 0523)   iopamidol (ISOVUE-370) solution 70 mL (70 mLs Intravenous $Given 5/14/25 0602)   cefTRIAXone (ROCEPHIN) 1 g vial to attach to  mL bag for ADULTS or NS 50 mL bag for PEDS (0 g Intravenous Stopped 5/14/25 0738)       NEW PRESCRIPTIONS STARTED AT TODAY'S ER VISIT  New Prescriptions    AMOXICILLIN-CLAVULANATE (AUGMENTIN) 875-125 MG TABLET    Take 1 tablet by mouth 2 times daily.    AZITHROMYCIN (ZITHROMAX Z-ROSEANNE) 250 MG TABLET    Take 1 tablet (250 mg) by mouth daily. First dose (500mg) given in ER on 5/14 AM    OXYCODONE (ROXICODONE) 5 MG TABLET    Take 1 tablet (5 mg) by mouth every 4 hours as needed for breakthrough pain. If pain is not improved with acetaminophen and ibuprofen.    PREDNISONE (DELTASONE) 20 MG TABLET    Take two tablets (= 40mg) each day for 5 (five) days          =================================================================    HPI    Patient information was obtained from: Patient and EMS    Use of : N/A        Armin Terrell is a 69 year old male with a pertinent history of prostate cancer and COPD who presents to this ED via ambulance for evaluation of chest pain and shortness of breath.    Per EMS, patient endorses increased shortness of breath and constant right-sided chest pain for the past couple of days. He has a history of pneumonia and COPD. Patient is on 3 L of oxygen at home due to his history of pneumonia. He used his inhaler with no relief and his neb with mild relief. Patient's oxygen saturations were in the 80's on room air and in the 90's with 2 L. Patient  confirms right-sided chest pain is not painful to the touch. He denies use of blood thinners and leg swelling.      PAST MEDICAL HISTORY:  History reviewed. No pertinent past medical history.    PAST SURGICAL HISTORY:  Past Surgical History:   Procedure Laterality Date    MS LAP,PROSTATECTOMY,RADICAL,W/NERVE SPARE,INCL ROBOTIC Bilateral 3/19/2019    Procedure: ROBOTIC ASSISTED RADICAL RETROPUBIC PROSTATECTOMY BILATERAL PELVIC LYMPH NODE DISSECTION, LYSIS OF ADHESIONS, REPAIR OF RIGHT INGUINAL HERNIA;  Surgeon: Candido Angelo MD;  Location: [facility] [place];  Service: Urology           CURRENT MEDICATIONS:    amoxicillin-clavulanate (AUGMENTIN) 875-125 MG tablet  [START ON 5/15/2025] azithromycin (ZITHROMAX Z-ROSEANNE) 250 MG tablet  oxyCODONE (ROXICODONE) 5 MG tablet  predniSONE (DELTASONE) 20 MG tablet  acetaminophen (TYLENOL) 500 MG tablet  albuterol (PROAIR HFA/PROVENTIL HFA/VENTOLIN HFA) 108 (90 Base) MCG/ACT inhaler  Fluticasone-Umeclidin-Vilanterol (TRELEGY ELLIPTA) 200-62.5-25 MCG/ACT oral inhaler  guaiFENesin (MUCINEX) 600 MG 12 hr tablet  ipratropium - albuterol 0.5 mg/2.5 mg/3 mL (DUONEB) 0.5-2.5 (3) MG/3ML neb solution  omeprazole (PRILOSEC) 20 MG DR capsule  simethicone (MYLICON) 80 MG chewable tablet        ALLERGIES:  No Known Allergies    FAMILY HISTORY:  History reviewed. No pertinent family history.    SOCIAL HISTORY:   Social History     Socioeconomic History    Marital status:    Tobacco Use    Smoking status: Former     Current packs/day: 0.00     Average packs/day: 0.8 packs/day for 30.0 years (22.5 ttl pk-yrs)     Types: Cigarettes     Start date: 1987     Quit date: 2017     Years since quittin.6     Passive exposure: Current (Wife smokes)    Smokeless tobacco: Former   Vaping Use    Vaping status: Never Used   Substance and Sexual Activity    Alcohol use: Yes     Alcohol/week: 4.0 - 5.0 standard drinks of alcohol     Comment: Alcoholic Drinks/day: drinks on weekends.     Drug use: No     Social Drivers of Health     Financial Resource Strain: Low Risk  (12/7/2024)    Financial Resource Strain     Within the past 12 months, have you or your family members you live with been unable to get utilities (heat, electricity) when it was really needed?: No   Food Insecurity: Low Risk  (12/7/2024)    Food Insecurity     Within the past 12 months, did you worry that your food would run out before you got money to buy more?: No     Within the past 12 months, did the food you bought just not last and you didn t have money to get more?: No   Transportation Needs: Low Risk  (12/7/2024)    Transportation Needs     Within the past 12 months, has lack of transportation kept you from medical appointments, getting your medicines, non-medical meetings or appointments, work, or from getting things that you need?: No   Interpersonal Safety: Low Risk  (12/6/2024)    Interpersonal Safety     Do you feel physically and emotionally safe where you currently live?: Yes     Within the past 12 months, have you been hit, slapped, kicked or otherwise physically hurt by someone?: No     Within the past 12 months, have you been humiliated or emotionally abused in other ways by your partner or ex-partner?: No   Housing Stability: Low Risk  (12/7/2024)    Housing Stability     Do you have housing? : Yes     Are you worried about losing your housing?: No       VITALS:  /67   Pulse 90   Temp 97.7  F (36.5  C) (Oral)   Resp 22   Ht 1.829 m (6')   Wt 66.2 kg (146 lb)   SpO2 92%   BMI 19.80 kg/m      PHYSICAL EXAM    Physical Exam  Vitals and nursing note reviewed.   Constitutional:       General: He is not in acute distress.     Appearance: Normal appearance. He is normal weight. He is not ill-appearing.   HENT:      Head: Normocephalic and atraumatic.      Nose: Nose normal.      Mouth/Throat:      Mouth: Mucous membranes are moist.      Pharynx: Oropharynx is clear.   Eyes:      Extraocular Movements:  Extraocular movements intact.      Conjunctiva/sclera: Conjunctivae normal.   Cardiovascular:      Rate and Rhythm: Regular rhythm. Tachycardia present.      Pulses: Normal pulses.      Heart sounds: Normal heart sounds. No murmur heard.  Pulmonary:      Effort: Respiratory distress present.      Breath sounds: Wheezing (R lower lung) present. No rhonchi or rales.      Comments: Tachypnea, accessory muscle use, able to speak in complete sentences  Abdominal:      General: Abdomen is flat. There is no distension.      Palpations: Abdomen is soft.      Tenderness: There is no abdominal tenderness.   Musculoskeletal:         General: Normal range of motion.      Cervical back: Normal range of motion.      Right lower leg: No edema.      Left lower leg: No edema.   Skin:     General: Skin is warm and dry.      Capillary Refill: Capillary refill takes less than 2 seconds.   Neurological:      General: No focal deficit present.      Mental Status: He is alert and oriented to person, place, and time. Mental status is at baseline.   Psychiatric:         Mood and Affect: Mood normal.         Behavior: Behavior normal.         Thought Content: Thought content normal.         Judgment: Judgment normal.            LAB:  All pertinent labs reviewed and interpreted.  Results for orders placed or performed during the hospital encounter of 05/14/25   CT Chest Pulmonary Embolism w Contrast    Impression    IMPRESSION:  1.  There is no pulmonary embolus, aortic aneurysm or dissection.  2.  Right upper lobe pneumonia.  3.  Mildly enlarged right hilar lymph node may be reactive.  4.  Advanced emphysema.  5.  No significant change in a 1.7 cm left upper lobe nodule or nodular infiltrate. Follow-up recommended.   Basic metabolic panel   Result Value Ref Range    Sodium 134 (L) 135 - 145 mmol/L    Potassium 3.9 3.4 - 5.3 mmol/L    Chloride 101 98 - 107 mmol/L    Carbon Dioxide (CO2) 27 22 - 29 mmol/L    Anion Gap 6 (L) 7 - 15 mmol/L     Urea Nitrogen 22.3 8.0 - 23.0 mg/dL    Creatinine 1.05 0.67 - 1.17 mg/dL    GFR Estimate 77 >60 mL/min/1.73m2    Calcium 9.1 8.8 - 10.4 mg/dL    Glucose 90 70 - 99 mg/dL   Result Value Ref Range    Troponin T, High Sensitivity 8 <=22 ng/L   Lactic acid whole blood with 1x repeat in 2 hr when >2   Result Value Ref Range    Lactic Acid, Initial 0.9 0.7 - 2.0 mmol/L   Blood gas venous   Result Value Ref Range    pH Venous 7.38 7.32 - 7.43    pCO2 Venous 47 40 - 50 mm Hg    pO2 Venous 18 (L) 25 - 47 mm Hg    Bicarbonate Venous 28 21 - 28 mmol/L    Base Excess/Deficit Venous 1.9 -3.0 - 3.0 mmol/L    FIO2 28     Oxyhemoglobin Venous 28 (L) 70 - 75 %    O2 Sat, Venous 28.0 (L) 70.0 - 75.0 %   Influenza A/B, RSV and SARS-CoV2 PCR (COVID-19) Nose    Specimen: Nose; Swab   Result Value Ref Range    Influenza A PCR Negative Negative    Influenza B PCR Negative Negative    RSV PCR Negative Negative    SARS CoV2 PCR Negative Negative   CBC with platelets and differential   Result Value Ref Range    WBC Count 22.1 (H) 4.0 - 11.0 10e3/uL    RBC Count 5.71 4.40 - 5.90 10e6/uL    Hemoglobin 16.8 13.3 - 17.7 g/dL    Hematocrit 49.5 40.0 - 53.0 %    MCV 87 78 - 100 fL    MCH 29.4 26.5 - 33.0 pg    MCHC 33.9 31.5 - 36.5 g/dL    RDW 14.4 10.0 - 15.0 %    Platelet Count 296 150 - 450 10e3/uL    % Neutrophils 83 %    % Lymphocytes 7 %    % Monocytes 8 %    % Eosinophils 1 %    % Basophils 0 %    % Immature Granulocytes 1 %    NRBCs per 100 WBC 0 <1 /100    Absolute Neutrophils 18.3 (H) 1.6 - 8.3 10e3/uL    Absolute Lymphocytes 1.6 0.8 - 5.3 10e3/uL    Absolute Monocytes 1.9 (H) 0.0 - 1.3 10e3/uL    Absolute Eosinophils 0.2 0.0 - 0.7 10e3/uL    Absolute Basophils 0.1 0.0 - 0.2 10e3/uL    Absolute Immature Granulocytes 0.2 <=0.4 10e3/uL    Absolute NRBCs 0.0 10e3/uL   Extra Blue Top Tube   Result Value Ref Range    Hold Specimen JIC    Extra Red Top Tube   Result Value Ref Range    Hold Specimen JIC    RBC and Platelet Morphology   Result  Value Ref Range    RBC Morphology Confirmed RBC Indices     Platelet Assessment  Automated Count Confirmed. Platelet morphology is normal.     Automated Count Confirmed. Platelet morphology is normal.    [place] Cells Slight (A) None Seen   Result Value Ref Range    Troponin T, High Sensitivity 7 <=22 ng/L       RADIOLOGY:  Reviewed all pertinent imaging. Please see official radiology report.  CT Chest Pulmonary Embolism w Contrast   Final Result   IMPRESSION:   1.  There is no pulmonary embolus, aortic aneurysm or dissection.   2.  Right upper lobe pneumonia.   3.  Mildly enlarged right hilar lymph node may be reactive.   4.  Advanced emphysema.   5.  No significant change in a 1.7 cm left upper lobe nodule or nodular infiltrate. Follow-up recommended.          PROCEDURES:   None      [facility] Documentation:   CMS Diagnoses: None             I, Elliotharish Judy, am serving as a scribe to document services personally performed by Pardeep Barney MD based on my observation and the provider's statements to me. I, Pardeep Barney MD, attest that Estella Kim is acting in a scribe capacity, has observed my performance of the services and has documented them in accordance with my direction.    Pardeep Barney MD  [facility] EMERGENCY DEPARTMENT  [address] [address]  [phone]     Pardeep Barney MD  05/14/25 7494

## 2025-05-14 NOTE — TELEPHONE ENCOUNTER
[facility] Call Center    Phone Message    May a detailed message be left on voicemail: yes     Reason for Call: Other: Pt's wife Trini is calling to speak to a nurse. They just got back from the ED. Pt was diagnosed pneumonia. Please call Trini back at ph: [phone].       Action Taken: Message routed to:  Other: [facility]     Travel Screening: Not Applicable     Date of Service: 5/14

## 2025-05-15 LAB
ATRIAL RATE - MUSE: 102 BPM
BACTERIA SPEC CULT: NORMAL
DIASTOLIC BLOOD PRESSURE - MUSE: NORMAL MMHG
INTERPRETATION ECG - MUSE: NORMAL
P AXIS - MUSE: 80 DEGREES
PR INTERVAL - MUSE: 140 MS
QRS DURATION - MUSE: 70 MS
QT - MUSE: 320 MS
QTC - MUSE: 417 MS
R AXIS - MUSE: 81 DEGREES
SYSTOLIC BLOOD PRESSURE - MUSE: NORMAL MMHG
T AXIS - MUSE: 81 DEGREES
VENTRICULAR RATE- MUSE: 102 BPM

## 2025-05-18 ENCOUNTER — APPOINTMENT (OUTPATIENT)
Dept: CT IMAGING | Facility: HOSPITAL | Age: 70
DRG: 870 | End: 2025-05-18
Attending: EMERGENCY MEDICINE
Payer: COMMERCIAL

## 2025-05-18 ENCOUNTER — HOSPITAL ENCOUNTER (INPATIENT)
Facility: HOSPITAL | Age: 70
LOS: 4 days | Discharge: OTHER INSTITUTION | DRG: 870 | End: 2025-05-22
Attending: EMERGENCY MEDICINE | Admitting: INTERNAL MEDICINE
Payer: COMMERCIAL

## 2025-05-18 ENCOUNTER — APPOINTMENT (OUTPATIENT)
Dept: RADIOLOGY | Facility: HOSPITAL | Age: 70
DRG: 870 | End: 2025-05-18
Attending: INTERNAL MEDICINE
Payer: COMMERCIAL

## 2025-05-18 ENCOUNTER — APPOINTMENT (OUTPATIENT)
Dept: RADIOLOGY | Facility: HOSPITAL | Age: 70
DRG: 870 | End: 2025-05-18
Attending: EMERGENCY MEDICINE
Payer: COMMERCIAL

## 2025-05-18 DIAGNOSIS — J18.9 PNEUMONIA DUE TO INFECTIOUS ORGANISM, UNSPECIFIED LATERALITY, UNSPECIFIED PART OF LUNG: ICD-10-CM

## 2025-05-18 DIAGNOSIS — Z71.89 OTHER SPECIFIED COUNSELING: Chronic | ICD-10-CM

## 2025-05-18 DIAGNOSIS — J96.02 ACUTE RESPIRATORY FAILURE WITH HYPOXIA AND HYPERCAPNIA (H): ICD-10-CM

## 2025-05-18 DIAGNOSIS — A41.9 SEPSIS, DUE TO UNSPECIFIED ORGANISM, UNSPECIFIED WHETHER ACUTE ORGAN DYSFUNCTION PRESENT (H): ICD-10-CM

## 2025-05-18 DIAGNOSIS — J93.9 PNEUMOTHORAX ON RIGHT: ICD-10-CM

## 2025-05-18 DIAGNOSIS — R06.02 SHORTNESS OF BREATH: ICD-10-CM

## 2025-05-18 DIAGNOSIS — J96.01 ACUTE RESPIRATORY FAILURE WITH HYPOXIA AND HYPERCAPNIA (H): ICD-10-CM

## 2025-05-18 LAB
ALBUMIN SERPL BCG-MCNC: 3.3 G/DL (ref 3.5–5.2)
ALBUMIN UR-MCNC: 10 MG/DL
ALP SERPL-CCNC: 175 U/L (ref 40–150)
ALT SERPL W P-5'-P-CCNC: 39 U/L (ref 0–70)
ANION GAP SERPL CALCULATED.3IONS-SCNC: 12 MMOL/L (ref 7–15)
APPEARANCE UR: CLEAR
AST SERPL W P-5'-P-CCNC: 45 U/L (ref 0–45)
BACTERIA #/AREA URNS HPF: ABNORMAL /HPF
BASE EXCESS BLDA CALC-SCNC: -2.8 MMOL/L (ref -3–3)
BASE EXCESS BLDA CALC-SCNC: -2.9 MMOL/L (ref -3–3)
BASE EXCESS BLDA CALC-SCNC: -3.4 MMOL/L (ref -3–3)
BASE EXCESS BLDA CALC-SCNC: -4.9 MMOL/L (ref -3–3)
BASE EXCESS BLDA CALC-SCNC: -5 MMOL/L (ref -3–3)
BASE EXCESS BLDV CALC-SCNC: -4.6 MMOL/L (ref -3–3)
BILIRUB SERPL-MCNC: 0.8 MG/DL
BILIRUB UR QL STRIP: NEGATIVE
BUN SERPL-MCNC: 34.3 MG/DL (ref 8–23)
CALCIUM SERPL-MCNC: 9.1 MG/DL (ref 8.8–10.4)
CHLORIDE SERPL-SCNC: 98 MMOL/L (ref 98–107)
COLOR UR AUTO: ABNORMAL
CREAT SERPL-MCNC: 1.02 MG/DL (ref 0.67–1.17)
D DIMER PPP FEU-MCNC: 3.64 UG/ML FEU (ref 0–0.5)
EGFRCR SERPLBLD CKD-EPI 2021: 80 ML/MIN/1.73M2
FLUAV RNA SPEC QL NAA+PROBE: NEGATIVE
FLUBV RNA RESP QL NAA+PROBE: NEGATIVE
GLUCOSE BLDC GLUCOMTR-MCNC: 138 MG/DL (ref 70–99)
GLUCOSE BLDC GLUCOMTR-MCNC: 141 MG/DL (ref 70–99)
GLUCOSE BLDC GLUCOMTR-MCNC: 148 MG/DL (ref 70–99)
GLUCOSE BLDC GLUCOMTR-MCNC: 153 MG/DL (ref 70–99)
GLUCOSE SERPL-MCNC: 117 MG/DL (ref 70–99)
GLUCOSE UR STRIP-MCNC: NEGATIVE MG/DL
HCO3 BLD-SCNC: 22 MMOL/L (ref 21–28)
HCO3 BLD-SCNC: 22 MMOL/L (ref 21–28)
HCO3 BLD-SCNC: 23 MMOL/L (ref 21–28)
HCO3 BLD-SCNC: 24 MMOL/L (ref 21–28)
HCO3 BLD-SCNC: 24 MMOL/L (ref 21–28)
HCO3 BLDV-SCNC: 27 MMOL/L (ref 21–28)
HCO3 SERPL-SCNC: 24 MMOL/L (ref 22–29)
HGB UR QL STRIP: NEGATIVE
HOLD SPECIMEN: NORMAL
KETONES UR STRIP-MCNC: NEGATIVE MG/DL
LACTATE SERPL-SCNC: 1.6 MMOL/L (ref 0.7–2)
LACTATE SERPL-SCNC: 2.8 MMOL/L (ref 0.7–2)
LEUKOCYTE ESTERASE UR QL STRIP: NEGATIVE
MRSA DNA SPEC QL NAA+PROBE: NEGATIVE
NITRATE UR QL: NEGATIVE
O2/TOTAL GAS SETTING VFR VENT: 100 %
O2/TOTAL GAS SETTING VFR VENT: 60 %
O2/TOTAL GAS SETTING VFR VENT: 60 %
O2/TOTAL GAS SETTING VFR VENT: 80 %
OXYHGB MFR BLDA: 97 % (ref 92–100)
OXYHGB MFR BLDA: 98 % (ref 92–100)
OXYHGB MFR BLDV: 39 % (ref 70–75)
PCO2 BLD: 44 MM HG (ref 35–45)
PCO2 BLD: 45 MM HG (ref 35–45)
PCO2 BLD: 45 MM HG (ref 35–45)
PCO2 BLD: 47 MM HG (ref 35–45)
PCO2 BLD: 51 MM HG (ref 35–45)
PCO2 BLDV: 76 MM HG (ref 40–50)
PEEP: 10 CM H2O
PEEP: 5 CM H2O
PEEP: 60 CM H2O
PEEP: 8 CM H2O
PEEP: 8 CM H2O
PH BLD: 7.29 [PH] (ref 7.35–7.45)
PH BLD: 7.29 [PH] (ref 7.35–7.45)
PH BLD: 7.3 [PH] (ref 7.35–7.45)
PH BLD: 7.31 [PH] (ref 7.35–7.45)
PH BLD: 7.32 [PH] (ref 7.35–7.45)
PH BLDV: 7.15 [PH] (ref 7.32–7.43)
PH UR STRIP: 5.5 [PH] (ref 5–7)
PO2 BLD: 104 MM HG (ref 80–105)
PO2 BLD: 111 MM HG (ref 80–105)
PO2 BLD: 89 MM HG (ref 80–105)
PO2 BLD: 95 MM HG (ref 80–105)
PO2 BLD: 95 MM HG (ref 80–105)
PO2 BLDV: 29 MM HG (ref 25–47)
POTASSIUM SERPL-SCNC: 4.8 MMOL/L (ref 3.4–5.3)
PROCALCITONIN SERPL IA-MCNC: 1.72 NG/ML
PROT SERPL-MCNC: 7.1 G/DL (ref 6.4–8.3)
RBC URINE: 1 /HPF
RSV RNA SPEC NAA+PROBE: NEGATIVE
SA TARGET DNA: NEGATIVE
SAO2 % BLDA: 97.3 % (ref 95–96)
SAO2 % BLDA: 97.6 % (ref 95–96)
SAO2 % BLDA: 97.8 % (ref 95–96)
SAO2 % BLDA: 98.2 % (ref 95–96)
SAO2 % BLDA: 98.4 % (ref 95–96)
SAO2 % BLDV: 39 % (ref 70–75)
SARS-COV-2 RNA RESP QL NAA+PROBE: NEGATIVE
SODIUM SERPL-SCNC: 134 MMOL/L (ref 135–145)
SP GR UR STRIP: 1.01 (ref 1–1.03)
UROBILINOGEN UR STRIP-MCNC: NORMAL MG/DL
WBC URINE: 2 /HPF

## 2025-05-18 PROCEDURE — 85379 FIBRIN DEGRADATION QUANT: CPT | Performed by: EMERGENCY MEDICINE

## 2025-05-18 PROCEDURE — 999N000065 XR CHEST PORT 1 VIEW

## 2025-05-18 PROCEDURE — 94645 CONT INHLJ TX EACH ADDL HOUR: CPT

## 2025-05-18 PROCEDURE — 272N000452 HC KIT SHRLOCK 5FR POWER PICC TRIPLE LUMEN

## 2025-05-18 PROCEDURE — 999N000287 HC ICU ADULT ROUNDING, EACH 10 MINS

## 2025-05-18 PROCEDURE — 96368 THER/DIAG CONCURRENT INF: CPT | Mod: 59

## 2025-05-18 PROCEDURE — 96375 TX/PRO/DX INJ NEW DRUG ADDON: CPT | Mod: 59

## 2025-05-18 PROCEDURE — 250N000009 HC RX 250

## 2025-05-18 PROCEDURE — 99207 PR NO BILLABLE SERVICE THIS VISIT: CPT | Performed by: INTERNAL MEDICINE

## 2025-05-18 PROCEDURE — 250N000011 HC RX IP 250 OP 636: Performed by: EMERGENCY MEDICINE

## 2025-05-18 PROCEDURE — 83605 ASSAY OF LACTIC ACID: CPT | Performed by: EMERGENCY MEDICINE

## 2025-05-18 PROCEDURE — 200N000001 HC R&B ICU

## 2025-05-18 PROCEDURE — 250N000013 HC RX MED GY IP 250 OP 250 PS 637: Performed by: INTERNAL MEDICINE

## 2025-05-18 PROCEDURE — 250N000012 HC RX MED GY IP 250 OP 636 PS 637: Performed by: INTERNAL MEDICINE

## 2025-05-18 PROCEDURE — 96365 THER/PROPH/DIAG IV INF INIT: CPT | Mod: 59

## 2025-05-18 PROCEDURE — 32551 INSERTION OF CHEST TUBE: CPT

## 2025-05-18 PROCEDURE — 250N000009 HC RX 250: Performed by: EMERGENCY MEDICINE

## 2025-05-18 PROCEDURE — 0T9B70Z DRAINAGE OF BLADDER WITH DRAINAGE DEVICE, VIA NATURAL OR ARTIFICIAL OPENING: ICD-10-PCS

## 2025-05-18 PROCEDURE — 82040 ASSAY OF SERUM ALBUMIN: CPT | Performed by: EMERGENCY MEDICINE

## 2025-05-18 PROCEDURE — 74018 RADEX ABDOMEN 1 VIEW: CPT

## 2025-05-18 PROCEDURE — 999N000254 HC STATISTIC VENTILATOR TRANSFER

## 2025-05-18 PROCEDURE — 94640 AIRWAY INHALATION TREATMENT: CPT

## 2025-05-18 PROCEDURE — 31500 INSERT EMERGENCY AIRWAY: CPT

## 2025-05-18 PROCEDURE — 94002 VENT MGMT INPAT INIT DAY: CPT

## 2025-05-18 PROCEDURE — 99291 CRITICAL CARE FIRST HOUR: CPT | Mod: 25 | Performed by: INTERNAL MEDICINE

## 2025-05-18 PROCEDURE — 36415 COLL VENOUS BLD VENIPUNCTURE: CPT | Performed by: EMERGENCY MEDICINE

## 2025-05-18 PROCEDURE — 82805 BLOOD GASES W/O2 SATURATION: CPT | Performed by: EMERGENCY MEDICINE

## 2025-05-18 PROCEDURE — 94660 CPAP INITIATION&MGMT: CPT

## 2025-05-18 PROCEDURE — 87641 MR-STAPH DNA AMP PROBE: CPT | Performed by: INTERNAL MEDICINE

## 2025-05-18 PROCEDURE — 81001 URINALYSIS AUTO W/SCOPE: CPT

## 2025-05-18 PROCEDURE — 36600 WITHDRAWAL OF ARTERIAL BLOOD: CPT

## 2025-05-18 PROCEDURE — 250N000009 HC RX 250: Performed by: INTERNAL MEDICINE

## 2025-05-18 PROCEDURE — 71275 CT ANGIOGRAPHY CHEST: CPT

## 2025-05-18 PROCEDURE — 999N000178 HC STATISTIC SUCTION SPUTUM

## 2025-05-18 PROCEDURE — 999N000009 HC STATISTIC AIRWAY CARE

## 2025-05-18 PROCEDURE — 5A1955Z RESPIRATORY VENTILATION, GREATER THAN 96 CONSECUTIVE HOURS: ICD-10-PCS | Performed by: NURSE PRACTITIONER

## 2025-05-18 PROCEDURE — 99285 EMERGENCY DEPT VISIT HI MDM: CPT | Mod: 25

## 2025-05-18 PROCEDURE — 82805 BLOOD GASES W/O2 SATURATION: CPT | Performed by: INTERNAL MEDICINE

## 2025-05-18 PROCEDURE — 85007 BL SMEAR W/DIFF WBC COUNT: CPT | Performed by: EMERGENCY MEDICINE

## 2025-05-18 PROCEDURE — 85014 HEMATOCRIT: CPT | Performed by: EMERGENCY MEDICINE

## 2025-05-18 PROCEDURE — 258N000003 HC RX IP 258 OP 636: Performed by: EMERGENCY MEDICINE

## 2025-05-18 PROCEDURE — 87637 SARSCOV2&INF A&B&RSV AMP PRB: CPT | Performed by: EMERGENCY MEDICINE

## 2025-05-18 PROCEDURE — 99292 CRITICAL CARE ADDL 30 MIN: CPT | Mod: 25 | Performed by: INTERNAL MEDICINE

## 2025-05-18 PROCEDURE — 999N000157 HC STATISTIC RCP TIME EA 10 MIN

## 2025-05-18 PROCEDURE — 0W9930Z DRAINAGE OF RIGHT PLEURAL CAVITY WITH DRAINAGE DEVICE, PERCUTANEOUS APPROACH: ICD-10-PCS | Performed by: EMERGENCY MEDICINE

## 2025-05-18 PROCEDURE — 94644 CONT INHLJ TX 1ST HOUR: CPT

## 2025-05-18 PROCEDURE — 36569 INSJ PICC 5 YR+ W/O IMAGING: CPT

## 2025-05-18 PROCEDURE — 272N000272 HC CONTINUOUS NEBULIZER MICRO PUMP

## 2025-05-18 PROCEDURE — 250N000011 HC RX IP 250 OP 636: Performed by: INTERNAL MEDICINE

## 2025-05-18 PROCEDURE — 84145 PROCALCITONIN (PCT): CPT | Performed by: EMERGENCY MEDICINE

## 2025-05-18 PROCEDURE — 71045 X-RAY EXAM CHEST 1 VIEW: CPT

## 2025-05-18 PROCEDURE — 250N000011 HC RX IP 250 OP 636

## 2025-05-18 PROCEDURE — 250N000011 HC RX IP 250 OP 636: Mod: JZ | Performed by: INTERNAL MEDICINE

## 2025-05-18 PROCEDURE — 3E043XZ INTRODUCTION OF VASOPRESSOR INTO CENTRAL VEIN, PERCUTANEOUS APPROACH: ICD-10-PCS | Performed by: NURSE PRACTITIONER

## 2025-05-18 PROCEDURE — 87040 BLOOD CULTURE FOR BACTERIA: CPT | Performed by: EMERGENCY MEDICINE

## 2025-05-18 PROCEDURE — 87633 RESP VIRUS 12-25 TARGETS: CPT | Performed by: INTERNAL MEDICINE

## 2025-05-18 PROCEDURE — 36620 INSERTION CATHETER ARTERY: CPT | Performed by: INTERNAL MEDICINE

## 2025-05-18 PROCEDURE — 87205 SMEAR GRAM STAIN: CPT | Performed by: INTERNAL MEDICINE

## 2025-05-18 RX ORDER — METHYLPREDNISOLONE SODIUM SUCCINATE 40 MG/ML
60 INJECTION INTRAMUSCULAR; INTRAVENOUS EVERY 24 HOURS
Status: DISCONTINUED | OUTPATIENT
Start: 2025-05-19 | End: 2025-05-18

## 2025-05-18 RX ORDER — IOPAMIDOL 755 MG/ML
90 INJECTION, SOLUTION INTRAVASCULAR ONCE
Status: COMPLETED | OUTPATIENT
Start: 2025-05-18 | End: 2025-05-18

## 2025-05-18 RX ORDER — NOREPINEPHRINE BITARTRATE 0.02 MG/ML
.01-.6 INJECTION, SOLUTION INTRAVENOUS CONTINUOUS
Status: DISCONTINUED | OUTPATIENT
Start: 2025-05-18 | End: 2025-05-18

## 2025-05-18 RX ORDER — LIDOCAINE 40 MG/G
CREAM TOPICAL
Status: ACTIVE | OUTPATIENT
Start: 2025-05-18 | End: 2025-05-21

## 2025-05-18 RX ORDER — IPRATROPIUM BROMIDE AND ALBUTEROL SULFATE 2.5; .5 MG/3ML; MG/3ML
3 SOLUTION RESPIRATORY (INHALATION)
Status: DISCONTINUED | OUTPATIENT
Start: 2025-05-18 | End: 2025-05-22 | Stop reason: HOSPADM

## 2025-05-18 RX ORDER — NICOTINE POLACRILEX 4 MG
15-30 LOZENGE BUCCAL
Status: DISCONTINUED | OUTPATIENT
Start: 2025-05-18 | End: 2025-05-18

## 2025-05-18 RX ORDER — LIDOCAINE HYDROCHLORIDE 20 MG/ML
JELLY TOPICAL ONCE
Status: COMPLETED | OUTPATIENT
Start: 2025-05-18 | End: 2025-05-18

## 2025-05-18 RX ORDER — NALOXONE HYDROCHLORIDE 0.4 MG/ML
0.4 INJECTION, SOLUTION INTRAMUSCULAR; INTRAVENOUS; SUBCUTANEOUS
Status: DISCONTINUED | OUTPATIENT
Start: 2025-05-18 | End: 2025-05-22 | Stop reason: HOSPADM

## 2025-05-18 RX ORDER — PIPERACILLIN SODIUM, TAZOBACTAM SODIUM 3; .375 G/15ML; G/15ML
3.38 INJECTION, POWDER, LYOPHILIZED, FOR SOLUTION INTRAVENOUS EVERY 6 HOURS
Status: DISCONTINUED | OUTPATIENT
Start: 2025-05-18 | End: 2025-05-18

## 2025-05-18 RX ORDER — PROPOFOL 10 MG/ML
5-75 INJECTION, EMULSION INTRAVENOUS CONTINUOUS
Status: DISCONTINUED | OUTPATIENT
Start: 2025-05-18 | End: 2025-05-21

## 2025-05-18 RX ORDER — FLUTICASONE FUROATE AND VILANTEROL 200; 25 UG/1; UG/1
1 POWDER RESPIRATORY (INHALATION) DAILY
Status: DISCONTINUED | OUTPATIENT
Start: 2025-05-18 | End: 2025-05-19

## 2025-05-18 RX ORDER — PIPERACILLIN SODIUM, TAZOBACTAM SODIUM 4; .5 G/20ML; G/20ML
4.5 INJECTION, POWDER, LYOPHILIZED, FOR SOLUTION INTRAVENOUS EVERY 6 HOURS
Status: DISCONTINUED | OUTPATIENT
Start: 2025-05-18 | End: 2025-05-18

## 2025-05-18 RX ORDER — NALOXONE HYDROCHLORIDE 0.4 MG/ML
0.2 INJECTION, SOLUTION INTRAMUSCULAR; INTRAVENOUS; SUBCUTANEOUS
Status: DISCONTINUED | OUTPATIENT
Start: 2025-05-18 | End: 2025-05-22 | Stop reason: HOSPADM

## 2025-05-18 RX ORDER — NICOTINE POLACRILEX 4 MG
15-30 LOZENGE BUCCAL
Status: DISCONTINUED | OUTPATIENT
Start: 2025-05-18 | End: 2025-05-22 | Stop reason: HOSPADM

## 2025-05-18 RX ORDER — PIPERACILLIN SODIUM, TAZOBACTAM SODIUM 4; .5 G/20ML; G/20ML
4.5 INJECTION, POWDER, LYOPHILIZED, FOR SOLUTION INTRAVENOUS ONCE
Status: COMPLETED | OUTPATIENT
Start: 2025-05-18 | End: 2025-05-18

## 2025-05-18 RX ORDER — IPRATROPIUM BROMIDE AND ALBUTEROL SULFATE 2.5; .5 MG/3ML; MG/3ML
3 SOLUTION RESPIRATORY (INHALATION) ONCE
Status: COMPLETED | OUTPATIENT
Start: 2025-05-18 | End: 2025-05-18

## 2025-05-18 RX ORDER — PROPOFOL 10 MG/ML
5-75 INJECTION, EMULSION INTRAVENOUS CONTINUOUS
Status: DISCONTINUED | OUTPATIENT
Start: 2025-05-18 | End: 2025-05-18

## 2025-05-18 RX ORDER — ROPIVACAINE IN 0.9% SOD CHL/PF 0.1 %
.01-.2 PLASTIC BAG, INJECTION (ML) EPIDURAL CONTINUOUS
Status: DISCONTINUED | OUTPATIENT
Start: 2025-05-18 | End: 2025-05-18 | Stop reason: ALTCHOICE

## 2025-05-18 RX ORDER — VANCOMYCIN HYDROCHLORIDE 1 G/200ML
1000 INJECTION, SOLUTION INTRAVENOUS EVERY 12 HOURS
Status: DISCONTINUED | OUTPATIENT
Start: 2025-05-18 | End: 2025-05-18

## 2025-05-18 RX ORDER — LORAZEPAM 2 MG/ML
0.5 INJECTION INTRAMUSCULAR ONCE
Status: COMPLETED | OUTPATIENT
Start: 2025-05-18 | End: 2025-05-18

## 2025-05-18 RX ORDER — METHYLPREDNISOLONE SODIUM SUCCINATE 40 MG/ML
40 INJECTION INTRAMUSCULAR; INTRAVENOUS EVERY 12 HOURS
Status: DISCONTINUED | OUTPATIENT
Start: 2025-05-18 | End: 2025-05-19

## 2025-05-18 RX ORDER — LORAZEPAM 2 MG/ML
INJECTION INTRAMUSCULAR
Status: COMPLETED
Start: 2025-05-18 | End: 2025-05-18

## 2025-05-18 RX ORDER — DEXTROSE MONOHYDRATE 25 G/50ML
25-50 INJECTION, SOLUTION INTRAVENOUS
Status: DISCONTINUED | OUTPATIENT
Start: 2025-05-18 | End: 2025-05-22 | Stop reason: HOSPADM

## 2025-05-18 RX ORDER — ETOMIDATE 2 MG/ML
20 INJECTION INTRAVENOUS ONCE
Status: COMPLETED | OUTPATIENT
Start: 2025-05-18 | End: 2025-05-18

## 2025-05-18 RX ORDER — FENTANYL CITRATE 50 UG/ML
50 INJECTION, SOLUTION INTRAMUSCULAR; INTRAVENOUS ONCE
Status: COMPLETED | OUTPATIENT
Start: 2025-05-18 | End: 2025-05-18

## 2025-05-18 RX ORDER — IBUPROFEN 200 MG
200 TABLET ORAL EVERY 4 HOURS PRN
Status: ON HOLD | COMMUNITY

## 2025-05-18 RX ORDER — NOREPINEPHRINE BITARTRATE 0.02 MG/ML
INJECTION, SOLUTION INTRAVENOUS
Status: COMPLETED
Start: 2025-05-18 | End: 2025-05-18

## 2025-05-18 RX ORDER — CHLORHEXIDINE GLUCONATE ORAL RINSE 1.2 MG/ML
15 SOLUTION DENTAL EVERY 12 HOURS
Status: DISCONTINUED | OUTPATIENT
Start: 2025-05-18 | End: 2025-05-19

## 2025-05-18 RX ORDER — FENTANYL CITRATE 50 UG/ML
50 INJECTION, SOLUTION INTRAMUSCULAR; INTRAVENOUS EVERY 30 MIN PRN
Status: COMPLETED | OUTPATIENT
Start: 2025-05-18 | End: 2025-05-19

## 2025-05-18 RX ORDER — DEXTROSE MONOHYDRATE 25 G/50ML
25-50 INJECTION, SOLUTION INTRAVENOUS
Status: DISCONTINUED | OUTPATIENT
Start: 2025-05-18 | End: 2025-05-18

## 2025-05-18 RX ORDER — MEROPENEM 1 G/1
1 INJECTION, POWDER, FOR SOLUTION INTRAVENOUS EVERY 8 HOURS
Status: DISCONTINUED | OUTPATIENT
Start: 2025-05-18 | End: 2025-05-22 | Stop reason: HOSPADM

## 2025-05-18 RX ORDER — NOREPINEPHRINE BITARTRATE 0.02 MG/ML
.01-.6 INJECTION, SOLUTION INTRAVENOUS CONTINUOUS
Status: DISCONTINUED | OUTPATIENT
Start: 2025-05-18 | End: 2025-05-21

## 2025-05-18 RX ORDER — ENOXAPARIN SODIUM 100 MG/ML
40 INJECTION SUBCUTANEOUS EVERY 24 HOURS
Status: DISCONTINUED | OUTPATIENT
Start: 2025-05-18 | End: 2025-05-22 | Stop reason: HOSPADM

## 2025-05-18 RX ORDER — METHYLPREDNISOLONE SODIUM SUCCINATE 125 MG/2ML
125 INJECTION INTRAMUSCULAR; INTRAVENOUS ONCE
Status: COMPLETED | OUTPATIENT
Start: 2025-05-18 | End: 2025-05-18

## 2025-05-18 RX ADMIN — INSULIN ASPART 1 UNITS: 100 INJECTION, SOLUTION INTRAVENOUS; SUBCUTANEOUS at 20:30

## 2025-05-18 RX ADMIN — Medication 25 MCG/HR: at 06:26

## 2025-05-18 RX ADMIN — NOREPINEPHRINE BITARTRATE 0.2 MCG/KG/MIN: 0.02 INJECTION, SOLUTION INTRAVENOUS at 03:58

## 2025-05-18 RX ADMIN — SODIUM CHLORIDE 1000 ML: 0.9 INJECTION, SOLUTION INTRAVENOUS at 03:50

## 2025-05-18 RX ADMIN — IOPAMIDOL 90 ML: 755 INJECTION, SOLUTION INTRAVENOUS at 05:09

## 2025-05-18 RX ADMIN — CHLORHEXIDINE GLUCONATE 15 ML: 1.2 SOLUTION ORAL at 08:46

## 2025-05-18 RX ADMIN — LIDOCAINE HYDROCHLORIDE: 20 JELLY TOPICAL at 09:10

## 2025-05-18 RX ADMIN — PIPERACILLIN SODIUM AND TAZOBACTAM SODIUM 4.5 G: 4; .5 INJECTION, POWDER, LYOPHILIZED, FOR SOLUTION INTRAVENOUS at 16:20

## 2025-05-18 RX ADMIN — LIDOCAINE HYDROCHLORIDE 1 ML: 10 INJECTION, SOLUTION EPIDURAL; INFILTRATION; INTRACAUDAL; PERINEURAL at 08:06

## 2025-05-18 RX ADMIN — PIPERACILLIN SODIUM AND TAZOBACTAM SODIUM 4.5 G: 4; .5 INJECTION, POWDER, LYOPHILIZED, FOR SOLUTION INTRAVENOUS at 03:34

## 2025-05-18 RX ADMIN — AZITHROMYCIN MONOHYDRATE 500 MG: 500 INJECTION, POWDER, LYOPHILIZED, FOR SOLUTION INTRAVENOUS at 04:22

## 2025-05-18 RX ADMIN — METHYLPREDNISOLONE SODIUM SUCCINATE 40 MG: 40 INJECTION, POWDER, FOR SOLUTION INTRAMUSCULAR; INTRAVENOUS at 21:05

## 2025-05-18 RX ADMIN — PROPOFOL 55 MCG/KG/MIN: 10 INJECTION, EMULSION INTRAVENOUS at 06:04

## 2025-05-18 RX ADMIN — CHLORHEXIDINE GLUCONATE 15 ML: 1.2 SOLUTION ORAL at 20:23

## 2025-05-18 RX ADMIN — FENTANYL CITRATE 50 MCG: 50 INJECTION, SOLUTION INTRAMUSCULAR; INTRAVENOUS at 03:40

## 2025-05-18 RX ADMIN — LIDOCAINE HYDROCHLORIDE: 20 JELLY TOPICAL at 05:50

## 2025-05-18 RX ADMIN — PANTOPRAZOLE SODIUM 40 MG: 40 INJECTION, POWDER, FOR SOLUTION INTRAVENOUS at 09:02

## 2025-05-18 RX ADMIN — INSULIN ASPART 1 UNITS: 100 INJECTION, SOLUTION INTRAVENOUS; SUBCUTANEOUS at 12:13

## 2025-05-18 RX ADMIN — LORAZEPAM 0.5 MG: 2 INJECTION INTRAMUSCULAR; INTRAVENOUS at 02:02

## 2025-05-18 RX ADMIN — MEROPENEM 1 G: 1 INJECTION, POWDER, FOR SOLUTION INTRAVENOUS at 17:46

## 2025-05-18 RX ADMIN — PROPOFOL 50 MCG/KG/MIN: 10 INJECTION, EMULSION INTRAVENOUS at 11:15

## 2025-05-18 RX ADMIN — IPRATROPIUM BROMIDE AND ALBUTEROL SULFATE 3 ML: .5; 3 SOLUTION RESPIRATORY (INHALATION) at 02:09

## 2025-05-18 RX ADMIN — PROPOFOL 50 MCG/KG/MIN: 10 INJECTION, EMULSION INTRAVENOUS at 20:39

## 2025-05-18 RX ADMIN — EPOPROSTENOL 20 NG/KG/MIN: 1.5 INJECTION, POWDER, LYOPHILIZED, FOR SOLUTION INTRAVENOUS at 07:14

## 2025-05-18 RX ADMIN — ETOMIDATE 20 MG: 2 INJECTION, SOLUTION INTRAVENOUS at 02:44

## 2025-05-18 RX ADMIN — IPRATROPIUM BROMIDE AND ALBUTEROL SULFATE 3 ML: .5; 3 SOLUTION RESPIRATORY (INHALATION) at 19:33

## 2025-05-18 RX ADMIN — Medication 100 MG: at 02:43

## 2025-05-18 RX ADMIN — PROPOFOL 40 MCG/KG/MIN: 10 INJECTION, EMULSION INTRAVENOUS at 02:57

## 2025-05-18 RX ADMIN — IPRATROPIUM BROMIDE AND ALBUTEROL SULFATE 3 ML: .5; 3 SOLUTION RESPIRATORY (INHALATION) at 02:10

## 2025-05-18 RX ADMIN — PIPERACILLIN AND TAZOBACTAM 3.38 G: 3; .375 INJECTION, POWDER, FOR SOLUTION INTRAVENOUS at 09:03

## 2025-05-18 RX ADMIN — FENTANYL CITRATE 50 MCG: 50 INJECTION, SOLUTION INTRAMUSCULAR; INTRAVENOUS at 04:30

## 2025-05-18 RX ADMIN — PROPOFOL 50 MCG/KG/MIN: 10 INJECTION, EMULSION INTRAVENOUS at 16:17

## 2025-05-18 RX ADMIN — FENTANYL CITRATE 50 MCG: 50 INJECTION, SOLUTION INTRAMUSCULAR; INTRAVENOUS at 03:03

## 2025-05-18 RX ADMIN — NOREPINEPHRINE BITARTRATE 0.08 MCG/KG/MIN: 0.02 INJECTION, SOLUTION INTRAVENOUS at 11:15

## 2025-05-18 RX ADMIN — ENOXAPARIN SODIUM 40 MG: 40 INJECTION SUBCUTANEOUS at 08:46

## 2025-05-18 RX ADMIN — LORAZEPAM 0.5 MG: 2 INJECTION INTRAMUSCULAR at 02:02

## 2025-05-18 RX ADMIN — METHYLPREDNISOLONE SODIUM SUCCINATE 125 MG: 125 INJECTION, POWDER, FOR SOLUTION INTRAMUSCULAR; INTRAVENOUS at 02:09

## 2025-05-18 RX ADMIN — IPRATROPIUM BROMIDE AND ALBUTEROL SULFATE 3 ML: .5; 3 SOLUTION RESPIRATORY (INHALATION) at 23:40

## 2025-05-18 ASSESSMENT — ACTIVITIES OF DAILY LIVING (ADL)
ADLS_ACUITY_SCORE: 67
ADLS_ACUITY_SCORE: 58
ADLS_ACUITY_SCORE: 67
ADLS_ACUITY_SCORE: 46
ADLS_ACUITY_SCORE: 67
ADLS_ACUITY_SCORE: 46
ADLS_ACUITY_SCORE: 67
ADLS_ACUITY_SCORE: 67
ADLS_ACUITY_SCORE: 69
ADLS_ACUITY_SCORE: 58
ADLS_ACUITY_SCORE: 46
ADLS_ACUITY_SCORE: 46
ADLS_ACUITY_SCORE: 67
ADLS_ACUITY_SCORE: 56
ADLS_ACUITY_SCORE: 46
ADLS_ACUITY_SCORE: 69
ADLS_ACUITY_SCORE: 67
ADLS_ACUITY_SCORE: 69
ADLS_ACUITY_SCORE: 58
ADLS_ACUITY_SCORE: 67

## 2025-05-18 NOTE — PROGRESS NOTES
RESPIRATORY CARE NOTE     Patient Name: Armin Terrell  Today's Date: 5/18/2025     Pt continues on the following settings:  FiO2 (%): 80 %, Resp: 14, Vent Mode: VC/AC, Resp Rate (Set): 16 breaths/min, Tidal Volume (Set, mL): 440 mL, PEEP (cm H2O): 5 cmH2O, Resp Rate (Set): 16 breaths/min, Tidal Volume (Set, mL): 440 mL, PEEP (cm H2O): 5 cmH2O   Plateau pressure: 14.1 cm H2O    Pt is intubated with  # 7.5 ETT secured  26 at the teeth. Pt has a mod cough with suction. RT suctioned pt for mod amt creamy red streaked. RT will continue to follow per MD's orders.     BP 96/63   Pulse 89   Temp 97.6  F (36.4  C) (Axillary)   Resp 14   Wt 67 kg (147 lb 11.2 oz)   SpO2 93%   BMI 20.03 kg/m        Arnulfo Harmon, RT

## 2025-05-18 NOTE — PROGRESS NOTES
ICU NOTE    Respiratory gram stain showed Gram negative 4+    PLAN    Change zosyn to meropenem    Nuno Duque MD  05/18/25  4:31 PM

## 2025-05-18 NOTE — PROCEDURES
"PICC Line Insertion Procedure Note  Pt. Name: Armin Terrell  MRN:        [id number]    Procedure: Insertion of a  TRIPLE Lumen  5 fr  Bard SOLO (valved) Power PICC, Lot number [id number]    Indications: VASOPRESSORS    Contraindications : NONE    Procedure Details     Patient identified with 2 identifiers and \"Time Out\" conducted.  .     Central line insertion bundle followed: hand hygiene performed prior to procedure, site cleansed with cholraprep, hat, mask, sterile gloves, sterile gown worn, patient draped with maximum barrier head to toe drape, sterile field maintained.    The vein was assessed and found to be compressible and of adequate size.     Lidocaine 1% 1 ml administered sq to the insertion site. A 5 Fr PICC was inserted into the BRACHIAL vein of the right arm with ultrasound guidance. 1 attempt(s) required to access vein.   Catheter threaded without difficulty. Good blood return noted.    Modified Seldinger Technique used for insertion.    The 8 sharps that are included in the PICC insertion kit were accounted for and disposed of in the sharps container prior to breakdown of the sterile field.    Catheter secured with STAT LOCK, biopatch and Tegaderm dressing applied.    Findings:    Total catheter length  43 cm, with 0 cm exposed. Mid upper arm circumference is 26 cm. Catheter was flushed with 30 cc NS. Patient  tolerated procedure well.    Tip placement verified by [employer] . Tip placement in the SVC.      CLABSI prevention brochure left at bedside.    Patient's primary RN notified PICC is ready for use.      Comments:      Selena Reyez RN PICC  [facility] - [facility]      "

## 2025-05-18 NOTE — PROGRESS NOTES
RT PROGRESS NOTE    VENT DAY# 1    CURRENT SETTINGS:   FiO2 (%): 100 %, Resp: 28, Vent Mode: VC/AC, Resp Rate (Set): 16 breaths/min, Tidal Volume (Set, mL): 550 mL, PEEP (cm H2O): 8 cmH2O, Resp Rate (Set): 16 breaths/min, Tidal Volume (Set, mL): 550 mL, PEEP (cm H2O): 8 cmH2O    Secretions:  large thick pink/red.streak   02 Sats:  95%  BS: course    ETT SIZE 7.5 Secured at 26 cm at teeth/gums      VBGpH 7.15; pCO2 76; ; HCO3 29- set RR increase from 16-22 post VBG.

## 2025-05-18 NOTE — PHARMACY-VANCOMYCIN DOSING SERVICE
Pharmacy Vancomycin Initial Note  Date of Service May 18, 2025  Patient's  1955  69 year old, male    Indication: Sepsis and Pneumonia    Current estimated CrCl = Estimated Creatinine Clearance: 64.8 mL/min (based on SCr of 1.02 mg/dL).    Creatinine for last 3 days  2025:  2:05 AM Creatinine 1.02 mg/dL    Recent Vancomycin Level(s) for last 3 days  No results found for requested labs within last 3 days.      Vancomycin IV Administrations (past 72 hours)        No vancomycin orders with administrations in past 72 hours.                    Nephrotoxins and other renal medications (From now, onward)      Start     Dose/Rate Route Frequency Ordered Stop    25 1600  piperacillin-tazobactam (ZOSYN) 4.5 g vial to attach to  mL bag         4.5 g  over 30 Minutes Intravenous EVERY 6 HOURS 25 1101      25 1130  vancomycin (VANCOCIN) 1,000 mg in 200 mL dextrose intermittent infusion         1,000 mg  200 mL/hr over 1 Hours Intravenous EVERY 12 HOURS 25 1126      25 0400  norepinephrine (LEVOPHED) 4 mg in  mL PERIPHERAL infusion         0.01-0.2 mcg/kg/min × 66.2 kg  2.5-49.7 mL/hr  Intravenous CONTINUOUS 25 0355              Contrast Orders - past 72 hours (72h ago, onward)      Start     Dose/Rate Route Frequency Stop    25 0530  iopamidol (ISOVUE-370) solution 90 mL         90 mL Intravenous ONCE 25 0509            [employer] Prediction of Planned Initial Vancomycin Regimen  Regimen: 1500 mg IV every 24 hours.  Start time: 11:36 on 2025  Exposure target: AUC24 (range)400-600 mg/L.hr   AUC24,ss: 511 mg/L.hr  Probability of AUC24 > 400: 78 %  Ctrough,ss: 14.2 mg/L  Probability of Ctrough,ss > 20: 19 %  Probability of nephrotoxicity (Lodise REVA ): 9 %      Plan:  Start vancomycin 1500 mg IV q24h.   Vancomycin monitoring method: AUC  Vancomycin therapeutic monitoring goal: 400-600 mg*h/L  Pharmacy will check vancomycin levels as appropriate in  1-3 Days.    Serum creatinine levels will be ordered daily for the first week of therapy and at least twice weekly for subsequent weeks.      Yoli Muñoz RPH

## 2025-05-18 NOTE — ED PROVIDER NOTES
EMERGENCY DEPARTMENT ENCOUNTER      NAME: Armin Terrell  AGE: 69 year old male  YOB: 1955  EVALUATION DATE & TIME: 5/18/2025  1:55 AM    ED PROVIDER: Paula Craft MD    Chief Complaint   Patient presents with    Respiratory Distress       FINAL IMPRESSION  1. Acute respiratory failure with hypoxia and hypercapnia (H)    2. Pneumonia due to infectious organism, unspecified laterality, unspecified part of lung    3. Pneumothorax on right    4. Shortness of breath    5. Sepsis, due to unspecified organism, unspecified whether acute organ dysfunction present (H)        MEDICAL DECISION MAKING   Armin Terrell is a 69 year old male who presents via EMS for evaluation of  increasing dyspnea and cough that apparently became worse yesterday.  When medics arrived on scene, O2 sat were 65% on room air, improved to 98% on CPAP but patient was not tolerating the mask and so was switched to nonrebreather with sats of 85%.      I met patient on arrival given report of Hypoxia and difficulty breathing provided by medics prior to arrival.  At that time, he appeared to be in moderate to severe respiratory distress with tachypnea, tachycardia, and hypoxia with O2 sats in the 70s.  RT was called and did attempt to place patient on BiPAP but he continued to request that the mask be removed. At time of my initial assessment, patient was not able to offer much in the way of history outside of confirming that he is feeling short of breath has been coughing, and has had pain on the right side of his chest.    Records reviewed.  Patient has a history of prostate cancer, severe COPD, Chronic O2 dependence at 3 L per nasal cannula, and recent diagnosis of pneumonia.  He follows with pulmonology and was most recently seen in clinic on 5/7/2025.  He was seen here on 5/13/2025 with shortness of breath and right-sided chest pain.  Workup with CT scan did reveal right upper lobe pneumonia and advanced COPD.  Patient was given IV  antibiotics and felt comfortable with plan for discharge with prescription for Augmentin, azithromycin, prednisone.      I considered a broad differential COVID not limited to worsening pneumonia, ACS/ischemia, unstable angina, viral illness, pulmonary edema, pleural effusion, CHF, arrhythmia, anemia, sepsis/bacteremia, PE.  Orders placed for labs, EKG, verbal chest x-ray, CT PE study, DuoNeb x 2, steroids, antibiotics.    Patient was given a dose of Ativan in hopes of facilitating him wearing the BiPAP.  Unfortunately, he remained agitated, continuing to request that the mass be removed.  Additionally, he appeared to become progressively more fatigued with this, I did have concern for his ability to continue to protect his airway.  VBG returned with severe respiratory acidosis with pCO2 of 76 and pH of 7.5.  With this, I rechecked him and spoke with his wife who arrived in the ED.  I explained that the neck step if he is not able to tolerate the BiPAP would be intubation and patient did state that he was now tired enough that he would rather have a breathing tube and tried to continue tolerating the mask.    RSI was performed without difficulty.  Please see procedure note for details.  Neff catheter, OG tube, restraints were placed.  He was started on propofol and given a dose of fentanyl.    CMP without significant electrolyte derangement, acidosis, or kidney injury.  D-dimer noted to be elevated at 3.64 so we will continue with plan for CT PE study.  Lactate elevated at 2.8 and CBC notable for leukocytosis with WBC of 35.1, both consistent with infectious process and sepsis.  Will continue antibiotics to cover for pneumonia/respiratory source.  Procalcitonin elevated at 1.72, again consistent with bacterial pneumonia.  Viral swabs all negative.  EKG with sinus tachycardia but no clear ischemic changes.    Portable chest x-ray after intubation revealed a right-sided pneumothorax and with this, I did place a  pigtail catheter with return of air as well as purulent fluid.  Fortunately, this did seem to aid in ventilation and also blood pressures.  Patient was started on low-dose Levophed after pressures did trend down prior to chest tube placement.  Hopefully, will not require this long-term.    I discussed the case with intensivist who agreed to facilitate admission to the ICU.  Results of CT PE study are currently pending.    Critical care: 60 minutes excluding separately billable procedures.  Includes bedside management, time reviewing test results, review of records, discussing the case with staff, documenting the medical record and time spent with family members (or surrogate decision makers) discussing specific treatment issues.     Additional Considerations in MDM  History:  Supplemental history from: EMS  External Record(s) reviewed: 05/14/2025 - ED visit for chest pain and shortness of breath    Work Up:  Chart documentation includes differential diagnoses considered and any EKGs or imaging independently interpreted as specified above.   In additional to work up documented, I considered additional advanced imaging and laboratory workup but deferred after shared decision making conversation with patient/family    External Consultation(s):  Discussion of management with another provider as documented above and in ED course     Chronic Illness(es):  Care impacted by chronic illness(es): Cancer/Chemotherapy and Chronic Lung Disease    Disposition considerations: Admit.    MIPS: CT Pulmonary Angiogram:Patient is moderate to high risk for PE.     Sepsis/STEMI/Stroke: The patient has signs of sepsis   Sepsis ED evaluation   The patient has signs of sepsis as evidenced by:  1. Presence of 2 SIRS criteria, suspected infection, AND  2. Organ dysfunction: Initial hypotension due to sepsis, Lactic Acidosis with value >2.0 due to sepsis, and Acute respiratory or ventilatory failure with new need for positive pressure  ventilation due to sepsis    Sepsis Care Initiation: Starting at 2:10 AM on 05/18/25, until specified. Prior to this documentation, sepsis, severe sepsis, or septic shock was NOT thought to be a significant cause of illness. This order represents the first time infection was seriously considered to be affecting the patient.    Lactic Acid Results:  Recent Labs   Lab Test 05/18/25  0205 05/14/25  0515 12/04/24  1534   LACT 2.8* 0.9 1.6       3 Hour Bundle 6 Hour Bundle (Reassessment)   Blood Cultures before IV Antibiotics: Yes  Antibiotics given: see below  Prehospital fluid volume (mL):                     Total fluids given (ED +Pre-hospital):  Full 30 mL/kg bolus intentionally NOT administered to this patient due to concern for hypervolemia. The target volume to infuse in this patient is 2L.   Repeat Lactic Acid Level: Ordered by reflex for 2 hours after initial lactic acid collection.  Vasopressors: Vasopressors started for septic shock due to persistent hypotension.  Repeat perfusion exam: I attest to having performed a repeat sepsis exam and assessment of perfusion at 4:54 AM .   BMI Readings from Last 1 Encounters:   05/14/25 19.80 kg/m        Anti-infectives (From admission through now)      Start     Dose/Rate Route Frequency Ordered Stop    05/18/25 0300  azithromycin (ZITHROMAX) 500 mg in sodium chloride 0.9 % 250 mL intermittent infusion         500 mg  over 1 Hours Intravenous ONCE 05/18/25 0228      05/18/25 0230  piperacillin-tazobactam (ZOSYN) 4.5 g vial to attach to  mL bag         4.5 g  over 30 Minutes Intravenous ONCE 05/18/25 0228 05/18/25 0424                    ED COURSE  1:54 AM I met with the patient to obtain patient history and performed a physical exam. Discussed plan for ED work up including potential diagnostic studies and interventions.  2:28 AM Spoke with patient's wife.  2:35 AM Intubated patient.  3:43 AM Reassessed and updated patient with findings.  3:47 AM Spoke with   Darian intensivist.  3:49 AM Put a chest tube in patient.      MEDICATIONS GIVEN IN THE ED  Medications   sodium chloride (PF) 0.9% PF flush 3 mL (has no administration in time range)   sodium chloride (PF) 0.9% PF flush 3 mL (3 mLs Intracatheter $Given 5/18/25 0210)   azithromycin (ZITHROMAX) 500 mg in sodium chloride 0.9 % 250 mL intermittent infusion (500 mg Intravenous $New Bag 5/18/25 0422)   propofol (DIPRIVAN) infusion (75 mcg/kg/min × 66.2 kg Intravenous Rate/Dose Change 5/18/25 0418)   norepinephrine (LEVOPHED) 4 mg in  mL PERIPHERAL infusion (0.18 mcg/kg/min × 66.2 kg Intravenous Rate/Dose Change 5/18/25 0410)   fentaNYL (PF) (SUBLIMAZE) injection 50 mcg (50 mcg Intravenous $Given 5/18/25 0430)   LORazepam (ATIVAN) injection 0.5 mg (0.5 mg Intravenous $Given 5/18/25 0202)   ipratropium - albuterol 0.5 mg/2.5 mg/3 mL (DUONEB) neb solution 3 mL (3 mLs Nebulization $Given 5/18/25 0210)   ipratropium - albuterol 0.5 mg/2.5 mg/3 mL (DUONEB) neb solution 3 mL (3 mLs Nebulization $Given 5/18/25 0209)   methylPREDNISolone Na Suc (solu-MEDROL) injection 125 mg (125 mg Intravenous $Given 5/18/25 0209)   piperacillin-tazobactam (ZOSYN) 4.5 g vial to attach to  mL bag (0 g Intravenous Stopped 5/18/25 0424)   fentaNYL (PF) (SUBLIMAZE) injection 50 mcg (50 mcg Intravenous $Given 5/18/25 0303)   fentaNYL (PF) (SUBLIMAZE) injection 50 mcg (50 mcg Intravenous $Given 5/18/25 0340)   sodium chloride 0.9% BOLUS 1,000 mL (1,000 mLs Intravenous $New Bag 5/18/25 0350)       NEW PRESCRIPTIONS STARTED AT TODAY'S VISIT  New Prescriptions    No medications on file          =================================================================    HPI:    Use of : N/A     Armin Terrell is a 69 year old male who presents with respiratory distress. Patient was recently diagnosed with pneumonia last week and has been taking his antibiotics. He began feeling bad 2 days ago with shortness of breath, right sided chest  pain, and cough. Patient confirms history of COPD and feeling hot currently.    Per chart review, patient presents with chest pain and shortness of breath on 05/14/2025. Lactic acid normal at 0.9. Patient has a significant leukocytosis of 22.1. Absolute neutrophil count 18.3. No anemia. Troponin normal at 8, will trend at the 2-hour.  Viral swabs negative. No significant electrolyte abnormalities or kidney injury. VBG does not show acidosis or pCO2 elevation. CT scan shows right upper lobe pneumonia, as well as advanced COPD. Otherwise unchanged from prior study. No PE or aortic pathology. Ordered ceftriaxone and azithromycin. Re-assessed patient who is feeling like he is breathing comfortably, but still has pain at his R chest, likely caused by the pneumonia. Ordered dilaudid. Rx written for oxycodone, augmentin, azithromycin, prednisone, as I anticipate he will discharge and treat at home. CURB-65 is moderate, d/t age and BUN >19. Patient wishes to be discharged. Troponin unchanged.      RELEVANT HISTORY, MEDICATIONS, & ALLERGIES   Past medical history, surgical history, family history, medications, and allergies reviewed and pertinent noted in HPI.    REVIEW OF SYSTEMS:  A complete review of systems was performed with pertinent positives and negatives noted in the HPI.     PHYSICAL EXAM:    Vitals: /72   Pulse 104   Temp 97.7  F (36.5  C) (Axillary)   Resp 21   SpO2 96%    General: Alert and interactive.  Moderate to severe respiratory distress  HENT: Atraumatic. Full AROM of neck. MMM.  Cardiovascular: Tachycardic, regular.   Chest/Pulmonary: Increased work of breathing.  Tachypneic, coarse breath sounds bilaterally.  Speaking in 1 word phrases.  Accessory muscle usage noted  Abdomen: Soft, nondistended. Nontender without guarding or rebound.  Extremities: Normal AROM of all major joints.  No lower extremity edema.  Skin: Warm and dry. Normal skin color.   Neuro: Speech clear. CNs grossly intact. Moves  all extremities spontaneously.   Psych: Normal affect/mood, cooperative, memory appropriate.      LAB  Labs Ordered and Resulted from Time of ED Arrival to Time of ED Departure   COMPREHENSIVE METABOLIC PANEL - Abnormal       Result Value    Sodium 134 (*)     Potassium 4.8      Carbon Dioxide (CO2) 24      Anion Gap 12      Urea Nitrogen 34.3 (*)     Creatinine 1.02      GFR Estimate 80      Calcium 9.1      Chloride 98      Glucose 117 (*)     Alkaline Phosphatase 175 (*)     AST 45      ALT 39      Protein Total 7.1      Albumin 3.3 (*)     Bilirubin Total 0.8     LACTIC ACID WHOLE BLOOD WITH 1X REPEAT IN 2 HR WHEN >2 - Abnormal    Lactic Acid, Initial 2.8 (*)    BLOOD GAS VENOUS - Abnormal    pH Venous 7.15 (*)     pCO2 Venous 76 (*)     pO2 Venous 29      Bicarbonate Venous 27      Base Excess/Deficit Venous -4.6 (*)     FIO2 100      Oxyhemoglobin Venous 39 (*)     O2 Sat, Venous 39.0 (*)    PROCALCITONIN - Abnormal    Procalcitonin 1.72 (*)    D DIMER QUANTITATIVE - Abnormal    D-Dimer Quantitative 3.64 (*)    CBC WITH PLATELETS AND DIFFERENTIAL - Abnormal    WBC Count 35.1 (*)     RBC Count 5.79      Hemoglobin 16.4      Hematocrit 51.0      MCV 88      MCH 28.3      MCHC 32.2      RDW 14.7      Platelet Count 514 (*)    MANUAL DIFFERENTIAL - Abnormal    % Neutrophils 78      % Lymphocytes 12      % Monocytes 10      % Eosinophils 0      % Basophils 0      Absolute Neutrophils 27.4 (*)     Absolute Lymphocytes 4.2      Absolute Monocytes 3.5 (*)     Absolute Eosinophils 0.0      Absolute Basophils 0.0      NRBCs per 100 WBC 1      Absolute NRBCs 0.4     INFLUENZA A/B, RSV AND SARS-COV2 PCR - Normal    Influenza A PCR Negative      Influenza B PCR Negative      RSV PCR Negative      SARS CoV2 PCR Negative     RBC AND PLATELET MORPHOLOGY   LACTIC ACID WHOLE BLOOD   BLOOD CULTURE   BLOOD CULTURE       RADIOLOGY  Abdomen XR 1 vw   Final Result   IMPRESSION: A nasogastric tube terminates in the gastric body with  proximal side port just below the gastroesophageal junction. Small amount of gas seen in the stomach and some scattered bowel loops. A small to moderate size right basilar pneumothorax is    redemonstrated. Diffuse coarsening of the visualized pulmonary interstitium compatible with underlying emphysema.      XR Chest Port 1 View   Final Result   IMPRESSION: Moderate right basilar and lateral pneumothorax is new from recent prior exam. No mediastinal shift. Endotracheal tube tip approximately 3.4 cm above the isa. Endotracheal tube tip is not well visualized, but appear to extend below    diaphragm. Background of advanced emphysema. Consolidative opacity in the medial right upper lung is increased from prior, likely increasing pneumonia.      CT Chest Pulmonary Embolism w Contrast    (Results Pending)       EKG  Performed at:  05/18/2025 0206  Impression: sinus tachycardia with premature atrial complexes. Right atrial enlargement. Left axis deviation. Pulmonary disease pattern.  No acute ST elevation.  Rate: 143  Rhythm: sinus tachycardia  QRS Interval: 70  QTc Interval: 379  Comparison: when compared with ECG of 05/14/2025 0506, rate has increased    Performed at: 05/18/2025 0229  Impression: sinus tachycardia. Right atrial enlargement.  No acute ST elevation.  Rate: 144  Rhythm: sinus tachycardia  QRS Interval: 66  QTc Interval: 405  Comparison: when compared with ECG of 05/18/2025 0214, ST depression has replaced ST elevation in lateral leads, and T wave amplitude has decreased in inferior leads.    Performed at: 05/18/2025 0305  Impression: sinus tachycardia. Right atrial enlargement.   Rate: 130  Rhythm: sinus tachycardia  QRS Interval: 72  QTc Interval: 400  Comparison: when compared with ECG of 05/18/2025 0229, rate is decreased    All laboratory and imaging results and EKG's were personally reviewed and interpreted by myself prior to disposition decision.       PROCEDURES  PROCEDURE: Rapid Sequence  Intubation   INDICATIONS: Respiratory Failure   PROCEDURE PROVIDER: Dr Paula Craft   CONSENT: Risks, benefits and alternatives were discussed with and Verbal consent was obtained from Spouse.   PROCEDURE SPECIFIC CHECKLIST COMPLETED: Yes   TIME OUT: Universal protocol was followed. TIME OUT conducted just prior to starting procedure confirmed patient identity, site/side, procedure, patient position, and availability of correct equipment. Yes   MEDICATIONS: Etomidate, 20 mg, IV and Succinylcholine, 100 mg IV   TUBE DETAILS: 7.5 tube, at 23 cm at the teeth   EQUIPMENT USED: Mac 3 (curved)   POST-INTUBATION ASSESSMENT/NOTE: Difficulty of intubation:  Easy, straightforward    Post-intubation pulmonary exam:  equal and absent over the epigastrium    ET Tube placement was confirmed with:  auscultation with good, equal bilateral breath sounds, absence of breath sounds over the epigastrium, fog in the tube, colorimetric CO2 detector (good color change), and pulse oximeter readings stable or improving    Lowest oxygen saturation was 91%    Monitoring consisted of:  heart rate, cardiac monitor, continuous pulse oximeter, frequent blood pressure checks, IV access, constant attendance by RN until patient is recovered, and constant attendance by MD until patient is stable     COMPLICATIONS: Patient tolerated procedure well, without complication         PROCEDURE: Tube Thoracostomy   TYPE: Pigtail   INDICATIONS: It is medically necessary to place a chest tube for pneumothorax   PROCEDURE PROVIDER: Dr Paula Craft   CONSENT: Risks, benefits and alternatives were discussed with and Verbal consent was obtained from Spouse.   TIME OUT: Universal protocol was followed. TIME OUT conducted just prior to starting procedure confirmed patient identity, site/side, procedure, patient position, and availability of correct equipment. Yes   SITE: Right midaxillary line   MEDICATIONS 6 mLs of 1% Lidocaine without epinephrine    Patient already  intubated and sedated.   NOTE: Patient was placed in a semirecumbent position with the head of the bed at 30 degrees.  The right prepped with chlorhexidine. Patient was medicated as above. Incision was made laterally in the midaxilary line.  Blunt dissection up and over the rib was preformed until access was obtained to the pleural cavity.  A Cook catheter or 10 Turkmen chest tube was placed and connected to Pleurovac on continuous suction. Tube was sutured in place with 2-0 silk, and all connections banded.    There was moderate purulent drainage as well as fair    Post procedure CT pending     COMPLICATIONS: Patient tolerated procedure well, without complication         I, Estella Kim, am serving as a scribe to document services personally performed by Dr. Paula Craft based on my observation and the provider's statements to me. I, Paula Craft MD attest that Estella Kim is acting in a scribe capacity, has observed my performance of the services and has documented them in accordance with my direction.    Paula Craft M.D.  Emergency Medicine  [facility] EMERGENCY DEPARTMENT  [address] [place] [id number]  [phone]  Dept: [phone]     Paula Craft MD  05/18/25 3738

## 2025-05-18 NOTE — ED NOTES
Bed: [place] [address]  Expected date: 5/18/25  Expected time: 1:49 AM  Means of arrival: Ambulance  Comments:  [facility]- 69M sob, pneumonia dx 10 days ago, 65% on RA, 98% on CPAP - pt not tolerating CPAP now on NRB with O2 sats of 85%, other vss

## 2025-05-18 NOTE — PROCEDURES
[facility]    Arterial line placement    Date/Time: 5/18/2025 6:55 AM    Performed by: Messi Farmer MD  Authorized by: Messi Farmer MD      UNIVERSAL PROTOCOL   Site Marked: NA  Prior Images Obtained and Reviewed:  NA  Required items: Required blood products, implants, devices and special equipment available    Patient identity confirmed:  Arm band  Patient was reevaluated immediately before administering moderate or deep sedation or anesthesia  Confirmation Checklist:  Patient's identity using two indicators  Time out: Immediately prior to the procedure a time out was called    Universal Protocol: the Joint Commission Universal Protocol was followed    Preparation: Patient was prepped and draped in usual sterile fashion    Indication:  multiple ABGs respiratory failure hemodynamic monitoring  Location:  Right radial      PROCEDURE DETAILS  Deonte's Test Normal?: Deonte's test not abnormal  Needle Gauge:  20  Seldinger technique: Seldinger technique used    Number of Attempts:  1  Post-procedure:  Line sutured  CMS: normal    PROCEDURE    Length of time physician/provider present for 1:1 monitoring during sedation: 30

## 2025-05-18 NOTE — CONSULTS
[facility] CONSULTATION    Type of Consult: inpatient  Place of Service: [facility]   Reason for consult: Difficult capone catheterization  Request for consult by: Dr. Mayberry    History of present illness:   Armin Terrell is a 69 year old male that was admitted for Acute hypoxemia, COPD exacerbation, Pneumothorax. Urology is being consulted for Difficult catheter placement. History obtained through patient and chart review.     Family denies knowing any changes in his urinary habits. History obtained via family as is patient sedated and on a ventilator.    Patient previously established with [facility] for management of prostate cancer s/p radical prostatectomy in 2019        Past Medical History:  No past medical history on file.    Past Surgical History:  Past Surgical History:   Procedure Laterality Date    SD LAP,PROSTATECTOMY,RADICAL,W/NERVE SPARE,INCL ROBOTIC Bilateral 3/19/2019    Procedure: ROBOTIC ASSISTED RADICAL RETROPUBIC PROSTATECTOMY BILATERAL PELVIC LYMPH NODE DISSECTION, LYSIS OF ADHESIONS, REPAIR OF RIGHT INGUINAL HERNIA;  Surgeon: Candido Angelo MD;  Location: [facility] [place];  Service: Urology       Social History:  Social History     Socioeconomic History    Marital status:      Spouse name: Not on file    Number of children: Not on file    Years of education: Not on file    Highest education level: Not on file   Occupational History    Not on file   Tobacco Use    Smoking status: Former     Current packs/day: 0.00     Average packs/day: 0.8 packs/day for 30.0 years (22.5 ttl pk-yrs)     Types: Cigarettes     Start date: 1987     Quit date: 2017     Years since quittin.6     Passive exposure: Current (Wife smokes)    Smokeless tobacco: Former   Vaping Use    Vaping status: Never Used   Substance and Sexual Activity    Alcohol use: Yes     Alcohol/week: 4.0 - 5.0 standard drinks of alcohol     Comment: Alcoholic Drinks/day: drinks on  weekends.    Drug use: No    Sexual activity: Not on file   Other Topics Concern    Not on file   Social History Narrative    Not on file     Social Drivers of Health     Financial Resource Strain: Low Risk  (12/7/2024)    Financial Resource Strain     Within the past 12 months, have you or your family members you live with been unable to get utilities (heat, electricity) when it was really needed?: No   Food Insecurity: Low Risk  (12/7/2024)    Food Insecurity     Within the past 12 months, did you worry that your food would run out before you got money to buy more?: No     Within the past 12 months, did the food you bought just not last and you didn t have money to get more?: No   Transportation Needs: Low Risk  (12/7/2024)    Transportation Needs     Within the past 12 months, has lack of transportation kept you from medical appointments, getting your medicines, non-medical meetings or appointments, work, or from getting things that you need?: No   Physical Activity: Not on file   Stress: Not on file   Social Connections: Not on file   Interpersonal Safety: Low Risk  (12/6/2024)    Interpersonal Safety     Do you feel physically and emotionally safe where you currently live?: Yes     Within the past 12 months, have you been hit, slapped, kicked or otherwise physically hurt by someone?: No     Within the past 12 months, have you been humiliated or emotionally abused in other ways by your partner or ex-partner?: No   Housing Stability: Low Risk  (12/7/2024)    Housing Stability     Do you have housing? : Yes     Are you worried about losing your housing?: No       Medications:  Current Facility-Administered Medications   Medication Dose Route Frequency Provider Last Rate Last Admin    [START ON 5/19/2025] azithromycin (ZITHROMAX) 500 mg in sodium chloride 0.9 % 250 mL intermittent infusion  500 mg Intravenous Q24H Messi Farmer MD        chlorhexidine (PERIDEX) 0.12 % solution 15 mL  15 mL Mouth/Throat Q12H Darian  MD Messi        glucose gel 15-30 g  15-30 g Oral Q15 Min PRN Messi Farmer MD        Or    dextrose 50 % injection 25-50 mL  25-50 mL Intravenous Q15 Min PRN Messi Farmer MD        Or    glucagon injection 1 mg  1 mg Subcutaneous Q15 Min PRN Messi Farmer MD        enoxaparin ANTICOAGULANT (LOVENOX) injection 40 mg  40 mg Subcutaneous Q24H Messi Farmer MD        epoprostenol (VELETRI) inhalation solution  20 ng/kg/min (Ideal) Nebulization Continuous Messi Farmer MD 4.7 mL/hr at 05/18/25 0714 20 ng/kg/min at 05/18/25 0714    fentaNYL (PF) (SUBLIMAZE) injection 50 mcg  50 mcg Intravenous Q30 Min PRN Paula Craft MD   50 mcg at 05/18/25 0430    fentaNYL (SUBLIMAZE) infusion   mcg/hr Intravenous Continuous Messi Farmer MD 0.5 mL/hr at 05/18/25 0626 25 mcg/hr at 05/18/25 0626    lidocaine (LMX4) cream   Topical Q1H PRN Messi Farmer MD        lidocaine 1 % 0.1-5 mL  0.1-5 mL Other Q1H PRN Messi Farmer MD        propofol (DIPRIVAN) infusion  5-75 mcg/kg/min Intravenous Continuous Messi Farmer MD 19.9 mL/hr at 05/18/25 0611 50 mcg/kg/min at 05/18/25 0611    And    propofol (DIPRIVAN) bolus from bag or syringe pump  10 mg Intravenous Q15 Min PRN Messi Farmer MD        And    Medication Instruction   Does not apply Continuous PRN Messi Farmer MD        [START ON 5/19/2025] methylPREDNISolone sodium succinate (SOLU-MEDROL) injection 60 mg  60 mg Intravenous Q24H Messi Farmer MD        naloxone (NARCAN) injection 0.2 mg  0.2 mg Intravenous Q2 Min PRN Messi Farmer MD        Or    naloxone (NARCAN) injection 0.4 mg  0.4 mg Intravenous Q2 Min PRN Messi Farmer MD        Or    naloxone (NARCAN) injection 0.2 mg  0.2 mg Intramuscular Q2 Min PRN Messi Farmer MD        Or    naloxone (NARCAN) injection 0.4 mg  0.4 mg Intramuscular Q2 Min PRN Messi Farmer MD        norepinephrine (LEVOPHED) 4 mg in  mL PERIPHERAL infusion  0.01-0.2 mcg/kg/min Intravenous Continuous Paula Craft MD 29.8 mL/hr at 05/18/25  "0631 0.12 mcg/kg/min at 05/18/25 0631    pantoprazole (PROTONIX) 2 mg/mL suspension 40 mg  40 mg Per Feeding Tube QAM Messi Burt MD        Or    pantoprazole (PROTONIX) IV push injection 40 mg  40 mg Intravenous QAM Messi Burt MD        piperacillin-tazobactam (ZOSYN) 3.375 g vial to attach to  mL bag  3.375 g Intravenous Q6H Messi Farmer MD        sodium chloride (PF) 0.9% PF flush 10-40 mL  10-40 mL Intracatheter Once PRN Messi Farmer MD        sodium chloride (PF) 0.9% PF flush 3 mL  3 mL Intracatheter q1 min prn Paula Craft MD        sodium chloride (PF) 0.9% PF flush 3 mL  3 mL Intracatheter Q8H Paula Craft MD   3 mL at 05/18/25 0210       Allergies:   No Known Allergies    Review of Systems:   A full 12 point review of systems was taken and is negative aside from what is noted above in the HPI    Physical Exam:  Temp:  [97.7  F (36.5  C)-98.2  F (36.8  C)] 98.2  F (36.8  C)  Pulse:  [] 81  Resp:  [4-36] 16  BP: ()/() 96/63  FiO2 (%):  [80 %-100 %] 80 %  SpO2:  [82 %-97 %] 97 %  General: NAD, alert, cooperative  Head: normocephalic, without abnormality / atraumatic  Abdomen: soft, not tender, no distended. no suprapubic fullness/tenderness. no CVA tenderness noted  Geniturinary: circumcised. Bilateral descended testes  Skin: no rashes or lesions  Musculoskeletal: no calf edema or tenderness  Psychological: alert and oriented, answers questions appropriately      Labs:   Creatinine   Date Value Ref Range Status   05/18/2025 1.02 0.67 - 1.17 mg/dL Final       Lab Results   Component Value Date    WBC 35.1 05/18/2025     Lab Results   Component Value Date    HGB 16.4 05/18/2025     Lab Results   Component Value Date     05/18/2025       UA RESULTS:  No results for input(s): \"COLOR\", \"APPEARANCE\", \"URINEGLC\", \"URINEBILI\", \"URINEKETONE\", \"SG\", \"UBLD\", \"URINEPH\", \"PROTEIN\", \"UROBILINOGEN\", \"NITRITE\", \"LEUKEST\", \"RBCU\", \"WBCU\" in the last 72232 hours.      Urine " culture: pending UA results    Lab Results: personally reviewed     Imaging:  XR Chest Port 1 View  Result Date: 5/18/2025  EXAM: XR CHEST PORT 1 VIEW LOCATION: [facility] DATE: 5/18/2025 INDICATION: Follow up on ptx COMPARISON: Same day CT and radiograph.     IMPRESSION: Pigtail chest tube at the right lower chest. Decreased size of the left basilar pneumothorax compared to comparison radiograph. The pneumothorax did appear larger on the comparison CT after chest tube placement earlier this morning. Small apical component of pneumothorax measuring up to 1.7 cm. Right midlung consolidation is stable. Stable streaky left mid and lower lung opacities. No substantial pleural effusion.    CT Chest Pulmonary Embolism w Contrast  Result Date: 5/18/2025  EXAM: CT CHEST PULMONARY EMBOLISM W CONTRAST LOCATION: [facility] DATE: 5/18/2025 INDICATION: dyspnea, hypoxia COMPARISON: Same day radiograph. Chest CTA 05/14/2025 TECHNIQUE: CT chest pulmonary angiogram during arterial phase injection of IV contrast. Multiplanar reformats and MIP reconstructions were performed. Dose reduction techniques were used. CONTRAST: 90ml isovue 370 FINDINGS: ANGIOGRAM CHEST: Pulmonary arteries are normal caliber and negative for pulmonary emboli. Thoracic aorta is negative for dissection. Aortic atherosclerosis, mild. Small pericardial effusion. LUNGS AND PLEURA: Large right pneumothorax is most pronounced at the anterior lung base. Small apical component. Right pigtail chest tube terminates along the major fissure posteriorly. Background of severe emphysema. Right upper lobe consolidation increased from prior CT, likely progressive pneumonia. Right lower lobe mostly collapsed. Partial collapse of the right middle lobe. Curvilinear bands of atelectasis in the left upper lower lobes. Left upper lobe nodule measuring 1.6 x 1.2 cm on image 152 of series 7 is stable from recent prior exam, but was not  seen on prior CT one year ago. Endotracheal tube in the trachea. MEDIASTINUM/AXILLAE: Grossly stable enlarged right hilar lymph node. CORONARY ARTERY CALCIFICATION: None. UPPER ABDOMEN: No acute findings. MUSCULOSKELETAL: Right lateral chest wall subcutaneous emphysema..     IMPRESSION: 1.  Large right pneumothorax, most pronounced at the anterior lung base. Small apical component. Right pigtail chest tube terminates along the major fissure posteriorly. 2.  Right lower lobe mostly collapsed. Partial collapse of the right middle lobe. 3.  Right upper lobe consolidation increased from prior CT, likely progressive pneumonia. 4.  Severe emphysema. 5.  Left upper lobe spiculated pulmonary nodule measuring 1.6 cm is new from prior CT one year ago, suspicious for malignancy until proven otherwise. 6.  No evidence of pulmonary embolism.    Abdomen XR 1 vw  Result Date: 5/18/2025  EXAM: XR ABDOMEN 1 VIEW LOCATION: [facility] DATE: 5/18/2025 INDICATION: OG placement COMPARISON: Chest x-ray 5/18/2025     IMPRESSION: A nasogastric tube terminates in the gastric body with proximal side port just below the gastroesophageal junction. Small amount of gas seen in the stomach and some scattered bowel loops. A small to moderate size right basilar pneumothorax is  redemonstrated. Diffuse coarsening of the visualized pulmonary interstitium compatible with underlying emphysema.    XR Chest Port 1 View  Result Date: 5/18/2025  EXAM: XR CHEST PORT 1 VIEW LOCATION: [facility] DATE: 5/18/2025 INDICATION: dyspnea, hypoxia COMPARISON: CT 05/14/2025. Radiograph 12/04/2024     IMPRESSION: Moderate right basilar and lateral pneumothorax is new from recent prior exam. No mediastinal shift. Endotracheal tube tip approximately 3.4 cm above the isa. Endotracheal tube tip is not well visualized, but appear to extend below diaphragm. Background of advanced emphysema. Consolidative opacity in the  medial right upper lung is increased from prior, likely increasing pneumonia.    CT Chest Pulmonary Embolism w Contrast  Result Date: 5/14/2025  EXAM: CT CHEST PULMONARY EMBOLISM W CONTRAST LOCATION: [facility] DATE: 5/14/2025 INDICATION: R sided chest pain, SOB COMPARISON: 5/5/2025. TECHNIQUE: CT chest pulmonary angiogram during arterial phase injection of IV contrast. Multiplanar reformats and MIP reconstructions were performed. Dose reduction techniques were used. CONTRAST: ISOVUE 370 70ML FINDINGS: ANGIOGRAM CHEST: Pulmonary arteries are normal caliber and negative for pulmonary emboli. Thoracic aorta is negative for dissection. LUNGS AND PLEURA: Severe emphysematous disease. There is new right upper lobe infiltrate. Increased atelectasis at the lung bases. 1.7 x 1.4 cm subpleural nodule in the left upper lobe laterally without significant change. No pneumothorax or pleural effusion. MEDIASTINUM/AXILLAE: Mildly enlarged left superior hilar lymph node measuring 1.9 x 1.8 cm. The heart size is normal. Small pericardial effusion. CORONARY ARTERY CALCIFICATION: None. UPPER ABDOMEN: No acute upper abdominal abnormality. MUSCULOSKELETAL: Mild degenerative disease in the spine.     IMPRESSION: 1.  There is no pulmonary embolus, aortic aneurysm or dissection. 2.  Right upper lobe pneumonia. 3.  Mildly enlarged right hilar lymph node may be reactive. 4.  Advanced emphysema. 5.  No significant change in a 1.7 cm left upper lobe nodule or nodular infiltrate. Follow-up recommended.    CT Chest w/o Contrast  Result Date: 5/6/2025  EXAM: CT CHEST W/O CONTRAST LOCATION: [facility] DATE: 5/5/2025 INDICATION:  Pulmonary nodules, Pulmonary infiltrates COMPARISON: 2/24/2025 TECHNIQUE: CT chest without IV contrast. Multiplanar reformats were obtained. Dose reduction techniques were used. CONTRAST: None. FINDINGS: LUNGS AND PLEURA: Advanced changes of centrilobular emphysema are  redemonstrated. Residual probable inflammatory scarring/fibrosis redemonstrated, greatest in the lingula and left lower lobe. There is a new area of spiculated parenchymal opacity within the periphery of the right lower lobe measuring up to 3.7 cm in size, image 233, series 3. Persistent bibasilar bronchial wall thickening. Tiny right lower lobe nodules seen previously are not clearly appreciated today. No pleural effusions or pneumothorax. MEDIASTINUM/AXILLAE: Normal heart size. Anterior pericardial effusion is unchanged. Ectasia of the aortic root measures up to 3.7 cm, unchanged. Borderline enlarged mediastinal nodes are unchanged. CORONARY ARTERY CALCIFICATION: Mild. UPPER ABDOMEN: No acute abnormalities MUSCULOSKELETAL: No acute bony abnormalities.     IMPRESSION: 1.  Advanced changes of centrilobular emphysema and chronic bibasilar posterior plantar scarring or fibrosis again noted. 2.  New right lower lobe spiculated parenchymal opacity favored to represent recurrent pneumonia or aspiration. Clinical correlation and follow-up advised. 3.  Additional stable findings as above.       I have personally reviewed the imaging reports above.     Assessment / Plan : Armin Terrell is being seen by [facility] for Difficult capone catheter placement.    - Patient is a 69 year old male admitted for COPD exacerbation, pneumothorax, urology consulted for difficult catheterization.    - UA ordered and pending.  - Cr of 1.02. appears at baseline.   - Nursing unable to place capone catheter.  - Duration of capone per attending provider. Recommend TOV upon removal. Notify urology if TOV is unsuccessful for outpatient follow-up.   - Old records reviewed - stefano     Thank you for consulting [facility] regarding this patient's care. Please contact us with questions or concerns.     Houston Huang PA-C  [facility]   [phone]      [facility] - CATHETER PLACEMENT PROCEDURE      Procedure:  The  patient's urethra was prepped with Betadine solution. Lubrication was instilled into the urethra meatus and a 16 Fr coude capone catheter was  inserted with minimal difficulty due to possible stricture. The catheter successfully reached the bladder and the catheter balloon was inflated with 10 cc of sterile saline. Yellow colored urine returned. The patient tolerated the procedure with minimal discomfort.       Plan for catheter: Per attending provider. Recommend TOV upon removal.

## 2025-05-18 NOTE — PROGRESS NOTES
Patient placed on BIPAP 12/6, 100% FiO2  due to hypoxia, and increase work of breathing. Pt received Duoneb x 2, BS diminished with scatter course crackles . RT will follow and wean FiO2 as tolerated.

## 2025-05-18 NOTE — PHARMACY-ADMISSION MEDICATION HISTORY
Pharmacy Intern Admission Medication History    Admission medication history is complete. The information provided in this note is only as accurate as the sources available at the time of the update.    Information Source(s): Family member and CareEverywhere/SureScripts via phone    Pertinent Information: Spouse was able to cite what the patient was taking and when they last took it. Patient was adherent to oral azithromycin (1 dose left), Augmentin (6 doses left), and prednisone (1 dose (2 tabs) left) for pneumonia & COPD exacerbation (discharged on 5/14).     Changes made to PTA medication list:  Added: Advil  Deleted: None  Changed: Mucinex to Mucinex DM    Allergies reviewed with patient and updates made in EHR: yes    Medication History Completed By: LOUANN CARRILLO 5/18/2025 7:31 AM    PTA Med List   Medication Sig Last Dose/Taking    acetaminophen (TYLENOL) 500 MG tablet Take 500-1,000 mg by mouth every 4 hours as needed for mild pain. Taking As Needed    albuterol (PROAIR HFA/PROVENTIL HFA/VENTOLIN HFA) 108 (90 Base) MCG/ACT inhaler Inhale 1-2 puffs into the lungs every 4 hours as needed for shortness of breath, wheezing or cough. Taking As Needed    amoxicillin-clavulanate (AUGMENTIN) 875-125 MG tablet Take 1 tablet by mouth 2 times daily. 5/17/2025 Evening    azithromycin (ZITHROMAX Z-ROSEANNE) 250 MG tablet Take 1 tablet (250 mg) by mouth daily. First dose (500mg) given in ER on 5/14 AM 5/17/2025 Morning    dextromethorphan-guaiFENesin (MUCINEX DM)  MG per 12 hr tablet Take 1 tablet by mouth every 12 hours. 5/17/2025 Evening    Fluticasone-Umeclidin-Vilanterol (TRELEGY ELLIPTA) 200-62.5-25 MCG/ACT oral inhaler Inhale 1 puff into the lungs daily. 5/17/2025 Morning    ibuprofen (ADVIL/MOTRIN) 200 MG tablet Take 200 mg by mouth every 4 hours as needed for pain. Taking As Needed    ipratropium - albuterol 0.5 mg/2.5 mg/3 mL (DUONEB) 0.5-2.5 (3) MG/3ML neb solution Take 1 vial (3 mLs) by nebulization every 6  hours as needed for shortness of breath, wheezing or cough. 5/17/2025 Evening    omeprazole (PRILOSEC) 20 MG DR capsule Take 20 mg by mouth daily as needed. Taking As Needed    oxyCODONE (ROXICODONE) 5 MG tablet Take 1 tablet (5 mg) by mouth every 4 hours as needed for breakthrough pain. If pain is not improved with acetaminophen and ibuprofen. 5/17/2025 Evening    predniSONE (DELTASONE) 20 MG tablet Take two tablets (= 40mg) each day for 5 (five) days 5/17/2025 Morning    simethicone (MYLICON) 80 MG chewable tablet Take 80 mg by mouth 4 times daily as needed for flatulence. Taking As Needed

## 2025-05-18 NOTE — H&P
ICU H&P Note (05/18/2025)     Date of Hospital Admission: 5/18/2025  Date of ICU Admission: 5/18/2025  Code Status: Full Code    Reason for Visit  Acute hypoxemic respiratory failure. Acute COPD exacerbation. Pneumothorax.    HPI  69 year old male who presents via EMS for evaluation of  increasing dyspnea and cough that apparently became worse yesterday.  When medics arrived on scene, O2 sat were 65% on room air, improved to 98% on CPAP but patient was not tolerating the mask and so was switched to nonrebreather with sats of 85%.      In the emergency department, the patient was noted to be hypoxemic with significant respiratory acidosis.  Treatment with BiPAP was attempted.  However, the patient was not able to tolerate BiPAP and he was quite agitated.  Ultimately he was intubated.  Postintubation chest x-ray was concerning for pneumothorax on the right side and a chest tube was placed.  He had a CT scan afterwards that confirmed the chest tube in the medial right fissure.  However he still had a large pneumothorax anteriorly.    Past medical history is notable for COPD, severe emphysema, alpha-1 antitrypsin deficiency, prostatic CA s/p radical prostatectomy and pulmonary nodules. He also has chronic O2 dependence at 3 L per nasal cannula     CT Scan 5/18  1.  Large right pneumothorax, most pronounced at the anterior lung base. Small apical component. Right pigtail chest tube terminates along the major fissure posteriorly.   2.  Right lower lobe mostly collapsed. Partial collapse of the right middle lobe.   3.  Right upper lobe consolidation increased from prior CT, likely progressive pneumonia.   4.  Severe emphysema.   5.  Left upper lobe spiculated pulmonary nodule measuring 1.6 cm is new from prior CT one year ago, suspicious for malignancy until proven otherwise.  6.  No evidence of pulmonary embolism.    Subjective  He arrives to the intensive care unit intubated on mechanical ventilation.  Chest tube was  attached to suction with significant air leak.  He is hypotensive on Levophed.    Objective   Vital Signs  Blood pressure (!) 132/91, pulse 85, temperature 97.7  F (36.5  C), temperature source Axillary, resp. rate 21, SpO2 97%.  No intake/output data recorded.  FiO2 (%): 80 %, Resp: 21, Vent Mode: VC/AC, Resp Rate (Set): 22 breaths/min, Tidal Volume (Set, mL): 550 mL, PEEP (cm H2O): 8 cmH2O, Resp Rate (Set): 22 breaths/min, Tidal Volume (Set, mL): 550 mL, PEEP (cm H2O): 8 cmH2O    Physical Exam   General:  Ill looking, intubated  Head:  normocephalic, atraumatic    Eyes: conjunctiva clear, PERRL.   Throat:  Orally intubated. No erythema, exudates or lesions.   Neck:  Supple, no masses  Heart:  RRR, no murmur   Lungs:  Coarse bilaterally. .   Abdomen:  Soft, NT/ND, no HSM, no masses.   Extremities: No deformities, clubbing, cyanosis, or edema.   Neurologic: Intubated, and sedated, moving all extremities. No focal deficits.     Diagnostic Data  The following portions of the patient's chart were reviewed: current medications, problem list, laboratory workup, and diagnostic data.    Most Recent Pertinent Labs  Lab Results   Component Value Date    WBC 35.1 (H) 05/18/2025    HGB 16.4 05/18/2025    HCT 51.0 05/18/2025     (H) 05/18/2025     (L) 05/18/2025    POTASSIUM 4.8 05/18/2025    CHLORIDE 98 05/18/2025    CO2 24 05/18/2025    BUN 34.3 (H) 05/18/2025    CR 1.02 05/18/2025     (H) 05/18/2025    DD 3.64 (H) 05/18/2025    NTBNPI 227 12/04/2024    AST 45 05/18/2025    ALT 39 05/18/2025    ALKPHOS 175 (H) 05/18/2025     Infusion Medications  Current Facility-Administered Medications   Medication Dose Route Frequency Provider Last Rate Last Admin    propofol (DIPRIVAN) infusion  5-75 mcg/kg/min Intravenous Continuous Messi Farmer MD        And    Medication Instruction   Does not apply Continuous PRN Messi Farmer MD        norepinephrine (LEVOPHED) 4 mg in  mL PERIPHERAL infusion  0.01-0.2  mcg/kg/min Intravenous Continuous Paula Craft MD 39.7 mL/hr at 05/18/25 0539 0.16 mcg/kg/min at 05/18/25 0539     Scheduled Medications  Current Facility-Administered Medications   Medication Dose Route Frequency Provider Last Rate Last Admin    [START ON 5/19/2025] azithromycin (ZITHROMAX) 500 mg in sodium chloride 0.9 % 250 mL intermittent infusion  500 mg Intravenous Q24H Messi Farmer MD        chlorhexidine (PERIDEX) 0.12 % solution 15 mL  15 mL Mouth/Throat Q12H Messi Farmer MD        enoxaparin ANTICOAGULANT (LOVENOX) injection 40 mg  40 mg Subcutaneous Q24H Messi Farmer MD        [START ON 5/19/2025] methylPREDNISolone Na Suc (solu-MEDROL) injection 62.5 mg  62.5 mg Intravenous Q24H Messi aFrmer MD        pantoprazole (PROTONIX) 2 mg/mL suspension 40 mg  40 mg Per Feeding Tube QAM Messi Burt MD        Or    pantoprazole (PROTONIX) IV push injection 40 mg  40 mg Intravenous QAM Messi Burt MD        piperacillin-tazobactam (ZOSYN) 3.375 g vial to attach to  mL bag  3.375 g Intravenous Q6H Messi Farmer MD        sodium chloride (PF) 0.9% PF flush 3 mL  3 mL Intracatheter Q8H Paula Craft MD   3 mL at 05/18/25 0210        Assessment & Plan   Active Problems:    Shortness of breath    Pneumothorax on right    Acute respiratory failure with hypoxia and hypercapnia (H)    Pneumonia due to infectious organism, unspecified laterality, unspecified part of lung    Sepsis, due to unspecified organism, unspecified whether acute organ dysfunction present (H)      Neurological:  # Sedation and analgesia  The patient is now intubated and sedated.  Will utilize propofol and fentanyl for sedation and analgesia respectively.  Given that he has auto PEEP/air trapping I would like to keep my RASS goal of -3 to -4  in order to ensure that the patient is synchronous and not overbreathing the vent.    Cardiovascular:    # Septic shock  The patient has shock which is partly related to being on sedation.  I  performed a bedside ultrasound.  The patient LV systolic function appeared fairly preserved.  LVOT VTI was 18 cm.  His RV was mildly dilated with RV wall hypertrophy indicating chronic RV dysfunction, pulmonary hypertension.  - Levophed.  Titrate for MAP of 65.  - Started Veletri given concern for pulmonary hypertension and RV dysfunction.  - Continue cardiac monitoring.    Pulmonary:    # Acute hypoxemic and hyper capneic respiratory failure  # Right upper lobe pneumonia  # Right pneumothorax  # COPD exacerbation  # Severe COPD. On home oxygen  The patient was intubated and was initiated on mechanical ventilation in the emergency department.  Postintubation he had a right pneumothorax and a chest tube was inserted in the emergency department.    On arrival to the ICU, the patient's respirate was 22 and he has significant air trapping and an auto PEEP of 5.  I lowered his respiratory rate to 16 and his auto PEEP significantly improved.  His pH is acceptable and we should allow for permissive hypercapnia in order to avoid significant air trapping/auto PEEP.    Continue chest tube to wall suction.  Implement lung protective ventilation strategies.  Daily chest x-ray.    With regards to COPD exacerbation, the patient will be on steroids.  Inhaled DuoNebs and steroids.    Infectious Disease:  # Sepsis  # Community acquired pneumonia  The patient has evidence of right upper lobe pneumonia.  Influenza, RSV, and COVID-19 screens were negative.  Treat the patient empirically with azithromycin and Zosyn.  Obtain sputum cultures if able.    Renal:   # Lactic acidosis  # History of prostate cancer. Unable to place a capone catheter  His lactate normalized.  Will continue to monitor his kidney function and electrolytes.  Obtain a neurological consultation as we attempted twice to place a Capone catheter but we were unsuccessful.    GI:  # NPO status  Protonix for GI prophylaxis.    Endocrine:  # At risk of hyperglycemia  Glucose  monitoring every 4 hours.    Hematological:  # Leukocytosis  Leukocytosis due to infection.  Lovenox for DVT prophylaxis.    Social:    Next of kin is his wife.  I met with her in person to give an update with regards to the patient's clinical condition and plan of care.  I got her consent for an arterial line placement.    I also discussed with her the patient's goals of care.  She indicated that he does not have formal advanced directives.  She notes that his health has declined over the course of the last year.  She confirms full CODE STATUS.  However she indicates that he would not want to be tethered to machines permanently.    Critical care time excluding any procedural time is 80 minutes.    Messi Farmer MD  Pager [phone]  [place] ([email or web address])   Vocera Web Console ([email or web address])

## 2025-05-18 NOTE — PROGRESS NOTES
PULMONARY / CRITICAL CARE PROGRESS NOTE    Date / Time of Admission:  5/18/2025  1:55 AM    Assessment:   Armin Terrell is a 69 year old male with history of chronic respiratory failure on home O2, 3LPM, COPD (FEV1 1.2 L 33%), severe emphysema, alpha-1 antitrypsin deficiency, decline treatment, lung nodules, prostate cancer status post radical prostatectomy.   Patient was recently seen in the ED on May 14, 2025, for evaluation of shortness of breath and right-sided chest pain, chest CT scan showed no pulmonary embolism right upper lobe infiltrate, enlarging right hilar adenopathy unchanged 1.7 cm left upper lobe nodule, labs show elevated white blood cell count negative respiratory viral PCR patient was treated for with systemic steroids and antibiotics.   Patient was brought to ED by EMS on May 18, 2021 for worsening shortness of breath generalized weakness.  Patient was in severe respiratory distress started on BiPAP therapy and later intubated.   Chest x-ray show right-sided pneumothorax, patient underwent chest tube placement.  Follow-up chest CT scan show large right-sided pneumothorax chest x-ray show right-sided pneumothorax, right pigtail chest tube along the major fissure, collapsed right lower lobe and right middle lobe consolidation of the right upper lobe unchanged left upper lobe nodule.   Patient was hypotensive , started on NE drip.   Patient was admitted to the ICU for further care.     Acute on chronic hypoxic hypercapnic respiratory failure s/p intubation 5/18  Secondary to COPD exacerbation, pneumonia, pneumothorax.   Right-sided pneumothorax status post chest tube placement 5/18  Right-sided pneumothorax secondary to barotrauma after patient was intubated and placed on mechanical ventilation, high tidal volumes.  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  Case was discussed with radiology tip of chest tube is located in the  fissure.   Plan to decrease tidal volumes, decrease PEEP.   Follow up CXR, if worsening pneumothorax plan for new chest tube placement.   COPD exacerbation  Systemic steroids, schedule bronchodilators  Pneumonia  Patient was recently started on Augmentin for right upper lobe pneumonia.   Admission chest x-ray showed persistent infiltrate, worsening leukocytosis.  Get respiratory tracheal culture, respiratory panel PCR, continue broad antibiotics azithromycin, Zosyn, vancomycin.   1.7 cm Left upper lobe nodule   Further evaluation as outpatient.     Advance Directives:  Full code    Plan:   Titrate vent settings to keep FiO2 more than 88% and plateau pressure less than 30 , currently on A/C 16/450/5/60%  Wean off epoprsotenol  Sedation with propofol and fentanyl drip, to keep RASS -2  Scheduled bronchodilators  Chest tube connected to suction  Follow-up chest x-ray in a.m. or sooner if decompensation  Hep-Lock IV fluids  Pressors as needed to keep MAP above 65  IV steroids  Induce tracheal respiratory culture  Broad antibiotics Zosyn, vancomycin, azithromycin  Monitor kidney function  Supplement electrolytes as needed  N.p.o.  PPI IV for GI prophylaxis  Glucose level monitoring  DVT prophylaxis Lovenox subcutaneous     Case was discussed with radiology.   Please contact me if you have any questions.    Additional critical care time 35 minutes  This patient had a high probability of imminent or life threatening deterioration due to acute respiratory failure which required my direct attention, intervention and personal management.     Nuno Duque  Pulmonary / Critical Care  05/18/2025  10:53 AM          ICU DAILY CHECKLIST                           Can patient transfer out of MICU? no    FAST HUG:    Feeding:  Feeding: no.  Patient is receiving NPO    Neff: yes  Analgesia/Sedation:yes  propofol and fentanyl   Thromboembolic prophylaxis: Yes; Mode:  Lovenox and SCDs  HOB>30:  Yes  Stress Ulcer Protocol  Active: Yes; Mode: PPI  Glycemic Control: Any glucose > 180 no; Mode of Insulin Therapy: Sliding Scale Insulin    INTUBATED:  Can patient have daily waking:  No  Can patient have spontaneous breathing trial:  No    Restraints? yes    PHYSICAL THERAPY AND MOBILITY:  Can patient have PT and mobility trial: no  Activity: bedrest    Subjective:   HPI:  Armin Terrell is a 69 year old male with history of chronic respiratory failure on home O2, 3LPM, COPD (FEV1 1.2 L 33%), severe emphysema, alpha-1 antitrypsin deficiency, decline treatment, lung nodules, prostate cancer status post radical prostatectomy.   Patient was recently seen in the ED on May 14, 2025, for evaluation of shortness of breath and right-sided chest pain, chest CT scan showed no pulmonary embolism right upper lobe infiltrate, enlarging right hilar adenopathy unchanged 1.7 cm left upper lobe nodule, labs show elevated white blood cell count negative respiratory viral PCR patient was treated for with systemic steroids and antibiotics.   Patient was brought to ED by EMS on May 18, 2021 for worsening shortness of breath generalized weakness.  Patient was in severe respiratory distress started on BiPAP therapy and later intubated.   Chest x-ray show right-sided pneumothorax, patient underwent chest tube placement.  Follow-up chest CT scan show large right-sided pneumothorax chest x-ray show right-sided pneumothorax, right pigtail chest tube along the major fissure, collapsed right lower lobe and right middle lobe consolidation of the right upper lobe unchanged left upper lobe nodule.   Patient was hypotensive , started on NE drip.   Patient was admitted to the ICU for further care.     Events since admission  - Weaning down pressors  - Ongoing air leak from chest tube  - Decrease FiO2 needs      Allergies: Patient has no known allergies.     MEDS:  Current Facility-Administered Medications   Medication Dose Route Frequency Provider Last Rate Last Admin     [START ON 5/19/2025] azithromycin (ZITHROMAX) 500 mg in sodium chloride 0.9 % 250 mL intermittent infusion  500 mg Intravenous Q24H Messi Farmer MD        chlorhexidine (PERIDEX) 0.12 % solution 15 mL  15 mL Mouth/Throat Q12H Messi Farmer MD   15 mL at 05/18/25 0846    glucose gel 15-30 g  15-30 g Oral Q15 Min PRN Messi Farmer MD        Or    dextrose 50 % injection 25-50 mL  25-50 mL Intravenous Q15 Min PRN Messi Farmer MD        Or    glucagon injection 1 mg  1 mg Subcutaneous Q15 Min PRN Messi Farmer MD        enoxaparin ANTICOAGULANT (LOVENOX) injection 40 mg  40 mg Subcutaneous Q24H Messi Farmer MD   40 mg at 05/18/25 0846    epoprostenol (VELETRI) inhalation solution  10 ng/kg/min (Ideal) Nebulization Continuous Zenellyos Nuno Duque MD 4.7 mL/hr at 05/18/25 0714 20 ng/kg/min at 05/18/25 0714    fentaNYL (PF) (SUBLIMAZE) injection 50 mcg  50 mcg Intravenous Q30 Min PRN Paula Craft MD   50 mcg at 05/18/25 0430    fentaNYL (SUBLIMAZE) infusion   mcg/hr Intravenous Continuous Messi Farmer MD 0.5 mL/hr at 05/18/25 1000 25 mcg/hr at 05/18/25 1000    lidocaine (LMX4) cream   Topical Q1H PRN Messi Farmer MD        lidocaine 1 % 0.1-5 mL  0.1-5 mL Other Q1H PRN Messi Farmer MD   1 mL at 05/18/25 0806    propofol (DIPRIVAN) infusion  5-75 mcg/kg/min Intravenous Continuous Messi Farmer MD 19.9 mL/hr at 05/18/25 1000 50 mcg/kg/min at 05/18/25 1000    And    propofol (DIPRIVAN) bolus from bag or syringe pump  10 mg Intravenous Q15 Min PRN Messi Farmer MD        And    Medication Instruction   Does not apply Continuous PRN Messi Farmer MD        [START ON 5/19/2025] methylPREDNISolone sodium succinate (SOLU-MEDROL) injection 60 mg  60 mg Intravenous Q24H Messi Farmer MD        naloxone (NARCAN) injection 0.2 mg  0.2 mg Intravenous Q2 Min PRN Messi Farmer MD        Or    naloxone (NARCAN) injection 0.4 mg  0.4 mg Intravenous Q2 Min PRN Messi Farmer MD        Or    naloxone (NARCAN) injection  0.2 mg  0.2 mg Intramuscular Q2 Min PRN Messi Farmer MD        Or    naloxone (NARCAN) injection 0.4 mg  0.4 mg Intramuscular Q2 Min PRN Messi Farmer MD        norepinephrine (LEVOPHED) 4 mg in  mL PERIPHERAL infusion  0.01-0.2 mcg/kg/min Intravenous Continuous Paula Craft MD 24.8 mL/hr at 05/18/25 1000 0.1 mcg/kg/min at 05/18/25 1000    pantoprazole (PROTONIX) 2 mg/mL suspension 40 mg  40 mg Per Feeding Tube QAM AC Messi Farmer MD        Or    pantoprazole (PROTONIX) IV push injection 40 mg  40 mg Intravenous QAM Messi Burt MD   40 mg at 05/18/25 0902    piperacillin-tazobactam (ZOSYN) 3.375 g vial to attach to  mL bag  3.375 g Intravenous Q6H Messi Farmer MD   3.375 g at 05/18/25 0903    sodium chloride (PF) 0.9% PF flush 10-20 mL  10-20 mL Intracatheter q1 min prn Nuno Amaro MD        sodium chloride (PF) 0.9% PF flush 10-40 mL  10-40 mL Intracatheter Once PRN Messi Farmer MD        sodium chloride (PF) 0.9% PF flush 10-40 mL  10-40 mL Intracatheter Q7 Days Nuno Amaro MD        sodium chloride (PF) 0.9% PF flush 10-40 mL  10-40 mL Intracatheter Daily Nuno Amaro MD   10 mL at 05/18/25 0903    sodium chloride (PF) 0.9% PF flush 3 mL  3 mL Intracatheter q1 min prn Paula Craft MD        sodium chloride (PF) 0.9% PF flush 3 mL  3 mL Intracatheter Q8H Paula Craft MD   3 mL at 05/18/25 0210         Objective:   VITALS:  BP 96/63   Pulse 76   Temp 97.6  F (36.4  C) (Axillary)   Resp 16   Wt 67 kg (147 lb 11.2 oz)   SpO2 93%   BMI 20.03 kg/m    VENT:  FiO2 (%): 80 %, Resp: 16, Vent Mode: VC/AC, Resp Rate (Set): 16 breaths/min, Tidal Volume (Set, mL): (S) 440 mL, PEEP (cm H2O): (S) 5 cmH2O, Resp Rate (Set): 16 breaths/min, Tidal Volume (Set, mL): (S) 440 mL, PEEP (cm H2O): (S) 5 cmH2O  EXAM:   Gen: sedated, vented  HEENT: pale conjunctiva, moist mucosa  Neck: no thyromegaly, masses or JVD  Chest: right side chest tube  Lungs:  decrease breath sounds both HT, ronchi  CV: regular, no murmurs or gallops appreciated  Abdomen: soft, NT, BS wnl  Ext: no edema  Neuro: sedated, vented      Data Review:  Recent Labs   Lab 05/18/25  0710 05/18/25  0205 05/14/25  0515   * 117* 90      05/18/25 02:05 05/18/25 05:50   Sodium 134 (L)    Potassium 4.8    Chloride 98    Carbon Dioxide (CO2) 24    Urea Nitrogen 34.3 (H)    Creatinine 1.02    GFR Estimate 80    Calcium 9.1    Anion Gap 12    Albumin 3.3 (L)    Protein Total 7.1    Alkaline Phosphatase 175 (H)    ALT 39    AST 45    Bilirubin Total 0.8    Glucose 117 (H)    Lactic Acid 2.8 (H) 1.6   Procalcitonin 1.72 (H)       05/14/25 05:15 05/18/25 02:05   WBC 22.1 (H) 35.1 (H) (P)   Hemoglobin 16.8 16.4 (P)   Hematocrit 49.5 51.0 (P)   Platelet Count 296 514 (H) (P)   RBC Count 5.71 5.79 (P)   MCV 87 88 (P)   MCH 29.4 28.3 (P)   MCHC 33.9 32.2 (P)   RDW 14.4 14.7 (P)   % Neutrophils 83 78 (P)   % Lymphocytes 7 12 (P)   % Monocytes 8 10 (P)   % Eosinophils 1 0 (P)   % Basophils 0 0 (P)   % Immature Granulocytes 1    NRBC/W 0 1 (P)   Absolute Neutrophil 18.3 (H) 27.4 (H) (P)     CT CHEST PULMONARY EMBOLISM W CONTRAST  LOCATION: [facility]  DATE: 5/18/2025  INDICATION: dyspnea, hypoxia  COMPARISON: Same day radiograph. Chest CTA 05/14/2025  FINDINGS:  ANGIOGRAM CHEST: Pulmonary arteries are normal caliber and negative for pulmonary emboli. Thoracic aorta is negative for dissection. Aortic atherosclerosis, mild. Small pericardial effusion.  LUNGS AND PLEURA: Large right pneumothorax is most pronounced at the anterior lung base. Small apical component. Right pigtail chest tube terminates along the major fissure posteriorly. Background of severe emphysema. Right upper lobe consolidation increased from prior CT, likely progressive pneumonia. Right lower lobe mostly collapsed. Partial collapse of the right middle lobe. Curvilinear bands of atelectasis in the left upper lower  lobes. Left upper lobe nodule measuring 1.6 x 1.2 cm on image   152 of series 7 is stable from recent prior exam, but was not seen on prior CT one year ago. Endotracheal tube in the trachea.  MEDIASTINUM/AXILLAE: Grossly stable enlarged right hilar lymph node.  CORONARY ARTERY CALCIFICATION: None.  UPPER ABDOMEN: No acute findings.   MUSCULOSKELETAL: Right lateral chest wall subcutaneous emphysema..  IMPRESSION:  1.  Large right pneumothorax, most pronounced at the anterior lung base. Small apical component. Right pigtail chest tube terminates along the major fissure posteriorly.   2.  Right lower lobe mostly collapsed. Partial collapse of the right middle lobe.   3.  Right upper lobe consolidation increased from prior CT, likely progressive pneumonia.   4.  Severe emphysema.   5.  Left upper lobe spiculated pulmonary nodule measuring 1.6 cm is new from prior CT one year ago, suspicious for malignancy until proven otherwise.  6.  No evidence of pulmonary embolism.    XR CHEST PORT 1 VIEW  LOCATION: [facility]  DATE: 5/18/2025   INDICATION: Follow up on ptx  COMPARISON: Same day CT and radiograph.  IMPRESSION: Pigtail chest tube at the right lower chest. Decreased size of the left basilar pneumothorax compared to comparison radiograph. The pneumothorax did appear larger on the comparison CT after chest tube placement earlier this morning. Small apical component of pneumothorax measuring up to 1.7 cm. Right midlung consolidation is stable. Stable streaky left mid and lower lung opacities. No substantial pleural effusion.        By:  Nuno Duque MD, 05/18/2025  10:53 AM    Primary Care Physician:  Júnior Salvador

## 2025-05-18 NOTE — PLAN OF CARE
Goal Outcome Evaluation:      Plan of Care Reviewed With: spouse    Overall Patient Progress: improvingOverall Patient Progress: improving    Outcome Evaluation: Decreasing vasopressor needs        [facility] - ICU    RN Progress Note:            Pertinent Assessments:      Please refer to flowsheet rows for full assessment     LS diminished RLL, RML anterior. CT in place with grade 5 air leak, continuous.  CT output pink/creamy. 58 ml total output since placement. Current RASS -4. Levo, propofol and fentanyl infusions. Levo titrating down. BUS for 46 ml. Unable to place capone catheter. Consults to urology and PICC placed. Arterial line placed. ETT 26 at teeth. Fi02 80% Peep 8. OG at 60cm and confirmed by XR.           Key Events - This Shift:       Transfer to ICU. Arterial line placement. Sedation and vasopressors infusion. CT PE study negative. CXR. Bedside  US performed by provider.     RN Managed Protocols Ordered:  N/A                  Barriers to Discharge / Downgrade:     Vent, vasopressor, sedation         Point of Contact Update: YES-OR-NO: Yes  Name:Trini, wife  Phone Number: See chart  Summary of Conversation: pt status

## 2025-05-19 ENCOUNTER — APPOINTMENT (OUTPATIENT)
Dept: RADIOLOGY | Facility: HOSPITAL | Age: 70
DRG: 870 | End: 2025-05-19
Attending: INTERNAL MEDICINE
Payer: COMMERCIAL

## 2025-05-19 ENCOUNTER — APPOINTMENT (OUTPATIENT)
Dept: RADIOLOGY | Facility: HOSPITAL | Age: 70
DRG: 870 | End: 2025-05-19
Attending: NURSE PRACTITIONER
Payer: COMMERCIAL

## 2025-05-19 LAB
ALBUMIN SERPL BCG-MCNC: 2.7 G/DL (ref 3.5–5.2)
ALP SERPL-CCNC: 120 U/L (ref 40–150)
ALT SERPL W P-5'-P-CCNC: 36 U/L (ref 0–70)
ANION GAP SERPL CALCULATED.3IONS-SCNC: 9 MMOL/L (ref 7–15)
AST SERPL W P-5'-P-CCNC: 42 U/L (ref 0–45)
BACTERIA SPEC CULT: NO GROWTH
BASE EXCESS BLDA CALC-SCNC: -1.7 MMOL/L (ref -3–3)
BASE EXCESS BLDA CALC-SCNC: -2.7 MMOL/L (ref -3–3)
BASE EXCESS BLDA CALC-SCNC: -2.9 MMOL/L (ref -3–3)
BASOPHILS # BLD AUTO: 0 10E3/UL (ref 0–0.2)
BASOPHILS NFR BLD AUTO: 0 %
BILIRUB SERPL-MCNC: 0.8 MG/DL
BUN SERPL-MCNC: 38.1 MG/DL (ref 8–23)
BURR CELLS BLD QL SMEAR: SLIGHT
C PNEUM DNA SPEC QL NAA+PROBE: NOT DETECTED
CALCIUM SERPL-MCNC: 8.4 MG/DL (ref 8.8–10.4)
CHLORIDE SERPL-SCNC: 106 MMOL/L (ref 98–107)
CREAT SERPL-MCNC: 1.11 MG/DL (ref 0.67–1.17)
CRP SERPL-MCNC: 327.6 MG/L
EGFRCR SERPLBLD CKD-EPI 2021: 72 ML/MIN/1.73M2
EOSINOPHIL # BLD AUTO: 0.3 10E3/UL (ref 0–0.7)
EOSINOPHIL NFR BLD AUTO: 1 %
ERYTHROCYTE [DISTWIDTH] IN BLOOD BY AUTOMATED COUNT: 15 % (ref 10–15)
FLUAV H1 2009 PAND RNA SPEC QL NAA+PROBE: NOT DETECTED
FLUAV H1 RNA SPEC QL NAA+PROBE: NOT DETECTED
FLUAV H3 RNA SPEC QL NAA+PROBE: NOT DETECTED
FLUAV RNA SPEC QL NAA+PROBE: NOT DETECTED
FLUBV RNA SPEC QL NAA+PROBE: NOT DETECTED
GLUCOSE BLDC GLUCOMTR-MCNC: 129 MG/DL (ref 70–99)
GLUCOSE BLDC GLUCOMTR-MCNC: 146 MG/DL (ref 70–99)
GLUCOSE BLDC GLUCOMTR-MCNC: 151 MG/DL (ref 70–99)
GLUCOSE BLDC GLUCOMTR-MCNC: 152 MG/DL (ref 70–99)
GLUCOSE BLDC GLUCOMTR-MCNC: 152 MG/DL (ref 70–99)
GLUCOSE BLDC GLUCOMTR-MCNC: 155 MG/DL (ref 70–99)
GLUCOSE SERPL-MCNC: 148 MG/DL (ref 70–99)
HADV DNA SPEC QL NAA+PROBE: NOT DETECTED
HCO3 BLD-SCNC: 24 MMOL/L (ref 21–28)
HCO3 BLD-SCNC: 25 MMOL/L (ref 21–28)
HCO3 BLD-SCNC: 26 MMOL/L (ref 21–28)
HCO3 SERPL-SCNC: 26 MMOL/L (ref 22–29)
HCOV PNL SPEC NAA+PROBE: NOT DETECTED
HCT VFR BLD AUTO: 44.9 % (ref 40–53)
HGB BLD-MCNC: 15 G/DL (ref 13.3–17.7)
HMPV RNA SPEC QL NAA+PROBE: NOT DETECTED
HPIV1 RNA SPEC QL NAA+PROBE: NOT DETECTED
HPIV2 RNA SPEC QL NAA+PROBE: NOT DETECTED
HPIV3 RNA SPEC QL NAA+PROBE: NOT DETECTED
HPIV4 RNA SPEC QL NAA+PROBE: NOT DETECTED
IMM GRANULOCYTES # BLD: 1.3 10E3/UL
IMM GRANULOCYTES NFR BLD: 4 %
LYMPHOCYTES # BLD AUTO: 0.8 10E3/UL (ref 0.8–5.3)
LYMPHOCYTES NFR BLD AUTO: 3 %
M PNEUMO DNA SPEC QL NAA+PROBE: NOT DETECTED
MCH RBC QN AUTO: 29.2 PG (ref 26.5–33)
MCHC RBC AUTO-ENTMCNC: 33.4 G/DL (ref 31.5–36.5)
MCV RBC AUTO: 88 FL (ref 78–100)
MONOCYTES # BLD AUTO: 1.6 10E3/UL (ref 0–1.3)
MONOCYTES NFR BLD AUTO: 5 %
NEUTROPHILS # BLD AUTO: 26.3 10E3/UL (ref 1.6–8.3)
NEUTROPHILS NFR BLD AUTO: 87 %
NRBC # BLD AUTO: 0.1 10E3/UL
NRBC BLD AUTO-RTO: 0 /100
O2/TOTAL GAS SETTING VFR VENT: 50 %
O2/TOTAL GAS SETTING VFR VENT: 60 %
O2/TOTAL GAS SETTING VFR VENT: 60 %
O2/TOTAL GAS SETTING VFR VENT: 70 %
O2/TOTAL GAS SETTING VFR VENT: 80 %
OXYHGB MFR BLDA: 89 % (ref 92–100)
OXYHGB MFR BLDA: 95 % (ref 92–100)
OXYHGB MFR BLDA: 97 % (ref 92–100)
OXYHGB MFR BLDA: 98 % (ref 92–100)
OXYHGB MFR BLDA: 98 % (ref 92–100)
PCO2 BLD: 41 MM HG (ref 35–45)
PCO2 BLD: 47 MM HG (ref 35–45)
PCO2 BLD: 48 MM HG (ref 35–45)
PCO2 BLD: 55 MM HG (ref 35–45)
PCO2 BLD: 59 MM HG (ref 35–45)
PEEP: 10 CM H2O
PEEP: 8 CM H2O
PEEP: 8 CM H2O
PH BLD: 7.25 [PH] (ref 7.35–7.45)
PH BLD: 7.27 [PH] (ref 7.35–7.45)
PH BLD: 7.31 [PH] (ref 7.35–7.45)
PH BLD: 7.32 [PH] (ref 7.35–7.45)
PH BLD: 7.37 [PH] (ref 7.35–7.45)
PLAT MORPH BLD: ABNORMAL
PLATELET # BLD AUTO: 343 10E3/UL (ref 150–450)
PO2 BLD: 113 MM HG (ref 80–105)
PO2 BLD: 120 MM HG (ref 80–105)
PO2 BLD: 60 MM HG (ref 80–105)
PO2 BLD: 73 MM HG (ref 80–105)
PO2 BLD: 99 MM HG (ref 80–105)
POTASSIUM SERPL-SCNC: 4.9 MMOL/L (ref 3.4–5.3)
PROT SERPL-MCNC: 5.9 G/DL (ref 6.4–8.3)
RBC # BLD AUTO: 5.13 10E6/UL (ref 4.4–5.9)
RBC MORPH BLD: ABNORMAL
RSV RNA SPEC QL NAA+PROBE: NOT DETECTED
RSV RNA SPEC QL NAA+PROBE: NOT DETECTED
RV+EV RNA SPEC QL NAA+PROBE: NOT DETECTED
SAO2 % BLDA: 90.2 % (ref 95–96)
SAO2 % BLDA: 95.9 % (ref 95–96)
SAO2 % BLDA: 97.8 % (ref 95–96)
SAO2 % BLDA: 98.6 % (ref 95–96)
SAO2 % BLDA: 98.8 % (ref 95–96)
SODIUM SERPL-SCNC: 141 MMOL/L (ref 135–145)
TRIGL SERPL-MCNC: 238 MG/DL
WBC # BLD AUTO: 30.2 10E3/UL (ref 4–11)

## 2025-05-19 PROCEDURE — 258N000003 HC RX IP 258 OP 636: Performed by: NURSE PRACTITIONER

## 2025-05-19 PROCEDURE — 71045 X-RAY EXAM CHEST 1 VIEW: CPT | Mod: 77

## 2025-05-19 PROCEDURE — 84478 ASSAY OF TRIGLYCERIDES: CPT | Performed by: INTERNAL MEDICINE

## 2025-05-19 PROCEDURE — 94003 VENT MGMT INPAT SUBQ DAY: CPT

## 2025-05-19 PROCEDURE — 250N000013 HC RX MED GY IP 250 OP 250 PS 637: Performed by: NURSE PRACTITIONER

## 2025-05-19 PROCEDURE — 999N000009 HC STATISTIC AIRWAY CARE

## 2025-05-19 PROCEDURE — 250N000009 HC RX 250: Performed by: NURSE PRACTITIONER

## 2025-05-19 PROCEDURE — 80053 COMPREHEN METABOLIC PANEL: CPT | Performed by: INTERNAL MEDICINE

## 2025-05-19 PROCEDURE — 200N000001 HC R&B ICU

## 2025-05-19 PROCEDURE — 99291 CRITICAL CARE FIRST HOUR: CPT | Performed by: NURSE PRACTITIONER

## 2025-05-19 PROCEDURE — 250N000009 HC RX 250: Performed by: INTERNAL MEDICINE

## 2025-05-19 PROCEDURE — 999N000157 HC STATISTIC RCP TIME EA 10 MIN

## 2025-05-19 PROCEDURE — 258N000003 HC RX IP 258 OP 636: Performed by: INTERNAL MEDICINE

## 2025-05-19 PROCEDURE — 250N000013 HC RX MED GY IP 250 OP 250 PS 637: Performed by: INTERNAL MEDICINE

## 2025-05-19 PROCEDURE — 250N000011 HC RX IP 250 OP 636: Mod: JZ | Performed by: NURSE PRACTITIONER

## 2025-05-19 PROCEDURE — 82805 BLOOD GASES W/O2 SATURATION: CPT | Performed by: NURSE PRACTITIONER

## 2025-05-19 PROCEDURE — 250N000011 HC RX IP 250 OP 636: Performed by: INTERNAL MEDICINE

## 2025-05-19 PROCEDURE — 86140 C-REACTIVE PROTEIN: CPT | Performed by: INTERNAL MEDICINE

## 2025-05-19 PROCEDURE — 94640 AIRWAY INHALATION TREATMENT: CPT | Mod: 76

## 2025-05-19 PROCEDURE — 250N000011 HC RX IP 250 OP 636: Mod: JZ | Performed by: EMERGENCY MEDICINE

## 2025-05-19 PROCEDURE — 82805 BLOOD GASES W/O2 SATURATION: CPT | Performed by: INTERNAL MEDICINE

## 2025-05-19 PROCEDURE — 71045 X-RAY EXAM CHEST 1 VIEW: CPT

## 2025-05-19 PROCEDURE — 85004 AUTOMATED DIFF WBC COUNT: CPT | Performed by: INTERNAL MEDICINE

## 2025-05-19 RX ORDER — METHYLPREDNISOLONE SODIUM SUCCINATE 40 MG/ML
40 INJECTION INTRAMUSCULAR; INTRAVENOUS EVERY 6 HOURS
Status: DISCONTINUED | OUTPATIENT
Start: 2025-05-19 | End: 2025-05-21

## 2025-05-19 RX ORDER — AMOXICILLIN 250 MG
1 CAPSULE ORAL 2 TIMES DAILY
Status: DISCONTINUED | OUTPATIENT
Start: 2025-05-19 | End: 2025-05-20

## 2025-05-19 RX ORDER — CHLORHEXIDINE GLUCONATE ORAL RINSE 1.2 MG/ML
15 SOLUTION DENTAL EVERY 12 HOURS
Status: DISCONTINUED | OUTPATIENT
Start: 2025-05-19 | End: 2025-05-22

## 2025-05-19 RX ORDER — SODIUM CHLORIDE FOR INHALATION 3 %
3 VIAL, NEBULIZER (ML) INHALATION
Status: DISCONTINUED | OUTPATIENT
Start: 2025-05-19 | End: 2025-05-22

## 2025-05-19 RX ADMIN — Medication 10 MG: at 10:50

## 2025-05-19 RX ADMIN — AZITHROMYCIN MONOHYDRATE 500 MG: 500 INJECTION, POWDER, LYOPHILIZED, FOR SOLUTION INTRAVENOUS at 04:50

## 2025-05-19 RX ADMIN — IPRATROPIUM BROMIDE AND ALBUTEROL SULFATE 3 ML: .5; 3 SOLUTION RESPIRATORY (INHALATION) at 04:13

## 2025-05-19 RX ADMIN — PROPOFOL 50 MCG/KG/MIN: 10 INJECTION, EMULSION INTRAVENOUS at 07:44

## 2025-05-19 RX ADMIN — IPRATROPIUM BROMIDE AND ALBUTEROL SULFATE 3 ML: .5; 3 SOLUTION RESPIRATORY (INHALATION) at 07:41

## 2025-05-19 RX ADMIN — NOREPINEPHRINE BITARTRATE 0.08 MCG/KG/MIN: 0.02 INJECTION, SOLUTION INTRAVENOUS at 16:20

## 2025-05-19 RX ADMIN — IPRATROPIUM BROMIDE AND ALBUTEROL SULFATE 3 ML: .5; 3 SOLUTION RESPIRATORY (INHALATION) at 19:21

## 2025-05-19 RX ADMIN — INSULIN ASPART 1 UNITS: 100 INJECTION, SOLUTION INTRAVENOUS; SUBCUTANEOUS at 04:51

## 2025-05-19 RX ADMIN — MEROPENEM 1 G: 1 INJECTION, POWDER, FOR SOLUTION INTRAVENOUS at 15:26

## 2025-05-19 RX ADMIN — ENOXAPARIN SODIUM 40 MG: 40 INJECTION SUBCUTANEOUS at 08:24

## 2025-05-19 RX ADMIN — MEROPENEM 1 G: 1 INJECTION, POWDER, FOR SOLUTION INTRAVENOUS at 23:58

## 2025-05-19 RX ADMIN — INSULIN ASPART 1 UNITS: 100 INJECTION, SOLUTION INTRAVENOUS; SUBCUTANEOUS at 11:45

## 2025-05-19 RX ADMIN — IPRATROPIUM BROMIDE AND ALBUTEROL SULFATE 3 ML: .5; 3 SOLUTION RESPIRATORY (INHALATION) at 16:46

## 2025-05-19 RX ADMIN — PROPOFOL 50 MCG/KG/MIN: 10 INJECTION, EMULSION INTRAVENOUS at 02:09

## 2025-05-19 RX ADMIN — IPRATROPIUM BROMIDE AND ALBUTEROL SULFATE 3 ML: .5; 3 SOLUTION RESPIRATORY (INHALATION) at 12:44

## 2025-05-19 RX ADMIN — SODIUM CHLORIDE 1500 MG: 0.9 INJECTION, SOLUTION INTRAVENOUS at 11:42

## 2025-05-19 RX ADMIN — PROPOFOL 30 MCG/KG/MIN: 10 INJECTION, EMULSION INTRAVENOUS at 14:33

## 2025-05-19 RX ADMIN — NOREPINEPHRINE BITARTRATE 0.08 MCG/KG/MIN: 0.02 INJECTION, SOLUTION INTRAVENOUS at 00:14

## 2025-05-19 RX ADMIN — INSULIN ASPART 1 UNITS: 100 INJECTION, SOLUTION INTRAVENOUS; SUBCUTANEOUS at 15:36

## 2025-05-19 RX ADMIN — METHYLPREDNISOLONE SODIUM SUCCINATE 40 MG: 40 INJECTION INTRAMUSCULAR; INTRAVENOUS at 10:18

## 2025-05-19 RX ADMIN — Medication 10 MG: at 08:25

## 2025-05-19 RX ADMIN — SODIUM CHLORIDE, SODIUM LACTATE, POTASSIUM CHLORIDE, AND CALCIUM CHLORIDE 500 ML: .6; .31; .03; .02 INJECTION, SOLUTION INTRAVENOUS at 13:45

## 2025-05-19 RX ADMIN — FENTANYL CITRATE 50 MCG: 50 INJECTION, SOLUTION INTRAMUSCULAR; INTRAVENOUS at 10:45

## 2025-05-19 RX ADMIN — SODIUM CHLORIDE 30 MG/ML INHALATION SOLUTION 3 ML: 30 SOLUTION INHALANT at 19:21

## 2025-05-19 RX ADMIN — METHYLPREDNISOLONE SODIUM SUCCINATE 40 MG: 40 INJECTION INTRAMUSCULAR; INTRAVENOUS at 15:23

## 2025-05-19 RX ADMIN — SODIUM CHLORIDE 30 MG/ML INHALATION SOLUTION 3 ML: 30 SOLUTION INHALANT at 16:46

## 2025-05-19 RX ADMIN — INSULIN ASPART 1 UNITS: 100 INJECTION, SOLUTION INTRAVENOUS; SUBCUTANEOUS at 20:14

## 2025-05-19 RX ADMIN — Medication 10 MG: at 20:33

## 2025-05-19 RX ADMIN — MEROPENEM 1 G: 1 INJECTION, POWDER, FOR SOLUTION INTRAVENOUS at 08:24

## 2025-05-19 RX ADMIN — METHYLPREDNISOLONE SODIUM SUCCINATE 40 MG: 40 INJECTION INTRAMUSCULAR; INTRAVENOUS at 22:19

## 2025-05-19 RX ADMIN — Medication 10 MG: at 23:29

## 2025-05-19 RX ADMIN — INSULIN ASPART 1 UNITS: 100 INJECTION, SOLUTION INTRAVENOUS; SUBCUTANEOUS at 08:26

## 2025-05-19 RX ADMIN — SODIUM CHLORIDE, SODIUM LACTATE, POTASSIUM CHLORIDE, AND CALCIUM CHLORIDE 500 ML: .6; .31; .03; .02 INJECTION, SOLUTION INTRAVENOUS at 11:05

## 2025-05-19 RX ADMIN — MEROPENEM 1 G: 1 INJECTION, POWDER, FOR SOLUTION INTRAVENOUS at 00:13

## 2025-05-19 RX ADMIN — CHLORHEXIDINE GLUCONATE 15 ML: 1.2 SOLUTION ORAL at 08:24

## 2025-05-19 RX ADMIN — PANTOPRAZOLE SODIUM 40 MG: 40 INJECTION, POWDER, FOR SOLUTION INTRAVENOUS at 08:24

## 2025-05-19 RX ADMIN — CHLORHEXIDINE GLUCONATE 15 ML: 1.2 SOLUTION ORAL at 20:16

## 2025-05-19 RX ADMIN — SENNOSIDES AND DOCUSATE SODIUM 1 TABLET: 50; 8.6 TABLET ORAL at 20:16

## 2025-05-19 RX ADMIN — FENTANYL CITRATE 50 MCG: 50 INJECTION, SOLUTION INTRAMUSCULAR; INTRAVENOUS at 08:15

## 2025-05-19 ASSESSMENT — ACTIVITIES OF DAILY LIVING (ADL)
ADLS_ACUITY_SCORE: 79
ADLS_ACUITY_SCORE: 75
ADLS_ACUITY_SCORE: 79
ADLS_ACUITY_SCORE: 79
ADLS_ACUITY_SCORE: 75
ADLS_ACUITY_SCORE: 79
ADLS_ACUITY_SCORE: 79
ADLS_ACUITY_SCORE: 77
ADLS_ACUITY_SCORE: 79
ADLS_ACUITY_SCORE: 80
DEPENDENT_IADLS:: INDEPENDENT
ADLS_ACUITY_SCORE: 75
ADLS_ACUITY_SCORE: 79
ADLS_ACUITY_SCORE: 75
ADLS_ACUITY_SCORE: 79
ADLS_ACUITY_SCORE: 75
ADLS_ACUITY_SCORE: 75
ADLS_ACUITY_SCORE: 79
ADLS_ACUITY_SCORE: 79

## 2025-05-19 NOTE — PROGRESS NOTES
[facility]  Chart Review    UA 5/18 benign for infection.     Recommend PVR checks at time of TOV - timing per primary team. Notify [facility] if fails TOV, to arrange for outpatient TOV.     [facility] will sign off. Please re-consult with any new or worsening urologic concerns.    Nestor Coto, APRN, CNP

## 2025-05-19 NOTE — PROGRESS NOTES
Critical Care Progress Note  5/19/2025   2:27 PM    Admit Date: 5/18/2025  ICU Admission Day: 5/18/2025  Code Status: FULL CODE      Problem List:   Active Problems:    Shortness of breath    Pneumothorax on right    Acute respiratory failure with hypoxia and hypercapnia (H)    Pneumonia due to infectious organism, unspecified laterality, unspecified part of lung    Sepsis, due to unspecified organism, unspecified whether acute organ dysfunction present (H)         Plan by System:     Neuro/Psych: sedation for vent synchrony and comfort    - RASS goal -3 to -4    * was having a difficulty staying synchronous with the vent this morning and was on pretty low Tidal Volumes which was leading to stacking breaths and dropping O2 sats.     * Will try lightening this afternoon now that he seems more comfortable on the vent.    - Propofol and Fentanyl for sedation and analgesia     Pulmonary: acute hypoxic and hypercapnic respiratory failure requiring intubation on 5/18; PTX post intubation s/p chest tube; underlying O2 dependent, A1AT deficiency. Pneumonia - sputum with 2+ Pseudomonas    - FiO2 (%): (S) 60 %, Resp: 20, Vent Mode: VC/AC, Resp Rate (Set): 16 breaths/min, Tidal Volume (Set, mL): 540 mL, PEEP (cm H2O): 8 cmH2O, Resp Rate (Set): 16 breaths/min, Tidal Volume (Set, mL): 540 mL, PEEP (cm H2O): 8 cmH2O    - liberate tidal volume to 7mL/kg and come down on PEEP.    -  Goal PIP/Pplat < 30    - Goal saturations > 89%    - Serial ABGs    - Keep chest tube to suction; ongoing brisk air leak and ongoing ptx on imaging. 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 May need to replace tube.    - daily CXR       CV: Shock - some component of septic shock with Pseudomonas pneumonia, some component of sedation.    - Levophed for MAP > 65    - Give LR x 1 liter   - Cardiac monitoring     GI/: no acute issues    - Diet: NPO - will start tube in AM    - Last BM: none recorderd     - Bowel meds: senna-docusate    Renal: normal  renal function    - Strict I/O    - give some IVF with LR x 1 liter     ID: pseudomonas pneumonia    - meropenem     Heme/Coag:  no acute issues     - DVT proph: enoxaparin     Endocrine: hyperglycemia   - Q4h sliding scale insulin      Family: updated wife at bedside.      Clinically Significant Risk Factors         # Hyponatremia: Lowest Na = 134 mmol/L in last 2 days, will monitor as appropriate       # Hypoalbuminemia: Lowest albumin = 2.7 g/dL at 5/19/2025  4:45 AM, will monitor as appropriate         # Acute Hypercapnic Respiratory Failure: based on arterial blood gas results.  Continue supplemental oxygen and ventilatory support as indicated.  # Acute Hypercapnic Respiratory Failure: based on venous blood gas results.  Continue supplemental oxygen and ventilatory support as indicated.                             Dispo: ICU     Argentina Cruz, CNP  [facility] Pulmonary/Critical Care    Total critical care time: 40-minutes  I have personally provided 40 minutes of critical care time exclusive of time spent on separately billable procedures.   _______________________________________________________________    HPI: Armin Terrell is a 69 year old male with history of chronic respiratory failure on home O2, 3LPM, COPD (FEV1 1.2 L 33%), severe emphysema, alpha-1 antitrypsin deficiency, decline treatment, lung nodules, prostate cancer status post radical prostatectomy.   Patient was recently seen in the ED on May 14, 2025, for evaluation of shortness of breath and right-sided chest pain, chest CT scan showed no pulmonary embolism right upper lobe infiltrate, enlarging right hilar adenopathy unchanged 1.7 cm left upper lobe nodule, labs show elevated white blood cell count negative respiratory viral PCR patient was treated for with systemic steroids and antibiotics.   Patient was brought to ED by EMS on May 18, 2021 for worsening shortness of breath generalized weakness.  Patient was in severe respiratory  distress started on BiPAP therapy and later intubated.   Chest x-ray show right-sided pneumothorax, patient underwent chest tube placement.  Follow-up chest CT scan show large right-sided pneumothorax chest x-ray show right-sided pneumothorax, right pigtail chest tube along the major fissure, collapsed right lower lobe and right middle lobe consolidation of the right upper lobe unchanged left upper lobe nodule.   Patient was hypotensive , started on NE drip.   Patient was admitted to the ICU for further care.     ROS: EDUARDO - intubated and sedated     Objective:     Vitals:    05/19/25 1230 05/19/25 1244 05/19/25 1245 05/19/25 1300   BP:       BP Location:       Pulse: 113  105 111   Resp: 20  20 20   Temp:       TempSrc:       SpO2: 94% 93% 94% 94%   Weight:           Vent:   Day of Intubation: 5/18  Ready to wean? No  FiO2 (%): (S) 60 %, Resp: 20, Vent Mode: VC/AC, Resp Rate (Set): 16 breaths/min, Tidal Volume (Set, mL): 540 mL, PEEP (cm H2O): 8 cmH2O, Resp Rate (Set): 16 breaths/min, Tidal Volume (Set, mL): 540 mL, PEEP (cm H2O): 8 cmH2O    Sedative Infusion: propofol and fentanyl   Sedation vacation: Yes    I/O:   Intake/Output Summary (Last 24 hours) at 5/19/2025 1427  Last data filed at 5/19/2025 1200  Gross per 24 hour   Intake 2094.47 ml   Output 1360 ml   Net 734.47 ml     Wt Readings from Last 3 Encounters:   05/19/25 69.4 kg (153 lb)   05/14/25 66.2 kg (146 lb)   05/07/25 75.3 kg (166 lb)      Weight change: 2.404 kg (5 lb 4.8 oz)    Physical Exam:  Gen: no acute distress  HEENMT: AT/NC OETT in place   NEURO: sedated  CARDIOVASCULAR: Tachy and regular; S1S2 no murmur  PULMONARY: unlabored on full vent; lungs have scattered rhonchi, no wheeze   GASTROINTESTINAL: soft ntnd  INTEGUMENT: visible skin intact   PSYCH: sedated    Labs:   Recent Labs   Lab 05/19/25  0445      CO2 26   BUN 38.1*   ALKPHOS 120   ALT 36   AST 42       Recent Labs   Lab 05/19/25  0445   WBC 30.2*   HGB 15.0   HCT 44.9           Micro:   5/18 Sputum - 2+ PsA    Imaging: all imaging personalized reviewed   5/19 CXR - Pigtail chest tube at the right lower chest. Decreased size of the left basilar pneumothorax compared to comparison radiograph. The pneumothorax did appear larger on the comparison CT after chest tube placement earlier this morning. Small   apical component of pneumothorax measuring up to 1.7 cm. Right midlung consolidation is stable. Stable streaky left mid and lower lung opacities. No substantial pleural effusion.      Argentina Cruz, CNP  [facility] Pulmonary/Critical Care

## 2025-05-19 NOTE — CONSULTS
Care Management Initial Consult    General Information  Assessment completed with: Family, dtr Gisele  Type of CM/SW Visit: Initial Assessment    Primary Care Provider verified and updated as needed:     Readmission within the last 30 days: no previous admission in last 30 days         Advance Care Planning:            Communication Assessment  Patient's communication style: spoken language (English or Bilingual)             Cognitive  Cognitive/Neuro/Behavioral: .WDL except, all  Level of Consciousness: sedated  Arousal Level: other (see comments) (breathing over vent)  Orientation: other (see comments) (EDUARDO)  Mood/Behavior: behavior appropriate to situation (sedated)  Best Language: 0 - No aphasia  Speech: endotracheal tube    Living Environment:   People in home: spouse     Current living Arrangements: house      Able to return to prior arrangements: yes       Family/Social Support:  Care provided by: self  Provides care for: no one  Marital Status:   Support system: Wife, Children          Description of Support System: Supportive, Involved         Current Resources:   Patient receiving home care services: No        Community Resources: None  Equipment currently used at home:  (Oxygen)  Supplies currently used at home: Oxygen Tubing/Supplies    Employment/Financial:  Employment Status:          Financial Concerns:             Does the patient's insurance plan have a 3 day qualifying hospital stay waiver?  Yes     Which insurance plan 3 day waiver is available? Alternative insurance waiver    Will the waiver be used for post-acute placement? No    Lifestyle & Psychosocial Needs:  Social Drivers of Health     Food Insecurity: Unknown (5/18/2025)    Food Insecurity     Within the past 12 months, did you worry that your food would run out before you got money to buy more?: Patient unable to answer     Within the past 12 months, did the food you bought just not last and you didn t have money to get more?:  Patient unable to answer   Depression: Not at risk (2/24/2025)    PHQ-2     PHQ-2 Score: 0   Housing Stability: Unknown (5/18/2025)    Housing Stability     Do you have housing? : Patient unable to answer     Are you worried about losing your housing?: Patient unable to answer   Tobacco Use: Medium Risk (5/14/2025)    Patient History     Smoking Tobacco Use: Former     Smokeless Tobacco Use: Former     Passive Exposure: Current   Financial Resource Strain: Unknown (5/18/2025)    Financial Resource Strain     Within the past 12 months, have you or your family members you live with been unable to get utilities (heat, electricity) when it was really needed?: Patient unable to answer   Alcohol Use: Not on file   Transportation Needs: Unknown (5/18/2025)    Transportation Needs     Within the past 12 months, has lack of transportation kept you from medical appointments, getting your medicines, non-medical meetings or appointments, work, or from getting things that you need?: Patient unable to answer   Physical Activity: Not on file   Interpersonal Safety: Unknown (5/18/2025)    Interpersonal Safety     Do you feel physically and emotionally safe where you currently live?: Patient unable to answer     Within the past 12 months, have you been hit, slapped, kicked or otherwise physically hurt by someone?: Patient unable to answer     Within the past 12 months, have you been humiliated or emotionally abused in other ways by your partner or ex-partner?: Patient unable to answer   Stress: Not on file   Social Connections: Not on file   Health Literacy: Not on file       Functional Status:  Prior to admission patient needed assistance:   Dependent ADLs:: Independent  Dependent IADLs:: Independent       Mental Health Status:          Chemical Dependency Status:                Values/Beliefs:  Spiritual, Cultural Beliefs, Yazdanism Practices, Values that affect care:                 Discussed  Partnership in Safe Discharge  Planning  document with patient/family: No    Additional Information:    Assessment completed with patient's daughter Gisele.  Patient lives in his house with spouse. He has been independent with ADLs/IADLs, ambulates without devices and has home O2 (oxygen company not known).  Two children live in [place] and one lives here. Spouse is primary family contact.       Next Steps:     Follow for progression and recommendations.      Janessa Corrales RN

## 2025-05-19 NOTE — PLAN OF CARE
[facility] - ICU    RN Progress Note:            Pertinent Assessments:      Please refer to flowsheet rows for full assessment       VSS, afebrile- able to decrease levo throughout the night. RASS goal -3/-4, pt remained a -3. Nurse noted some vent asynchrony and breath stacking- increased sedation to help with synchrony.     UOP adequate- no BM    CT output serous in color, CT airleak ranging from level 1-3, crepitus noted, intensivist aware- see flowsheet for exact output amount           Key Events - This Shift:       Increased PEEP to 10, dropped RR to 12       RN Managed Protocols Ordered:  No  Protocols:  PRN'S:  Protocols Status:                 Barriers to Discharge / Downgrade:     Vent, sedation, pressors         Point of Contact Update: YES-OR-NO: No  If No, reason: No overnight events occurred that warrant point of contact update     Goal Outcome Evaluation:     Plan of Care Reviewed With: patient    Overall Patient Progress: declining Overall Patient Progress: declining    Outcome Evaluation: increased O2 needs, labile BP

## 2025-05-19 NOTE — PROGRESS NOTES
RT PROGRESS NOTE    VENT DAY# 2    CURRENT SETTINGS:   FiO2 (%): 80 %, Resp: (!) 9, Vent Mode: VC/AC, Resp Rate (Set): 16 breaths/min, Tidal Volume (Set, mL): 440 mL, PEEP (cm H2O): 10 cmH2O, Resp Rate (Set): 16 breaths/min, Tidal Volume (Set, mL): 440 mL, PEEP (cm H2O): 10 cmH2O    PATIENT PARAMETERS:  PIP 14  Pplat:  18  Pmean:  10  Compliance: 16.4  Secretions:  small red-streaked thick  02 Sats:  94%  BS: coarse crackles/diminished    ETT SIZE 7.5 Secured at 26 cm at teeth/gums    Respiratory Medications: Duoneb Q4     AB25 @2246 pH 7.29; pCO2 51; pO2 11; HCO3 24, %O2 Sat 98, BE -2.9 on 80%    NOTE / SHIFT SUMMARY:   Patient on full vent support over night. Increased peep from +5 to +10. Current fio2 is 80%. RT will continue to follow.     Lelia Crystal, RT

## 2025-05-20 ENCOUNTER — APPOINTMENT (OUTPATIENT)
Dept: RADIOLOGY | Facility: HOSPITAL | Age: 70
DRG: 870 | End: 2025-05-20
Attending: NURSE PRACTITIONER
Payer: COMMERCIAL

## 2025-05-20 ENCOUNTER — APPOINTMENT (OUTPATIENT)
Dept: INTERVENTIONAL RADIOLOGY/VASCULAR | Facility: HOSPITAL | Age: 70
DRG: 870 | End: 2025-05-20
Payer: COMMERCIAL

## 2025-05-20 LAB
ANION GAP SERPL CALCULATED.3IONS-SCNC: 7 MMOL/L (ref 7–15)
BACTERIA SPT CULT: ABNORMAL
BACTERIA SPT CULT: ABNORMAL
BASE EXCESS BLDA CALC-SCNC: -0.5 MMOL/L (ref -3–3)
BASE EXCESS BLDA CALC-SCNC: -1.3 MMOL/L (ref -3–3)
BASOPHILS # BLD MANUAL: 0 10E3/UL (ref 0–0.2)
BASOPHILS NFR BLD MANUAL: 0 %
BUN SERPL-MCNC: 58.5 MG/DL (ref 8–23)
BURR CELLS BLD QL SMEAR: SLIGHT
CALCIUM SERPL-MCNC: 8.3 MG/DL (ref 8.8–10.4)
CHLORIDE SERPL-SCNC: 108 MMOL/L (ref 98–107)
CREAT SERPL-MCNC: 1.23 MG/DL (ref 0.67–1.17)
EGFRCR SERPLBLD CKD-EPI 2021: 64 ML/MIN/1.73M2
EOSINOPHIL # BLD MANUAL: 0 10E3/UL (ref 0–0.7)
EOSINOPHIL NFR BLD MANUAL: 0 %
ERYTHROCYTE [DISTWIDTH] IN BLOOD BY AUTOMATED COUNT: 14.7 % (ref 10–15)
ERYTHROCYTE [DISTWIDTH] IN BLOOD BY AUTOMATED COUNT: 15.2 % (ref 10–15)
GLUCOSE BLDC GLUCOMTR-MCNC: 132 MG/DL (ref 70–99)
GLUCOSE BLDC GLUCOMTR-MCNC: 138 MG/DL (ref 70–99)
GLUCOSE BLDC GLUCOMTR-MCNC: 139 MG/DL (ref 70–99)
GLUCOSE BLDC GLUCOMTR-MCNC: 161 MG/DL (ref 70–99)
GLUCOSE BLDC GLUCOMTR-MCNC: 163 MG/DL (ref 70–99)
GLUCOSE BLDC GLUCOMTR-MCNC: 164 MG/DL (ref 70–99)
GLUCOSE SERPL-MCNC: 154 MG/DL (ref 70–99)
GRAM STAIN RESULT: ABNORMAL
GRAM STAIN RESULT: ABNORMAL
HCO3 BLD-SCNC: 24 MMOL/L (ref 21–28)
HCO3 BLD-SCNC: 24 MMOL/L (ref 21–28)
HCO3 SERPL-SCNC: 25 MMOL/L (ref 22–29)
HCT VFR BLD AUTO: 45.5 % (ref 40–53)
HCT VFR BLD AUTO: 51 % (ref 40–53)
HGB BLD-MCNC: 14.7 G/DL (ref 13.3–17.7)
HGB BLD-MCNC: 16.4 G/DL (ref 13.3–17.7)
LYMPHOCYTES # BLD MANUAL: 4.2 10E3/UL (ref 0.8–5.3)
LYMPHOCYTES NFR BLD MANUAL: 12 %
MAGNESIUM SERPL-MCNC: 2.9 MG/DL (ref 1.7–2.3)
MCH RBC QN AUTO: 28.2 PG (ref 26.5–33)
MCH RBC QN AUTO: 28.3 PG (ref 26.5–33)
MCHC RBC AUTO-ENTMCNC: 32.2 G/DL (ref 31.5–36.5)
MCHC RBC AUTO-ENTMCNC: 32.3 G/DL (ref 31.5–36.5)
MCV RBC AUTO: 87 FL (ref 78–100)
MCV RBC AUTO: 88 FL (ref 78–100)
MONOCYTES # BLD MANUAL: 3.5 10E3/UL (ref 0–1.3)
MONOCYTES NFR BLD MANUAL: 10 %
NEUTROPHILS # BLD MANUAL: 27.4 10E3/UL (ref 1.6–8.3)
NEUTROPHILS NFR BLD MANUAL: 78 %
NRBC # BLD AUTO: 0.4 10E3/UL
NRBC BLD MANUAL-RTO: 1 %
O2/TOTAL GAS SETTING VFR VENT: 50 %
O2/TOTAL GAS SETTING VFR VENT: 50 %
OXYHGB MFR BLDA: 95 % (ref 92–100)
OXYHGB MFR BLDA: 96 % (ref 92–100)
PATH REV: ABNORMAL
PCO2 BLD: 39 MM HG (ref 35–45)
PCO2 BLD: 42 MM HG (ref 35–45)
PEEP: 8 CM H2O
PH BLD: 7.37 [PH] (ref 7.35–7.45)
PH BLD: 7.4 [PH] (ref 7.35–7.45)
PHOSPHATE SERPL-MCNC: 4 MG/DL (ref 2.5–4.5)
PLAT MORPH BLD: ABNORMAL
PLATELET # BLD AUTO: 306 10E3/UL (ref 150–450)
PLATELET # BLD AUTO: 514 10E3/UL (ref 150–450)
PO2 BLD: 75 MM HG (ref 80–105)
PO2 BLD: 79 MM HG (ref 80–105)
POTASSIUM SERPL-SCNC: 5 MMOL/L (ref 3.4–5.3)
RBC # BLD AUTO: 5.21 10E6/UL (ref 4.4–5.9)
RBC # BLD AUTO: 5.79 10E6/UL (ref 4.4–5.9)
RBC MORPH BLD: ABNORMAL
SAO2 % BLDA: 96.1 % (ref 95–96)
SAO2 % BLDA: 96.4 % (ref 95–96)
SODIUM SERPL-SCNC: 140 MMOL/L (ref 135–145)
WBC # BLD AUTO: 28.2 10E3/UL (ref 4–11)
WBC # BLD AUTO: 35.1 10E3/UL (ref 4–11)

## 2025-05-20 PROCEDURE — 0W9930Z DRAINAGE OF RIGHT PLEURAL CAVITY WITH DRAINAGE DEVICE, PERCUTANEOUS APPROACH: ICD-10-PCS | Performed by: RADIOLOGY

## 2025-05-20 PROCEDURE — 250N000011 HC RX IP 250 OP 636: Performed by: INTERNAL MEDICINE

## 2025-05-20 PROCEDURE — 200N000001 HC R&B ICU

## 2025-05-20 PROCEDURE — 258N000003 HC RX IP 258 OP 636: Performed by: NURSE PRACTITIONER

## 2025-05-20 PROCEDURE — 250N000009 HC RX 250: Performed by: INTERNAL MEDICINE

## 2025-05-20 PROCEDURE — 32999 UNLISTED PX LUNGS & PLEURA: CPT

## 2025-05-20 PROCEDURE — C1769 GUIDE WIRE: HCPCS

## 2025-05-20 PROCEDURE — 999N000157 HC STATISTIC RCP TIME EA 10 MIN

## 2025-05-20 PROCEDURE — 84100 ASSAY OF PHOSPHORUS: CPT | Performed by: NURSE PRACTITIONER

## 2025-05-20 PROCEDURE — C1729 CATH, DRAINAGE: HCPCS

## 2025-05-20 PROCEDURE — 258N000003 HC RX IP 258 OP 636: Performed by: INTERNAL MEDICINE

## 2025-05-20 PROCEDURE — 94003 VENT MGMT INPAT SUBQ DAY: CPT

## 2025-05-20 PROCEDURE — 250N000011 HC RX IP 250 OP 636: Mod: JZ | Performed by: INTERNAL MEDICINE

## 2025-05-20 PROCEDURE — 250N000011 HC RX IP 250 OP 636: Mod: JZ | Performed by: NURSE PRACTITIONER

## 2025-05-20 PROCEDURE — 85018 HEMOGLOBIN: CPT | Performed by: NURSE PRACTITIONER

## 2025-05-20 PROCEDURE — 71045 X-RAY EXAM CHEST 1 VIEW: CPT

## 2025-05-20 PROCEDURE — 82805 BLOOD GASES W/O2 SATURATION: CPT | Performed by: INTERNAL MEDICINE

## 2025-05-20 PROCEDURE — 250N000013 HC RX MED GY IP 250 OP 250 PS 637: Performed by: NURSE PRACTITIONER

## 2025-05-20 PROCEDURE — 99232 SBSQ HOSP IP/OBS MODERATE 35: CPT | Performed by: NURSE PRACTITIONER

## 2025-05-20 PROCEDURE — 999N000009 HC STATISTIC AIRWAY CARE

## 2025-05-20 PROCEDURE — 0WP930Z REMOVAL OF DRAINAGE DEVICE FROM RIGHT PLEURAL CAVITY, PERCUTANEOUS APPROACH: ICD-10-PCS | Performed by: RADIOLOGY

## 2025-05-20 PROCEDURE — 999N000185 HC STATISTIC TRANSPORT TIME EA 15 MIN

## 2025-05-20 PROCEDURE — 999N000287 HC ICU ADULT ROUNDING, EACH 10 MINS

## 2025-05-20 PROCEDURE — 82435 ASSAY OF BLOOD CHLORIDE: CPT | Performed by: NURSE PRACTITIONER

## 2025-05-20 PROCEDURE — 75984 XRAY CONTROL CATHETER CHANGE: CPT

## 2025-05-20 PROCEDURE — 250N000009 HC RX 250: Performed by: NURSE PRACTITIONER

## 2025-05-20 PROCEDURE — 83735 ASSAY OF MAGNESIUM: CPT | Performed by: NURSE PRACTITIONER

## 2025-05-20 PROCEDURE — 94640 AIRWAY INHALATION TREATMENT: CPT | Mod: 76

## 2025-05-20 RX ORDER — AMINO ACIDS/PROTEIN HYDROLYS 11G-40/45
1 LIQUID IN PACKET (ML) ORAL DAILY
Status: DISCONTINUED | OUTPATIENT
Start: 2025-05-20 | End: 2025-05-21

## 2025-05-20 RX ORDER — SODIUM CHLORIDE, SODIUM LACTATE, POTASSIUM CHLORIDE, CALCIUM CHLORIDE 600; 310; 30; 20 MG/100ML; MG/100ML; MG/100ML; MG/100ML
INJECTION, SOLUTION INTRAVENOUS CONTINUOUS
Status: ACTIVE | OUTPATIENT
Start: 2025-05-20 | End: 2025-05-20

## 2025-05-20 RX ADMIN — CHLORHEXIDINE GLUCONATE 15 ML: 1.2 SOLUTION ORAL at 20:04

## 2025-05-20 RX ADMIN — INSULIN ASPART 1 UNITS: 100 INJECTION, SOLUTION INTRAVENOUS; SUBCUTANEOUS at 00:11

## 2025-05-20 RX ADMIN — METHYLPREDNISOLONE SODIUM SUCCINATE 40 MG: 40 INJECTION INTRAMUSCULAR; INTRAVENOUS at 10:10

## 2025-05-20 RX ADMIN — SODIUM CHLORIDE 30 MG/ML INHALATION SOLUTION 3 ML: 30 SOLUTION INHALANT at 23:49

## 2025-05-20 RX ADMIN — SENNOSIDES 5 ML: 8.8 LIQUID ORAL at 20:04

## 2025-05-20 RX ADMIN — PROPOFOL 25 MCG/KG/MIN: 10 INJECTION, EMULSION INTRAVENOUS at 21:05

## 2025-05-20 RX ADMIN — Medication 10 MG: at 03:48

## 2025-05-20 RX ADMIN — IPRATROPIUM BROMIDE AND ALBUTEROL SULFATE 3 ML: .5; 3 SOLUTION RESPIRATORY (INHALATION) at 11:28

## 2025-05-20 RX ADMIN — IPRATROPIUM BROMIDE AND ALBUTEROL SULFATE 3 ML: .5; 3 SOLUTION RESPIRATORY (INHALATION) at 23:49

## 2025-05-20 RX ADMIN — Medication 50 MCG: at 22:50

## 2025-05-20 RX ADMIN — SODIUM CHLORIDE 30 MG/ML INHALATION SOLUTION 3 ML: 30 SOLUTION INHALANT at 07:48

## 2025-05-20 RX ADMIN — DOCUSATE SODIUM 100 MG: 50 LIQUID ORAL at 20:04

## 2025-05-20 RX ADMIN — SODIUM CHLORIDE 30 MG/ML INHALATION SOLUTION 3 ML: 30 SOLUTION INHALANT at 00:38

## 2025-05-20 RX ADMIN — SODIUM CHLORIDE 30 MG/ML INHALATION SOLUTION 3 ML: 30 SOLUTION INHALANT at 03:24

## 2025-05-20 RX ADMIN — IPRATROPIUM BROMIDE AND ALBUTEROL SULFATE 3 ML: .5; 3 SOLUTION RESPIRATORY (INHALATION) at 00:37

## 2025-05-20 RX ADMIN — MEROPENEM 1 G: 1 INJECTION, POWDER, FOR SOLUTION INTRAVENOUS at 08:25

## 2025-05-20 RX ADMIN — Medication 75 MCG/HR: at 00:35

## 2025-05-20 RX ADMIN — IPRATROPIUM BROMIDE AND ALBUTEROL SULFATE 3 ML: .5; 3 SOLUTION RESPIRATORY (INHALATION) at 20:06

## 2025-05-20 RX ADMIN — NOREPINEPHRINE BITARTRATE 0.07 MCG/KG/MIN: 0.02 INJECTION, SOLUTION INTRAVENOUS at 06:18

## 2025-05-20 RX ADMIN — Medication 10 MG: at 00:23

## 2025-05-20 RX ADMIN — METHYLPREDNISOLONE SODIUM SUCCINATE 40 MG: 40 INJECTION INTRAMUSCULAR; INTRAVENOUS at 21:10

## 2025-05-20 RX ADMIN — SODIUM CHLORIDE 30 MG/ML INHALATION SOLUTION 3 ML: 30 SOLUTION INHALANT at 11:29

## 2025-05-20 RX ADMIN — METHYLPREDNISOLONE SODIUM SUCCINATE 40 MG: 40 INJECTION INTRAMUSCULAR; INTRAVENOUS at 16:33

## 2025-05-20 RX ADMIN — IPRATROPIUM BROMIDE AND ALBUTEROL SULFATE 3 ML: .5; 3 SOLUTION RESPIRATORY (INHALATION) at 15:21

## 2025-05-20 RX ADMIN — PROPOFOL 20 MCG/KG/MIN: 10 INJECTION, EMULSION INTRAVENOUS at 00:30

## 2025-05-20 RX ADMIN — Medication 10 MG: at 22:48

## 2025-05-20 RX ADMIN — AZITHROMYCIN MONOHYDRATE 500 MG: 500 INJECTION, POWDER, LYOPHILIZED, FOR SOLUTION INTRAVENOUS at 05:06

## 2025-05-20 RX ADMIN — SODIUM CHLORIDE, SODIUM LACTATE, POTASSIUM CHLORIDE, AND CALCIUM CHLORIDE: .6; .31; .03; .02 INJECTION, SOLUTION INTRAVENOUS at 08:26

## 2025-05-20 RX ADMIN — Medication 10 MG: at 12:55

## 2025-05-20 RX ADMIN — Medication 50 MCG: at 00:04

## 2025-05-20 RX ADMIN — Medication 10 MG: at 16:30

## 2025-05-20 RX ADMIN — CHLORHEXIDINE GLUCONATE 15 ML: 1.2 SOLUTION ORAL at 08:25

## 2025-05-20 RX ADMIN — Medication 20 MG: at 13:10

## 2025-05-20 RX ADMIN — Medication 50 MCG: at 12:00

## 2025-05-20 RX ADMIN — IPRATROPIUM BROMIDE AND ALBUTEROL SULFATE 3 ML: .5; 3 SOLUTION RESPIRATORY (INHALATION) at 03:23

## 2025-05-20 RX ADMIN — SODIUM CHLORIDE 30 MG/ML INHALATION SOLUTION 3 ML: 30 SOLUTION INHALANT at 20:06

## 2025-05-20 RX ADMIN — INSULIN ASPART 1 UNITS: 100 INJECTION, SOLUTION INTRAVENOUS; SUBCUTANEOUS at 08:26

## 2025-05-20 RX ADMIN — SODIUM CHLORIDE 30 MG/ML INHALATION SOLUTION 3 ML: 30 SOLUTION INHALANT at 15:22

## 2025-05-20 RX ADMIN — IPRATROPIUM BROMIDE AND ALBUTEROL SULFATE 3 ML: .5; 3 SOLUTION RESPIRATORY (INHALATION) at 07:47

## 2025-05-20 RX ADMIN — PANTOPRAZOLE SODIUM 40 MG: 40 INJECTION, POWDER, FOR SOLUTION INTRAVENOUS at 06:34

## 2025-05-20 RX ADMIN — Medication 10 MG: at 13:00

## 2025-05-20 RX ADMIN — MEROPENEM 1 G: 1 INJECTION, POWDER, FOR SOLUTION INTRAVENOUS at 16:33

## 2025-05-20 RX ADMIN — Medication 60 ML: at 14:50

## 2025-05-20 RX ADMIN — SENNOSIDES 5 ML: 8.8 LIQUID ORAL at 08:41

## 2025-05-20 RX ADMIN — DOCUSATE SODIUM 100 MG: 50 LIQUID ORAL at 08:42

## 2025-05-20 RX ADMIN — ENOXAPARIN SODIUM 40 MG: 40 INJECTION SUBCUTANEOUS at 08:25

## 2025-05-20 RX ADMIN — Medication 50 MCG: at 19:49

## 2025-05-20 RX ADMIN — METHYLPREDNISOLONE SODIUM SUCCINATE 40 MG: 40 INJECTION INTRAMUSCULAR; INTRAVENOUS at 03:27

## 2025-05-20 RX ADMIN — INSULIN ASPART 1 UNITS: 100 INJECTION, SOLUTION INTRAVENOUS; SUBCUTANEOUS at 03:27

## 2025-05-20 RX ADMIN — Medication 50 MCG: at 03:39

## 2025-05-20 RX ADMIN — Medication 20 MG: at 13:05

## 2025-05-20 RX ADMIN — Medication 50 MCG: at 15:20

## 2025-05-20 RX ADMIN — Medication 50 MCG: at 08:15

## 2025-05-20 RX ADMIN — PROPOFOL 20 MCG/KG/MIN: 10 INJECTION, EMULSION INTRAVENOUS at 10:25

## 2025-05-20 ASSESSMENT — ACTIVITIES OF DAILY LIVING (ADL)
ADLS_ACUITY_SCORE: 75
ADLS_ACUITY_SCORE: 79
ADLS_ACUITY_SCORE: 75
ADLS_ACUITY_SCORE: 63
ADLS_ACUITY_SCORE: 55
ADLS_ACUITY_SCORE: 63
ADLS_ACUITY_SCORE: 75
ADLS_ACUITY_SCORE: 63
ADLS_ACUITY_SCORE: 79
ADLS_ACUITY_SCORE: 83
ADLS_ACUITY_SCORE: 75
ADLS_ACUITY_SCORE: 79
ADLS_ACUITY_SCORE: 63
ADLS_ACUITY_SCORE: 55
ADLS_ACUITY_SCORE: 63
ADLS_ACUITY_SCORE: 63
ADLS_ACUITY_SCORE: 79
ADLS_ACUITY_SCORE: 55
ADLS_ACUITY_SCORE: 79
ADLS_ACUITY_SCORE: 63
ADLS_ACUITY_SCORE: 63

## 2025-05-20 NOTE — PLAN OF CARE
Goal Outcome Evaluation:      Plan of Care Reviewed With: patient, family    Overall Patient Progress: no change    Outcome Evaluation: see note      [facility] - ICU    RN Progress Note:            Pertinent Assessments:      Please refer to flowsheet rows for full assessment     Vent settings changed throughout day. As pt more synchronous with vent RASS goal lightened. Titrated sedation accordingly.     Two 500 ml LR boluses given. Urine output remains adequate but low and pt remains on Norepi gtt.     At 1730 found CT to be kinked. Pt repositioned, provider notified, and chest xray done.            Key Events - This Shift:     See above     RN Managed Protocols Ordered:  No  Protocols:  PRN'S:  Protocols Status:                 Barriers to Discharge / Downgrade:     ICU level cares         Point of Contact Update: YES-OR-NO: yes  If No, reason: na  Name:Trini wife  Phone Number: on file  Summary of Conversation: Wife and children here this am. Updated by provider and RN at bedside.

## 2025-05-20 NOTE — SEDATION DOCUMENTATION
Dr Joiner aware of multiple propofol bolus's given intra procedure for pt bucking the vent and spo2 drop to 88%  RT at bedside with changes made.

## 2025-05-20 NOTE — PLAN OF CARE
Goal Outcome Evaluation:      Plan of Care Reviewed With: patient, spouse    Overall Patient Progress: improving    Outcome Evaluation: see note    [facility] - ICU    RN Progress Note:            Pertinent Assessments:      Please refer to flowsheet rows for full assessment     Pt to IR for chest tube exchange/upsize this afternoon with Todd RN and Hussein RT. Pt on monitor, non-eventful trip. Chest tube cont to have air leak but no output. Plan to monitor.     Tube feedings started.     Kidney function slightly worse per am labs. Urine output improved with 1 L LR given over 8 hours. Plan to monitor.     Was able to titrate Norepi off, will monitor.     Pt nodding yes to pain after activity. PRN fentanyl and propofol given. Increased gtts as well. Will monitor.         Key Events - This Shift:     See above     RN Managed Protocols Ordered:  No  Protocols:  PRN'S:  Protocols Status:             Barriers to Discharge / Downgrade:     ICU level cares     Point of Contact Update: YES-OR-NO: Yes  If No, reason: na  Name: Trini, wife  Phone Number: on file  Summary of Conversation: Chest tube replaced, TF started, pt stable and appears comfortable.

## 2025-05-20 NOTE — CONSULTS
CLINICAL NUTRITION SERVICES - ASSESSMENT NOTE    RECOMMENDATIONS FOR MDs/PROVIDERS TO ORDER:  Monitor and replace K, Mg, Phos as needed with start of TF    Registered Dietitian Interventions:  Start Jevity 1.5 at 20 mL/hr, increase 10 mL every 8 hours, as tolerated, to goal of 45 mL/hr  Flush with 80 mL water every 4 hours    Goal will provide: 1080 mL formula, 1620 calories, 69 gm protein, 233 gm cho, 54 gm fat, 23 gm fiber, 821 mL free water + 480 mL water flushes = 1301 mL water/day    Plus 1 packet Prosource TF 20 daily which provides 20 gm protein and 80 calories.    Ordered Mg, Phos lab for tomorrow    Future/Additional Recommendations:  Will monitor TF tolerance, hydration, wt, I/O, labs, Propofol     REASON FOR ASSESSMENT  Provider order - Registered Dietitian to order TF per Medical Nutrition Therapy Guidelines    Per chart,  Armin Terrell is a 69 year old male with history of chronic respiratory failure on home O2, 3LPM, COPD (FEV1 1.2 L 33%), severe emphysema, alpha-1 antitrypsin deficiency, decline treatment, lung nodules, prostate cancer status post radical prostatectomy   Active Problems:    Shortness of breath    Pneumothorax on right    Acute respiratory failure with hypoxia and hypercapnia (H)    Pneumonia due to infectious organism, unspecified laterality, unspecified part of lung    Sepsis, due to unspecified organism, unspecified whether acute organ dysfunction present (H)    Chest tube   OG tube placed and xray confirmed 5/18/25    INFORMATION OBTAINED  Assessed patient in room.  Pt is intubated.  Writer visited with pt's wife at bedside.    NUTRITION HISTORY  Per pt's wife, pt's diet is regular.  The last time he ate was Saturday afternoon, he had a chicken salad sandwich and chips.  The middle of last week he asked her not to make dinner for him because he was not hungry.  Normally he is not a big eater, he does snack.  For fiber he eats honey nut cheerios.  He eats 2 clementines in the  evening.  He drinks water, 2 cups of coffee, and occasionally drinks Gatorade.  She feels he started eating less than 50% of his normal intake the middle of last week.  She says he was in the hospital the end of last year for pneumonia and has eaten as well and has been consistently losing weight since that time.  She says he has lost weight and muscle mass.  He is not as strong and does not have the endurance he had last year.  He is still active but, cannot mow the whole lawn anymore. He does not take vitamins.  He has NKFA.    CURRENT NUTRITION ORDERS  Diet: NPO    Propofol at current rate of 7.9 mL/hr provides ~209 calories/day    LABS  Nutrition-relevant labs:   BUN 58.5 H  Creat 1.23 H - normal prior to today  Glucose 154   5/19 Triglyceride 238 H    MEDICATIONS  Nutrition-relevant medications:   IV abx  Sliding scale insulin  Duoneb  Solu- medrol  Pantoprazole  Senokot  Colace  Cont, IV fentanyl, levophed, propofol, LR at 125 mL/hr  Received 500 mL LR bolus yesterday    ANTHROPOMETRICS  Height: 6'  Admission Weight: 67 kg (147 lb 11.2 oz) (05/18/25 0600)   Most Recent Weight: 70.1 kg (154 lb 9.6 oz) (05/20/25 0400) 7% loss in under 2 weeks  IBW: 80.9 kg  BMI: Body mass index is 20.97 kg/m .  Normal  Weight History:   Wt Readings from Last 10 Encounters:   05/20/25 70.1 kg (154 lb 9.6 oz)   05/14/25 66.2 kg (146 lb)   05/07/25 75.3 kg (166 lb)   02/24/25 67.1 kg (148 lb)   12/20/24 69.4 kg (153 lb)   12/04/24 69.4 kg (153 lb)   11/07/24 70.6 kg (155 lb 9.6 oz)   07/10/24 68 kg (150 lb)   05/06/24 72.7 kg (160 lb 3.2 oz)   05/04/23 73.9 kg (163 lb)      Dosing Weight: 70.1 kg, based on actual wt    ASSESSED NUTRITION NEEDS  Estimated Energy Needs: 1686+ kcal/day ([facility] 203b)  Justification: Vented  Estimated Protein Needs: 84 -105 grams protein/day (1.2 - 1.5 grams of pro/kg)  Justification: Hypercatabolism with critical illness  Estimated Fluid Needs: 2100 mL/day (30 mL/kg), or per  Provider  Justification: Maintenance    SYSTEM AND PHYSICAL FINDINGS    Per chart and observation  Edema +1 generalized, ankles  GI symptoms: hypoactive BS, given bowel meds today  Skin/wounds: WDL, heels are reddened    MALNUTRITION  % Intake: </= 50% for >/= 5 days (severe)  % Weight Loss: > 5% in 1 month (severe)   Subcutaneous Fat Loss: Orbital: Moderate and Buccal: Moderate  Muscle Loss: Temples (temporalis muscle): Moderate, Clavicles (pectoralis and deltoids): Mild, and Thigh (quadriceps): Mild  Fluid Accumulation/Edema: Mild, 1+  Malnutrition Diagnosis: Severe malnutrition in the context of acute illness or injury  Malnutrition Present on Admission: Yes    NUTRITION DIAGNOSIS  Swallowing difficulty related to respiratory failure as evidenced by NPO, mechanical ventilation    INTERVENTIONS  Enteral nutrition management  Management of flushing of feeding tube    GOALS  TF tolerance  Meet estimated nutrition needs  Electrolytes WDL     MONITORING/EVALUATION  Progress toward goals will be monitored and evaluated per policy.

## 2025-05-20 NOTE — CONSULTS
"  Interventional Radiology - Pre-Procedure Evaluation:  Inpatient - [facility]  05/20/2025     Procedure Requested: \"persistent ptx with nonfucntioning chest tube - persistent air leak and ptx on imaging\"  Requested by: Argentina Cruz APRN CNP     HPI: Armin Terrell is a 69 year old male with a PMH of COPD on 3L home O2, alpha-1 antitrypsin deficiency, and prostate cancer s/p radical prostatectomy who was admitted to [facility] on 05/18/2025 2/2 acute hypoxemic respiratory failure, COPD exacerbation, and pneumothorax. Intubated and RIGHT sided chest tube placed for pneumothorax in ED. Admitted to ICU. Also found to have sepsis 2/2 pseudomonas pneumonia. Follow up CXR this AM demonstrated \"residual pneumothorax is present in the apex\", per below. IR consulted for possible chest tube intervention.    Currently intubated and sedated on fentanyl and propofol in ICU. Requiring norepinephrine gtt.     IMAGING:  EXAM: XR CHEST PORT 1 VIEW  LOCATION: [facility]  DATE: 5/20/2025     INDICATION: f u ptx, chest tube in place  COMPARISON: Portable AP view of the chest 5/19/2025, 5/18/2025; CT of the chest 5/18/2025                                                                      IMPRESSION:      The endotracheal tube is in satisfactory position. Right PICC terminates in the lower third of the SVC. Telemetry leads overlie the chest.     Unchanged position of the right pleural drain projecting in the lateral basal pleural space. Residual pneumothorax is present in the apex, along the lateral costal pleura and in the base, similar in size to the last several days. Questionable improvement   in subcutaneous emphysema in the right lateral chest wall.     Slight improvement in airspace consolidation in the right upper lobe which is greatest along the minor fissure. Severe emphysema with conspicuous hyperexpansion and lucency of the apical division left upper lobe. Unchanged " "angular bands of atelectasis in   both bases.     Cardiac silhouette is normal in size. Unchanged mediastinal interfaces.    NPO: Midnight, OG in place, clamped for several days  ANTICOAGULANTS/ANTIPLATELETS: Lovenox, no hold required  ANTIBIOTICS: Not needed  GLP-1 Agonist: None    ALLERGIES:  No Known Allergies      LABS:  No results found for: \"INR\"   Hemoglobin   Date Value Ref Range Status   05/20/2025 14.7 13.3 - 17.7 g/dL Final     Platelet Count   Date Value Ref Range Status   05/20/2025 306 150 - 450 10e3/uL Final     Creatinine   Date Value Ref Range Status   05/20/2025 1.23 (H) 0.67 - 1.17 mg/dL Final     Potassium   Date Value Ref Range Status   05/20/2025 5.0 3.4 - 5.3 mmol/L Final   02/21/2022 4.3 3.5 - 5.0 mmol/L Final         EXAM:  /78   Pulse 93   Temp 98.5  F (36.9  C) (Oral)   Resp 20   Wt 70.1 kg (154 lb 9.6 oz)   SpO2 93%   BMI 20.97 kg/m    General: Stable. In no acute distress.    Neuro: Sedated  Psych: Appropriate mood and affect. Linear/coherent thought process.   Resp: Vented. Lungs clear to auscultation bilaterally. RIGHT chest tube in place, significant air leak noted.  Cardio: S1S2, regular rate and rhythm, without murmur, clicks or rubs.  Skin: Warm and dry. Without excoriations, ecchymosis, erythema, lesions or open sores.      PRE-SEDATION ASSESSMENT:  Mallampati Airway Classification: Intubated  Previous reaction to anesthesia/sedation:  No  Sedation plan based on assessment: Moderate (conscious) sedation as needed  ASA Classification: Class 4 - SEVERE SYSTEMIC DISEASE, ACUTE UNSTABLE PROBLEMS.   Code Status: FULL CODE      ASSESSMENT/PLAN:   D/w Dr. Acuna, who reviewed imaging.    RIGHT chest tube check with possible intervention including new RIGHT chest tube placement with sedation as needed.    Procedural education reviewed with patient's wife, Trini Terrell, via phone, in detail including, but not limited to risks, benefits and alternatives with understanding " verbalized by Trini Terrell.    Total time spent on the date of the encounter: 30 minutes.      JORDAN TOMLIN CNP  Interventional Radiology

## 2025-05-20 NOTE — PLAN OF CARE
Goal Outcome Evaluation:      Plan of Care Reviewed With: patient    Overall Patient Progress: no changeOverall Patient Progress: no change    Outcome Evaluation: Still requiring vasopressor.        [facility] - ICU    RN Progress Note:            Pertinent Assessments:      Please refer to flowsheet rows for full assessment     Pt opens eyes but does not follow commands; withdraws from pain. Pupils equal and reactive. LS diminished w/ friction rub on R.            Key Events - This Shift:       Spoke with MD about adding PRN fentanyl from pump for acute agitation/restlessness/pain--order obtain. Fentanyl and propofol boluses given for pain/sedation during cares.    Chest tube air leak remains but lessens when turned on R side. Refrained from positioning on R d/t O2 sats in mid-upper 80s in this position.      RN Managed Protocols Ordered:  No  Protocols:  PRN'S:  Protocols Status:                 Barriers to Discharge / Downgrade:     Mechanical ventilation, sedation, vasopressor

## 2025-05-20 NOTE — PROGRESS NOTES
Patient was transported to  from ICU and back, on transport vent.  Patient tolerated well.  Transport was done with no complications.  Was placed back on ventilator on the following settings:    FiO2 (%): 50 %, Resp: 28, Vent Mode: VC/AC, Resp Rate (Set): 20 breaths/min, Tidal Volume (Set, mL): 500 mL, PEEP (cm H2O): 8 cmH2O, Resp Rate (Set): 20 breaths/min, Tidal Volume (Set, mL): 500 mL, PEEP (cm H2O): 8 cmH2O    Arnulfo Harmon, RT

## 2025-05-20 NOTE — PROGRESS NOTES
Critical Care Progress Note  5/20/2025     Admit Date: 5/18/2025  ICU Admission Day: 5/18/2025  Code Status: FULL CODE      Problem List:   Active Problems:    Shortness of breath    Pneumothorax on right    Acute respiratory failure with hypoxia and hypercapnia (H)    Pneumonia due to infectious organism, unspecified laterality, unspecified part of lung    Sepsis, due to unspecified organism, unspecified whether acute organ dysfunction present (H)         Plan by System:     Neuro/Psych: sedation for vent synchrony and comfort    - RASS goal -1 to -2    - Propofol and Fentanyl for sedation and analgesia    - Midazolam & propofol boluses for agitation    - Fentanyl bolus for pain     Pulmonary: acute hypoxic and hypercapnic respiratory failure requiring intubation on 5/18; PTX post intubation s/p chest tube; underlying O2 dependent, A1AT deficiency. Pneumonia - sputum with 2+ Pseudomonas    - FiO2 (%): 50 %, Resp: 20, Vent Mode: VC/AC, Resp Rate (Set): 20 breaths/min, Tidal Volume (Set, mL): 500 mL, PEEP (cm H2O): (S) 8 cmH2O, Resp Rate (Set): 20 breaths/min, Tidal Volume (Set, mL): 500 mL, PEEP (cm H2O): (S) 8 cmH2O    -  Goal PIP/Pplat < 30    - Goal saturations > 89%    - Serial ABGs    - Chest tube with continuous air leak and there is persistent PTX on imaging. Will ask IR to replace the tube.    - daily CXR       CV: Shock - some component of septic shock with Pseudomonas pneumonia, some component of sedation.    - Levophed for MAP > 65    - Cardiac monitoring     GI/: no acute issues    - Diet: NPO - will start tube today   - Last BM: none recorderd     - Bowel meds: senna-docusate    Renal: normal renal function    - Strict I/O    - LR @ 125ml/hr x 1-liter     ID: pseudomonas pneumonia    - meropenem     Heme/Coag:  no acute issues     - DVT proph: enoxaparin     Endocrine: hyperglycemia   - Q4h sliding scale insulin      Family: updated wife at bedside.      Clinically Significant Risk Factors          #  Hyperchloremia: Highest Cl = 108 mmol/L in last 2 days, will monitor as appropriate          # Hypoalbuminemia: Lowest albumin = 2.7 g/dL at 5/19/2025  4:45 AM, will monitor as appropriate         # Acute Hypercapnic Respiratory Failure: based on arterial blood gas results.  Continue supplemental oxygen and ventilatory support as indicated.  # Acute Hypercapnic Respiratory Failure: based on venous blood gas results.  Continue supplemental oxygen and ventilatory support as indicated.          # Severe Malnutrition: based on nutrition assessment and treatment provided per dietitian's recommendations., PRESENT ON ADMISSION                   Dispo: ICU     Argentina Cruz, [facility] Pulmonary/Critical Care    Total critical care time: 35-minutes  I have personally provided 35 minutes of critical care time exclusive of time spent on separately billable procedures.   _______________________________________________________________    HPI: Armin Terrell is a 69 year old male with history of chronic respiratory failure on home O2, 3LPM, COPD (FEV1 1.2 L 33%), severe emphysema, alpha-1 antitrypsin deficiency, decline treatment, lung nodules, prostate cancer status post radical prostatectomy.   Patient was recently seen in the ED on May 14, 2025, for evaluation of shortness of breath and right-sided chest pain, chest CT scan showed no pulmonary embolism right upper lobe infiltrate, enlarging right hilar adenopathy unchanged 1.7 cm left upper lobe nodule, labs show elevated white blood cell count negative respiratory viral PCR patient was treated for with systemic steroids and antibiotics.   Patient was brought to ED by EMS on May 18, 2021 for worsening shortness of breath generalized weakness.  Patient was in severe respiratory distress started on BiPAP therapy and later intubated.   Chest x-ray show right-sided pneumothorax, patient underwent chest tube placement.  Follow-up chest CT scan show large  right-sided pneumothorax chest x-ray show right-sided pneumothorax, right pigtail chest tube along the major fissure, collapsed right lower lobe and right middle lobe consolidation of the right upper lobe unchanged left upper lobe nodule.   Patient was hypotensive , started on NE drip.   Patient was admitted to the ICU for further care.     ROS: EDUARDO - intubated and sedated     Objective:     Vitals:    05/20/25 0930 05/20/25 1000 05/20/25 1030 05/20/25 1100   BP:       Pulse: 84 90 92 93   Resp: 20 20 20 20   Temp:       TempSrc:       SpO2: 95% 93% 93% 93%   Weight:           Vent:   Day of Intubation: 5/18  Ready to wean? No  FiO2 (%): 50 %, Resp: 20, Vent Mode: VC/AC, Resp Rate (Set): 20 breaths/min, Tidal Volume (Set, mL): 500 mL, PEEP (cm H2O): (S) 8 cmH2O, Resp Rate (Set): 20 breaths/min, Tidal Volume (Set, mL): 500 mL, PEEP (cm H2O): (S) 8 cmH2O    Sedative Infusion: propofol and fentanyl   Sedation vacation: Yes    I/O:   Intake/Output Summary (Last 24 hours) at 5/19/2025 1427  Last data filed at 5/19/2025 1200  Gross per 24 hour   Intake 2094.47 ml   Output 1360 ml   Net 734.47 ml     Wt Readings from Last 3 Encounters:   05/20/25 70.1 kg (154 lb 9.6 oz)   05/14/25 66.2 kg (146 lb)   05/07/25 75.3 kg (166 lb)      Weight change: 0.726 kg (1 lb 9.6 oz)    Physical Exam:  Gen: no acute distress  HEENMT: AT/NC OETT in place   NEURO: sedated  CARDIOVASCULAR: Tachy and regular; S1S2 no murmur  PULMONARY: unlabored on full vent; lungs have scattered rhonchi, no wheeze   GASTROINTESTINAL: soft ntnd  INTEGUMENT: visible skin intact   PSYCH: sedated    Labs:   Recent Labs   Lab 05/20/25  0314 05/19/25  0445    141   CO2 25 26   BUN 58.5* 38.1*   ALKPHOS  --  120   ALT  --  36   AST  --  42       Recent Labs   Lab 05/20/25  0314   WBC 28.2*   HGB 14.7   HCT 45.5          Micro:   5/18 Sputum - 2+ PsA    Imaging: all imaging personalized reviewed  5/20 CXR - The endotracheal tube is in satisfactory  position. Right PICC terminates in the lower third of the SVC. Telemetry leads overlie the chest.     Unchanged position of the right pleural drain projecting in the lateral basal pleural space. Residual pneumothorax is present in the apex, along the lateral costal pleura and in the base, similar in size to the last several days. Questionable improvement   in subcutaneous emphysema in the right lateral chest wall.     Slight improvement in airspace consolidation in the right upper lobe which is greatest along the minor fissure. Severe emphysema with conspicuous hyperexpansion and lucency of the apical division left upper lobe. Unchanged angular bands of atelectasis in   both bases.     Cardiac silhouette is normal in size. Unchanged mediastinal interfaces.    5/19 CXR - Pigtail chest tube at the right lower chest. Decreased size of the left basilar pneumothorax compared to comparison radiograph. The pneumothorax did appear larger on the comparison CT after chest tube placement earlier this morning. Small   apical component of pneumothorax measuring up to 1.7 cm. Right midlung consolidation is stable. Stable streaky left mid and lower lung opacities. No substantial pleural effusion.      Argentina Cruz, CNP  [facility] Pulmonary/Critical Care

## 2025-05-21 ENCOUNTER — APPOINTMENT (OUTPATIENT)
Dept: RADIOLOGY | Facility: HOSPITAL | Age: 70
DRG: 870 | End: 2025-05-21
Attending: NURSE PRACTITIONER
Payer: COMMERCIAL

## 2025-05-21 LAB
ANION GAP SERPL CALCULATED.3IONS-SCNC: 7 MMOL/L (ref 7–15)
BASE EXCESS BLDA CALC-SCNC: 1.7 MMOL/L (ref -3–3)
BUN SERPL-MCNC: 70.4 MG/DL (ref 8–23)
CALCIUM SERPL-MCNC: 8.6 MG/DL (ref 8.8–10.4)
CHLORIDE SERPL-SCNC: 114 MMOL/L (ref 98–107)
CREAT SERPL-MCNC: 0.96 MG/DL (ref 0.67–1.17)
EGFRCR SERPLBLD CKD-EPI 2021: 86 ML/MIN/1.73M2
ERYTHROCYTE [DISTWIDTH] IN BLOOD BY AUTOMATED COUNT: 15.2 % (ref 10–15)
GLUCOSE BLDC GLUCOMTR-MCNC: 133 MG/DL (ref 70–99)
GLUCOSE BLDC GLUCOMTR-MCNC: 149 MG/DL (ref 70–99)
GLUCOSE BLDC GLUCOMTR-MCNC: 160 MG/DL (ref 70–99)
GLUCOSE BLDC GLUCOMTR-MCNC: 164 MG/DL (ref 70–99)
GLUCOSE BLDC GLUCOMTR-MCNC: 181 MG/DL (ref 70–99)
GLUCOSE BLDC GLUCOMTR-MCNC: 187 MG/DL (ref 70–99)
GLUCOSE SERPL-MCNC: 178 MG/DL (ref 70–99)
HCO3 BLD-SCNC: 26 MMOL/L (ref 21–28)
HCO3 SERPL-SCNC: 24 MMOL/L (ref 22–29)
HCT VFR BLD AUTO: 41.1 % (ref 40–53)
HGB BLD-MCNC: 13.5 G/DL (ref 13.3–17.7)
LDH SERPL L TO P-CCNC: 209 U/L (ref 0–250)
MAGNESIUM SERPL-MCNC: 3.1 MG/DL (ref 1.7–2.3)
MCH RBC QN AUTO: 28.2 PG (ref 26.5–33)
MCHC RBC AUTO-ENTMCNC: 32.8 G/DL (ref 31.5–36.5)
MCV RBC AUTO: 86 FL (ref 78–100)
O2/TOTAL GAS SETTING VFR VENT: 50 %
OXYHGB MFR BLDA: 97 % (ref 92–100)
PCO2 BLD: 38 MM HG (ref 35–45)
PEEP: 5 CM H2O
PH BLD: 7.44 [PH] (ref 7.35–7.45)
PH BODY FLUID SOURCE: NORMAL
PH FLD: 6 PH
PHOSPHATE SERPL-MCNC: 2.9 MG/DL (ref 2.5–4.5)
PLATELET # BLD AUTO: 161 10E3/UL (ref 150–450)
PO2 BLD: 83 MM HG (ref 80–105)
POTASSIUM SERPL-SCNC: 4.7 MMOL/L (ref 3.4–5.3)
PROT SERPL-MCNC: 4.8 G/DL (ref 6.4–8.3)
RBC # BLD AUTO: 4.79 10E6/UL (ref 4.4–5.9)
SAO2 % BLDA: 97.2 % (ref 95–96)
SODIUM SERPL-SCNC: 145 MMOL/L (ref 135–145)
TRIGL SERPL-MCNC: 164 MG/DL
WBC # BLD AUTO: 21.9 10E3/UL (ref 4–11)

## 2025-05-21 PROCEDURE — 74018 RADEX ABDOMEN 1 VIEW: CPT

## 2025-05-21 PROCEDURE — 71045 X-RAY EXAM CHEST 1 VIEW: CPT

## 2025-05-21 PROCEDURE — 82945 GLUCOSE OTHER FLUID: CPT | Performed by: NURSE PRACTITIONER

## 2025-05-21 PROCEDURE — 82805 BLOOD GASES W/O2 SATURATION: CPT | Performed by: NURSE PRACTITIONER

## 2025-05-21 PROCEDURE — 84478 ASSAY OF TRIGLYCERIDES: CPT | Performed by: NURSE PRACTITIONER

## 2025-05-21 PROCEDURE — 999N000009 HC STATISTIC AIRWAY CARE

## 2025-05-21 PROCEDURE — 83735 ASSAY OF MAGNESIUM: CPT | Performed by: NURSE PRACTITIONER

## 2025-05-21 PROCEDURE — 250N000009 HC RX 250: Performed by: NURSE PRACTITIONER

## 2025-05-21 PROCEDURE — 94003 VENT MGMT INPAT SUBQ DAY: CPT

## 2025-05-21 PROCEDURE — 999N000157 HC STATISTIC RCP TIME EA 10 MIN

## 2025-05-21 PROCEDURE — 89050 BODY FLUID CELL COUNT: CPT | Performed by: NURSE PRACTITIONER

## 2025-05-21 PROCEDURE — 80048 BASIC METABOLIC PNL TOTAL CA: CPT | Performed by: NURSE PRACTITIONER

## 2025-05-21 PROCEDURE — 200N000001 HC R&B ICU

## 2025-05-21 PROCEDURE — 94640 AIRWAY INHALATION TREATMENT: CPT | Mod: 76

## 2025-05-21 PROCEDURE — 99232 SBSQ HOSP IP/OBS MODERATE 35: CPT | Performed by: NURSE PRACTITIONER

## 2025-05-21 PROCEDURE — 250N000011 HC RX IP 250 OP 636: Mod: JZ | Performed by: INTERNAL MEDICINE

## 2025-05-21 PROCEDURE — 258N000003 HC RX IP 258 OP 636: Performed by: INTERNAL MEDICINE

## 2025-05-21 PROCEDURE — 83986 ASSAY PH BODY FLUID NOS: CPT | Performed by: NURSE PRACTITIONER

## 2025-05-21 PROCEDURE — 250N000011 HC RX IP 250 OP 636: Mod: JZ | Performed by: NURSE PRACTITIONER

## 2025-05-21 PROCEDURE — 250N000013 HC RX MED GY IP 250 OP 250 PS 637: Performed by: NURSE PRACTITIONER

## 2025-05-21 PROCEDURE — 250N000011 HC RX IP 250 OP 636: Performed by: INTERNAL MEDICINE

## 2025-05-21 PROCEDURE — 84155 ASSAY OF PROTEIN SERUM: CPT | Performed by: NURSE PRACTITIONER

## 2025-05-21 PROCEDURE — 84100 ASSAY OF PHOSPHORUS: CPT | Performed by: NURSE PRACTITIONER

## 2025-05-21 PROCEDURE — 83615 LACTATE (LD) (LDH) ENZYME: CPT | Performed by: NURSE PRACTITIONER

## 2025-05-21 PROCEDURE — 999N000055 HC STATISTIC END TITIAL CO2 MONITORING

## 2025-05-21 PROCEDURE — 999N000253 HC STATISTIC WEANING TRIALS

## 2025-05-21 PROCEDURE — 85018 HEMOGLOBIN: CPT | Performed by: NURSE PRACTITIONER

## 2025-05-21 PROCEDURE — 84157 ASSAY OF PROTEIN OTHER: CPT | Performed by: NURSE PRACTITIONER

## 2025-05-21 PROCEDURE — 250N000009 HC RX 250: Performed by: INTERNAL MEDICINE

## 2025-05-21 PROCEDURE — 258N000003 HC RX IP 258 OP 636: Performed by: NURSE PRACTITIONER

## 2025-05-21 RX ORDER — METOCLOPRAMIDE HYDROCHLORIDE 5 MG/ML
5 INJECTION INTRAMUSCULAR; INTRAVENOUS ONCE
Status: COMPLETED | OUTPATIENT
Start: 2025-05-21 | End: 2025-05-21

## 2025-05-21 RX ORDER — METHYLPREDNISOLONE SODIUM SUCCINATE 40 MG/ML
40 INJECTION INTRAMUSCULAR; INTRAVENOUS EVERY 12 HOURS
Status: DISCONTINUED | OUTPATIENT
Start: 2025-05-21 | End: 2025-05-22 | Stop reason: HOSPADM

## 2025-05-21 RX ORDER — ACETAMINOPHEN 325 MG/1
650 TABLET ORAL EVERY 4 HOURS PRN
Status: DISCONTINUED | OUTPATIENT
Start: 2025-05-21 | End: 2025-05-22 | Stop reason: HOSPADM

## 2025-05-21 RX ORDER — ACETAMINOPHEN 325 MG/10.15ML
650 LIQUID ORAL EVERY 4 HOURS PRN
Status: DISCONTINUED | OUTPATIENT
Start: 2025-05-21 | End: 2025-05-22 | Stop reason: HOSPADM

## 2025-05-21 RX ORDER — ACETAMINOPHEN 650 MG/1
650 SUPPOSITORY RECTAL EVERY 4 HOURS PRN
Status: DISCONTINUED | OUTPATIENT
Start: 2025-05-21 | End: 2025-05-22 | Stop reason: HOSPADM

## 2025-05-21 RX ORDER — LIDOCAINE HYDROCHLORIDE 20 MG/ML
5 SOLUTION OROPHARYNGEAL ONCE
Status: DISCONTINUED | OUTPATIENT
Start: 2025-05-21 | End: 2025-05-22 | Stop reason: HOSPADM

## 2025-05-21 RX ORDER — HYDRALAZINE HYDROCHLORIDE 20 MG/ML
10-20 INJECTION INTRAMUSCULAR; INTRAVENOUS EVERY 6 HOURS PRN
Status: DISCONTINUED | OUTPATIENT
Start: 2025-05-21 | End: 2025-05-22 | Stop reason: HOSPADM

## 2025-05-21 RX ORDER — DEXMEDETOMIDINE HYDROCHLORIDE 4 UG/ML
.1-1.2 INJECTION, SOLUTION INTRAVENOUS CONTINUOUS
Status: DISCONTINUED | OUTPATIENT
Start: 2025-05-21 | End: 2025-05-22

## 2025-05-21 RX ORDER — SODIUM CHLORIDE, SODIUM LACTATE, POTASSIUM CHLORIDE, CALCIUM CHLORIDE 600; 310; 30; 20 MG/100ML; MG/100ML; MG/100ML; MG/100ML
INJECTION, SOLUTION INTRAVENOUS CONTINUOUS
Status: ACTIVE | OUTPATIENT
Start: 2025-05-21 | End: 2025-05-21

## 2025-05-21 RX ADMIN — SODIUM CHLORIDE 30 MG/ML INHALATION SOLUTION 3 ML: 30 SOLUTION INHALANT at 08:30

## 2025-05-21 RX ADMIN — PROPOFOL 20 MCG/KG/MIN: 10 INJECTION, EMULSION INTRAVENOUS at 04:58

## 2025-05-21 RX ADMIN — SODIUM CHLORIDE 30 MG/ML INHALATION SOLUTION 3 ML: 30 SOLUTION INHALANT at 03:26

## 2025-05-21 RX ADMIN — DEXMEDETOMIDINE HYDROCHLORIDE 0.8 MCG/KG/HR: 400 INJECTION INTRAVENOUS at 15:28

## 2025-05-21 RX ADMIN — SODIUM CHLORIDE 30 MG/ML INHALATION SOLUTION 3 ML: 30 SOLUTION INHALANT at 19:22

## 2025-05-21 RX ADMIN — INSULIN ASPART 1 UNITS: 100 INJECTION, SOLUTION INTRAVENOUS; SUBCUTANEOUS at 11:40

## 2025-05-21 RX ADMIN — MIDAZOLAM HYDROCHLORIDE 1 MG: 1 INJECTION, SOLUTION INTRAMUSCULAR; INTRAVENOUS at 19:36

## 2025-05-21 RX ADMIN — IPRATROPIUM BROMIDE AND ALBUTEROL SULFATE 3 ML: .5; 3 SOLUTION RESPIRATORY (INHALATION) at 19:22

## 2025-05-21 RX ADMIN — SODIUM CHLORIDE 30 MG/ML INHALATION SOLUTION 3 ML: 30 SOLUTION INHALANT at 23:05

## 2025-05-21 RX ADMIN — Medication 50 MCG: at 12:45

## 2025-05-21 RX ADMIN — METHYLPREDNISOLONE SODIUM SUCCINATE 40 MG: 40 INJECTION INTRAMUSCULAR; INTRAVENOUS at 15:30

## 2025-05-21 RX ADMIN — IPRATROPIUM BROMIDE AND ALBUTEROL SULFATE 3 ML: .5; 3 SOLUTION RESPIRATORY (INHALATION) at 03:26

## 2025-05-21 RX ADMIN — IPRATROPIUM BROMIDE AND ALBUTEROL SULFATE 3 ML: .5; 3 SOLUTION RESPIRATORY (INHALATION) at 23:05

## 2025-05-21 RX ADMIN — METOCLOPRAMIDE 5 MG: 5 INJECTION, SOLUTION INTRAMUSCULAR; INTRAVENOUS at 13:43

## 2025-05-21 RX ADMIN — SODIUM CHLORIDE 30 MG/ML INHALATION SOLUTION 3 ML: 30 SOLUTION INHALANT at 11:30

## 2025-05-21 RX ADMIN — Medication 50 MCG: at 22:50

## 2025-05-21 RX ADMIN — DOCUSATE SODIUM 100 MG: 50 LIQUID ORAL at 08:03

## 2025-05-21 RX ADMIN — Medication 10 MG: at 01:42

## 2025-05-21 RX ADMIN — SODIUM CHLORIDE, SODIUM LACTATE, POTASSIUM CHLORIDE, AND CALCIUM CHLORIDE: .6; .31; .03; .02 INJECTION, SOLUTION INTRAVENOUS at 09:18

## 2025-05-21 RX ADMIN — IPRATROPIUM BROMIDE AND ALBUTEROL SULFATE 3 ML: .5; 3 SOLUTION RESPIRATORY (INHALATION) at 15:44

## 2025-05-21 RX ADMIN — Medication 75 MCG/HR: at 04:59

## 2025-05-21 RX ADMIN — SODIUM CHLORIDE 30 MG/ML INHALATION SOLUTION 3 ML: 30 SOLUTION INHALANT at 15:44

## 2025-05-21 RX ADMIN — DEXMEDETOMIDINE HYDROCHLORIDE 0.2 MCG/KG/HR: 400 INJECTION INTRAVENOUS at 08:07

## 2025-05-21 RX ADMIN — PANTOPRAZOLE SODIUM 40 MG: 40 INJECTION, POWDER, FOR SOLUTION INTRAVENOUS at 06:32

## 2025-05-21 RX ADMIN — MEROPENEM 1 G: 1 INJECTION, POWDER, FOR SOLUTION INTRAVENOUS at 07:52

## 2025-05-21 RX ADMIN — INSULIN ASPART 1 UNITS: 100 INJECTION, SOLUTION INTRAVENOUS; SUBCUTANEOUS at 16:09

## 2025-05-21 RX ADMIN — Medication 50 MCG: at 20:17

## 2025-05-21 RX ADMIN — SENNOSIDES 5 ML: 8.8 LIQUID ORAL at 08:03

## 2025-05-21 RX ADMIN — AZITHROMYCIN MONOHYDRATE 500 MG: 500 INJECTION, POWDER, LYOPHILIZED, FOR SOLUTION INTRAVENOUS at 04:43

## 2025-05-21 RX ADMIN — CHLORHEXIDINE GLUCONATE 15 ML: 1.2 SOLUTION ORAL at 07:51

## 2025-05-21 RX ADMIN — INSULIN ASPART 1 UNITS: 100 INJECTION, SOLUTION INTRAVENOUS; SUBCUTANEOUS at 20:25

## 2025-05-21 RX ADMIN — DOCUSATE SODIUM 100 MG: 50 LIQUID ORAL at 20:18

## 2025-05-21 RX ADMIN — IPRATROPIUM BROMIDE AND ALBUTEROL SULFATE 3 ML: .5; 3 SOLUTION RESPIRATORY (INHALATION) at 08:30

## 2025-05-21 RX ADMIN — Medication 10 MG: at 10:20

## 2025-05-21 RX ADMIN — METHYLPREDNISOLONE SODIUM SUCCINATE 40 MG: 40 INJECTION INTRAMUSCULAR; INTRAVENOUS at 04:16

## 2025-05-21 RX ADMIN — INSULIN ASPART 1 UNITS: 100 INJECTION, SOLUTION INTRAVENOUS; SUBCUTANEOUS at 07:51

## 2025-05-21 RX ADMIN — DEXMEDETOMIDINE HYDROCHLORIDE 0.8 MCG/KG/HR: 400 INJECTION INTRAVENOUS at 22:43

## 2025-05-21 RX ADMIN — SENNOSIDES 5 ML: 8.8 LIQUID ORAL at 20:19

## 2025-05-21 RX ADMIN — CHLORHEXIDINE GLUCONATE 15 ML: 1.2 SOLUTION ORAL at 20:13

## 2025-05-21 RX ADMIN — Medication 50 MCG: at 09:55

## 2025-05-21 RX ADMIN — ENOXAPARIN SODIUM 40 MG: 40 INJECTION SUBCUTANEOUS at 07:51

## 2025-05-21 RX ADMIN — Medication 60 ML: at 08:03

## 2025-05-21 RX ADMIN — MEROPENEM 1 G: 1 INJECTION, POWDER, FOR SOLUTION INTRAVENOUS at 00:18

## 2025-05-21 RX ADMIN — MEROPENEM 1 G: 1 INJECTION, POWDER, FOR SOLUTION INTRAVENOUS at 15:29

## 2025-05-21 RX ADMIN — INSULIN ASPART 1 UNITS: 100 INJECTION, SOLUTION INTRAVENOUS; SUBCUTANEOUS at 04:41

## 2025-05-21 RX ADMIN — ACETAMINOPHEN 650 MG: 325 SOLUTION ORAL at 11:00

## 2025-05-21 RX ADMIN — HYDRALAZINE HYDROCHLORIDE 10 MG: 20 INJECTION INTRAMUSCULAR; INTRAVENOUS at 17:38

## 2025-05-21 RX ADMIN — Medication 50 MCG: at 18:15

## 2025-05-21 RX ADMIN — IPRATROPIUM BROMIDE AND ALBUTEROL SULFATE 3 ML: .5; 3 SOLUTION RESPIRATORY (INHALATION) at 11:31

## 2025-05-21 ASSESSMENT — ACTIVITIES OF DAILY LIVING (ADL)
ADLS_ACUITY_SCORE: 58
ADLS_ACUITY_SCORE: 58
ADLS_ACUITY_SCORE: 59
ADLS_ACUITY_SCORE: 59
ADLS_ACUITY_SCORE: 61
ADLS_ACUITY_SCORE: 59
ADLS_ACUITY_SCORE: 61
ADLS_ACUITY_SCORE: 58
ADLS_ACUITY_SCORE: 61
ADLS_ACUITY_SCORE: 59
ADLS_ACUITY_SCORE: 59
ADLS_ACUITY_SCORE: 61
ADLS_ACUITY_SCORE: 58
ADLS_ACUITY_SCORE: 58
ADLS_ACUITY_SCORE: 59
ADLS_ACUITY_SCORE: 58
ADLS_ACUITY_SCORE: 59
ADLS_ACUITY_SCORE: 58

## 2025-05-21 NOTE — PROGRESS NOTES
Critical Care Progress Note  5/21/2025     Admit Date: 5/18/2025  ICU Admission Day: 5/18/2025  Code Status: FULL CODE      Problem List:   Active Problems:    Shortness of breath    Pneumothorax on right    Acute respiratory failure with hypoxia and hypercapnia (H)    Pneumonia due to infectious organism, unspecified laterality, unspecified part of lung    Sepsis, due to unspecified organism, unspecified whether acute organ dysfunction present (H)         Plan by System:     Neuro/Psych: sedation for vent synchrony and comfort    - RASS goal 0 to -1    - Propofol and Fentanyl for sedation and analgesia     * transition from propofol to precedex to facilitate weaning   - Fentanyl bolus for pain     Pulmonary: acute hypoxic and hypercapnic respiratory failure requiring intubation on 5/18; PTX post intubation s/p chest tube; underlying O2 dependent, A1AT deficiency. Pneumonia - sputum with 2+ Pseudomonas    - FiO2 (%): 50 %, Resp: 18, Vent Mode: VC/AC, Resp Rate (Set): 18 breaths/min, Tidal Volume (Set, mL): 540 mL, PEEP (cm H2O): 5 cmH2O, Resp Rate (Set): 18 breaths/min, Tidal Volume (Set, mL): 540 mL, PEEP (cm H2O): 5 cmH2O    -  Goal PIP/Pplat < 30    - Goal saturations > 89%    - start backing off on steroid    - Chest tube upsized yesterday in IR; PTX looks better on xray this morning but now with increased subcu emphysema and air leak continues.     * Keep chest tube to suction. No attempt to wean until at least off vent.     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    - MAP goal > 65    - off Norepi since yesterday   - Cardiac monitoring     GI/: no acute issues    - Diet: NPO - tube feeding started 5/20    - Last BM: none recorderd     - place PPFT today; anticipate ongoing tube feeding need after extubation.    - Bowel meds: senna-docusate    Renal: normal renal function; elevated BUN - ddx  dehydration vs protein? Vs GI bleed (less likely - no signs of bleeding)     - Strict I/O    - LR @ 125ml/hr x 1-liter    - RD looking into tube feeding formula    - electrolyte protocols     ID: pseudomonas pneumonia    - meropenem     Heme/Coag:  no acute issues     - DVT proph: enoxaparin     Endocrine: hyperglycemia   - Q4h sliding scale insulin      Family: updated wife at bedside.      Clinically Significant Risk Factors          # Hyperchloremia: Highest Cl = 114 mmol/L in last 2 days, will monitor as appropriate          # Hypoalbuminemia: Lowest albumin = 2.7 g/dL at 5/19/2025  4:45 AM, will monitor as appropriate         # Acute Hypercapnic Respiratory Failure: based on arterial blood gas results.  Continue supplemental oxygen and ventilatory support as indicated.  # Acute Hypercapnic Respiratory Failure: based on venous blood gas results.  Continue supplemental oxygen and ventilatory support as indicated.          # Severe Malnutrition: based on nutrition assessment and treatment provided per dietitian's recommendations., PRESENT ON ADMISSION                   Dispo: ICU     Argentina Cruz, CNP  [facility] Pulmonary/Critical Care    Total critical care time: 36-minutes  I have personally provided 36 minutes of critical care time exclusive of time spent on separately billable procedures.   _______________________________________________________________    HPI: Armin Terrell is a 69 year old male with history of chronic respiratory failure on home O2, 3LPM, COPD (FEV1 1.2 L 33%), severe emphysema, alpha-1 antitrypsin deficiency, decline treatment, lung nodules, prostate cancer status post radical prostatectomy.   Patient was recently seen in the ED on May 14, 2025, for evaluation of shortness of breath and right-sided chest pain, chest CT scan showed no pulmonary embolism right upper lobe infiltrate, enlarging right hilar adenopathy unchanged 1.7 cm left upper lobe nodule, labs show elevated  white blood cell count negative respiratory viral PCR patient was treated for with systemic steroids and antibiotics.   Patient was brought to ED by EMS on May 18, 2021 for worsening shortness of breath generalized weakness.  Patient was in severe respiratory distress started on BiPAP therapy and later intubated.   Chest x-ray show right-sided pneumothorax, patient underwent chest tube placement.  Follow-up chest CT scan show large right-sided pneumothorax chest x-ray show right-sided pneumothorax, right pigtail chest tube along the major fissure, collapsed right lower lobe and right middle lobe consolidation of the right upper lobe unchanged left upper lobe nodule.   Patient was hypotensive , started on NE drip.   Patient was admitted to the ICU for further care.     ROS: EDUARDO - intubated and sedated     Objective:     Vitals:    05/21/25 0645 05/21/25 0700 05/21/25 0800 05/21/25 0900   BP:       BP Location:       Pulse: 104 96 92 82   Resp: 26 18 18 18   Temp:   97.6  F (36.4  C)    TempSrc:   Oral    SpO2: 93% 94% 94% 95%   Weight:           Vent:   Day of Intubation: 5/18  Ready to wean? Yes  FiO2 (%): 50 %, Resp: 18, Vent Mode: VC/AC, Resp Rate (Set): 18 breaths/min, Tidal Volume (Set, mL): 540 mL, PEEP (cm H2O): 5 cmH2O, Resp Rate (Set): 18 breaths/min, Tidal Volume (Set, mL): 540 mL, PEEP (cm H2O): 5 cmH2O    Sedative Infusion: propofol and fentanyl   Sedation vacation: Yes    I/O:   Intake/Output Summary (Last 24 hours) at 5/19/2025 1427  Last data filed at 5/19/2025 1200  Gross per 24 hour   Intake 2094.47 ml   Output 1360 ml   Net 734.47 ml     Wt Readings from Last 3 Encounters:   05/21/25 70.1 kg (154 lb 9.6 oz)   05/14/25 66.2 kg (146 lb)   05/07/25 75.3 kg (166 lb)      Weight change: 0 kg (0 lb)    Physical Exam:  Gen: no acute distress; sedated  HEENMT: AT/NC OETT in place   NEURO: sedated  CARDIOVASCULAR: RRR S1S2 no murmur  PULMONARY: unlabored on full vent; lungs clear, no wheeze. Subcu emphysema  area over right chest. Chest tube with persistent air leak    GASTROINTESTINAL: soft ntnd  INTEGUMENT: visible skin intact   PSYCH: sedated    Labs:   Recent Labs   Lab 05/21/25  0427 05/20/25  0314 05/19/25  0445      < > 141   CO2 24   < > 26   BUN 70.4*   < > 38.1*   ALKPHOS  --   --  120   ALT  --   --  36   AST  --   --  42    < > = values in this interval not displayed.       Recent Labs   Lab 05/21/25 0427   WBC 21.9*   HGB 13.5   HCT 41.1          Micro:   5/18 Sputum - 2+ PsA    Imaging: all imaging personalized reviewed   5/21 CXR - Endotracheal tube approximately 5.8 cm above the isa. Enteric tube descends bilirubin diaphragm, beyond margins of study. Right PICC tip overlies the mid to lower SVC.     Normal cardiomediastinal silhouette. Right chest tube now located at the apex. Small right apical pneumothorax, though it is decreased from prior exam yesterday. Patchy right midlung airspace disease persists. Probable atelectasis or scarring at the lung   bases, right greater then left. Background of advanced emphysema. Question small pleural effusions. Substantial right lateral chest wall subcutaneous emphysema.      Argentina Cruz, CNP  [facility] Pulmonary/Critical Care

## 2025-05-21 NOTE — PROGRESS NOTES
CLINICAL NUTRITION SERVICES -  NOTE    EVALUATION OF THE PROGRESS TOWARD GOALS   Diet: NPO  Nutrition Support: Jevity 1.5 goal rate 45 ml/hr continuous.  1 pkt Prosource TF20   Free water flush 80 ml every 4 hrs.    TF at Goal provides: 1080 mL formula, 1700 calories, 89 gm protein, 233 gm cho, 54 gm fat, 23 gm fiber, 821 mL free water + 480 mL water flushes = 1301 mL water/day        NEW FINDINGS   Patient remains intubated, sedation weaning down today.    Labs noted:  sodium 145, Creatinine 0.96, BUN 70.4 (H).  Magnesium 3.1 (H), glucose 132-164 (H).    BUN has more than doubled since admission, BUN was 34.3-admission labs.   +3.3 L since admission per I/Os.      INTERVENTIONS  Implementation  1.  Increase free water flush for hydration needs 100 ml every 4 hrs.   2.  Stop the Prosource Modular to decrease protein provided as BUN continues to increase.    TF to provide 1620 kcals and 69 g protein w/o protein modular.

## 2025-05-21 NOTE — PLAN OF CARE
From: Sascha Craig  To: ETHAN Escobar  Sent: 8/21/2019 6:56 AM CDT  Subject: Medication Question    Can I get a refill of percoset?   Goal Outcome Evaluation:                          [facility] - ICU    RN Progress Note:            Pertinent Assessments:      Please refer to flowsheet rows for full assessment     Afebrile. Pt will now wiggle toes but will not squeeze fingers. PRN propofol and fentanyl given for restlessness with cares. LS clear to diminished this morning with improving pleural rub on R.            Key Events - This Shift:       MD notified about new large pocket of air on R side with crepitus. Morning CXR obtained showing worsening large subQ emphysema. No orders obtained at this time.     Clear output from chest tube with persistent air leak.    Improving UOP.      RN Managed Protocols Ordered:  No  Protocols:  PRN'S:  Protocols Status:                 Barriers to Discharge / Downgrade:     Mechanical ventilation, sedation

## 2025-05-21 NOTE — PROGRESS NOTES
Patient placed on SBT at 1544 PS 5/5 50%. No obstructions noted. Patient weaned for 137 minutes. RT will follow.    Kevin Doran, RT

## 2025-05-21 NOTE — PROGRESS NOTES
"Respiratory Therapy Overnight Shift Updates      Remains intubated (ETT 7.5/26T) and mechanically ventilator (AC R18  P5 50%.) Lung dynamics within normal range. Minimal secretions from ETT. BS=diminished, crackles RUL. Chest tube right apex, upsized 5/20 in the IR.    Now has \"substantial right lateral chest wall subcutaneous emphysema.\" Overnight intensivist aware. Vitals stable. Will closely monitor.     /69 (BP Location: Left arm)   Pulse 93   Temp 97.7  F (36.5  C) (Oral)   Resp 18   Wt 70.1 kg (154 lb 9.6 oz)   SpO2 94%   BMI 20.97 kg/m       Angel Amezcua, RRT  "

## 2025-05-21 NOTE — PROGRESS NOTES
Care Management Follow Up    Length of Stay (days): 3    Expected Discharge Date: pending    Anticipated Discharge Plan:   TBD    Transportation: TBD    PT Recommendations:    OT Recommendations:        Barriers to Discharge: medical stability, pulm status, nutrition-TF    Prior Living Situation:  Patient lives in his house with spouse. He has been independent with ADLs/IADLs, ambulates without devices and has home O2 (oxygen company not known). Two children live in [place] and one lives here. Spouse is primary family contact.     Discussed  Partnership in Safe Discharge Planning  document with patient/family: No     Handoff Completed: No, handoff not indicated or clinically appropriate    Patient/Spokesperson Updated: family 5/21    Additional Information:  Medical:  Armin Terrell is a 69 year old male with history of chronic respiratory failure on home O2, 3LPM, COPD (FEV1 1.2 L 33%), severe emphysema, alpha-1 antitrypsin deficiency, decline treatment, lung nodules, prostate cancer status post radical prostatectomy.   Patient was recently seen in the ED on May 14, 2025, for evaluation of shortness of breath and right-sided chest pain, chest CT scan showed no pulmonary embolism right upper lobe infiltrate, enlarging right hilar adenopathy unchanged 1.7 cm left upper lobe nodule, labs show elevated white blood cell count negative respiratory viral PCR patient was treated for with systemic steroids and antibiotics.   Patient was brought to ED by EMS on May 18, 2021 for worsening shortness of breath generalized weakness.     Intubated 5/18-current, Chest tube placed    5/21 Intensivist note: place PPFT today; anticipate ongoing tube feeding need after extubation.       5/21/25:  Patient is not medically stable to initiate discharge planning.        Next Steps:   Follow progression and recommendations.  Anticipating need for therapy orders when appropriate.      Janessa Corrales RN

## 2025-05-21 NOTE — PROGRESS NOTES
Patient placed on SBT at 11:25 on PS 5/5. SBT lasted 70 mins. No obstructions noted. RT will follow.    Kevin Doran, RT

## 2025-05-21 NOTE — PLAN OF CARE
Goal Outcome Evaluation:      Plan of Care Reviewed With: patient, spouse    Overall Patient Progress: improving    Outcome Evaluation: see note    [facility] - ICU    RN Progress Note:            Pertinent Assessments:      Please refer to flowsheet rows for full assessment     Transitioned to Precedex gtt and pt able to wean x2 today. See RT notes.     Increasing strength and cognition noted throughout day. Pt currently denying pain and anxiety.     Pt sat at edge of bed and PT/OT consulted, they will see pt tomorrow.     SBP elevated, notified provider and prn hydralazine ordered. Plan to monitor.     Changed out CT atrium and sent clear right chest pleural fluid for evaluation. Post change of atrium noted yellow/dark red fluid. Leak remains.     NJ placed and verified. Placed in advance of anticipated extubation tomorrow. Pt having frequent infiltrates before admit and is currently scheduled for esophogram and swallow eval next Friday.     TF advanced to goal and pt tolerating.         Key Events - This Shift:     See above     RN Managed Protocols Ordered:  Yes  Protocols:Potassium and Magnesium  PRN'S:  Protocols Status: Reviewed with Oncoming RN, rechecks ordered for am                Barriers to Discharge / Downgrade:     Ventilator support, may be able to downgrade tomorrow or Friday.          Point of Contact Update: YES-OR-NO: Yes  If No, reason:   Name: Trini, wife  Phone Number: on file  Summary of Conversation: See above

## 2025-05-22 ENCOUNTER — APPOINTMENT (OUTPATIENT)
Dept: CT IMAGING | Facility: HOSPITAL | Age: 70
DRG: 870 | End: 2025-05-22
Attending: NURSE PRACTITIONER
Payer: COMMERCIAL

## 2025-05-22 ENCOUNTER — ANESTHESIA EVENT (OUTPATIENT)
Dept: INTENSIVE CARE | Facility: HOSPITAL | Age: 70
End: 2025-05-22
Payer: COMMERCIAL

## 2025-05-22 ENCOUNTER — APPOINTMENT (OUTPATIENT)
Dept: RADIOLOGY | Facility: HOSPITAL | Age: 70
DRG: 870 | End: 2025-05-22
Attending: NURSE PRACTITIONER
Payer: COMMERCIAL

## 2025-05-22 ENCOUNTER — APPOINTMENT (OUTPATIENT)
Dept: PHYSICAL THERAPY | Facility: HOSPITAL | Age: 70
DRG: 870 | End: 2025-05-22
Attending: NURSE PRACTITIONER
Payer: COMMERCIAL

## 2025-05-22 ENCOUNTER — HOSPITAL ENCOUNTER (INPATIENT)
Facility: CLINIC | Age: 70
DRG: 870 | End: 2025-05-22
Attending: SURGERY | Admitting: INTERNAL MEDICINE
Payer: COMMERCIAL

## 2025-05-22 ENCOUNTER — APPOINTMENT (OUTPATIENT)
Dept: OCCUPATIONAL THERAPY | Facility: HOSPITAL | Age: 70
DRG: 870 | End: 2025-05-22
Attending: NURSE PRACTITIONER
Payer: COMMERCIAL

## 2025-05-22 ENCOUNTER — ANESTHESIA (OUTPATIENT)
Dept: INTENSIVE CARE | Facility: HOSPITAL | Age: 70
End: 2025-05-22
Payer: COMMERCIAL

## 2025-05-22 VITALS
BODY MASS INDEX: 21.59 KG/M2 | RESPIRATION RATE: 20 BRPM | TEMPERATURE: 97.7 F | SYSTOLIC BLOOD PRESSURE: 93 MMHG | HEART RATE: 80 BPM | DIASTOLIC BLOOD PRESSURE: 62 MMHG | WEIGHT: 159.2 LBS | OXYGEN SATURATION: 93 %

## 2025-05-22 DIAGNOSIS — Z71.89 OTHER SPECIFIED COUNSELING: Chronic | ICD-10-CM

## 2025-05-22 DIAGNOSIS — R23.9 IMPAIRED SKIN INTEGRITY: Primary | ICD-10-CM

## 2025-05-22 PROBLEM — J93.9 PNEUMOTHORAX: Status: ACTIVE | Noted: 2025-05-22

## 2025-05-22 PROBLEM — J86.0 BRONCHOPLEURAL FISTULA (H): Status: ACTIVE | Noted: 2025-05-22

## 2025-05-22 LAB
% LINING CELLS, BODY FLUID: 35 %
ALBUMIN SERPL BCG-MCNC: 1.1 G/DL (ref 3.5–5.2)
ALP SERPL-CCNC: 193 U/L (ref 40–150)
ALT SERPL W P-5'-P-CCNC: ABNORMAL U/L
ANION GAP SERPL CALCULATED.3IONS-SCNC: 5 MMOL/L (ref 7–15)
ANION GAP SERPL CALCULATED.3IONS-SCNC: 8 MMOL/L (ref 7–15)
APPEARANCE FLD: CLEAR
AST SERPL W P-5'-P-CCNC: ABNORMAL U/L
BACTERIA SPEC CULT: NORMAL
BACTERIA SPEC CULT: NORMAL
BASE EXCESS BLDA CALC-SCNC: 4.8 MMOL/L (ref -3–3)
BASE EXCESS BLDA CALC-SCNC: 6.2 MMOL/L (ref -3–3)
BASE EXCESS BLDV CALC-SCNC: 8.2 MMOL/L (ref -3–3)
BILIRUB SERPL-MCNC: 0.9 MG/DL
BUN SERPL-MCNC: 56.5 MG/DL (ref 8–23)
BUN SERPL-MCNC: 64.8 MG/DL (ref 8–23)
CALCIUM SERPL-MCNC: 8 MG/DL (ref 8.8–10.4)
CALCIUM SERPL-MCNC: 8.6 MG/DL (ref 8.8–10.4)
CHLORIDE SERPL-SCNC: 107 MMOL/L (ref 98–107)
CHLORIDE SERPL-SCNC: 117 MMOL/L (ref 98–107)
COLOR FLD: COLORLESS
CREAT SERPL-MCNC: 0.75 MG/DL (ref 0.67–1.17)
CREAT SERPL-MCNC: 0.77 MG/DL (ref 0.67–1.17)
CRP SERPL-MCNC: 144 MG/L
EGFRCR SERPLBLD CKD-EPI 2021: >90 ML/MIN/1.73M2
EGFRCR SERPLBLD CKD-EPI 2021: >90 ML/MIN/1.73M2
ERYTHROCYTE [DISTWIDTH] IN BLOOD BY AUTOMATED COUNT: 15.4 % (ref 10–15)
ERYTHROCYTE [DISTWIDTH] IN BLOOD BY AUTOMATED COUNT: 15.5 % (ref 10–15)
GLUCOSE BLDC GLUCOMTR-MCNC: 106 MG/DL (ref 70–99)
GLUCOSE BLDC GLUCOMTR-MCNC: 123 MG/DL (ref 70–99)
GLUCOSE BLDC GLUCOMTR-MCNC: 157 MG/DL (ref 70–99)
GLUCOSE BLDC GLUCOMTR-MCNC: 176 MG/DL (ref 70–99)
GLUCOSE BLDC GLUCOMTR-MCNC: 196 MG/DL (ref 70–99)
GLUCOSE BLDC GLUCOMTR-MCNC: 88 MG/DL (ref 70–99)
GLUCOSE BLDC GLUCOMTR-MCNC: 89 MG/DL (ref 70–99)
GLUCOSE BLDC GLUCOMTR-MCNC: 97 MG/DL (ref 70–99)
GLUCOSE BODY FLUID SOURCE: NORMAL
GLUCOSE FLD-MCNC: <2 MG/DL
GLUCOSE SERPL-MCNC: 163 MG/DL (ref 70–99)
GLUCOSE SERPL-MCNC: 96 MG/DL (ref 70–99)
HCO3 BLD-SCNC: 31 MMOL/L (ref 21–28)
HCO3 BLD-SCNC: 33 MMOL/L (ref 21–28)
HCO3 BLDV-SCNC: 34 MMOL/L (ref 21–28)
HCO3 SERPL-SCNC: 27 MMOL/L (ref 22–29)
HCO3 SERPL-SCNC: 28 MMOL/L (ref 22–29)
HCT VFR BLD AUTO: 42.9 % (ref 40–53)
HCT VFR BLD AUTO: 44.4 % (ref 40–53)
HGB BLD-MCNC: 14.5 G/DL (ref 13.3–17.7)
HGB BLD-MCNC: 15.8 G/DL (ref 13.3–17.7)
HOLD SPECIMEN: NORMAL
LACTATE SERPL-SCNC: 1.4 MMOL/L (ref 0.7–2)
LD BODY BODY FLUID SOURCE: NORMAL
LDH FLD L TO P-CCNC: <10 U/L
LYMPHOCYTES NFR FLD MANUAL: 32 %
MAGNESIUM SERPL-MCNC: 2.6 MG/DL (ref 1.7–2.3)
MCH RBC QN AUTO: 29.1 PG (ref 26.5–33)
MCH RBC QN AUTO: 30.8 PG (ref 26.5–33)
MCHC RBC AUTO-ENTMCNC: 33.8 G/DL (ref 31.5–36.5)
MCHC RBC AUTO-ENTMCNC: 35.6 G/DL (ref 31.5–36.5)
MCV RBC AUTO: 86 FL (ref 78–100)
MCV RBC AUTO: 87 FL (ref 78–100)
MONOS+MACROS NFR FLD MANUAL: 29 %
NEUTS BAND NFR FLD MANUAL: 3 %
O2/TOTAL GAS SETTING VFR VENT: 100 %
O2/TOTAL GAS SETTING VFR VENT: 100 %
O2/TOTAL GAS SETTING VFR VENT: 50 %
OTHER CELLS FLD MANUAL: 1 %
OXYHGB MFR BLDA: 96 % (ref 92–100)
OXYHGB MFR BLDA: 99 % (ref 92–100)
OXYHGB MFR BLDV: 76 % (ref 70–75)
PATH REV: NORMAL
PCO2 BLD: 52 MM HG (ref 35–45)
PCO2 BLD: 52 MM HG (ref 35–45)
PCO2 BLDV: 51 MM HG (ref 40–50)
PEEP: 5 CM H2O
PEEP: 5 CM H2O
PH BLD: 7.39 [PH] (ref 7.35–7.45)
PH BLD: 7.41 [PH] (ref 7.35–7.45)
PH BLDV: 7.44 [PH] (ref 7.32–7.43)
PLATELET # BLD AUTO: 133 10E3/UL (ref 150–450)
PLATELET # BLD AUTO: 161 10E3/UL (ref 150–450)
PO2 BLD: 122 MM HG (ref 80–105)
PO2 BLD: 86 MM HG (ref 80–105)
PO2 BLDV: 40 MM HG (ref 25–47)
POTASSIUM SERPL-SCNC: 4.9 MMOL/L (ref 3.4–5.3)
POTASSIUM SERPL-SCNC: 5.9 MMOL/L (ref 3.4–5.3)
PROCALCITONIN SERPL IA-MCNC: 2.58 NG/ML
PROT FLD-MCNC: <0.2 G/DL
PROT SERPL-MCNC: 5.3 G/DL (ref 6.4–8.3)
PROTEIN BODY FLUID SOURCE: NORMAL
RBC # BLD AUTO: 4.99 10E6/UL (ref 4.4–5.9)
RBC # BLD AUTO: 5.13 10E6/UL (ref 4.4–5.9)
SAO2 % BLDA: 97.1 % (ref 95–96)
SAO2 % BLDA: 99.3 % (ref 95–96)
SAO2 % BLDV: 77.1 % (ref 70–75)
SODIUM SERPL-SCNC: 143 MMOL/L (ref 135–145)
SODIUM SERPL-SCNC: 149 MMOL/L (ref 135–145)
SPECIMEN SOURCE FLD: NORMAL
WBC # BLD AUTO: 26.9 10E3/UL (ref 4–11)
WBC # BLD AUTO: 48.2 10E3/UL (ref 4–11)
WBC # FLD AUTO: 92 /UL

## 2025-05-22 PROCEDURE — 99233 SBSQ HOSP IP/OBS HIGH 50: CPT | Performed by: NURSE PRACTITIONER

## 2025-05-22 PROCEDURE — 71250 CT THORAX DX C-: CPT

## 2025-05-22 PROCEDURE — 97166 OT EVAL MOD COMPLEX 45 MIN: CPT | Mod: GO

## 2025-05-22 PROCEDURE — 71045 X-RAY EXAM CHEST 1 VIEW: CPT

## 2025-05-22 PROCEDURE — 999N000009 HC STATISTIC AIRWAY CARE

## 2025-05-22 PROCEDURE — 87040 BLOOD CULTURE FOR BACTERIA: CPT

## 2025-05-22 PROCEDURE — 250N000011 HC RX IP 250 OP 636: Performed by: NURSE PRACTITIONER

## 2025-05-22 PROCEDURE — 36569 INSJ PICC 5 YR+ W/O IMAGING: CPT

## 2025-05-22 PROCEDURE — 85014 HEMATOCRIT: CPT | Performed by: NURSE PRACTITIONER

## 2025-05-22 PROCEDURE — 999N000127 HC STATISTIC PERIPHERAL IV START W US GUIDANCE

## 2025-05-22 PROCEDURE — 999N000253 HC STATISTIC WEANING TRIALS

## 2025-05-22 PROCEDURE — 999N000055 HC STATISTIC END TITIAL CO2 MONITORING

## 2025-05-22 PROCEDURE — 85048 AUTOMATED LEUKOCYTE COUNT: CPT

## 2025-05-22 PROCEDURE — 99207 PR NO CHARGE LOS: CPT | Performed by: SURGERY

## 2025-05-22 PROCEDURE — 250N000009 HC RX 250: Performed by: INTERNAL MEDICINE

## 2025-05-22 PROCEDURE — 94003 VENT MGMT INPAT SUBQ DAY: CPT

## 2025-05-22 PROCEDURE — 97163 PT EVAL HIGH COMPLEX 45 MIN: CPT | Mod: GP

## 2025-05-22 PROCEDURE — 250N000011 HC RX IP 250 OP 636

## 2025-05-22 PROCEDURE — 99207 PR NO BILLABLE SERVICE THIS VISIT: CPT | Performed by: NURSE PRACTITIONER

## 2025-05-22 PROCEDURE — 94002 VENT MGMT INPAT INIT DAY: CPT

## 2025-05-22 PROCEDURE — 258N000003 HC RX IP 258 OP 636

## 2025-05-22 PROCEDURE — 70450 CT HEAD/BRAIN W/O DYE: CPT

## 2025-05-22 PROCEDURE — 999N000065 XR CHEST PORT 1 VIEW

## 2025-05-22 PROCEDURE — 82805 BLOOD GASES W/O2 SATURATION: CPT | Performed by: INTERNAL MEDICINE

## 2025-05-22 PROCEDURE — 999N000185 HC STATISTIC TRANSPORT TIME EA 15 MIN

## 2025-05-22 PROCEDURE — 83735 ASSAY OF MAGNESIUM: CPT | Performed by: NURSE PRACTITIONER

## 2025-05-22 PROCEDURE — 250N000013 HC RX MED GY IP 250 OP 250 PS 637

## 2025-05-22 PROCEDURE — 250N000013 HC RX MED GY IP 250 OP 250 PS 637: Performed by: NURSE PRACTITIONER

## 2025-05-22 PROCEDURE — 82310 ASSAY OF CALCIUM: CPT

## 2025-05-22 PROCEDURE — 99291 CRITICAL CARE FIRST HOUR: CPT | Mod: GC | Performed by: SURGERY

## 2025-05-22 PROCEDURE — 5A1955Z RESPIRATORY VENTILATION, GREATER THAN 96 CONSECUTIVE HOURS: ICD-10-PCS | Performed by: SURGERY

## 2025-05-22 PROCEDURE — 250N000009 HC RX 250: Performed by: NURSE PRACTITIONER

## 2025-05-22 PROCEDURE — 250N000011 HC RX IP 250 OP 636: Performed by: INTERNAL MEDICINE

## 2025-05-22 PROCEDURE — 82805 BLOOD GASES W/O2 SATURATION: CPT | Performed by: NURSE PRACTITIONER

## 2025-05-22 PROCEDURE — 999N000259 HC STATISTIC EXTUBATION

## 2025-05-22 PROCEDURE — 250N000011 HC RX IP 250 OP 636: Mod: JZ | Performed by: NURSE PRACTITIONER

## 2025-05-22 PROCEDURE — 36415 COLL VENOUS BLD VENIPUNCTURE: CPT

## 2025-05-22 PROCEDURE — 200N000002 HC R&B ICU UMMC

## 2025-05-22 PROCEDURE — 250N000009 HC RX 250

## 2025-05-22 PROCEDURE — 86140 C-REACTIVE PROTEIN: CPT

## 2025-05-22 PROCEDURE — 84145 PROCALCITONIN (PCT): CPT

## 2025-05-22 PROCEDURE — 258N000003 HC RX IP 258 OP 636: Performed by: INTERNAL MEDICINE

## 2025-05-22 PROCEDURE — 3E043XZ INTRODUCTION OF VASOPRESSOR INTO CENTRAL VEIN, PERCUTANEOUS APPROACH: ICD-10-PCS | Performed by: SURGERY

## 2025-05-22 PROCEDURE — 999N000157 HC STATISTIC RCP TIME EA 10 MIN

## 2025-05-22 PROCEDURE — 250N000011 HC RX IP 250 OP 636: Mod: JZ

## 2025-05-22 PROCEDURE — 250N000011 HC RX IP 250 OP 636: Mod: JZ | Performed by: INTERNAL MEDICINE

## 2025-05-22 PROCEDURE — 80048 BASIC METABOLIC PNL TOTAL CA: CPT | Performed by: NURSE PRACTITIONER

## 2025-05-22 PROCEDURE — 82962 GLUCOSE BLOOD TEST: CPT

## 2025-05-22 PROCEDURE — 82805 BLOOD GASES W/O2 SATURATION: CPT

## 2025-05-22 PROCEDURE — 83605 ASSAY OF LACTIC ACID: CPT

## 2025-05-22 PROCEDURE — 94640 AIRWAY INHALATION TREATMENT: CPT | Mod: 76

## 2025-05-22 RX ORDER — NALOXONE HYDROCHLORIDE 0.4 MG/ML
0.2 INJECTION, SOLUTION INTRAMUSCULAR; INTRAVENOUS; SUBCUTANEOUS
Status: DISCONTINUED | OUTPATIENT
Start: 2025-05-22 | End: 2025-05-22 | Stop reason: HOSPADM

## 2025-05-22 RX ORDER — PHENYLEPHRINE HCL IN 0.9% NACL 50MG/250ML
.1-6 PLASTIC BAG, INJECTION (ML) INTRAVENOUS CONTINUOUS
Status: CANCELLED | OUTPATIENT
Start: 2025-05-22

## 2025-05-22 RX ORDER — MEROPENEM 1 G/1
1 INJECTION, POWDER, FOR SOLUTION INTRAVENOUS EVERY 8 HOURS
Status: DISCONTINUED | OUTPATIENT
Start: 2025-05-23 | End: 2025-05-22

## 2025-05-22 RX ORDER — CHLORHEXIDINE GLUCONATE ORAL RINSE 1.2 MG/ML
15 SOLUTION DENTAL EVERY 12 HOURS
Status: DISCONTINUED | OUTPATIENT
Start: 2025-05-22 | End: 2025-05-22

## 2025-05-22 RX ORDER — PROPOFOL 10 MG/ML
5-75 INJECTION, EMULSION INTRAVENOUS CONTINUOUS
Status: DISCONTINUED | OUTPATIENT
Start: 2025-05-22 | End: 2025-05-22 | Stop reason: HOSPADM

## 2025-05-22 RX ORDER — HYDROMORPHONE HYDROCHLORIDE 1 MG/ML
0.3 INJECTION, SOLUTION INTRAMUSCULAR; INTRAVENOUS; SUBCUTANEOUS
Status: DISCONTINUED | OUTPATIENT
Start: 2025-05-22 | End: 2025-05-22

## 2025-05-22 RX ORDER — OXYCODONE HYDROCHLORIDE 5 MG/1
5 TABLET ORAL EVERY 4 HOURS PRN
Refills: 0 | Status: DISCONTINUED | OUTPATIENT
Start: 2025-05-22 | End: 2025-05-22 | Stop reason: HOSPADM

## 2025-05-22 RX ORDER — NALOXONE HYDROCHLORIDE 0.4 MG/ML
0.2 INJECTION, SOLUTION INTRAMUSCULAR; INTRAVENOUS; SUBCUTANEOUS
Status: CANCELLED | OUTPATIENT
Start: 2025-05-22

## 2025-05-22 RX ORDER — NALOXONE HYDROCHLORIDE 0.4 MG/ML
0.4 INJECTION, SOLUTION INTRAMUSCULAR; INTRAVENOUS; SUBCUTANEOUS
Status: DISCONTINUED | OUTPATIENT
Start: 2025-05-22 | End: 2025-05-22 | Stop reason: HOSPADM

## 2025-05-22 RX ORDER — ONDANSETRON 2 MG/ML
4 INJECTION INTRAMUSCULAR; INTRAVENOUS EVERY 6 HOURS PRN
Status: DISCONTINUED | OUTPATIENT
Start: 2025-05-22 | End: 2025-05-31 | Stop reason: HOSPADM

## 2025-05-22 RX ORDER — SIMETHICONE 80 MG
80 TABLET,CHEWABLE ORAL 4 TIMES DAILY PRN
Status: DISCONTINUED | OUTPATIENT
Start: 2025-05-22 | End: 2025-05-22 | Stop reason: HOSPADM

## 2025-05-22 RX ORDER — MEROPENEM 1 G/1
1 INJECTION, POWDER, FOR SOLUTION INTRAVENOUS EVERY 8 HOURS
Status: CANCELLED | OUTPATIENT
Start: 2025-05-23

## 2025-05-22 RX ORDER — MEROPENEM 1 G/1
1 INJECTION, POWDER, FOR SOLUTION INTRAVENOUS EVERY 8 HOURS
Status: DISCONTINUED | OUTPATIENT
Start: 2025-05-22 | End: 2025-05-23

## 2025-05-22 RX ORDER — HYDRALAZINE HYDROCHLORIDE 20 MG/ML
10-20 INJECTION INTRAMUSCULAR; INTRAVENOUS EVERY 6 HOURS PRN
Status: CANCELLED | OUTPATIENT
Start: 2025-05-22

## 2025-05-22 RX ORDER — HYDRALAZINE HYDROCHLORIDE 20 MG/ML
10-20 INJECTION INTRAMUSCULAR; INTRAVENOUS EVERY 6 HOURS PRN
Status: DISCONTINUED | OUTPATIENT
Start: 2025-05-22 | End: 2025-05-23

## 2025-05-22 RX ORDER — ONDANSETRON 4 MG/1
4 TABLET, ORALLY DISINTEGRATING ORAL EVERY 6 HOURS PRN
Status: DISCONTINUED | OUTPATIENT
Start: 2025-05-22 | End: 2025-05-23

## 2025-05-22 RX ORDER — NICOTINE POLACRILEX 4 MG
15-30 LOZENGE BUCCAL
Status: CANCELLED | OUTPATIENT
Start: 2025-05-22

## 2025-05-22 RX ORDER — NICOTINE POLACRILEX 4 MG
15-30 LOZENGE BUCCAL
Status: DISCONTINUED | OUTPATIENT
Start: 2025-05-22 | End: 2025-05-31 | Stop reason: HOSPADM

## 2025-05-22 RX ORDER — CEFEPIME HYDROCHLORIDE 2 G/1
2 INJECTION, POWDER, FOR SOLUTION INTRAVENOUS EVERY 8 HOURS
Status: DISCONTINUED | OUTPATIENT
Start: 2025-05-23 | End: 2025-05-22

## 2025-05-22 RX ORDER — PROPOFOL 10 MG/ML
5-75 INJECTION, EMULSION INTRAVENOUS CONTINUOUS
Status: DISCONTINUED | OUTPATIENT
Start: 2025-05-22 | End: 2025-05-22

## 2025-05-22 RX ORDER — NOREPINEPHRINE BITARTRATE 0.02 MG/ML
.01-.6 INJECTION, SOLUTION INTRAVENOUS CONTINUOUS
Status: DISCONTINUED | OUTPATIENT
Start: 2025-05-22 | End: 2025-05-22

## 2025-05-22 RX ORDER — NOREPINEPHRINE BITARTRATE 0.06 MG/ML
INJECTION, SOLUTION INTRAVENOUS
Status: COMPLETED
Start: 2025-05-22 | End: 2025-05-22

## 2025-05-22 RX ORDER — PROPOFOL 10 MG/ML
INJECTION, EMULSION INTRAVENOUS PRN
Status: DISCONTINUED | OUTPATIENT
Start: 2025-05-22 | End: 2025-05-22

## 2025-05-22 RX ORDER — IPRATROPIUM BROMIDE AND ALBUTEROL SULFATE 2.5; .5 MG/3ML; MG/3ML
3 SOLUTION RESPIRATORY (INHALATION)
Status: DISCONTINUED | OUTPATIENT
Start: 2025-05-22 | End: 2025-05-22

## 2025-05-22 RX ORDER — METHYLPREDNISOLONE SODIUM SUCCINATE 40 MG/ML
40 INJECTION INTRAMUSCULAR; INTRAVENOUS EVERY 12 HOURS
Status: DISCONTINUED | OUTPATIENT
Start: 2025-05-23 | End: 2025-05-22

## 2025-05-22 RX ORDER — DEXTROSE MONOHYDRATE 25 G/50ML
25-50 INJECTION, SOLUTION INTRAVENOUS
Status: CANCELLED | OUTPATIENT
Start: 2025-05-22

## 2025-05-22 RX ORDER — CHLORHEXIDINE GLUCONATE ORAL RINSE 1.2 MG/ML
15 SOLUTION DENTAL EVERY 12 HOURS
Status: DISCONTINUED | OUTPATIENT
Start: 2025-05-22 | End: 2025-05-31 | Stop reason: HOSPADM

## 2025-05-22 RX ORDER — LABETALOL HYDROCHLORIDE 5 MG/ML
20 INJECTION, SOLUTION INTRAVENOUS EVERY 4 HOURS PRN
Status: DISCONTINUED | OUTPATIENT
Start: 2025-05-22 | End: 2025-05-22

## 2025-05-22 RX ORDER — ACETAMINOPHEN 325 MG/1
650 TABLET ORAL EVERY 4 HOURS PRN
Status: DISCONTINUED | OUTPATIENT
Start: 2025-05-22 | End: 2025-05-31 | Stop reason: HOSPADM

## 2025-05-22 RX ORDER — MEROPENEM 1 G/1
1 INJECTION, POWDER, FOR SOLUTION INTRAVENOUS EVERY 8 HOURS
Status: DISCONTINUED | OUTPATIENT
Start: 2025-05-22 | End: 2025-05-22

## 2025-05-22 RX ORDER — NALOXONE HYDROCHLORIDE 0.4 MG/ML
0.2 INJECTION, SOLUTION INTRAMUSCULAR; INTRAVENOUS; SUBCUTANEOUS
Status: DISCONTINUED | OUTPATIENT
Start: 2025-05-22 | End: 2025-05-31 | Stop reason: HOSPADM

## 2025-05-22 RX ORDER — IPRATROPIUM BROMIDE AND ALBUTEROL SULFATE 2.5; .5 MG/3ML; MG/3ML
3 SOLUTION RESPIRATORY (INHALATION)
Status: CANCELLED | OUTPATIENT
Start: 2025-05-22

## 2025-05-22 RX ORDER — NOREPINEPHRINE BITARTRATE 0.06 MG/ML
.01-.6 INJECTION, SOLUTION INTRAVENOUS CONTINUOUS
Status: DISCONTINUED | OUTPATIENT
Start: 2025-05-22 | End: 2025-05-30

## 2025-05-22 RX ORDER — CHLORHEXIDINE GLUCONATE ORAL RINSE 1.2 MG/ML
15 SOLUTION DENTAL EVERY 12 HOURS
Status: CANCELLED | OUTPATIENT
Start: 2025-05-22

## 2025-05-22 RX ORDER — ACETAMINOPHEN 325 MG/10.15ML
650 LIQUID ORAL EVERY 4 HOURS PRN
Status: CANCELLED | OUTPATIENT
Start: 2025-05-22

## 2025-05-22 RX ORDER — ACETAMINOPHEN 325 MG/1
650 TABLET ORAL EVERY 4 HOURS PRN
Status: CANCELLED | OUTPATIENT
Start: 2025-05-22

## 2025-05-22 RX ORDER — DEXTROSE MONOHYDRATE 25 G/50ML
25-50 INJECTION, SOLUTION INTRAVENOUS
Status: DISCONTINUED | OUTPATIENT
Start: 2025-05-22 | End: 2025-05-22

## 2025-05-22 RX ORDER — ACETAMINOPHEN 325 MG/1
650 TABLET ORAL EVERY 6 HOURS
Status: DISCONTINUED | OUTPATIENT
Start: 2025-05-22 | End: 2025-05-23

## 2025-05-22 RX ORDER — ACETAMINOPHEN 650 MG/1
650 SUPPOSITORY RECTAL EVERY 6 HOURS
Status: DISCONTINUED | OUTPATIENT
Start: 2025-05-22 | End: 2025-05-23

## 2025-05-22 RX ORDER — NOREPINEPHRINE BITARTRATE 0.02 MG/ML
.01-.6 INJECTION, SOLUTION INTRAVENOUS CONTINUOUS
Status: DISCONTINUED | OUTPATIENT
Start: 2025-05-22 | End: 2025-05-22 | Stop reason: HOSPADM

## 2025-05-22 RX ORDER — NICOTINE POLACRILEX 4 MG
15-30 LOZENGE BUCCAL
Status: DISCONTINUED | OUTPATIENT
Start: 2025-05-22 | End: 2025-05-22

## 2025-05-22 RX ORDER — FUROSEMIDE 10 MG/ML
20 INJECTION INTRAMUSCULAR; INTRAVENOUS ONCE
Status: COMPLETED | OUTPATIENT
Start: 2025-05-22 | End: 2025-05-22

## 2025-05-22 RX ORDER — METHYLPREDNISOLONE SODIUM SUCCINATE 40 MG/ML
40 INJECTION INTRAMUSCULAR; INTRAVENOUS 2 TIMES DAILY
Status: DISCONTINUED | OUTPATIENT
Start: 2025-05-22 | End: 2025-05-23

## 2025-05-22 RX ORDER — IPRATROPIUM BROMIDE AND ALBUTEROL SULFATE 2.5; .5 MG/3ML; MG/3ML
3 SOLUTION RESPIRATORY (INHALATION)
Status: DISCONTINUED | OUTPATIENT
Start: 2025-05-22 | End: 2025-05-25

## 2025-05-22 RX ORDER — POLYETHYLENE GLYCOL 3350 17 G/17G
17 POWDER, FOR SOLUTION ORAL DAILY
Status: DISCONTINUED | OUTPATIENT
Start: 2025-05-23 | End: 2025-05-23

## 2025-05-22 RX ORDER — NOREPINEPHRINE BITARTRATE 0.02 MG/ML
.01-.6 INJECTION, SOLUTION INTRAVENOUS CONTINUOUS
Status: CANCELLED | OUTPATIENT
Start: 2025-05-22

## 2025-05-22 RX ORDER — NALOXONE HYDROCHLORIDE 0.4 MG/ML
0.4 INJECTION, SOLUTION INTRAMUSCULAR; INTRAVENOUS; SUBCUTANEOUS
Status: DISCONTINUED | OUTPATIENT
Start: 2025-05-22 | End: 2025-05-31 | Stop reason: HOSPADM

## 2025-05-22 RX ORDER — DEXTROSE MONOHYDRATE 50 MG/ML
INJECTION, SOLUTION INTRAVENOUS CONTINUOUS
Status: DISCONTINUED | OUTPATIENT
Start: 2025-05-22 | End: 2025-05-23

## 2025-05-22 RX ORDER — NALOXONE HYDROCHLORIDE 0.4 MG/ML
0.4 INJECTION, SOLUTION INTRAMUSCULAR; INTRAVENOUS; SUBCUTANEOUS
Status: CANCELLED | OUTPATIENT
Start: 2025-05-22

## 2025-05-22 RX ORDER — ACETAMINOPHEN 325 MG/10.15ML
650 LIQUID ORAL EVERY 4 HOURS PRN
Status: DISCONTINUED | OUTPATIENT
Start: 2025-05-22 | End: 2025-05-22

## 2025-05-22 RX ORDER — PROPOFOL 10 MG/ML
5-75 INJECTION, EMULSION INTRAVENOUS CONTINUOUS
Status: CANCELLED | OUTPATIENT
Start: 2025-05-22

## 2025-05-22 RX ORDER — PHENYLEPHRINE HCL IN 0.9% NACL 50MG/250ML
.1-6 PLASTIC BAG, INJECTION (ML) INTRAVENOUS CONTINUOUS
Status: DISCONTINUED | OUTPATIENT
Start: 2025-05-22 | End: 2025-05-22

## 2025-05-22 RX ORDER — CHLORHEXIDINE GLUCONATE ORAL RINSE 1.2 MG/ML
15 SOLUTION DENTAL EVERY 12 HOURS
Status: DISCONTINUED | OUTPATIENT
Start: 2025-05-22 | End: 2025-05-22 | Stop reason: HOSPADM

## 2025-05-22 RX ORDER — HYDROMORPHONE HYDROCHLORIDE 1 MG/ML
0.3 INJECTION, SOLUTION INTRAMUSCULAR; INTRAVENOUS; SUBCUTANEOUS
Status: DISCONTINUED | OUTPATIENT
Start: 2025-05-22 | End: 2025-05-22 | Stop reason: HOSPADM

## 2025-05-22 RX ORDER — AMOXICILLIN 250 MG
1 CAPSULE ORAL DAILY
Status: DISCONTINUED | OUTPATIENT
Start: 2025-05-23 | End: 2025-05-23

## 2025-05-22 RX ORDER — PHENYLEPHRINE HCL IN 0.9% NACL 50MG/250ML
.1-6 PLASTIC BAG, INJECTION (ML) INTRAVENOUS CONTINUOUS
Status: DISCONTINUED | OUTPATIENT
Start: 2025-05-22 | End: 2025-05-22 | Stop reason: HOSPADM

## 2025-05-22 RX ORDER — FUROSEMIDE 10 MG/ML
20 INJECTION INTRAMUSCULAR; INTRAVENOUS EVERY 6 HOURS
Status: DISCONTINUED | OUTPATIENT
Start: 2025-05-22 | End: 2025-05-22

## 2025-05-22 RX ORDER — PROPOFOL 10 MG/ML
5-75 INJECTION, EMULSION INTRAVENOUS CONTINUOUS
Status: DISCONTINUED | OUTPATIENT
Start: 2025-05-22 | End: 2025-05-29

## 2025-05-22 RX ORDER — ACETAMINOPHEN 650 MG/1
650 SUPPOSITORY RECTAL EVERY 4 HOURS PRN
Status: CANCELLED | OUTPATIENT
Start: 2025-05-22

## 2025-05-22 RX ORDER — OMEPRAZOLE 20 MG/1
20 CAPSULE, DELAYED RELEASE ORAL DAILY PRN
Status: DISCONTINUED | OUTPATIENT
Start: 2025-05-22 | End: 2025-05-22 | Stop reason: HOSPADM

## 2025-05-22 RX ORDER — ENOXAPARIN SODIUM 100 MG/ML
40 INJECTION SUBCUTANEOUS EVERY 24 HOURS
Status: DISCONTINUED | OUTPATIENT
Start: 2025-05-23 | End: 2025-05-27

## 2025-05-22 RX ORDER — ENOXAPARIN SODIUM 100 MG/ML
40 INJECTION SUBCUTANEOUS EVERY 24 HOURS
Status: DISCONTINUED | OUTPATIENT
Start: 2025-05-23 | End: 2025-05-22

## 2025-05-22 RX ORDER — ACETAMINOPHEN 650 MG/1
650 SUPPOSITORY RECTAL EVERY 4 HOURS PRN
Status: DISCONTINUED | OUTPATIENT
Start: 2025-05-22 | End: 2025-05-31 | Stop reason: HOSPADM

## 2025-05-22 RX ORDER — DEXMEDETOMIDINE HYDROCHLORIDE 4 UG/ML
.1-1.2 INJECTION, SOLUTION INTRAVENOUS CONTINUOUS
Status: DISCONTINUED | OUTPATIENT
Start: 2025-05-22 | End: 2025-05-22

## 2025-05-22 RX ORDER — HYDROMORPHONE HYDROCHLORIDE 1 MG/ML
0.3 INJECTION, SOLUTION INTRAMUSCULAR; INTRAVENOUS; SUBCUTANEOUS
Refills: 0 | Status: CANCELLED | OUTPATIENT
Start: 2025-05-22

## 2025-05-22 RX ORDER — ENOXAPARIN SODIUM 100 MG/ML
40 INJECTION SUBCUTANEOUS EVERY 24 HOURS
Status: CANCELLED | OUTPATIENT
Start: 2025-05-23

## 2025-05-22 RX ORDER — DEXTROSE MONOHYDRATE 25 G/50ML
25-50 INJECTION, SOLUTION INTRAVENOUS
Status: DISCONTINUED | OUTPATIENT
Start: 2025-05-22 | End: 2025-05-31 | Stop reason: HOSPADM

## 2025-05-22 RX ORDER — HYDROMORPHONE HYDROCHLORIDE 1 MG/ML
0.3 INJECTION, SOLUTION INTRAMUSCULAR; INTRAVENOUS; SUBCUTANEOUS
Status: DISCONTINUED | OUTPATIENT
Start: 2025-05-22 | End: 2025-05-23

## 2025-05-22 RX ORDER — PHENYLEPHRINE HCL IN 0.9% NACL 50MG/250ML
.1-6 PLASTIC BAG, INJECTION (ML) INTRAVENOUS CONTINUOUS
Status: DISCONTINUED | OUTPATIENT
Start: 2025-05-22 | End: 2025-05-23

## 2025-05-22 RX ORDER — METHYLPREDNISOLONE SODIUM SUCCINATE 40 MG/ML
40 INJECTION INTRAMUSCULAR; INTRAVENOUS EVERY 12 HOURS
Status: CANCELLED | OUTPATIENT
Start: 2025-05-23

## 2025-05-22 RX ADMIN — NOREPINEPHRINE BITARTRATE 0.3 MCG/KG/MIN: 0.02 INJECTION, SOLUTION INTRAVENOUS at 16:00

## 2025-05-22 RX ADMIN — METHYLPREDNISOLONE SODIUM SUCCINATE 40 MG: 40 INJECTION INTRAMUSCULAR; INTRAVENOUS at 15:45

## 2025-05-22 RX ADMIN — Medication 50 MCG: at 02:19

## 2025-05-22 RX ADMIN — SODIUM CHLORIDE 30 MG/ML INHALATION SOLUTION 3 ML: 30 SOLUTION INHALANT at 03:23

## 2025-05-22 RX ADMIN — SODIUM CHLORIDE 30 MG/ML INHALATION SOLUTION 3 ML: 30 SOLUTION INHALANT at 12:16

## 2025-05-22 RX ADMIN — ACETAMINOPHEN 650 MG: 650 SUPPOSITORY RECTAL at 21:46

## 2025-05-22 RX ADMIN — ROCURONIUM 70 MG: 50 INJECTION, SOLUTION INTRAVENOUS at 14:05

## 2025-05-22 RX ADMIN — Medication 50 MCG: at 22:24

## 2025-05-22 RX ADMIN — CHLORHEXIDINE GLUCONATE 15 ML: 1.2 SOLUTION ORAL at 08:04

## 2025-05-22 RX ADMIN — Medication 0.5 MCG/KG/MIN: at 15:45

## 2025-05-22 RX ADMIN — HYDRALAZINE HYDROCHLORIDE 20 MG: 20 INJECTION INTRAMUSCULAR; INTRAVENOUS at 13:04

## 2025-05-22 RX ADMIN — MEROPENEM 1 G: 1 INJECTION, POWDER, FOR SOLUTION INTRAVENOUS at 17:15

## 2025-05-22 RX ADMIN — FUROSEMIDE 20 MG: 10 INJECTION, SOLUTION INTRAMUSCULAR; INTRAVENOUS at 11:11

## 2025-05-22 RX ADMIN — MEROPENEM 1 G: 1 INJECTION, POWDER, FOR SOLUTION INTRAVENOUS at 08:04

## 2025-05-22 RX ADMIN — INSULIN ASPART 1 UNITS: 100 INJECTION, SOLUTION INTRAVENOUS; SUBCUTANEOUS at 03:55

## 2025-05-22 RX ADMIN — PHENYLEPHRINE HYDROCHLORIDE 100 MCG: 10 INJECTION INTRAVENOUS at 14:10

## 2025-05-22 RX ADMIN — INSULIN ASPART 2 UNITS: 100 INJECTION, SOLUTION INTRAVENOUS; SUBCUTANEOUS at 00:23

## 2025-05-22 RX ADMIN — Medication 50 MCG: at 05:21

## 2025-05-22 RX ADMIN — IPRATROPIUM BROMIDE AND ALBUTEROL SULFATE 3 ML: .5; 3 SOLUTION RESPIRATORY (INHALATION) at 03:23

## 2025-05-22 RX ADMIN — Medication 50 MCG: at 00:27

## 2025-05-22 RX ADMIN — PHENYLEPHRINE HYDROCHLORIDE 100 MCG: 10 INJECTION INTRAVENOUS at 14:11

## 2025-05-22 RX ADMIN — Medication 0.5 MCG/KG/MIN: at 21:02

## 2025-05-22 RX ADMIN — SENNOSIDES 5 ML: 8.8 LIQUID ORAL at 08:04

## 2025-05-22 RX ADMIN — PROPOFOL 50 MCG/KG/MIN: 10 INJECTION, EMULSION INTRAVENOUS at 21:42

## 2025-05-22 RX ADMIN — Medication 50 MCG/HR: at 21:39

## 2025-05-22 RX ADMIN — PROPOFOL 10 MCG/KG/MIN: 10 INJECTION, EMULSION INTRAVENOUS at 15:34

## 2025-05-22 RX ADMIN — PROPOFOL 150 MG: 10 INJECTION, EMULSION INTRAVENOUS at 14:05

## 2025-05-22 RX ADMIN — NOREPINEPHRINE BITARTRATE 0.03 MCG/KG/MIN: 0.06 INJECTION, SOLUTION INTRAVENOUS at 20:58

## 2025-05-22 RX ADMIN — SODIUM CHLORIDE 30 MG/ML INHALATION SOLUTION 3 ML: 30 SOLUTION INHALANT at 07:37

## 2025-05-22 RX ADMIN — ENOXAPARIN SODIUM 40 MG: 40 INJECTION SUBCUTANEOUS at 08:04

## 2025-05-22 RX ADMIN — PANTOPRAZOLE SODIUM 40 MG: 40 INJECTION, POWDER, FOR SOLUTION INTRAVENOUS at 06:38

## 2025-05-22 RX ADMIN — PROPOFOL 50 MCG/KG/MIN: 10 INJECTION, EMULSION INTRAVENOUS at 21:04

## 2025-05-22 RX ADMIN — DEXMEDETOMIDINE HYDROCHLORIDE 1 MCG/KG/HR: 400 INJECTION INTRAVENOUS at 05:23

## 2025-05-22 RX ADMIN — LABETALOL HYDROCHLORIDE 20 MG: 5 INJECTION, SOLUTION INTRAVENOUS at 14:47

## 2025-05-22 RX ADMIN — HYDROMORPHONE HYDROCHLORIDE 0.3 MG: 1 INJECTION, SOLUTION INTRAMUSCULAR; INTRAVENOUS; SUBCUTANEOUS at 13:08

## 2025-05-22 RX ADMIN — MEROPENEM 1 G: 1 INJECTION, POWDER, FOR SOLUTION INTRAVENOUS at 00:04

## 2025-05-22 RX ADMIN — FUROSEMIDE 20 MG: 10 INJECTION, SOLUTION INTRAMUSCULAR; INTRAVENOUS at 13:52

## 2025-05-22 RX ADMIN — Medication 100 MCG/HR: at 08:04

## 2025-05-22 RX ADMIN — INSULIN ASPART 1 UNITS: 100 INJECTION, SOLUTION INTRAVENOUS; SUBCUTANEOUS at 08:04

## 2025-05-22 RX ADMIN — METHYLPREDNISOLONE SODIUM SUCCINATE 40 MG: 40 INJECTION, POWDER, FOR SOLUTION INTRAMUSCULAR; INTRAVENOUS at 21:46

## 2025-05-22 RX ADMIN — IPRATROPIUM BROMIDE AND ALBUTEROL SULFATE 3 ML: .5; 3 SOLUTION RESPIRATORY (INHALATION) at 07:37

## 2025-05-22 RX ADMIN — DEXTROSE MONOHYDRATE: 50 INJECTION, SOLUTION INTRAVENOUS at 23:02

## 2025-05-22 RX ADMIN — IPRATROPIUM BROMIDE AND ALBUTEROL SULFATE 3 ML: .5; 3 SOLUTION RESPIRATORY (INHALATION) at 17:20

## 2025-05-22 RX ADMIN — CHLORHEXIDINE GLUCONATE 15 ML: 1.2 SOLUTION ORAL at 21:46

## 2025-05-22 RX ADMIN — METHYLPREDNISOLONE SODIUM SUCCINATE 40 MG: 40 INJECTION INTRAMUSCULAR; INTRAVENOUS at 03:33

## 2025-05-22 RX ADMIN — DOCUSATE SODIUM 100 MG: 50 LIQUID ORAL at 08:04

## 2025-05-22 RX ADMIN — AZITHROMYCIN MONOHYDRATE 500 MG: 500 INJECTION, POWDER, LYOPHILIZED, FOR SOLUTION INTRAVENOUS at 05:21

## 2025-05-22 RX ADMIN — DEXMEDETOMIDINE HYDROCHLORIDE 1 MCG/KG/HR: 400 INJECTION INTRAVENOUS at 14:47

## 2025-05-22 RX ADMIN — IPRATROPIUM BROMIDE AND ALBUTEROL SULFATE 3 ML: .5; 3 SOLUTION RESPIRATORY (INHALATION) at 12:16

## 2025-05-22 ASSESSMENT — ACTIVITIES OF DAILY LIVING (ADL)
ADLS_ACUITY_SCORE: 73
ADLS_ACUITY_SCORE: 58
ADLS_ACUITY_SCORE: 58
ADLS_ACUITY_SCORE: 59
ADLS_ACUITY_SCORE: 58
ADLS_ACUITY_SCORE: 59
ADLS_ACUITY_SCORE: 63
ADLS_ACUITY_SCORE: 63
ADLS_ACUITY_SCORE: 58
ADLS_ACUITY_SCORE: 59
ADLS_ACUITY_SCORE: 59
ADLS_ACUITY_SCORE: 58
ADLS_ACUITY_SCORE: 59
ADLS_ACUITY_SCORE: 58
ADLS_ACUITY_SCORE: 59
ADLS_ACUITY_SCORE: 58
ADLS_ACUITY_SCORE: 67
ADLS_ACUITY_SCORE: 59
ADLS_ACUITY_SCORE: 59
ADLS_ACUITY_SCORE: 58

## 2025-05-22 NOTE — PROGRESS NOTES
Patient is alert and is air hungry. Leak noted and 1 ml of air added. Changes are not made. Pt stable and remains on vent/full support; PIP 17 cmH2O, Pplat 10.8 cmH2O. Nebs given. BS coarse/diminished.    Ian Bell, RT

## 2025-05-22 NOTE — PLAN OF CARE
[facility] - ICU    RN Progress Note:            Pertinent Assessments:      Please refer to flowsheet rows for full assessment     Currently VSS, intubated, sedated, on vent and vasopressor.           Key Events - This Shift:       Pt extubated this morning. About 1400 decompensated rapidly, code called. Pt reintubated. Transferring to [facility] for surgery.    RN Managed Protocols Ordered:  Yes  Protocols:Potassium and Magnesium  PRN'S:  Protocols Status: In Progress                Barriers to Discharge / Downgrade:     Sedated, intubated         Point of Contact Update: YES-OR-NO: Yes  If No, reason:   Name:wife   Phone Number:  Summary of Conversation: updated on pt condition,updated on pt transfer.

## 2025-05-22 NOTE — PROGRESS NOTES
CLINICAL NUTRITION SERVICES -  NOTE     EVALUATION OF THE PROGRESS TOWARD GOALS   Diet: NPO  Nutrition Support: Jevity 1.5 goal rate 45 ml/hr continuous.  1 pkt Prosource TF20   Free water flush 100 ml every 4 hrs.     TF at Goal provides: 1080 mL formula, 1700 calories, 89 gm protein, 233 gm cho, 54 gm fat, 23 gm fiber, 821 mL free water + 600 mL water flushes = 1301 mL water/day     New Findings:  Postpyloric feeding tube placed yesterday in anticipation of weaning from vent today.  (Per Clinic notes patient has complained of dysphagia, has not had a swallow eval.)  TF held for possible vent wean.  Sodium elevated.  Last BM preadmit.      Plan:  Increase free water flush for hydration needs.  Increase flush to 220 ml every 4 hrs = 1320 ml flushes/day.  This increases total free water (TF+flushes) to 2141 ml/day.    Monitor for improved hydration.      Will need swallow eval before starting po intake.

## 2025-05-22 NOTE — H&P
MEDICAL ICU H&P  05/22/2025    Date of Hospital Admission: 5/22/25  Date of ICU Admission: 5/22/25  Reason for Critical Care Admission: acute hypoxic respiratory failure and bronchopleural fistula   Date of Service (when I saw the patient): 05/22/2025    ASSESSMENT: Armin Terrell is a 68yo M with PMH of chronic respiratory failure (on home O2, 3LPM), severe COPD emphysema type (FEV1 35%), alpha-1 antitrypsin deficiency, lung nodules, hx tobacco use, and prostate cancer status post radical prostatectomy.  He was admitted to [facility] on 5/18 due to acute on chronic hypoxic respiratory failure requiring intubation. Found to have right sided pneumothorax and R side consolidation (pseudomonas + sputum). Chest tube placed but pneumothorax persisted, C/f bronchopleural fistula. Patient started on pressors for hypotension and admitted to the ICU. Started on meropenem. Failed extubation on 5/22 due to repeat episode of tension physiology d/t kink in chest tube following extubation resulting in worsening pneumothorax and emphysema. Re-intubated and transferred to [facility] on 5/22 for continued ICU management and interventional pulmonology consultation for bronchial valve placement.     CHANGES and MAJOR THINGS TODAY:   - Transfer to [facility]  - Failed extubation  - Re-intubated  - IP consult   > Bronch in the AM  - Minimize PEEP and TV to avoid worsening pneumothorax (extensive emphysema)  - D5 100ml/hr for Hypernatremia   - Blood cultures  - OGT placement    PLAN:    Neuro:  # Pain and sedation  - Propofol gtt  - Precedex PRN   - Tylenol 650 Q6H   - RASS goal -3 to -4    Pulmonary:  # Acute on chronic hypoxic respiratory failure, multifactorial, s/p intubation  # COPD exacerbation 2/2 R pneumonia  # Right pneumothorax, c/f bronchopleural fistula, s/p chest tube  # MAULIK Nodule  # Failed extubation on 5/22  # Hx severe COPD emphysema type (FEV1 35% on 5/6/2024)  # Subcutaneous emphysema  # Hx tobacco use, quit 2018  #  Alpha 1 antitrypsin deficiency  On 3L O2 and takes trilogy inhaler at home. Presented to ED on 5/14 for SOB sent home oxycodone, augmentin, azithromycin, prednisone for RUL pneumonia. Returned to the ED on 5/18 for worsening shortness of breath generalized weakness. Patient was in severe respiratory distress started on BiPAP therapy and later intubated. CXR showed R pneumothorax- chest tube placed. Follow-up chest CT scan show large R pneumothorax, right pigtail chest tube along the major fissure, collapsed right lower lobe and right middle lobe consolidation of the right upper lobe unchanged left upper lobe nodule. Admitted to ICU and started on abx (discussed in ID section). Patient vent settings weaned and trailed extubation on 5/22. After being extubated he had a repeated episode of tension physiology requiring re-intubation and pressors. Continued air leak concerning for bronchopleural fistula. Transferred to [facility] for ongoing ICU care and IP consult with possible endobronchial valve. Goal to ventilate with low peep and low TV.   - IP consult for possible endobronchial valve   > Bronch in the AM  - Duonebs Q4H  - Chest tube to continuous suction  - Steroid and abx as below    FiO2 (%): 50 %, Resp: 15, Vent Mode: VC/AC, Resp Rate (Set): 12 breaths/min, Tidal Volume (Set, mL): 460 mL, PEEP (cm H2O): 5 cmH2O, Pressure Support (cm H2O): 5 cmH2O, Resp Rate (Set): 12 breaths/min, Tidal Volume (Set, mL): 460 mL, PEEP (cm H2O): 5 cmH2O     Cardiovascular:  # Mixed shock, obstructive (2/2 PTX)  Hypotensive with systolic BP to 80s, tachycardic, lactate 2.8, and leukocytosis to 35 on admission to [facility] on 5/18. CT PE negative for PE but showed R pneumothorax and R pneumonia. Shock physiology likely 2/2 combination of sepsis and obstruction initially. Chest tube placed but pneumothorax persisted, as described above. Weaned off levo on 5/22 however post extubation his chest tube kinked and the patient quickly  decompensated and became hypotensive again requiring re-intubation and pressor support. On pressors on arrival to [facility]. Weaning as able now that he is stable.   - Norepi gtt  - Phenylephrine gtt as needed for adjunct   - MAP goal >65  - 40 mg IV solumedrol BID    GI/Nutrition:  # Nutrition  Feeding tube dislodged during extubation on 5/22. Will need FT placement.   - NPO   - OGT placement    # Constipation  - Senna/miralax once daily    Renal/Fluids/Electrolytes:  # Hypernatremia  # Respiratory acidosis, improved  # Isolated elevated BUN, 2/2 shock vs steroids  Sodium 149 at OSH. BMP Recheck on arrival   - FWD 1.2L for goal of 145 and 2.7L for goal of 140   >  D5 100mL/hr  - q3h Na checks    Endocrine:  # C/f hyperglycemia 2/2 stress dose steroids  - SSI  - Hypoglycemia protocol       ID:  # C/f septic shock 2/2 pneumonia  # Pseudomonas pneumonia  # Leukocytosis  Pseudomonas + sputum culture on 5/18 (pansensitive). RVP negative. Blood cultures on 5/18 NGTD. UA unremarkable. Pleural fluid from R pleural cavity on 5/21, transudative effusion per Light's criteria. Currently, pt leukocytosis suspected to be reactive without fevers given worsening pneumothorax and afebrile. Will continue on Meropenem for broad coverage and obtain blood cultures. Procal is elevated, but without other signs of worsening bacterial infection there is a high possibility it is due to replacement of chest tube in setting of worsening emphysema.   - Meropenem and azithromycin (5/18-5/23 )   - S/p zosyn 5/18  - S/p vancomycin 5/19    - Blood Cultures    Hematology:    # Leukocytosis as above  # Thrombocytopenia  Noted plt 133 at OSH. Has been down trending from 5/14. Possible 2/2 critical illness.   - CTM    Musculoskeletal:  - PTOT to evaluate    Skin:  # Emphysema 2/2 Pneumothorax  - CTM    General Cares/Prophylaxis:    DVT Prophylaxis: Enoxaparin (Lovenox) SQ  GI Prophylaxis: PPI  Restraints: Soft  Family Communication: Wife, Trini Terrell  [phone]  Code Status: Full    Lines/tubes/drains:   - PICC R brachial  - Arterial line R radial  - Urinary catheter  - ETT  - PIV L forearm  - Rt sided Chest Tube  - OGT    Disposition:  - Medical ICU     Patient seen and findings/plan discussed with medical ICU staff, Dr. Jeffries.    Dr. Fabian Torres MD  Internal Medicine PGY3    Clinically Significant Risk Factors Present on Admission         # Hypernatremia: Highest Na = 149 mmol/L in last 2 days, will monitor as appropriate  # Hyperchloremia: Highest Cl = 117 mmol/L in last 2 days, will monitor as appropriate                                       -----------------------------------------------------------------------    HISTORY PRESENTING ILLNESS: Presented to ED at [facility] on 5/18/25 for worsening shortness of breath generalized weakness.  Patient was in severe respiratory distress started on BiPAP therapy and  intubated. Post intubation XR with R pneumothorax and R infiltrates. Right-sided chest tube placement done in ER. Follow-up chest CT scan show large right-sided pneumothorax, right pigtail chest tube along the major fissure, collapsed right lower lobe and right middle lobe consolidation of the right upper lobe unchanged left upper lobe nodule. He was started on levophed and admitted. Admitted to ICU for further management. Tx COPD exacerbation with steroid and neb treatments. Sputum grew Pseudomonas - started on meropenem. Slowly improved though his chest tube has continued to have persistent air leak and cxr shows persistent right pneumothorax. Tube was upsized in IR on 5/20/2025 with no improvement in air leak. Levophed was weaned off and he was doing well on spontaneous breathing trials. He extubated 5/22 to nasal cannula. Was doing well and was able to sit on the edge of bed. When he was laid back down in bed, his chest tube became kinked and he quickly decompensated - became significantly hypertensive and tachycardic and in severe  respiratory distress. Subcutaneous emphysema had become much more pronounced over his right chest, extending up in to the neck. Chest tube was noted to be kinked; once this was unkinked there was brisk air leak in to atrium chamber but patient did not improve. He was intubated by anesthesia and required vasopressor support. Due to concern for bronchopleural fistula in patient with severe COPD, decision made to transfer to higher level of care at [facility] for Interventional Pulmonology service.     REVIEW OF SYSTEMS: Unable to obtain patient intubated.    PAST MEDICAL HISTORY:   No past medical history on file.  SURGICAL HISTORY:  Past Surgical History:   Procedure Laterality Date    IR CHEST TUBE REPOSITION NON TUNNELED RIGHT  2025    PICC TRIPLE LUMEN PLACEMENT  2025    OH LAP,PROSTATECTOMY,RADICAL,W/NERVE SPARE,INCL ROBOTIC Bilateral 3/19/2019    Procedure: ROBOTIC ASSISTED RADICAL RETROPUBIC PROSTATECTOMY BILATERAL PELVIC LYMPH NODE DISSECTION, LYSIS OF ADHESIONS, REPAIR OF RIGHT INGUINAL HERNIA;  Surgeon: Candido Angelo MD;  Location: [facility];  Service: Urology     SOCIAL HISTORY:  Social History     Socioeconomic History    Marital status:    Tobacco Use    Smoking status: Former     Current packs/day: 0.00     Average packs/day: 0.8 packs/day for 30.0 years (22.5 ttl pk-yrs)     Types: Cigarettes     Start date: 1987     Quit date: 2017     Years since quittin.6     Passive exposure: Current (Wife smokes)    Smokeless tobacco: Former   Vaping Use    Vaping status: Never Used   Substance and Sexual Activity    Alcohol use: Yes     Alcohol/week: 4.0 - 5.0 standard drinks of alcohol     Comment: Alcoholic Drinks/day: drinks on weekends.    Drug use: No     Social Drivers of Health     Financial Resource Strain: Unknown (2025)    Financial Resource Strain     Within the past 12 months, have you or your family members you live with been unable to get utilities (heat,  electricity) when it was really needed?: Patient unable to answer   Food Insecurity: Unknown (5/18/2025)    Food Insecurity     Within the past 12 months, did you worry that your food would run out before you got money to buy more?: Patient unable to answer     Within the past 12 months, did the food you bought just not last and you didn t have money to get more?: Patient unable to answer   Transportation Needs: Unknown (5/18/2025)    Transportation Needs     Within the past 12 months, has lack of transportation kept you from medical appointments, getting your medicines, non-medical meetings or appointments, work, or from getting things that you need?: Patient unable to answer   Interpersonal Safety: Unknown (5/18/2025)    Interpersonal Safety     Do you feel physically and emotionally safe where you currently live?: Patient unable to answer     Within the past 12 months, have you been hit, slapped, kicked or otherwise physically hurt by someone?: Patient unable to answer     Within the past 12 months, have you been humiliated or emotionally abused in other ways by your partner or ex-partner?: Patient unable to answer   Housing Stability: Unknown (5/18/2025)    Housing Stability     Do you have housing? : Patient unable to answer     Are you worried about losing your housing?: Patient unable to answer     FAMILY HISTORY:   No family history on file.  ALLERGIES:   No Known Allergies  MEDICATIONS:  No current facility-administered medications for this encounter.     No current outpatient medications on file.     Facility-Administered Medications Ordered in Other Encounters   Medication Dose Route Frequency Provider Last Rate Last Admin    acetaminophen (TYLENOL) oral liquid 650 mg  650 mg Per Feeding Tube Q4H PRN Argentina Cruz APRN CNP   650 mg at 05/21/25 1100    acetaminophen (TYLENOL) tablet 650 mg  650 mg Oral Q4H PRN Argentina Cruz APRN CNP        Or    acetaminophen (TYLENOL) Suppository 650 mg  650 mg  Rectal Q4H PRN Elli Cruzecca PRASHANTH APRN CNP        chlorhexidine (PERIDEX) 0.12 % solution 15 mL  15 mL Mouth/Throat Q12H Argentina Cruz APRN CNP        glucose gel 15-30 g  15-30 g Oral Q15 Min PRN Messi Farmer MD        Or    dextrose 50 % injection 25-50 mL  25-50 mL Intravenous Q15 Min PRN Messi Farmer MD        Or    glucagon injection 1 mg  1 mg Subcutaneous Q15 Min PRN Messi Farmer MD        sennosides (SENOKOT) syrup 5 mL  5 mL Oral or Feeding Tube BID Wieben, Argentina PRASHANTH APRN CNP   5 mL at 05/22/25 0804    And    docusate (COLACE) 50 MG/5ML liquid 100 mg  100 mg Oral or Feeding Tube BID Wieben, Argentina PRASHANTH, APRN CNP   100 mg at 05/22/25 0804    enoxaparin ANTICOAGULANT (LOVENOX) injection 40 mg  40 mg Subcutaneous Q24H WirajanienElliArgentina PRASHANTH APRN CNP   40 mg at 05/22/25 0804    hydrALAZINE (APRESOLINE) injection 10-20 mg  10-20 mg Intravenous Q6H PRN Wilisa, Argentina A, APRN CNP   20 mg at 05/22/25 1304    HYDROmorphone (PF) (DILAUDID) injection 0.3 mg  0.3 mg Intravenous Q3H PRN Wilisa, Argentina A, APRN CNP   0.3 mg at 05/22/25 1308    insulin aspart (NovoLOG) injection (RAPID ACTING)  1-7 Units Subcutaneous Q4H Nuno Amaro MD   1 Units at 05/22/25 0804    ipratropium - albuterol 0.5 mg/2.5 mg/3 mL (DUONEB) neb solution 3 mL  3 mL Nebulization Q4H Messi Farmer MD   3 mL at 05/22/25 1720    lidocaine (viscous) (XYLOCAINE) 2 % solution 5 mL  5 mL Mouth/Throat Once Argentina Cruz APRN CNP        propofol (DIPRIVAN) infusion  5-75 mcg/kg/min (Dosing Weight) Intravenous Continuous Wieben, Argentina A, APRN CNP 7.9 mL/hr at 05/22/25 1609 20 mcg/kg/min at 05/22/25 1609    And    propofol (DIPRIVAN) bolus from bag or syringe pump  10 mg Intravenous Q15 Min PRN Wiebsolange Argentina A, APRN CNP        And    Medication Instruction   Does not apply Continuous PRN Wiebsolange Argentina A, APRN CNP        meropenem (MERREM) 1 g vial to attach to  mL bag  1 g Intravenous Q8H Nuno Amaro MD    1 g at 05/22/25 1715    methylPREDNISolone sodium succinate (SOLU-MEDROL) injection 40 mg  40 mg Intravenous Q12H Argentina Cruz APRN CNP   40 mg at 05/22/25 1545    midazolam (VERSED) injection 1 mg  1 mg Intravenous Q1H PRN Argentina Cruz APRN CNP        naloxone (NARCAN) injection 0.2 mg  0.2 mg Intravenous Q2 Min PRN Messi Farmer MD        Or    naloxone (NARCAN) injection 0.4 mg  0.4 mg Intravenous Q2 Min PRN Messi Farmer MD        Or    naloxone (NARCAN) injection 0.2 mg  0.2 mg Intramuscular Q2 Min PRN Messi Farmer MD        Or    naloxone (NARCAN) injection 0.4 mg  0.4 mg Intramuscular Q2 Min PRN Messi Farmer MD        norepinephrine (LEVOPHED) 4 mg in  mL infusion PREMIX  0.01-0.6 mcg/kg/min (Dosing Weight) Intravenous Continuous Argentina Cruz APRN CNP 74.5 mL/hr at 05/22/25 1600 0.3 mcg/kg/min at 05/22/25 1600    pantoprazole (PROTONIX) 2 mg/mL suspension 40 mg  40 mg Per Feeding Tube QAM Messi Burt MD        Or    pantoprazole (PROTONIX) IV push injection 40 mg  40 mg Intravenous QAM Messi Burt MD   40 mg at 05/22/25 0638    phenylephrine (BART-SYNEPHRINE) 50 mg in NaCl 0.9 % 250 mL infusion  0.1-6 mcg/kg/min (Dosing Weight) Intravenous Continuous Argentina Cruz APRN CNP 19.9 mL/hr at 05/22/25 1609 1 mcg/kg/min at 05/22/25 1609    sodium chloride (PF) 0.9% PF flush 10-20 mL  10-20 mL Intracatheter q1 min prn Nuno Amaro MD        sodium chloride (PF) 0.9% PF flush 10-40 mL  10-40 mL Intracatheter Once PRN Messi Farmer MD        sodium chloride (PF) 0.9% PF flush 10-40 mL  10-40 mL Intracatheter Q7 Days Nuno Amaro MD        sodium chloride (PF) 0.9% PF flush 10-40 mL  10-40 mL Intracatheter Daily Nuno Amaro MD   10 mL at 05/22/25 0333    sodium chloride (PF) 0.9% PF flush 3 mL  3 mL Intracatheter q1 min ambern Paula Craft MD   3 mL at 05/20/25 2114    sodium chloride (PF) 0.9% PF flush 3 mL  3 mL Intracatheter  Q8H Paula Craft MD   3 mL at 05/22/25 1716    vasopressin (VASOSTRICT) 20 Units in sodium chloride 0.9 % 100 mL standard conc infusion  2.4 Units/hr Intravenous Continuous Argentina Cruz APRN CNP           PHYSICAL EXAMINATION:  Temp:  [97.4  F (36.3  C)-97.7  F (36.5  C)] 97.7  F (36.5  C)  Pulse:  [] 80  Resp:  [13-57] 20  BP: ()/() 93/62  MAP:  [63 mmHg-140 mmHg] 82 mmHg  Arterial Line BP: ()/() 120/63  FiO2 (%):  [50 %-80 %] 80 %  SpO2:  [86 %-98 %] 93 %  Constitutional: Sedated  Eyes: VICTORIA  ENT: atramatic  Hematologic / Lymphatic: no cervical lymphadenopathy and no supraclavicular lymphadenopathy  Respiratory: Clear to auscultation BL, Difficult to assess with Emphysema  Cardiovascular: Normal S1/S2 with no murmurs, rub, or gallops, Difficult to assess with Emphysema  GI: Non-distended, normal bowel sounds, non-tender  Skin: Severe Emphysema Rt chest extending up to right lateral neck  Musculoskeletal: No peripheral edema present   Neurologic: Awake, alert, oriented to name, place and time.       LABS: Reviewed.   Arterial Blood Gases   Recent Labs   Lab 05/22/25  1714 05/22/25  1457 05/21/25  0427 05/20/25  1449   PH 7.41 7.39 7.44 7.40   PCO2 52* 52* 38 39   PO2 122* 86 83 75*   HCO3 33* 31* 26 24     Complete Blood Count   Recent Labs   Lab 05/22/25  0334 05/21/25  0427 05/20/25  0314 05/19/25  0445   WBC 26.9* 21.9* 28.2* 30.2*   HGB 14.5 13.5 14.7 15.0   * 161 306 343     Basic Metabolic Panel  Recent Labs   Lab 05/22/25  1754 05/22/25  1209 05/22/25  0747 05/22/25  0338 05/22/25  0334 05/21/25  0432 05/21/25  0427 05/20/25 0317 05/20/25 0314 05/19/25  0737 05/19/25  0445   NA  --   --   --   --  149*  --  145  --  140  --  141   POTASSIUM  --   --   --   --  4.9  --  4.7  --  5.0  --  4.9   CHLORIDE  --   --   --   --  117*  --  114*  --  108*  --  106   CO2  --   --   --   --  27  --  24  --  25  --  26   BUN  --   --   --   --  64.8*  --  70.4*  --  58.5*   --  38.1*   CR  --   --   --   --  0.75  --  0.96  --  1.23*  --  1.11   * 123* 176* 157* 163*   < > 178*   < > 154*   < > 146*  148*    < > = values in this interval not displayed.     Liver Function Tests  Recent Labs   Lab 05/19/25  0445 05/18/25  0205   AST 42 45   ALT 36 39   ALKPHOS 120 175*   BILITOTAL 0.8 0.8   ALBUMIN 2.7* 3.3*     Coagulation Profile  No lab results found in last 7 days.    IMAGING:  Recent Results (from the past 24 hours)   XR Chest Port 1 View    Narrative    EXAM: XR CHEST PORT 1 VIEW  LOCATION: [facility]  DATE: 5/22/2025    INDICATION: f u ptx  COMPARISON: 5/21/2025      Impression    IMPRESSION: Stable right chest tube. Stable endotracheal tube, right PICC, and gastric drainage tube. Interval placement of a feeding tube which courses into the stomach with tip outside the field-of-view. Trace right apical pneumothorax. Unchanged   patchy right lung opacities. Normal heart size. Subcutaneous emphysema in the right chest wall and lower neck again noted.   XR Chest Port 1 View    Narrative    EXAM: XR CHEST PORT 1 VIEW  LOCATION: [facility]  DATE: 5/22/2025    INDICATION: respiratory distress  COMPARISON: Film earlier today, 5/21/2025 and multiple prior studies, CTA chest 5/18/2025, 5/14/2025      Impression    IMPRESSION: Endotracheal tube is in the mid trachea, 4 cm from the isa. Right upper extremity PICC line catheter overlies the distal SVC.    There is a large caliber pigtail right-sided chest tube coursing into the apex.     There is a small right-sided pneumothorax, slightly increased from earlier today and measuring 2.3 cm laterally in the right midlung. This previously measured approximately a centimeter in the same location.    Increased subcutaneous emphysema throughout which now crosses midline and can be seen along the left neck and lateral chest.    Underlying, extensive, bilateral emphysema with a small right  effusion and band like area of consolidation in the medial costophrenic angle again noted.    Findings discussed by the undersigned with Argentina Cruz at 1450. Patient has recently decompensated and coded prior to the CXR.    CT Head w/o Contrast    Narrative    EXAM: CT HEAD W/O CONTRAST  LOCATION: [facility]  DATE: 5/22/2025    INDICATION: Altered mental status  COMPARISON: CT dated 7/31/2016   TECHNIQUE: Routine CT Head without IV contrast. Multiplanar reformats. Dose reduction techniques were used.    FINDINGS:  INTRACRANIAL CONTENTS: No acute intracranial hemorrhage, extra-axial fluid collection, or mass effect. Grossly maintained gray-white matter differentiation without evidence of an acute transcortical confluent infarct. Mild, newly evident presumed chronic   small vessel ischemic changes. Essentially new mild generalized cerebral parenchymal volume loss. No hydrocephalus.     VISUALIZED ORBITS/SINUSES/MASTOIDS: No acute intraorbital finding. No evidence of significant paranasal sinus or mastoid mucosal disease. Partially imaged presumed enteric and endotracheal tubes.    BONES/SOFT TISSUES: No acute calvarial injury or significant scalp hematoma. A few maxillary dental caries and periapical lucencies. Partially imaged extensive soft tissue emphysema extending into the deep and superficial neck spaces bilaterally.    Findings of the visualized thoracic cavity are reported separately.      Impression    IMPRESSION:  1.  No acute intracranial hemorrhage, mass effect, or evidence of an acute transcortical infarct.  2.  Mild chronic aged-related intracranial changes.   CT Chest w/o Contrast    Narrative    EXAM: CT CHEST W/O CONTRAST  LOCATION: [facility]  DATE: 5/22/2025    INDICATION: respiratory failure; ptx  COMPARISON: Chest CT 5/18/2025. Chest x-ray 5/22/2025.  TECHNIQUE: CT chest without IV contrast. Multiplanar reformats were obtained. Dose reduction  techniques were used.  CONTRAST: None.    FINDINGS: Respiratory motion artifact.  LUNGS AND PLEURA: Indwelling large bore chest tube entering the lateral aspect of the inferior right hemithorax, with its loop within the right apex. Large, anterior right pneumothorax. This is larger than visualized on the previous CT from 5/18/2025.   This is much larger than seen on today's portable chest x-ray which was probably obtained while the tube was on wall suction. Severe underlying emphysema. Stable right upper lobe consolidation and patchy airspace changes within the left upper lobe. New,   near complete collapse of the left lower lobe. New, moderate-sized right pleural effusion. Indwelling endotracheal tube with its tip at the level the aortic arch.    MEDIASTINUM/AXILLAE: New, moderate pneumomediastinum. Mild calcified atherosclerotic changes of the thoracic aorta without aneurysmal dilatation. No lymphadenopathy.    CORONARY ARTERY CALCIFICATION: Mild.    UPPER ABDOMEN: Small calcified gallstones.    MUSCULOSKELETAL: Markedly increased soft tissue emphysema throughout the visualized chest, extending into the base of the neck and right and posterior aspects of the visualized abdominal wall.      Impression    IMPRESSION:   1.  Large right anterior pneumothorax. CT was likely obtained on water seal. The increase in size consistent with a significant air leak.  2.  Markedly increased soft tissue gas, which has dissected into the mediastinum.  3.  New, near complete collapse of the left lower lobe.  4.  New, moderate right pleural effusion.  5.  Stable right upper lobe consolidation and patchy airspace changes within the left upper lobe, which may be inflammatory/infectious in nature..

## 2025-05-22 NOTE — PROGRESS NOTES
Pt transferring to [facility] via [facility] transport. All belongings sent with patient.

## 2025-05-22 NOTE — PROGRESS NOTES
Care Management Follow Up    Length of Stay (days): 4      Expected Discharge Date: pending     Anticipated Discharge Plan:   TBD     Transportation: TBD     PT Recommendations:    OT Recommendations:        Barriers to Discharge: medical stability, pulm status, nutrition-TF, IV medications     Prior Living Situation:  Patient lives in his house with spouse. He has been independent with ADLs/IADLs, ambulates without devices and has home O2 (oxygen company not known). Two children live in [place] and one lives here. Spouse is primary family contact.      Discussed  Partnership in Safe Discharge Planning  document with patient/family: No      Handoff Completed: No, handoff not indicated or clinically appropriate     Patient/Spokesperson Updated: family 5/21     Additional Information:  Medical:  Armin Terrell is a 69 year old male with history of chronic respiratory failure on home O2, 3LPM, COPD (FEV1 1.2 L 33%), severe emphysema, alpha-1 antitrypsin deficiency, decline treatment, lung nodules, prostate cancer status post radical prostatectomy.   Patient was recently seen in the ED on May 14, 2025, for evaluation of shortness of breath and right-sided chest pain, chest CT scan showed no pulmonary embolism right upper lobe infiltrate, enlarging right hilar adenopathy unchanged 1.7 cm left upper lobe nodule, labs show elevated white blood cell count negative respiratory viral PCR patient was treated for with systemic steroids and antibiotics.   Patient was brought to ED by EMS on May 18, 2021 for worsening shortness of breath generalized weakness.      Intubated 5/18-5/22, Chest in place, Postpyloric feeding tube placed 5/21 5/21 Intensivist note: place PPFT today; anticipate ongoing tube feeding need after extubation.         5/22/25:  Extubated today to NC 8-10L  Patient is not medically stable to initiate discharge planning.           Next Steps:   Follow progression and recommendations.  Anticipating need  for therapy orders when appropriate.       Janessa Corrales RN

## 2025-05-22 NOTE — PROGRESS NOTES
Called to bedside by MD to BMV unstable pt with increasing pneumothorax. SpO2 84% on oxymask. SpO2 improved to 92%. BVM until CRNA arrived to intubate. Pt intubated with no complications. Placed on ventilator.    Paula Taylor, RT

## 2025-05-22 NOTE — PROGRESS NOTES
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  Repeat episode of tension physiology resulting in re-intubation and new chest tube placement.  Sustained air leak causing ventilatory issues.    If patient is transferring for specialty care or procedure, the specialist has agreed with need for transfer and anticipated timeline:Yes  ICU Level of Care Recommendation: [facility]  ICU Service Recommendation: [facility]- Medicine  Final destination: [facility] ICU MICU service.   Recommendations for referring provider: not applicable

## 2025-05-22 NOTE — DISCHARGE SUMMARY
[facility] Discharge Summary    Armin Terrell MRN# [id number]   Age: 69 year old YOB: 1955     Date of Admission:  5/18/2025  Date of Discharge::  5/22/2025  Admitting Physician:  Messi Farmer MD  Discharge Physician:  JORDAN Ramos CNP             Admission Diagnoses:   Shortness of breath [R06.02]  Pneumothorax on right [J93.9]  Acute respiratory failure with hypoxia and hypercapnia (H) [J96.01, J96.02]  Pneumonia due to infectious organism, unspecified laterality, unspecified part of lung [J18.9]  Sepsis, due to unspecified organism, unspecified whether acute organ dysfunction present (H) [A41.9]          Discharge Diagnosis:     Active Problems:    Shortness of breath    Pneumothorax on right    Acute respiratory failure with hypoxia and hypercapnia (H)    Pneumonia due to infectious organism, unspecified laterality, unspecified part of lung    Sepsis, due to unspecified organism, unspecified whether acute organ dysfunction present (H)               Discharge Medications:     Current Facility-Administered Medications   Medication Dose Route Frequency Provider Last Rate Last Admin    acetaminophen (TYLENOL) oral liquid 650 mg  650 mg Per Feeding Tube Q4H PRN Argentina Cruz APRN CNP   650 mg at 05/21/25 1100    acetaminophen (TYLENOL) tablet 650 mg  650 mg Oral Q4H PRN Argentina Cruz APRN CNP        Or    acetaminophen (TYLENOL) Suppository 650 mg  650 mg Rectal Q4H PRN Argentina Cruz APRN CNP        chlorhexidine (PERIDEX) 0.12 % solution 15 mL  15 mL Mouth/Throat Q12H Argentina Cruz APRN CNP        glucose gel 15-30 g  15-30 g Oral Q15 Min PRN Messi Farmer MD        Or    dextrose 50 % injection 25-50 mL  25-50 mL Intravenous Q15 Min PRN Messi Farmer MD        Or    glucagon injection 1 mg  1 mg Subcutaneous Q15 Min PRN Messi Farmer MD        sennosides (SENOKOT) syrup 5 mL  5 mL Oral or Feeding Tube BID Argentina Cruz APRN CNP   5 mL at 05/22/25  0804    And    docusate (COLACE) 50 MG/5ML liquid 100 mg  100 mg Oral or Feeding Tube BID Wilisa, Argentina A, APRN CNP   100 mg at 05/22/25 0804    enoxaparin ANTICOAGULANT (LOVENOX) injection 40 mg  40 mg Subcutaneous Q24H Wieben, Argentina A, APRN CNP   40 mg at 05/22/25 0804    hydrALAZINE (APRESOLINE) injection 10-20 mg  10-20 mg Intravenous Q6H PRN Wieben, Argentina A, APRN CNP   20 mg at 05/22/25 1304    HYDROmorphone (PF) (DILAUDID) injection 0.3 mg  0.3 mg Intravenous Q3H PRN Nancy, Argentina A, APRN CNP   0.3 mg at 05/22/25 1308    insulin aspart (NovoLOG) injection (RAPID ACTING)  1-7 Units Subcutaneous Q4H Nuno Amaro MD   1 Units at 05/22/25 0804    ipratropium - albuterol 0.5 mg/2.5 mg/3 mL (DUONEB) neb solution 3 mL  3 mL Nebulization Q4H Messi Farmer MD   3 mL at 05/22/25 1216    lidocaine (viscous) (XYLOCAINE) 2 % solution 5 mL  5 mL Mouth/Throat Once Wieben Argentina A, APRN CNP        propofol (DIPRIVAN) infusion  5-75 mcg/kg/min (Dosing Weight) Intravenous Continuous Wieben, Argentina A, APRN CNP 7.9 mL/hr at 05/22/25 1609 20 mcg/kg/min at 05/22/25 1609    And    propofol (DIPRIVAN) bolus from bag or syringe pump  10 mg Intravenous Q15 Min PRN Wieben, Argentina A, APRN CNP        And    Medication Instruction   Does not apply Continuous PRN Wieben, Argentina A, APRN CNP        meropenem (MERREM) 1 g vial to attach to  mL bag  1 g Intravenous Q8H Nuno Amaro MD   1 g at 05/22/25 0804    methylPREDNISolone sodium succinate (SOLU-MEDROL) injection 40 mg  40 mg Intravenous Q12H Nancy, Argentina A, APRN CNP   40 mg at 05/22/25 1545    midazolam (VERSED) injection 1 mg  1 mg Intravenous Q1H PRN Argentina Cruz APRN CNP        naloxone (NARCAN) injection 0.2 mg  0.2 mg Intravenous Q2 Min PRN Messi Farmer MD        Or    naloxone (NARCAN) injection 0.4 mg  0.4 mg Intravenous Q2 Min PRN Messi Farmer MD        Or    naloxone (NARCAN) injection 0.2 mg  0.2 mg Intramuscular  Q2 Min PRN Messi Farmer MD        Or    naloxone (NARCAN) injection 0.4 mg  0.4 mg Intramuscular Q2 Min PRN Messi Farmer MD        norepinephrine (LEVOPHED) 4 mg in  mL infusion PREMIX  0.01-0.6 mcg/kg/min (Dosing Weight) Intravenous Continuous Argentina Cruz APRN CNP 74.5 mL/hr at 05/22/25 1600 0.3 mcg/kg/min at 05/22/25 1600    pantoprazole (PROTONIX) 2 mg/mL suspension 40 mg  40 mg Per Feeding Tube QAM Messi Burt MD        Or    pantoprazole (PROTONIX) IV push injection 40 mg  40 mg Intravenous QAMessi Silver MD   40 mg at 05/22/25 0638    phenylephrine (BART-SYNEPHRINE) 50 mg in NaCl 0.9 % 250 mL infusion  0.1-6 mcg/kg/min (Dosing Weight) Intravenous Continuous Argentina Cruz APRN CNP 19.9 mL/hr at 05/22/25 1609 1 mcg/kg/min at 05/22/25 1609    sodium chloride (PF) 0.9% PF flush 10-20 mL  10-20 mL Intracatheter q1 min prn Nuno Amaro MD        sodium chloride (PF) 0.9% PF flush 10-40 mL  10-40 mL Intracatheter Once PRN Messi Farmer MD        sodium chloride (PF) 0.9% PF flush 10-40 mL  10-40 mL Intracatheter Q7 Days Nuno Amaro MD        sodium chloride (PF) 0.9% PF flush 10-40 mL  10-40 mL Intracatheter Daily Nuno Amaro MD   10 mL at 05/22/25 0333    sodium chloride (PF) 0.9% PF flush 3 mL  3 mL Intracatheter q1 min prn Paula Craft MD   3 mL at 05/20/25 2114    sodium chloride (PF) 0.9% PF flush 3 mL  3 mL Intracatheter Q8H Paula Craft MD   3 mL at 05/22/25 0804    vasopressin (VASOSTRICT) 20 Units in sodium chloride 0.9 % 100 mL standard conc infusion  2.4 Units/hr Intravenous Continuous Argentina Cruz, APRN CNP                 Consultations:   No consultations were requested during this admission          Brief History of Illness:   69 year old man with history of severe COPD (FEV1 1.2L, 33%), alpha-1 antitrypsin deficiency, tobacco dependence. He presented 5/18/2025 with worsening shortness of breath and weakness.            Hospital Course:   Patient was brought to ED by EMS on May 18, 2021 for worsening shortness of breath generalized weakness.  Patient was in severe respiratory distress started on BiPAP therapy and  intubated. Post intubation Chest x-ray showed right-sided pneumothorax and right infiltrates. Right-sided chest tube placement done in ER.  Follow-up chest CT scan show large right-sided pneumothorax chest x-ray show right-sided pneumothorax, right pigtail chest tube along the major fissure, collapsed right lower lobe and right middle lobe consolidation of the right upper lobe unchanged left upper lobe nodule. He was started on levophed and admitted.   He was admitted to ICU for further management. He was treated for COPD exacerbation with steroid and neb treatments. Sputum grew Pseudomonas and has been treated with meropenem. Over the course of the previous 4 days he has slowly improved though his chest tube has continued to have persistent air leak and cxr shows persistent right pneumothorax. Tube was upsized in IR on 5/20/2025 with no improvement in air leak. Levophed was weaned off and he was doing well on spontaneous breathing trials. He extubated this morning to nasal cannula. He initially was doing well and was able to sit on the edge of bed. Unfortunately, when he was laid back down in bed, his chest tube became kinked and he quickly decompensated.   Patient was found this afternoon significantly hypertensive and tachycardic and in severe respiratory distress. Subcutaneous emphysema had become much more pronounced over his right chest, extending up in to the neck. Chest tube was noted to be kinked; once this was unkinked there was brisk air leak in to atrium chamber but patient did not improve. He was intubated by anesthesia and required vasopressor support thereafter with phenylephrine.     Due to concern for bronchopleural fistula in patient with severe COPD, decision made to transfer to higher level of  care at [facility] for Interventional Pulmonology service.             Plan per system:     Neuro/Psych: sedation for vent synchrony and comfort               - RASS goal -2 to -3              - Propofol and Fentanyl for RASS goal and analgesia              - Fentanyl bolus for pain               - Propofol bolus for sedation      Pulmonary: acute hypoxic and hypercapnic respiratory failure requiring intubation on 5/18; PTX post intubation s/p chest tube; underlying O2 dependent, A1AT deficiency. Pneumonia - sputum with 2+ Pseudomonas. Reintubation on 5/22 due to acute worsening of pneumothorax, bronchopleural fistula.               - FiO2 (%): 50 %, Resp: 20, Vent Mode: VC/AC, Resp Rate (Set): 20 breaths/min, Tidal Volume (Set, mL): 440 mL, PEEP (cm H2O): 5 cmH2O, Pressure Support (cm H2O): 5 cmH2O, Resp Rate (Set): 20 breaths/min, Tidal Volume (Set, mL): 440 mL, PEEP (cm H2O): 5 cmH2O               - Chest tube upsized 5/20 in IR; PTX looks better on xray this morning but now with increased subcu emphysema and air leak continues.                           *Chest tube to suction; large ptx persists               - Interventional Pulmonology to see re: consideration of endobronchial valve.                             CV: Shock - following reintubation, likely sedation related.               - MAP goal > 65               - Phenylephrine for MAP goal               - Cardiac monitoring      GI/: no acute issues               - Diet: NPO - feeding tube dislodged this morning with extubation.               - Last BM: 5/22              - Bowel meds: senna-docusate     Renal: normal renal function; elevated BUN - ddx dehydration vs protein? Vs GI bleed (less likely - no signs of bleeding)                - Strict I/O               - Gave one dose 20mg IV lasix today               - electrolyte protocols      ID: pseudomonas pneumonia               - meropenem      Heme/Coag:  no acute issues; platelets drifting down - too soon  for HIT.               - DVT proph: enoxaparin      Endocrine: hyperglycemia   - Q4h sliding scale insulin       Family: updated wife extensively; she is aware of transfer      JORDAN Ramos CNP

## 2025-05-22 NOTE — ANESTHESIA PROCEDURE NOTES
Airway         Procedure Start/Stop Times: 5/22/2025 2:05 PM  Staff -        CRNA: Leanna Casillas APRN CRNA       Performed By: CRNA  Consent for Airway        Urgency: elective  Indications and Patient Condition       Indications for airway management: mery-procedural       Induction type:RSI       Mask difficulty assessment: 1 - vent by mask    Final Airway Details       Final airway type: endotracheal airway       Successful airway: Single subglottic suction  Endotracheal Airway Details        ETT size (mm): 8.0       Cuffed: yes       Successful intubation technique: video laryngoscopy       VL Blade Size: Glidescope 3       Adjucts: stylet       Position: Right       Measured from: lips       Secured at (cm): 24       Bite block used: None    Post intubation assessment        Placement verified by: capnometry, equal breath sounds and chest rise        Number of attempts at approach: 1       Number of other approaches attempted: 0       Secured with: commercial tube olea       Ease of procedure: easy       Dentition: Intact and Unchanged    Medication(s) Administered   Medication Administration Time: 5/22/2025 2:05 PM

## 2025-05-22 NOTE — PROGRESS NOTES
05/22/25 1330   Appointment Info   Signing Clinician's Name / Credentials (OT) Lelia Bolivar, OTR/L   Living Environment   People in Home spouse   Current Living Arrangements house   Living Environment Comments all needs on main level, spouse does basement laundry. per chart, 3L home O2   Self-Care   Usual Activity Tolerance moderate   Current Activity Tolerance poor   Equipment Currently Used at Home none   Fall history within last six months no   Activity/Exercise/Self-Care Comment IND for BADLs   Instrumental Activities of Daily Living (IADL)   IADL Comments IND for meds, driving, no AD used, shared household tasks with spouse   General Information   Onset of Illness/Injury or Date of Surgery 05/18/25   Referring Physician Argentina Cruz, JORDAN CNP   Patient/Family Therapy Goal Statement (OT) less pain   Additional Occupational Profile Info/Pertinent History of Current Problem PMH chronic respiratory failure on home O2, 3LPM, COPD (FEV1 1.2 L 33%), severe emphysema, alpha-1 antitrypsin deficiency, decline treatment, lung nodules, prostate cancer status post radical prostatectomy. presents with shortness of breath generalized weakness, required BiPAP then intubation. findings of right-sided pneumothorax s/p chest-tube placement. now extubated to 10L oximask.   Existing Precautions/Restrictions oxygen therapy device and L/min;fall   General Observations and Info 10 L oximask, /100, 's, on bedpan upon arrival   Cognitive Status Examination   Orientation Status person;time  (oriented to place type, unsure of place name. informed of place name, then pt able to accurately state city.)   Affect/Mental Status (Cognitive) anxious  (regarding pain)   Follows Commands follows one-step commands   Cognitive Status Comments needing cues during activity to keep oximask on   Pain Assessment   Patient Currently in Pain Yes, see Vital Sign flowsheet   Range of Motion Comprehensive   General Range of Motion  bilateral upper extremity ROM WFL   Strength Comprehensive (MMT)   Comment, General Manual Muscle Testing (MMT) Assessment global weakness   Bed Mobility   Bed Mobility scooting/bridging;rolling left;rolling right;supine-sit;sit-supine   Rolling Left [place] (Bed Mobility) maximum assist (25% patient effort)   Rolling Right [place] (Bed Mobility) maximum assist (25% patient effort)  (per clinical judgement)   Scooting/Bridging [place] (Bed Mobility) dependent (less than 25% patient effort)   Comment (Bed Mobility) rolled to L to remove bedpan, remains sidelying for pericares with CGA. once supine, additional loose BM and increasing signs of stress, taking off oximask, needing reassurance, HR increasing to 140's, SpO2 88%. calms with cues and rest. deferred additional activity and notified RN of additional BM cares needed.   Transfers   Transfer Comments did not attempt today d/t limited tolerance with rolling.   Activities of Daily Living   BADL Assessment/Intervention toileting;lower body dressing   Lower Body Dressing Assessment/Training   [place] Level (Lower Body Dressing) dependent (less than 25% patient effort)   Comment, (Lower Body Dressing) per clinical judgement   Toileting   [place] Level (Toileting) dependent (less than 25% patient effort)   Clinical Impression   Criteria for Skilled Therapeutic Interventions Met (OT) Yes, treatment indicated   OT Diagnosis dec BADL IND   OT Problem List-Impairments impacting ADL problems related to;activity tolerance impaired;cognition;balance;mobility;strength;pain   Assessment of Occupational Performance 5 or more Performance Deficits   Identified Performance Deficits dressing, toileting, bathing, household mobility, functional transfers, household management, meal prep, community mobility   Planned Therapy Interventions (OT) ADL retraining;IADL retraining;balance training;bed mobility training;cognition;strengthening;transfer training;home  program guidelines;progressive activity/exercise;risk factor education   Clinical Decision Making Complexity (OT) detailed assessment/moderate complexity   Risk & Benefits of therapy have been explained evaluation/treatment results reviewed;participants included;patient   OT Total Evaluation Time   OT Eval, Moderate Complexity Minutes (22782) 15   OT Goals   Therapy Frequency (OT) 5 times/week   OT Predicted Duration/Target Date for Goal Attainment 05/29/25   OT Goals Lower Body Dressing;Toilet Transfer/Toileting   OT: Lower Body Dressing Moderate assist   OT: Toilet Transfer/Toileting Moderate assist;toilet transfer   OT Discharge Planning   OT Plan EOB sitting, check with RT for increasing O2, g/h with setup   OT Discharge Recommendation (DC Rec) Transitional Care Facility   OT Rationale for DC Rec requires hands on assist for all BADLs and very low tolerance for activity   OT Brief overview of current status aide   OT Total Distance Amb During Session (feet) 0   Total Session Time   Total Session Time (sum of timed and untimed services) 15

## 2025-05-22 NOTE — PROGRESS NOTES
SPIRITUAL HEALTH SERVICES Progress Note  [facility], ICU     responded to code blue. Medical staff attending to patient; no visitors present at this time.    Plan of Care - A  will continue to visit as able or per request by patient/family/staff.      Min Bryson MDiv, [facility]  /Manager Spiritual Health Services  [phone]       Spiritual Health Services is available 24/7 for emergent requests and consults, either by paging the on-call  or by entering an ASAP/STAT consult in [facility], which will also page the on-call .

## 2025-05-22 NOTE — PROGRESS NOTES
Respiratory Care    Patient weaned for about 1 hour 30 min doing well OK to extubate per NP. No respiratory distress noted afterward or stridor.       Remi Greer, RT

## 2025-05-22 NOTE — PLAN OF CARE
Goal Outcome Evaluation:      Plan of Care Reviewed With: patient    Overall Patient Progress: improvingOverall Patient Progress: improving    Outcome Evaluation: Remained intubated overnight. Calm with fentanyl boluses.        [facility] - ICU    RN Progress Note:            Pertinent Assessments:      Please refer to flowsheet rows for full assessment     Pt opens eyes and follows commands. Pupils equal and reactive. LS coarse with audible bubbles from chest tube air leak.            Key Events - This Shift:       Rested comfortably between cares and assessments. Fentanyl boluses given for intermittent restlessness. PRN versed given x1 with minimal effects.    Good UOP.      RN Managed Protocols Ordered:  Yes  Protocols:Potassium and Magnesium  PRN'S:  Protocols Status: Reviewed with Oncoming RN                Barriers to Discharge / Downgrade:     Mechanical ventilation, sedation

## 2025-05-22 NOTE — PROGRESS NOTES
Critical Care Progress Note  5/22/2025     Admit Date: 5/18/2025  ICU Admission Day: 5/18/2025  Code Status: FULL CODE      Problem List:   Active Problems:    Shortness of breath    Pneumothorax on right    Acute respiratory failure with hypoxia and hypercapnia (H)    Pneumonia due to infectious organism, unspecified laterality, unspecified part of lung    Sepsis, due to unspecified organism, unspecified whether acute organ dysfunction present (H)         Plan by System:     Neuro/Psych: sedation for vent synchrony and comfort    - RASS goal 0 to -1    - Precedex and Fentanyl for RASS goal and analgesia   - Fentanyl bolus for pain     Pulmonary: acute hypoxic and hypercapnic respiratory failure requiring intubation on 5/18; PTX post intubation s/p chest tube; underlying O2 dependent, A1AT deficiency. Pneumonia - sputum with 2+ Pseudomonas    - FiO2 (%): 50 %, Resp: 15, Vent Mode: PS, Resp Rate (Set): 18 breaths/min, Tidal Volume (Set, mL): 540 mL, PEEP (cm H2O): 5 cmH2O, Pressure Support (cm H2O): 5 cmH2O, Resp Rate (Set): 18 breaths/min, Tidal Volume (Set, mL): 540 mL, PEEP (cm H2O): 5 cmH2O    - Extubate    - Chest tube upsized 5/20 in IR; PTX looks better on xray this morning but now with increased subcu emphysema and air leak continues.     * Keep chest tube to suction. No attempt to wean until at least off vent.     * daily CXR        CV: Shock - some component of septic shock with Pseudomonas pneumonia, some component of sedation.    - MAP goal > 65    - off Norepi and has now been more hypertensive    - PRN hydralazine and labetalol    - Cardiac monitoring     GI/: no acute issues    - Diet: NPO - tube feeding started 5/20    - Last BM: none recorderd     - PPFT in place for ongoing nutrition    - Bowel meds: senna-docusate    Renal: normal renal function; elevated BUN - ddx dehydration vs protein? Vs GI bleed (less likely - no signs of bleeding)     - Strict I/O    - Give one dose 20mg IV lasix today    -  electrolyte protocols     ID: pseudomonas pneumonia    - meropenem     Heme/Coag:  no acute issues     - DVT proph: enoxaparin     Endocrine: hyperglycemia   - Q4h sliding scale insulin      Family: updated wife at bedside.      Clinically Significant Risk Factors         # Hypernatremia: Highest Na = 149 mmol/L in last 2 days, will monitor as appropriate  # Hyperchloremia: Highest Cl = 117 mmol/L in last 2 days, will monitor as appropriate          # Hypoalbuminemia: Lowest albumin = 2.7 g/dL at 5/19/2025  4:45 AM, will monitor as appropriate         # Acute Hypercapnic Respiratory Failure: based on arterial blood gas results.  Continue supplemental oxygen and ventilatory support as indicated.  # Acute Hypercapnic Respiratory Failure: based on venous blood gas results.  Continue supplemental oxygen and ventilatory support as indicated.          # Severe Malnutrition: based on nutrition assessment and treatment provided per dietitian's recommendations.                    Dispo: ICU     Argentina Cruz, CNP  [facility] Pulmonary/Critical Care    Total critical care time: 32-minutes  I have personally provided 32 minutes of critical care time exclusive of time spent on separately billable procedures.   _______________________________________________________________    HPI: Armin Terrell is a 69 year old male with history of chronic respiratory failure on home O2, 3LPM, COPD (FEV1 1.2 L 33%), severe emphysema, alpha-1 antitrypsin deficiency, decline treatment, lung nodules, prostate cancer status post radical prostatectomy.   Patient was recently seen in the ED on May 14, 2025, for evaluation of shortness of breath and right-sided chest pain, chest CT scan showed no pulmonary embolism right upper lobe infiltrate, enlarging right hilar adenopathy unchanged 1.7 cm left upper lobe nodule, labs show elevated white blood cell count negative respiratory viral PCR patient was treated for with systemic steroids and  antibiotics.   Patient was brought to ED by EMS on May 18, 2021 for worsening shortness of breath generalized weakness.  Patient was in severe respiratory distress started on BiPAP therapy and later intubated.   Chest x-ray show right-sided pneumothorax, patient underwent chest tube placement.  Follow-up chest CT scan show large right-sided pneumothorax chest x-ray show right-sided pneumothorax, right pigtail chest tube along the major fissure, collapsed right lower lobe and right middle lobe consolidation of the right upper lobe unchanged left upper lobe nodule.   Patient was hypotensive , started on NE drip.   Patient was admitted to the ICU for further care.     ROS: nods head vigorously to wanting the tube out. Denies pain, discomfort otherwise.     Objective:     Vitals:    05/22/25 0800 05/22/25 0900 05/22/25 0945 05/22/25 1000   BP: (!) 141/94 (!) 148/93  (!) 150/98   Pulse: 81 86 81 79   Resp: 14 16 13 15   Temp: 97.7  F (36.5  C)      TempSrc: Axillary      SpO2: 92% 94% 94% 94%   Weight:           Vent:   Day of Intubation: 5/18  Ready to wean? Yes  FiO2 (%): 50 %, Resp: 15, Vent Mode: PS, Resp Rate (Set): 18 breaths/min, Tidal Volume (Set, mL): 540 mL, PEEP (cm H2O): 5 cmH2O, Pressure Support (cm H2O): 5 cmH2O, Resp Rate (Set): 18 breaths/min, Tidal Volume (Set, mL): 540 mL, PEEP (cm H2O): 5 cmH2O    Sedative Infusion: precedex and fentanyl   Sedation vacation: Yes    I/O:   Intake/Output Summary (Last 24 hours) at 5/19/2025 1427  Last data filed at 5/19/2025 1200  Gross per 24 hour   Intake 2094.47 ml   Output 1360 ml   Net 734.47 ml     Wt Readings from Last 3 Encounters:   05/22/25 72.2 kg (159 lb 3.2 oz)   05/14/25 66.2 kg (146 lb)   05/07/25 75.3 kg (166 lb)      Weight change: 2.087 kg (4 lb 9.6 oz)    Physical Exam:  Gen: no acute distress;    HEENMT: AT/NC OETT in place   NEURO: responds appropriately, moving all extremities   CARDIOVASCULAR: RRR S1S2 no murmur  PULMONARY: unlabored on PSV; lungs  clear, no wheeze. Subcu emphysema area over right chest. Chest tube with persistent air leak    GASTROINTESTINAL: soft ntnd  INTEGUMENT: visible skin intact, swelling in left arm/hand   PSYCH: calm    Labs:   Recent Labs   Lab 05/22/25  0334 05/20/25  0314 05/19/25  0445   *   < > 141   CO2 27   < > 26   BUN 64.8*   < > 38.1*   ALKPHOS  --   --  120   ALT  --   --  36   AST  --   --  42    < > = values in this interval not displayed.       Recent Labs   Lab 05/22/25 0334   WBC 26.9*   HGB 14.5   HCT 42.9   *       Micro:   5/18 Sputum - 2+ PsA    Imaging: all imaging personalized reviewed   5/22  CXR - Stable right chest tube. Stable endotracheal tube, right PICC, and gastric drainage tube. Interval placement of a feeding tube which courses into the stomach with tip outside the field-of-view. Trace right apical pneumothorax. Unchanged   patchy right lung opacities. Normal heart size. Subcutaneous emphysema in the right chest wall and lower neck again noted.      Argentina Cruz, CNP  [facility] Pulmonary/Critical Care

## 2025-05-23 ENCOUNTER — APPOINTMENT (OUTPATIENT)
Dept: GENERAL RADIOLOGY | Facility: CLINIC | Age: 70
End: 2025-05-23
Attending: SURGERY
Payer: COMMERCIAL

## 2025-05-23 LAB
ALBUMIN UR-MCNC: NEGATIVE MG/DL
ANION GAP SERPL CALCULATED.3IONS-SCNC: 6 MMOL/L (ref 7–15)
ANION GAP SERPL CALCULATED.3IONS-SCNC: 7 MMOL/L (ref 7–15)
ANION GAP SERPL CALCULATED.3IONS-SCNC: 9 MMOL/L (ref 7–15)
APPEARANCE UR: ABNORMAL
BACTERIA SPEC CULT: NO GROWTH
BACTERIA SPEC CULT: NO GROWTH
BASE EXCESS BLDV CALC-SCNC: 8.4 MMOL/L (ref -3–3)
BASOPHILS # BLD AUTO: 0 10E3/UL (ref 0–0.2)
BASOPHILS NFR BLD AUTO: 0 %
BILIRUB UR QL STRIP: NEGATIVE
BUN SERPL-MCNC: 62.2 MG/DL (ref 8–23)
BUN SERPL-MCNC: 64.5 MG/DL (ref 8–23)
BUN SERPL-MCNC: 65.2 MG/DL (ref 8–23)
CALCIUM SERPL-MCNC: 7.6 MG/DL (ref 8.8–10.4)
CALCIUM SERPL-MCNC: 7.8 MG/DL (ref 8.8–10.4)
CALCIUM SERPL-MCNC: 7.9 MG/DL (ref 8.8–10.4)
CHLORIDE SERPL-SCNC: 109 MMOL/L (ref 98–107)
CHLORIDE SERPL-SCNC: 110 MMOL/L (ref 98–107)
CHLORIDE SERPL-SCNC: 111 MMOL/L (ref 98–107)
COLOR UR AUTO: YELLOW
CREAT SERPL-MCNC: 0.87 MG/DL (ref 0.67–1.17)
CREAT SERPL-MCNC: 0.91 MG/DL (ref 0.67–1.17)
CREAT SERPL-MCNC: 0.91 MG/DL (ref 0.67–1.17)
EGFRCR SERPLBLD CKD-EPI 2021: >90 ML/MIN/1.73M2
EOSINOPHIL # BLD AUTO: 0 10E3/UL (ref 0–0.7)
EOSINOPHIL NFR BLD AUTO: 0 %
ERYTHROCYTE [DISTWIDTH] IN BLOOD BY AUTOMATED COUNT: 15.9 % (ref 10–15)
GLUCOSE BLDC GLUCOMTR-MCNC: 110 MG/DL (ref 70–99)
GLUCOSE BLDC GLUCOMTR-MCNC: 111 MG/DL (ref 70–99)
GLUCOSE BLDC GLUCOMTR-MCNC: 112 MG/DL (ref 70–99)
GLUCOSE BLDC GLUCOMTR-MCNC: 118 MG/DL (ref 70–99)
GLUCOSE BLDC GLUCOMTR-MCNC: 122 MG/DL (ref 70–99)
GLUCOSE BLDC GLUCOMTR-MCNC: 129 MG/DL (ref 70–99)
GLUCOSE SERPL-MCNC: 114 MG/DL (ref 70–99)
GLUCOSE SERPL-MCNC: 122 MG/DL (ref 70–99)
GLUCOSE SERPL-MCNC: 136 MG/DL (ref 70–99)
GLUCOSE UR STRIP-MCNC: NEGATIVE MG/DL
HCO3 BLDV-SCNC: 35 MMOL/L (ref 21–28)
HCO3 SERPL-SCNC: 28 MMOL/L (ref 22–29)
HCO3 SERPL-SCNC: 29 MMOL/L (ref 22–29)
HCO3 SERPL-SCNC: 30 MMOL/L (ref 22–29)
HCT VFR BLD AUTO: 41.5 % (ref 40–53)
HGB BLD-MCNC: 14 G/DL (ref 13.3–17.7)
HGB UR QL STRIP: ABNORMAL
IMM GRANULOCYTES # BLD: 1.9 10E3/UL
IMM GRANULOCYTES NFR BLD: 4 %
KETONES UR STRIP-MCNC: NEGATIVE MG/DL
LEUKOCYTE ESTERASE UR QL STRIP: NEGATIVE
LYMPHOCYTES # BLD AUTO: 1 10E3/UL (ref 0.8–5.3)
LYMPHOCYTES NFR BLD AUTO: 2 %
MAGNESIUM SERPL-MCNC: 2.5 MG/DL (ref 1.7–2.3)
MCH RBC QN AUTO: 29 PG (ref 26.5–33)
MCHC RBC AUTO-ENTMCNC: 33.7 G/DL (ref 31.5–36.5)
MCV RBC AUTO: 86 FL (ref 78–100)
MONOCYTES # BLD AUTO: 1 10E3/UL (ref 0–1.3)
MONOCYTES NFR BLD AUTO: 2 %
MUCOUS THREADS #/AREA URNS LPF: PRESENT /LPF
NEUTROPHILS # BLD AUTO: 41.6 10E3/UL (ref 1.6–8.3)
NEUTROPHILS NFR BLD AUTO: 91 %
NITRATE UR QL: NEGATIVE
NRBC # BLD AUTO: 0.1 10E3/UL
NRBC BLD AUTO-RTO: 0 /100
O2/TOTAL GAS SETTING VFR VENT: 60 %
OXYHGB MFR BLDV: 74 % (ref 70–75)
PCO2 BLDV: 54 MM HG (ref 40–50)
PH BLDV: 7.42 [PH] (ref 7.32–7.43)
PH UR STRIP: 5.5 [PH] (ref 5–7)
PHOSPHATE SERPL-MCNC: 4.2 MG/DL (ref 2.5–4.5)
PLAT MORPH BLD: NORMAL
PLATELET # BLD AUTO: 144 10E3/UL (ref 150–450)
PO2 BLDV: 41 MM HG (ref 25–47)
POTASSIUM SERPL-SCNC: 5.2 MMOL/L (ref 3.4–5.3)
POTASSIUM SERPL-SCNC: 5.4 MMOL/L (ref 3.4–5.3)
POTASSIUM SERPL-SCNC: 5.6 MMOL/L (ref 3.4–5.3)
RBC # BLD AUTO: 4.82 10E6/UL (ref 4.4–5.9)
RBC MORPH BLD: NORMAL
RBC URINE: 20 /HPF
SAO2 % BLDV: 75.3 % (ref 70–75)
SODIUM SERPL-SCNC: 145 MMOL/L (ref 135–145)
SODIUM SERPL-SCNC: 146 MMOL/L (ref 135–145)
SODIUM SERPL-SCNC: 148 MMOL/L (ref 135–145)
SP GR UR STRIP: 1.03 (ref 1–1.03)
TRANSITIONAL EPI: <1 /HPF
UROBILINOGEN UR STRIP-MCNC: NORMAL MG/DL
WBC # BLD AUTO: 45.4 10E3/UL (ref 4–11)
WBC URINE: 2 /HPF

## 2025-05-23 PROCEDURE — 250N000013 HC RX MED GY IP 250 OP 250 PS 637

## 2025-05-23 PROCEDURE — 258N000003 HC RX IP 258 OP 636

## 2025-05-23 PROCEDURE — 250N000011 HC RX IP 250 OP 636

## 2025-05-23 PROCEDURE — 83735 ASSAY OF MAGNESIUM: CPT | Performed by: NURSE PRACTITIONER

## 2025-05-23 PROCEDURE — 71045 X-RAY EXAM CHEST 1 VIEW: CPT | Mod: 26 | Performed by: RADIOLOGY

## 2025-05-23 PROCEDURE — 250N000011 HC RX IP 250 OP 636: Mod: JZ

## 2025-05-23 PROCEDURE — 272N000069 HC FORCEP BIOPSY BRONCH

## 2025-05-23 PROCEDURE — 999N000185 HC STATISTIC TRANSPORT TIME EA 15 MIN

## 2025-05-23 PROCEDURE — 81001 URINALYSIS AUTO W/SCOPE: CPT

## 2025-05-23 PROCEDURE — 71045 X-RAY EXAM CHEST 1 VIEW: CPT

## 2025-05-23 PROCEDURE — 0BH48GZ INSERTION OF ENDOBRONCHIAL VALVE INTO RIGHT UPPER LOBE BRONCHUS, VIA NATURAL OR ARTIFICIAL OPENING ENDOSCOPIC: ICD-10-PCS | Performed by: INTERNAL MEDICINE

## 2025-05-23 PROCEDURE — 94003 VENT MGMT INPAT SUBQ DAY: CPT

## 2025-05-23 PROCEDURE — 250N000009 HC RX 250

## 2025-05-23 PROCEDURE — 99291 CRITICAL CARE FIRST HOUR: CPT | Mod: 25 | Performed by: INTERNAL MEDICINE

## 2025-05-23 PROCEDURE — 87070 CULTURE OTHR SPECIMN AEROBIC: CPT

## 2025-05-23 PROCEDURE — 31625 BRONCHOSCOPY W/BIOPSY(S): CPT

## 2025-05-23 PROCEDURE — 0BP08DZ REMOVAL OF INTRALUMINAL DEVICE FROM TRACHEOBRONCHIAL TREE, VIA NATURAL OR ARTIFICIAL OPENING ENDOSCOPIC: ICD-10-PCS | Performed by: INTERNAL MEDICINE

## 2025-05-23 PROCEDURE — 94640 AIRWAY INHALATION TREATMENT: CPT | Mod: 76

## 2025-05-23 PROCEDURE — 94640 AIRWAY INHALATION TREATMENT: CPT

## 2025-05-23 PROCEDURE — 250N000011 HC RX IP 250 OP 636: Performed by: INTERNAL MEDICINE

## 2025-05-23 PROCEDURE — 250N000013 HC RX MED GY IP 250 OP 250 PS 637: Performed by: INTERNAL MEDICINE

## 2025-05-23 PROCEDURE — 84100 ASSAY OF PHOSPHORUS: CPT

## 2025-05-23 PROCEDURE — 99223 1ST HOSP IP/OBS HIGH 75: CPT | Mod: 25 | Performed by: INTERNAL MEDICINE

## 2025-05-23 PROCEDURE — 82435 ASSAY OF BLOOD CHLORIDE: CPT

## 2025-05-23 PROCEDURE — 84295 ASSAY OF SERUM SODIUM: CPT

## 2025-05-23 PROCEDURE — 200N000002 HC R&B ICU UMMC

## 2025-05-23 PROCEDURE — 250N000013 HC RX MED GY IP 250 OP 250 PS 637: Performed by: STUDENT IN AN ORGANIZED HEALTH CARE EDUCATION/TRAINING PROGRAM

## 2025-05-23 PROCEDURE — 250N000009 HC RX 250: Performed by: INTERNAL MEDICINE

## 2025-05-23 PROCEDURE — 82805 BLOOD GASES W/O2 SATURATION: CPT

## 2025-05-23 PROCEDURE — 85025 COMPLETE CBC W/AUTO DIFF WBC: CPT

## 2025-05-23 PROCEDURE — 250N000011 HC RX IP 250 OP 636: Performed by: NURSE PRACTITIONER

## 2025-05-23 PROCEDURE — 999N000157 HC STATISTIC RCP TIME EA 10 MIN

## 2025-05-23 PROCEDURE — 99292 CRITICAL CARE ADDL 30 MIN: CPT | Mod: 25 | Performed by: INTERNAL MEDICINE

## 2025-05-23 PROCEDURE — 0BC48ZZ EXTIRPATION OF MATTER FROM RIGHT UPPER LOBE BRONCHUS, VIA NATURAL OR ARTIFICIAL OPENING ENDOSCOPIC: ICD-10-PCS | Performed by: INTERNAL MEDICINE

## 2025-05-23 RX ORDER — AMINO ACIDS/PROTEIN HYDROLYS 11G-40/45
1 LIQUID IN PACKET (ML) ORAL DAILY
Status: DISCONTINUED | OUTPATIENT
Start: 2025-05-23 | End: 2025-05-31 | Stop reason: HOSPADM

## 2025-05-23 RX ORDER — AMOXICILLIN 250 MG
1 CAPSULE ORAL 2 TIMES DAILY
Status: DISCONTINUED | OUTPATIENT
Start: 2025-05-23 | End: 2025-05-26

## 2025-05-23 RX ORDER — CEFTAZIDIME 2 G/1
2 INJECTION, POWDER, FOR SOLUTION INTRAVENOUS ONCE
Status: COMPLETED | OUTPATIENT
Start: 2025-05-23 | End: 2025-05-23

## 2025-05-23 RX ORDER — LIDOCAINE HYDROCHLORIDE 10 MG/ML
INJECTION, SOLUTION EPIDURAL; INFILTRATION; INTRACAUDAL; PERINEURAL
Status: COMPLETED
Start: 2025-05-23 | End: 2025-05-23

## 2025-05-23 RX ORDER — CEFTAZIDIME 2 G/1
2 INJECTION, POWDER, FOR SOLUTION INTRAVENOUS EVERY 8 HOURS
Status: DISCONTINUED | OUTPATIENT
Start: 2025-05-23 | End: 2025-05-24

## 2025-05-23 RX ORDER — DEXTROSE MONOHYDRATE 100 MG/ML
INJECTION, SOLUTION INTRAVENOUS CONTINUOUS PRN
Status: DISCONTINUED | OUTPATIENT
Start: 2025-05-23 | End: 2025-05-31 | Stop reason: HOSPADM

## 2025-05-23 RX ORDER — POLYETHYLENE GLYCOL 3350 17 G/17G
17 POWDER, FOR SOLUTION ORAL 2 TIMES DAILY
Status: DISCONTINUED | OUTPATIENT
Start: 2025-05-23 | End: 2025-05-31 | Stop reason: HOSPADM

## 2025-05-23 RX ORDER — THERA TABS 400 MCG
1 TAB ORAL DAILY
Status: DISCONTINUED | OUTPATIENT
Start: 2025-05-24 | End: 2025-05-31 | Stop reason: HOSPADM

## 2025-05-23 RX ORDER — THERA TABS 400 MCG
1 TAB ORAL DAILY
Status: DISCONTINUED | OUTPATIENT
Start: 2025-05-23 | End: 2025-05-23

## 2025-05-23 RX ORDER — POLYETHYLENE GLYCOL 3350 17 G/17G
17 POWDER, FOR SOLUTION ORAL 2 TIMES DAILY
Status: DISCONTINUED | OUTPATIENT
Start: 2025-05-23 | End: 2025-05-23

## 2025-05-23 RX ORDER — VECURONIUM BROMIDE 1 MG/ML
10 INJECTION, POWDER, LYOPHILIZED, FOR SOLUTION INTRAVENOUS ONCE
Status: COMPLETED | OUTPATIENT
Start: 2025-05-23 | End: 2025-05-23

## 2025-05-23 RX ORDER — DEXMEDETOMIDINE HYDROCHLORIDE 4 UG/ML
.1-1.2 INJECTION, SOLUTION INTRAVENOUS CONTINUOUS
Status: DISCONTINUED | OUTPATIENT
Start: 2025-05-23 | End: 2025-05-27

## 2025-05-23 RX ADMIN — CEFTAZIDIME 2 G: 2 INJECTION, POWDER, FOR SOLUTION INTRAVENOUS at 11:12

## 2025-05-23 RX ADMIN — POLYETHYLENE GLYCOL 3350 17 G: 17 POWDER, FOR SOLUTION ORAL at 08:00

## 2025-05-23 RX ADMIN — CEFTAZIDIME 2 G: 2 INJECTION, POWDER, FOR SOLUTION INTRAVENOUS at 20:36

## 2025-05-23 RX ADMIN — DEXMEDETOMIDINE HYDROCHLORIDE 0.2 MCG/KG/HR: 400 INJECTION INTRAVENOUS at 16:44

## 2025-05-23 RX ADMIN — ACETAMINOPHEN 650 MG: 325 TABLET ORAL at 04:27

## 2025-05-23 RX ADMIN — Medication 100 MCG/HR: at 21:12

## 2025-05-23 RX ADMIN — MEROPENEM 1 G: 1 INJECTION, POWDER, FOR SOLUTION INTRAVENOUS at 08:00

## 2025-05-23 RX ADMIN — IPRATROPIUM BROMIDE AND ALBUTEROL SULFATE 3 ML: .5; 3 SOLUTION RESPIRATORY (INHALATION) at 04:08

## 2025-05-23 RX ADMIN — AZITHROMYCIN MONOHYDRATE 500 MG: 500 INJECTION, POWDER, LYOPHILIZED, FOR SOLUTION INTRAVENOUS at 08:01

## 2025-05-23 RX ADMIN — IPRATROPIUM BROMIDE AND ALBUTEROL SULFATE 3 ML: .5; 3 SOLUTION RESPIRATORY (INHALATION) at 20:45

## 2025-05-23 RX ADMIN — IPRATROPIUM BROMIDE AND ALBUTEROL SULFATE 3 ML: .5; 3 SOLUTION RESPIRATORY (INHALATION) at 13:17

## 2025-05-23 RX ADMIN — PROPOFOL 40 MCG/KG/MIN: 10 INJECTION, EMULSION INTRAVENOUS at 02:26

## 2025-05-23 RX ADMIN — ENOXAPARIN SODIUM 40 MG: 40 INJECTION SUBCUTANEOUS at 08:00

## 2025-05-23 RX ADMIN — Medication 60 ML: at 11:10

## 2025-05-23 RX ADMIN — LIDOCAINE HYDROCHLORIDE 2 ML: 10 INJECTION, SOLUTION EPIDURAL; INFILTRATION; INTRACAUDAL; PERINEURAL at 09:35

## 2025-05-23 RX ADMIN — CHLORHEXIDINE GLUCONATE 15 ML: 1.2 SOLUTION ORAL at 08:00

## 2025-05-23 RX ADMIN — PROPOFOL 40 MCG/KG/MIN: 10 INJECTION, EMULSION INTRAVENOUS at 11:12

## 2025-05-23 RX ADMIN — IPRATROPIUM BROMIDE AND ALBUTEROL SULFATE 3 ML: .5; 3 SOLUTION RESPIRATORY (INHALATION) at 16:46

## 2025-05-23 RX ADMIN — SENNOSIDES AND DOCUSATE SODIUM 1 TABLET: 50; 8.6 TABLET ORAL at 08:01

## 2025-05-23 RX ADMIN — THIAMINE HCL TAB 100 MG 100 MG: 100 TAB at 11:11

## 2025-05-23 RX ADMIN — IPRATROPIUM BROMIDE AND ALBUTEROL SULFATE 3 ML: .5; 3 SOLUTION RESPIRATORY (INHALATION) at 08:41

## 2025-05-23 RX ADMIN — SODIUM ZIRCONIUM CYCLOSILICATE 10 G: 10 POWDER, FOR SUSPENSION ORAL at 06:14

## 2025-05-23 RX ADMIN — VECURONIUM BROMIDE 10 MG: 10 INJECTION, POWDER, LYOPHILIZED, FOR SOLUTION INTRAVENOUS at 09:29

## 2025-05-23 RX ADMIN — CHLORHEXIDINE GLUCONATE 15 ML: 1.2 SOLUTION ORAL at 20:37

## 2025-05-23 RX ADMIN — Medication 40 MG: at 08:01

## 2025-05-23 RX ADMIN — MEROPENEM 1 G: 1 INJECTION, POWDER, FOR SOLUTION INTRAVENOUS at 00:02

## 2025-05-23 RX ADMIN — SENNOSIDES AND DOCUSATE SODIUM 1 TABLET: 50; 8.6 TABLET ORAL at 20:37

## 2025-05-23 RX ADMIN — THERA TABS 1 TABLET: TAB at 11:12

## 2025-05-23 RX ADMIN — METHYLPREDNISOLONE SODIUM SUCCINATE 40 MG: 40 INJECTION, POWDER, FOR SOLUTION INTRAMUSCULAR; INTRAVENOUS at 08:00

## 2025-05-23 RX ADMIN — Medication 50 MCG: at 08:46

## 2025-05-23 RX ADMIN — DEXTROSE MONOHYDRATE: 50 INJECTION, SOLUTION INTRAVENOUS at 08:48

## 2025-05-23 RX ADMIN — PROPOFOL 30 MCG/KG/MIN: 10 INJECTION, EMULSION INTRAVENOUS at 07:48

## 2025-05-23 RX ADMIN — Medication 50 MCG: at 09:26

## 2025-05-23 RX ADMIN — SODIUM POLYSTYRENE SULFONATE 30 G: 15 SUSPENSION ORAL; RECTAL at 00:51

## 2025-05-23 ASSESSMENT — ACTIVITIES OF DAILY LIVING (ADL)
ADLS_ACUITY_SCORE: 69
ADLS_ACUITY_SCORE: 69
ADLS_ACUITY_SCORE: 75
DEPENDENT_IADLS:: INDEPENDENT
ADLS_ACUITY_SCORE: 73
ADLS_ACUITY_SCORE: 75
ADLS_ACUITY_SCORE: 69
ADLS_ACUITY_SCORE: 73
ADLS_ACUITY_SCORE: 69
ADLS_ACUITY_SCORE: 73
ADLS_ACUITY_SCORE: 69
ADLS_ACUITY_SCORE: 73
ADLS_ACUITY_SCORE: 73
ADLS_ACUITY_SCORE: 69
ADLS_ACUITY_SCORE: 73
ADLS_ACUITY_SCORE: 75
ADLS_ACUITY_SCORE: 69
ADLS_ACUITY_SCORE: 75
ADLS_ACUITY_SCORE: 75
ADLS_ACUITY_SCORE: 69
ADLS_ACUITY_SCORE: 69

## 2025-05-23 NOTE — PHARMACY-ADMISSION MEDICATION HISTORY
Admission medication history completed at [facility]. Please see Pharmacy Intern Admission Medication History note from 5/18/2025.    Noe Stewart, Pharm.D., R.Ph., PGY2 Critical Care Resident Pharmacist   alcohol use disorder   r/o delirium

## 2025-05-23 NOTE — CONSULTS
"         Interventional Pulmonology          Initial Inpatient Consultation Note                                     May 23, 2025            Patient: Armin Terrell    Date of Admission: 5/22/2025  Reason for Consultation: BP fistula  Requesting Physician: JORDAN Ramos CNP  [address] [place] [address]      Assessment:   A 70 YO M with PMH significant for chronic hypoxic respiratory failure, severe emphysematous COPD, alpha-1 antitrypsin deficiency, and prostate cancer S/p radical prostatectomy.  He was transferred from [facility] last night for BP assessment and valve insertion. He  was admitted there to the ICU and intubated due to pseudomonas PNA, septic shock and PTX with continuous air leak. He failed extubation on 5/22 due to repeat episode of tension physiology d/t kink in chest tube following extubation resulting in worsening pneumothorax.    I personally revived his CT scans and CXR from the outside hospital. Showing significant emphysema with right sided large PTX and PNA changes. Patient is currently sedated and intubated on 5 of peep and 60 % FIO2. Chest tube closely examined seemed to be in good position, continuous air leak is seen on the pleural vac container.       Plan:  We will proceed with bronchoscopy and bedside endobronchial valve insertion this morning.   Discussed with the family on the phone and obtained their consent.     Patient seen and discussed with Dr. Genao.     Jacob York  Interventional Pulmonary Fellow    Pager: [phone]            Chief Complaint: \"dyspnea\"    History of Present Illness:   A 70 YO M with PMH significant for chronic hypoxic respiratory failure, severe emphysematous COPD, alpha-1 antitrypsin deficiency, and prostate cancer S/p radical prostatectomy.  He was transferred from [facility] last night for BP assessment and valve insertion. He  was admitted there to the ICU and intubated due to pseudomonas PNA, " septic shock and PTX with continuous air leak. He failed extubation on  due to repeat episode of tension physiology d/t kink in chest tube following extubation resulting in worsening pneumothorax.    I personally revived his CT scans and CXR from the outside hospital. Showing significant emphysema with right sided large PTX and PNA changes. Patient is currently sedated and intubated on 5 of peep and 60 % FIO2. Chest tube closely examined seemed to be in good position, continuous air leak is seen on the pleural vac container.            Data:   All pertinent laboratory and imaging data reviewed.           Past Medical History:   No past medical history on file.         Past Surgical History:     Past Surgical History:   Procedure Laterality Date    IR CHEST TUBE REPOSITION NON TUNNELED RIGHT  2025    PICC TRIPLE LUMEN PLACEMENT  2025    HI LAP,PROSTATECTOMY,RADICAL,W/NERVE SPARE,INCL ROBOTIC Bilateral 3/19/2019    Procedure: ROBOTIC ASSISTED RADICAL RETROPUBIC PROSTATECTOMY BILATERAL PELVIC LYMPH NODE DISSECTION, LYSIS OF ADHESIONS, REPAIR OF RIGHT INGUINAL HERNIA;  Surgeon: Candido Angelo MD;  [place] [facility]  Service: Urology            Social History:     Social History     Socioeconomic History    Marital status:      Spouse name: Not on file    Number of children: Not on file    Years of education: Not on file    Highest education level: Not on file   Occupational History    Not on file   Tobacco Use    Smoking status: Former     Current packs/day: 0.00     Average packs/day: 0.8 packs/day for 30.0 years (22.5 ttl pk-yrs)     Types: Cigarettes     Start date: 1987     Quit date: 2017     Years since quittin.6     Passive exposure: Current (Wife smokes)    Smokeless tobacco: Former   Vaping Use    Vaping status: Never Used   Substance and Sexual Activity    Alcohol use: Yes     Alcohol/week: 4.0 - 5.0 standard drinks of alcohol     Comment: Alcoholic  Drinks/day: drinks on weekends.    Drug use: No    Sexual activity: Not on file   Other Topics Concern    Not on file   Social History Narrative    Not on file     Social Drivers of Health     Financial Resource Strain: Unknown (5/23/2025)    Financial Resource Strain     Within the past 12 months, have you or your family members you live with been unable to get utilities (heat, electricity) when it was really needed?: Patient unable to answer   Food Insecurity: Unknown (5/23/2025)    Food Insecurity     Within the past 12 months, did you worry that your food would run out before you got money to buy more?: Patient unable to answer     Within the past 12 months, did the food you bought just not last and you didn t have money to get more?: Patient unable to answer   Transportation Needs: Unknown (5/23/2025)    Transportation Needs     Within the past 12 months, has lack of transportation kept you from medical appointments, getting your medicines, non-medical meetings or appointments, work, or from getting things that you need?: Patient unable to answer   Physical Activity: Not on file   Stress: Not on file   Social Connections: Not on file   Interpersonal Safety: Unknown (5/23/2025)    Interpersonal Safety     Do you feel physically and emotionally safe where you currently live?: Patient unable to answer     Within the past 12 months, have you been hit, slapped, kicked or otherwise physically hurt by someone?: Patient unable to answer     Within the past 12 months, have you been humiliated or emotionally abused in other ways by your partner or ex-partner?: Patient unable to answer   Housing Stability: Unknown (5/23/2025)    Housing Stability     Do you have housing? : Patient unable to answer     Are you worried about losing your housing?: Patient unable to answer            Family History:   No family history on file.         Allergies:   No Known Allergies         Medications:     Current Facility-Administered  Medications   Medication Dose Route Frequency Provider Last Rate Last Admin    cefTAZidime (FORTAZ) 2 g vial to attach to  ml bag for ADULTS or NS 50 ml bag for PEDS  2 g Intravenous Q8H Fer Francis MD        cefTAZidime (FORTAZ) 2 g vial to attach to  ml bag for ADULTS or NS 50 ml bag for PEDS  2 g Intravenous Once Bryon Palmer MD        chlorhexidine (PERIDEX) 0.12 % solution 15 mL  15 mL Mouth/Throat Q12H Fer Francis MD   15 mL at 05/23/25 0800    enoxaparin ANTICOAGULANT (LOVENOX) injection 40 mg  40 mg Subcutaneous Q24H Fer Francis MD   40 mg at 05/23/25 0800    insulin aspart (NovoLOG) injection (RAPID ACTING)  1-7 Units Subcutaneous Q4H Argentina Cruz APRN CNP        ipratropium - albuterol 0.5 mg/2.5 mg/3 mL (DUONEB) neb solution 3 mL  3 mL Nebulization Q4H Fer Francis MD   3 mL at 05/23/25 0841    lidocaine (PF) (XYLOCAINE) 1 % injection             multivitamin, therapeutic (THERA-VIT) tablet 1 tablet  1 tablet Oral Daily Fer Francis MD        pantoprazole (PROTONIX) 2 mg/mL suspension 40 mg  40 mg Per Feeding Tube QAM AC Bryon Palmer MD   40 mg at 05/23/25 0801    polyethylene glycol (MIRALAX) Packet 17 g  17 g Oral BID Fer Francis MD        Prosource TF20 ENfit Compatibl EN LIQD (PROSOURCE TF20) packet 60 mL  1 packet Per Feeding Tube Daily Fer Francis MD        senna-docusate (SENOKOT-S/PERICOLACE) 8.6-50 MG per tablet 1 tablet  1 tablet Oral or Feeding Tube BID Fer Francis MD        [START ON 5/25/2025] sodium chloride (PF) 0.9% PF flush 10-40 mL  10-40 mL Intracatheter Q7 Days Argentina Cruz APRN CNP        sodium chloride (PF) 0.9% PF flush 10-40 mL  10-40 mL Intracatheter Daily Argentina Cruz APRN CNP        sodium chloride (PF) 0.9% PF flush 3 mL  3 mL Intracatheter Q8H Argentina Cruz APRN CNP   3 mL at 05/23/25 0803    thiamine (B-1) tablet 100 mg  100 mg Oral or Feeding Tube Daily Fer Francis  MD Bello             Review of Systems:  Unable to assess.          Physical Exam:   Temp:  [97.1  F (36.2  C)-99.7  F (37.6  C)] 98.8  F (37.1  C)  Pulse:  [] 90  Resp:  [12-57] 12  BP: ()/() 82/58  MAP:  [63 mmHg-140 mmHg] 82 mmHg  Arterial Line BP: ()/() 120/63  FiO2 (%):  [50 %-80 %] 60 %  SpO2:  [86 %-98 %] 98 %      General: sedated and intubated.   Neck: Trachea supple/midline.   Pulm: Clear breath sounds b/l, no crackles, no wheezes. Chest tube in place.   CV: RRR, no murmurs  Abdomen: Soft, non-tender, non-distended   MSK: No edema, no clubbing   Neurologic: No focal deficits  Skin: sub-q emphysema seen on the upper torso and neck.   Psych: unable to assess.

## 2025-05-23 NOTE — CONSULTS
Care Management Initial Consult    General Information  Assessment completed with: chart review  Type of CM/SW Visit: Initial Assessment  Primary Care Provider verified and updated as needed: Yes (Júnior Salvador with [facility])   Readmission within the last 30 days: no previous admission in last 30 days   Reason for Consult: discharge planning  Advance Care Planning: Advance Care Planning Reviewed: other (see comments) (not on file)        Communication Assessment  Patient's communication style: spoken language (English)    Hearing Difficulty or Deaf: no (PEÑA sedated)   Wear Glasses or Blind: other (see comments) (PEÑA sedated)    Cognitive  Cognitive/Neuro/Behavioral:   Level of Consciousness: sedated   Arousal Level: arouses to touch/gentle shaking    Orientation: other (see comments) (peña ett)    Mood/Behavior: other (see comments) (peña sedated)    Best Language:  (peña ett)    Speech: endotracheal tube    Living Environment:   People in home: spouse  Trini  Current living Arrangements: house      Able to return to prior arrangements: yes     Family/Social Support:  Care provided by: self  Provides care for: no one  Marital Status:   Support system: Gisele (adult dtr), Wife, Parent     Description of Support System: Supportive, Involved       Current Resources:   Patient receiving home care services: No  Community Resources: None  Equipment currently used at home: none  Supplies currently used at home: Oxygen Tubing/Supplies    Employment/Financial:  Employment Status: retired     Financial Concerns: none   Referral to Financial Worker: No  Does the patient's insurance plan have a 3 day qualifying hospital stay waiver?  No    Lifestyle & Psychosocial Needs:  Social Drivers of Health     Food Insecurity: Unknown (5/23/2025)    Food Insecurity     Within the past 12 months, did you worry that your food would run out before you got money to buy more?: Patient unable to answer     Within the past 12  months, did the food you bought just not last and you didn t have money to get more?: Patient unable to answer   Depression: Not at risk (2/24/2025)    PHQ-2     PHQ-2 Score: 0   Housing Stability: Unknown (5/23/2025)    Housing Stability     Do you have housing? : Patient unable to answer     Are you worried about losing your housing?: Patient unable to answer   Tobacco Use: Medium Risk (5/14/2025)    Patient History     Smoking Tobacco Use: Former     Smokeless Tobacco Use: Former     Passive Exposure: Current   Financial Resource Strain: Unknown (5/23/2025)    Financial Resource Strain     Within the past 12 months, have you or your family members you live with been unable to get utilities (heat, electricity) when it was really needed?: Patient unable to answer   Alcohol Use: Not on file   Transportation Needs: Unknown (5/23/2025)    Transportation Needs     Within the past 12 months, has lack of transportation kept you from medical appointments, getting your medicines, non-medical meetings or appointments, work, or from getting things that you need?: Patient unable to answer   Physical Activity: Not on file   Interpersonal Safety: Unknown (5/23/2025)    Interpersonal Safety     Do you feel physically and emotionally safe where you currently live?: Patient unable to answer     Within the past 12 months, have you been hit, slapped, kicked or otherwise physically hurt by someone?: Patient unable to answer     Within the past 12 months, have you been humiliated or emotionally abused in other ways by your partner or ex-partner?: Patient unable to answer   Stress: Not on file   Social Connections: Not on file   Health Literacy: Not on file     Functional Status:  Prior to admission patient needed assistance:   Dependent ADLs: Independent  Dependent IADLs: Independent    Mental Health Status:  Mental Health Status: No Current Concerns       Chemical Dependency Status:  Chemical Dependency Status: No Current Concerns              Values/Beliefs:  Spiritual, Cultural Beliefs, Mormonism Practices, Values that affect care: other (see comments) (did not assess)             Discussed  Partnership in Safe Discharge Planning  document with patient/family: No    Additional Information: Pt was just seen by RNCC at [facility] on 5/19/2025. SW attempted to check in with family again but when I stopped by the room there weren't any visitors. Pt unable to speak. Assessment completed based off information that was obtained on 5/19/2025.    Next Steps: Care management to follow to support, as needed.    JUAN J Salgado  Covering for Paige Parry  [address] [facility] Phone: [phone]

## 2025-05-23 NOTE — PROGRESS NOTES
"Bronchoscopy Risk Assessment Guidelines      A. Patient symptoms to consider when assessing pulmonary TB risk are:    I. Cough greater than 3 weeks; and fever, hemoptysis, pleuritic chest    pain, weight loss greater than 10 lbs, night sweats, fatigue, infiltrates on    upper lobes or superior segments of lower lobes, cavitation on chest    x-ray.   B. Patient risk factors to consider when assessing pulmonary TB risk are:    I. Exposure to known TB case, foreign-born persons (within 5 years of    arrival to [place]), residence in a crowded setting (correctional facility,     long-term care center, etc.), persons with HIV or immunosuppression.    Patients with symptoms and risk factors should generally be considered \"suspect risk\" and bronchoscopies should be performed in airborne precautions.    This patient has NO KNOWN RISK of Tuberculosis (proceed with bronchoscopy)    Specimens sent: no  Complications: None  Scope used: O.R. Therapeutic #90  Attending Physician: Dr. Chadwick Kim, RT on 5/23/2025 at 10:28 AM  "

## 2025-05-23 NOTE — PHARMACY-CONSULT NOTE
Pharmacy Tube Feeding Consult    Medication reviewed for administration by feeding tube and for potential food/drug interactions.    Recommendation: Recommend changing the following medications to a liquid dosage form: pantoprazole (done for you).     Pharmacy will continue to follow as new medications are ordered.    Noe Stewart Pharm.D., R.Ph., PGY2 Critical Care Resident Pharmacist

## 2025-05-23 NOTE — PROCEDURES
INTERVENTIONAL PULMONOLOGY       Procedure(s):    A flexible bronchoscopy  Airway exam  Intrabronchial valve placement (5 sites)  Intrabronchial valve removal (2 sites)   Therapeutic suctioning (2 sites)    Indication:  BP fistula     Attending of Record:  Robert Genao MD    Interventional Pulmonary Fellow   Jacob York    Trainees Present:   None     Medications:    continued on ICU gtt medications (see MAR)      Time Out:  Performed    The patient's medical record has been reviewed.  The indication for the procedure was reviewed.  The necessary history and physical examination was performed and reviewed.  The risks, benefits and alternatives of the procedure were discussed with the patient family in detail and he had the opportunity to ask questions.  I discussed in particular the potential complications including risks of minor or life-threatening bleeding and/or infection, respiratory failure, vocal cord trauma / paralysis, pneumothorax, and discomfort. Sedation risks were also discussed including abnormal heart rhythms, low blood pressure, and respiratory failure. All questions were answered to the best of my ability.  Verbal and written informed consent was obtained.  The proposed procedure and the patient's identification were verified prior to the procedure by the physician and the nurse.    The patient was assessed for the adequacy for the procedure and to receive medications.   Mental Status:  sedated and intubated   Airway examination:  ETT   Pulmonary:  on MV with continuous air leak via the CT   CV:  Regular rate  ASA Grade:  (III)  Mild systemic disease    After clinical evaluation and reviewing the indication, risks, alternatives and benefits of the procedure the patient was deemed to be in satisfactory condition to undergo the procedure.      Immediately before administration of medications the patient was re-assessed for adequacy to receive sedatives including the heart rate,  respiratory rate, mental status, oxygen saturation, blood pressure and adequacy of pulmonary ventilation. These same parameters were continuously monitored throughout the procedure.    A Tuberculosis risk assessment was performed:  The patient has no known RISK of Tuberculosis    The procedure was performed in a negative airflow room: Yes    Maneuvers / Procedure:      Airway Examination: A complete airway examination was performed from the distal trachea to the subsegmental level in each lobe of both lungs.  Pertinent findings include normal airways.         Airway occlusion to assess for leak: A raeann balloon was used to occlude the airway and visualize if leak had improved through the PleuroVAC system. The culprit airways included: RUL     Intrabronchial valve placement: A total of 3 IBV's were placed. Locations include: RUL Apical (size 5 mm), RUL Anterior (Size 6 mm), and RUL Posterior (size 7 mm). Those are the final valves that stayed inside the patient lung. The below valves were removed due to malposition.     Intrabronchial valve removal: Two Valves - The bronchoscope was inserted and the valve was identified in the RUL Posterior (size 6). Forceps were used to grasp the center asuncion and the valve was removed.  Fluoroscopic guidance was not needed.  The next valve was identified in the RUL Anterior (size 5) and the forceps were used to grasp the center asuncion and the valve was removed.  Fluoroscopic guidance was not needed.  The valvse were inspected and they were intact.    Therapeutic suctioning: 15-20min of operative time was spent clearing out the airway of debris, blood and mucous prior to the intervention.     Any disposable equipment was visually inspected and deemed to be intact immediately post procedure.      Relevant Pictures  Couldn't be taken. Procedure was done at the bedside.     Assessment and Recommendations:     Successful completion of FB with intrabronchial valves insertion.   Locations  include: RUL Apical (size 5 mm), RUL Anterior (Size 6 mm), and RUL Posterior (size 7 mm).   Follow up CXR.   Please keep the chest tube to water seal for now unless he become unstable or the sub-q emphysema increases.  If he styes stable and the air leak improves, then he is ok to be extubated whenever he is ready.   IP will follow.           Jacob York  Interventional pulmonary fellow  Pager: [phone]

## 2025-05-23 NOTE — PROGRESS NOTES
CLINICAL NUTRITION SERVICES - ASSESSMENT NOTE    RECOMMENDATIONS FOR MDs/PROVIDERS TO ORDER:  None currently     Registered Dietitian Interventions:  EN access: OGT  Goal TF: Novasource Renal (or equivalent) @ 40 ml/hr (960 ml) + 1 Prosource TF20 provides 2000 kcal (30 kcal/kg), 107 g pro (1.5 g/kg), 176 g CHO, 688 ml free water, and 0 g fiber daily.     Initiate @ 20 ml/hr and advance by 10 ml Q8H pending pt's tolerance.  Do not advance TF rate unless Mg++ > 1.5, K+ is > 3, and phos > 1.9  30 ml Q4H fluid flushes for tube patency. Additional fluids and/or adjustments per MD.    Multivitamin/minerals to help ensure micronutrient needs being met with suspected hypermetabolic demands and potential interruptions to TF infusions.   100 mg Thiamine x 5-7 days to prevent lyte depletion d/t at risk for refeeding syndrome  Due to gastric enteral access: HOB > 30 degrees  Ordered phos for monitoring with TF      Future/Additional Recommendations:  Monitor TF initiation/tolerance  Monitor electrolytes  Monitor pressor support   Monitor propofol, if continues to provide significant kcal once TF is at goal, consider decreasing TF provisions     REASON FOR ASSESSMENT  Provider order - Registered Dietitian to order TF per Medical Nutrition Therapy Guidelines    INFORMATION OBTAINED  Assessed patient in room.    NUTRITION HISTORY  Pt was seen by inpatient dietitians at [facility] for TF. TF started 5/20 while intubated. TF regimen was Jevity 1.5 Dirk @ goal of  45ml/hr  (1080ml/day) + 1 Prosource TF20 provides: 1700 kcals, 88 g PRO, 820 ml free H20, 232 g CHO, and 22 g fiber daily. TF was running via OGT until PPFT was placed 5/22 in anticipation of extubation however PPFT dislodged/lost during extubation. Per last RD note 5/22, last BM was PTA.     History per RD note 5/20/25 - Per pt's wife, pt's diet is regular. The last time he ate was Saturday afternoon, he had a chicken salad sandwich and chips. The middle of last week he asked her  "not to make dinner for him because he was not hungry. Normally he is not a big eater, he does snack. For fiber he eats honey nut cheerios. He eats 2 clementines in the evening. He drinks water, 2 cups of coffee, and occasionally drinks Gatorade. She feels he started eating less than 50% of his normal intake the middle of last week. She says he was in the hospital the end of last year for pneumonia and has eaten as well and has been consistently losing weight since that time. She says he has lost weight and muscle mass. He is not as strong and does not have the endurance he had last year. He is still active but, cannot mow the whole lawn anymore. He does not take vitamins. He has NKFA.     CURRENT NUTRITION ORDERS  Diet: NPO    CURRENT INTAKE/TOLERANCE  Tube Feeding: was running at OSH prior to transfer, TF stopped 5/22 with extubation (lost enteral access), plan to resume today.     LABS  Nutrition-relevant labs: elevated K (5.6), intermittently elevated Na (currently WNL at 145)    MEDICATIONS  Nutrition-relevant medications: SSI Q4H. IVF D5 @ 100 ml/h. Propofol @ 13 ml/h (343 kcal/d). Norepi @ 0.07 mcg/kg/min    ANTHROPOMETRICS  Height: 1.829 m (6' 0.01\")  Admission Weight: 70.4 kg (155 lb 1.6 oz) (05/22/25 2000)   Most Recent Weight: 70.5 kg (155 lb 8 oz) (05/23/25 0600)  IBW: 80.9 kg   BMI: Body mass index is 21.08 kg/m .   Weight History: 4.8 kg (6.4%) weight loss over 1 month.  Wt Readings from Last 10 Encounters:   05/23/25 70.5 kg (155 lb 8 oz)   05/22/25 72.2 kg (159 lb 3.2 oz)   05/14/25 66.2 kg (146 lb)   05/07/25 75.3 kg (166 lb)   02/24/25 67.1 kg (148 lb)   12/20/24 69.4 kg (153 lb)   12/04/24 69.4 kg (153 lb)   11/07/24 70.6 kg (155 lb 9.6 oz)   07/10/24 68 kg (150 lb)   05/06/24 72.7 kg (160 lb 3.2 oz)     ASSESSED NUTRITION NEEDS  Dosing Weight: 70.5 kg, based on actual wt   Estimated Energy Needs: 4300-3752 kcals/day (25 - 30 kcals/kg)  Justification: Maintenance, Vented, and Critical " "illness  Estimated Protein Needs: 106-141 grams protein/day (1.5 - 2 grams of pro/kg)  Justification: Hypercatabolism with critical illness  Estimated Fluid Needs: 1892-8431 mL/day (1 mL/kcal)  Justification: Maintenance    SYSTEM AND PHYSICAL FINDINGS    GI symptoms: On scheduled bowel med(s). Per chart review, last BM may have been prior to OSH admission.    Skin/wounds: No issues noted    MALNUTRITION  % Intake: Decreased intake does not meet criteria  % Weight Loss: > 5% in 1 month (severe)   Subcutaneous Fat Loss: Orbital: Moderate, Buccal: Mild, and Triceps: Moderate  Muscle Loss: Temples (temporalis muscle): Moderate, Clavicles (pectoralis and deltoids): Moderate, and Interosseous muscles: Moderate  Fluid Accumulation/Edema: Moderate to severe, 2-4+  Malnutrition Diagnosis: Severe malnutrition in the context of acute illness/disease  Malnutrition Present on Admission: Yes    NUTRITION DIAGNOSIS  Inadequate oral intake related to intubation as evidenced by NPO with nutrition support needed to meet nutrition needs.     INTERVENTIONS  See nutrition interventions above    GOALS  Total avg nutritional intake to meet a minimum of 25 kcal/kg and 1.5 g PRO/kg daily (per dosing wt 70.5 kg).      MONITORING/EVALUATION  Progress toward goals will be monitored and evaluated per policy.      Teri Jerry, MS, RDN, LD  4C MICU RD  Vocera - \"4C Clinical Dietitian\"  Weekend/Holiday RD - \"Weekend Clinical Dietitian\"    "

## 2025-05-23 NOTE — PLAN OF CARE
Physical Therapy Discharge Summary    Reason for therapy discharge:    Discharged to [facility]    Progress towards therapy goal(s). See goals on Care Plan in [facility] electronic health record for goal details.  Goals not met.  Barriers to achieving goals:   limited tolerance for therapy.    Therapy recommendation(s):    Continued therapy is recommended.  Rationale/Recommendations:  Re-Eval at [facility]..      Nidia Galindo, PT, DPT  5/23/2025

## 2025-05-23 NOTE — PHARMACY-CONSULT NOTE
Pharmacy Tube Feeding Consult    Medication reviewed for administration by feeding tube and for potential food/drug interactions.    Recommendation:   - Have made formulation changes for neccessary medications and adjusted routes of administration for current orders  - No further changes are needed at this time.     Pharmacy will continue to follow as new medications are ordered.     Gary Justice, PharmD  Pharmacy Resident

## 2025-05-23 NOTE — PROGRESS NOTES
Admitted/transferred from: [facility]  Reason for admission/transfer: bronchopleural fistula repair  Patient status upon admission/transfer: Pt intubated, RASS -2 to -3 on prop. On pheyl and levo to keep MAP >65. CT w/Lg air leak wto water seal   Interventions: place CT to cont -20 suction. OG placed. D5w started for Na. Phenyl weaned off. Fent added for sedation. CXR obtained. Increased WBC to 43, BC obtained.   Plan: Bronch in AM for fistula repair  2 RN skin assessment: completed by Kaitlin MCGEE RN and Georgie VERNON  Sexual Orientation and Gender Identity (SOGI) smartfom completed: Done/Not Done  Result of skin assessment and interventions/actions: blanchable redness on sacrum, skin tear on chest, R CT w/Lg air leak. Scattered skin tears and bruising.   Height, weight, drug calc weight: Done  Patient belongings (see Flowsheet - Adult Profile for details): NA  MDRO education (if applicable):

## 2025-05-23 NOTE — PROGRESS NOTES
MEDICAL ICU PROGRESS NOTE  05/23/2025      Date of Service (when I saw the patient): 05/23/2025      ASSESSMENT: Armin Terrell is a 70yo M with PMH of chronic respiratory failure (on home O2, 3LPM), severe COPD emphysema type (FEV1 35%), alpha-1 antitrypsin deficiency, lung nodules, hx tobacco use, and prostate cancer status post radical prostatectomy.  He was admitted to [facility] on 5/18 due to acute on chronic hypoxic respiratory failure requiring intubation. Found to have right sided pneumothorax and R side consolidation (pseudomonas + sputum). Chest tube placed but pneumothorax persisted, C/f bronchopleural fistula. Patient started on pressors for hypotension and admitted to the ICU. Started on meropenem. Failed extubation on 5/22 due to repeat episode of tension physiology d/t kink in chest tube following extubation resulting in worsening pneumothorax and emphysema. Re-intubated and transferred to [facility] on 5/22 for continued ICU management and interventional pulmonology consult. Underwent bronch with valve (3) placement on 5/23 however has persistent air leak. Pending possible re-bronch on 5/27. Weaning pressors and sedation as able.      CHANGES and MAJOR THINGS TODAY:   - Chest tube to water seal  - Bronch today with IP s/p valve placement with persistent air leak  - Discontinue Methylpred  - Increase bowel regimen to BID  - Discontinue D5W gtt  - FWF 60 ml Q4H for mild hypernatremia  - UA without reflex showing no UTI  - Discontinue Meropenem, switch to Ceftazidime   - Discontinue Azithromycin  - Discontinue scheduled Tylenol while on fentanyl gtt  - Decrease RASS to 0 to -1  - Repeat CXR with water seal  - Add precedex gtt for any agitation/sedation needs  - Recheck Na and K   - Restart TF    PLAN:    Neuro:  # Pain and sedation  - Propofol gtt, wean as able  - Fentanyl gtt  - Precedex gtt   - RASS goal 0 to -1     Pulmonary:  # Acute on chronic hypoxic respiratory failure, multifactorial, s/p  intubation  # COPD exacerbation 2/2 R pneumonia  # Right pneumothorax, c/f bronchopleural fistula, s/p chest tube  # MAULIK Nodule  # Failed extubation on 5/22  # Hx severe COPD emphysema type (FEV1 35% on 5/6/2024)  # Subcutaneous emphysema  # Hx tobacco use, quit 2018  # Alpha 1 antitrypsin deficiency  On 3L O2 and takes trilogy inhaler at home. Presented to ED on 5/14 for SOB sent home oxycodone, augmentin, azithromycin, prednisone for RUL pneumonia. Returned to the ED on 5/18 for worsening shortness of breath generalized weakness. Patient was in severe respiratory distress started on BiPAP therapy and later intubated. CXR showed R pneumothorax- chest tube placed. Follow-up chest CT scan show large R pneumothorax, right pigtail chest tube along the major fissure, collapsed right lower lobe and right middle lobe consolidation of the right upper lobe unchanged left upper lobe nodule. Admitted to ICU and started on abx (discussed in ID section). Completed 5 days of methylpred d/t concern for possible COPD exacerbation component initially. Patient vent settings weaned and trailed extubation on 5/22. After being extubated he had a repeated episode of tension physiology requiring re-intubation and pressors. Continuous air leak concerning for bronchopleural fistula. IP preformed bronchoscopy with placement of 3 valves on 5/23 but there is still a persistent air leak however smaller than before. He may require repeat bronch and valves, IP planning to tentatively do this on 5/27. His lung disease is severe and his condition is tenuous at this time, he may require consultation with transplant pulm or palliative depending on how his clinical course progresses. Goal to keep airway pressures and RR low while reducing sedation as above, however if he is unable to remain synchronous and calm with lighter sedation there is concern that he could develop a left pneumothorax given his extensive emphysema.   - IP consulted  - s/p  Bronch with valve placement on 5/23  - Possible repeat bronch on 5/27  - Minimize airway pressures   - Duonebs Q4H  - Chest tube to water seal   - CXR after water seal pending.    - if worsening pneumothorax, subcutaneous emphysema or hypotension switch chest tube back to suction  - Abx as below  - S/p steroids     FiO2 (%): 60 %, Resp: 12, Vent Mode: VC/AC, Resp Rate (Set): 12 breaths/min, Tidal Volume (Set, mL): 460 mL, PEEP (cm H2O): 5 cmH2O, Pressure Support (cm H2O): 5 cmH2O, Resp Rate (Set): 12 breaths/min, Tidal Volume (Set, mL): 460 mL, PEEP (cm H2O): 5 cmH2O    Cardiovascular:  # Mixed shock, obstructive (2/2 PTX)  Hypotensive with systolic BP to 80s, tachycardic, lactate 2.8, and leukocytosis to 35 on admission to [facility] on 5/18. CT PE negative for PE but showed R pneumothorax and R pneumonia. Shock physiology likely 2/2 combination of sepsis and obstruction initially. Chest tube placed but pneumothorax persisted, as described above. Weaned off levo on 5/22 however post extubation his chest tube kinked and the patient quickly decompensated and became hypotensive again requiring re-intubation and pressor support. On pressors on arrival to [facility]. Weaning as able now that he is stable.   - Norepi gtt   - Vasopressin gtt as needed for adjunct   - MAP goal >65  - s/p 5 days solumedrol     GI/Nutrition:  # Nutrition  Feeding tube dislodged during extubation on 5/22. OGT replaced on 5/23.  - Resume TF  - Nutrition consult     # Constipation  - Senna/miralax BID     Renal/Fluids/Electrolytes:  # Hypernatremia, improved  # Respiratory acidosis, improved  # Isolated elevated BUN, 2/2 shock vs steroids  Sodium 149 at OSH. BMP Recheck on arrival   - FWD 1.2L for goal of 145 and 2.7L for goal of 140. Na improved to 146 on 5/23.  - S/p D5 gtt  - FWF 60 ml Q4H     #Hyperkalemia  5.9 > 5.6 on 5/23 however sample noted to have slight hemolysis, given kayexalate then lokelma.   - Recheck 5.4 on 5/23 with slight hemolysis    - No BM yet  - If K>6 shift with insulin    Endocrine:  # C/f hyperglycemia 2/2 stress dose steroids  - SSI  - Hypoglycemia protocol     ID:  # C/f septic shock 2/2 pneumonia  # Pseudomonas pneumonia  # Leukocytosis  Pseudomonas + sputum culture on 5/18 (pansensitive). RVP negative. Blood cultures on 5/18 NGTD. UA unremarkable. Pleural fluid from R pleural cavity on 5/21, transudative effusion per Light's criteria. Currently, pt leukocytosis suspected to be reactive without fevers given worsening pneumothorax and afebrile. Blood cultures from 5/18 NGTD. Second set obtained on 5/22. Procal is mildly elevated, but without other signs of worsening bacterial infection. Ordered repeat sputum sample. Discontinued scheduled tylenol given possibility of masking a fever.   - work up and antibiotics as below      Micro:   - Blood cultures from 5/18 NGTD, repeated cultures collected 5/22  - MRSA nares negative  - UA bland on 5/23  - Sputum pending    Abx:   - Ceftazidime 2 g Q8H (5/23-5/25)  - S/p meropenem and azithromycin (5/18-5/23)  - S/p zosyn (5/18)  - S/p vancomycin (5/19)    Hematology:    # Leukocytosis as above  # Thrombocytopenia, resolved  Noted plt 133 at OSH. Had been down trending from 5/14. Possible 2/2 critical illness. On 5/23 stable at 144.  - CTM     Musculoskeletal:  - PTOT to evaluate     Skin:  # Emphysema 2/2 Pneumothorax  - CTM     General Cares/Prophylaxis:    DVT Prophylaxis: Enoxaparin (Lovenox) SQ  GI Prophylaxis: PPI  Restraints: Soft  Family Communication: WifeTrini [phone]  Code Status: Full     Lines/tubes/drains:   - PICC R brachial  - Urinary catheter  - ETT  - PIV L forearm  - Rt sided Chest Tube  - OGT     Disposition:  - Medical ICU     Patient seen and findings/plan discussed with medical ICU staff, Dr. Palmer.    Alejandrina Milan    Resident/Fellow Attestation   I, Fer Francis MD, was present with the medical/BROOKE student who participated in the service and in the  documentation of the note.  I have verified the history and personally performed the physical exam and medical decision making.  I agree with the assessment and plan of care as documented in the note.          Fer Francis MD  PGY1  Date of Service (when I saw the patient): 05/23/25   Clinically Significant Risk Factors Present on Admission        # Hyperkalemia: Highest K = 5.9 mmol/L in last 2 days, will monitor as appropriate  # Hypernatremia: Highest Na = 149 mmol/L in last 2 days, will monitor as appropriate  # Hyperchloremia: Highest Cl = 117 mmol/L in last 2 days, will monitor as appropriate       # Hypercalcemia: corrected calcium is >10.1, will monitor as appropriate    # Hypoalbuminemia: Lowest albumin = 1.1 g/dL at 5/22/2025  9:30 PM, will monitor as appropriate       # Circulatory Shock: required vasopressors within past 24 hours             # Severe Malnutrition: based on nutrition assessment and treatment provided per dietitian's recommendations.                ====================================  INTERVAL HISTORY:   Patient intubated and sedated. Needed increased sedation overnight due to vent dyssynchrony. Some increased pressor needs overnight as well. Chest tube to suction with continuous air leak. No BM overnight. Wife, lola updated post bronch.     OBJECTIVE:   1. VITAL SIGNS:   Temp:  [97.1  F (36.2  C)-99.7  F (37.6  C)] 98.8  F (37.1  C)  Pulse:  [] 89  Resp:  [12-57] 12  BP: ()/() 98/66  MAP:  [63 mmHg-140 mmHg] 82 mmHg  Arterial Line BP: ()/() 120/63  FiO2 (%):  [50 %-80 %] 60 %  SpO2:  [86 %-98 %] 96 %  FiO2 (%): 60 %, Resp: 12, Vent Mode: VC/AC, Resp Rate (Set): 12 breaths/min, Tidal Volume (Set, mL): 460 mL, PEEP (cm H2O): 5 cmH2O, Pressure Support (cm H2O): 5 cmH2O, Resp Rate (Set): 12 breaths/min, Tidal Volume (Set, mL): 460 mL, PEEP (cm H2O): 5 cmH2O  2. INTAKE/ OUTPUT:   I/O last 3 completed shifts:  In: 1459.43 [I.V.:1058.43; NG/GT:300; IV  Piggyback:101]  Out: 685 [Urine:605; Chest Tube:80]    3. PHYSICAL EXAMINATION:  General: Sedated  HEENT: PERRL  Neuro: Sedated, RASS -4, does not rouse to voice or touch  Pulm/Resp: Decreased breath sounds on RLL. Left lung sounds clear.   CV: RRR  Abdomen: Soft, non-distended, non-tender  : capone catheter in place, urine yellow and clear  Incisions/Skin: Crepitus over right chest wall    4. LABS:   Arterial Blood Gases   Recent Labs   Lab 05/22/25  1714 05/22/25  1457 05/21/25  0427 05/20/25  1449   PH 7.41 7.39 7.44 7.40   PCO2 52* 52* 38 39   PO2 122* 86 83 75*   HCO3 33* 31* 26 24     Complete Blood Count   Recent Labs   Lab 05/23/25  0416 05/22/25  2130 05/22/25  0334 05/21/25  0427   WBC 45.4* 48.2* 26.9* 21.9*   HGB 14.0 15.8 14.5 13.5   * 161 133* 161     Basic Metabolic Panel  Recent Labs   Lab 05/23/25  1127 05/23/25  1005 05/23/25  0755 05/23/25  0416 05/23/25  0414 05/23/25  0154 05/22/25  2134 05/22/25  2130 05/22/25  0338 05/22/25  0334   NA  --  146*  146*  --  145  --  146*  --  143  --  149*   POTASSIUM  --  5.4*  --  5.6*  --   --   --  5.9*  --  4.9   CHLORIDE  --  109*  --  110*  --   --   --  107  --  117*   CO2  --  28  --  29  --   --   --  28  --  27   BUN  --  65.2*  --  62.2*  --   --   --  56.5*  --  64.8*   CR  --  0.91  --  0.87  --   --   --  0.77  --  0.75   * 136* 122* 122*   < >  --    < > 96   < > 163*    < > = values in this interval not displayed.     Liver Function Tests  Recent Labs   Lab 05/22/25  2130 05/19/25  0445 05/18/25  0205   AST  --  42 45   ALT  --  36 39   ALKPHOS 193* 120 175*   BILITOTAL 0.9 0.8 0.8   ALBUMIN 1.1* 2.7* 3.3*     Coagulation Profile  No lab results found in last 7 days.    5. RADIOLOGY:   Recent Results (from the past 24 hours)   XR Chest Port 1 View    Narrative    EXAM: XR CHEST PORT 1 VIEW  LOCATION: [facility]  DATE: 5/22/2025    INDICATION: respiratory distress  COMPARISON: Film earlier today, 5/21/2025  and multiple prior studies, CTA chest 5/18/2025, 5/14/2025      Impression    IMPRESSION: Endotracheal tube is in the mid trachea, 4 cm from the isa. Right upper extremity PICC line catheter overlies the distal SVC.    There is a large caliber pigtail right-sided chest tube coursing into the apex.     There is a small right-sided pneumothorax, slightly increased from earlier today and measuring 2.3 cm laterally in the right midlung. This previously measured approximately a centimeter in the same location.    Increased subcutaneous emphysema throughout which now crosses midline and can be seen along the left neck and lateral chest.    Underlying, extensive, bilateral emphysema with a small right effusion and band like area of consolidation in the medial costophrenic angle again noted.    Findings discussed by the undersigned with Argentina Cruz at 1450. Patient has recently decompensated and coded prior to the CXR.    CT Head w/o Contrast    Narrative    EXAM: CT HEAD W/O CONTRAST  LOCATION: [facility]  DATE: 5/22/2025    INDICATION: Altered mental status  COMPARISON: CT dated 7/31/2016   TECHNIQUE: Routine CT Head without IV contrast. Multiplanar reformats. Dose reduction techniques were used.    FINDINGS:  INTRACRANIAL CONTENTS: No acute intracranial hemorrhage, extra-axial fluid collection, or mass effect. Grossly maintained gray-white matter differentiation without evidence of an acute transcortical confluent infarct. Mild, newly evident presumed chronic   small vessel ischemic changes. Essentially new mild generalized cerebral parenchymal volume loss. No hydrocephalus.     VISUALIZED ORBITS/SINUSES/MASTOIDS: No acute intraorbital finding. No evidence of significant paranasal sinus or mastoid mucosal disease. Partially imaged presumed enteric and endotracheal tubes.    BONES/SOFT TISSUES: No acute calvarial injury or significant scalp hematoma. A few maxillary dental caries and  periapical lucencies. Partially imaged extensive soft tissue emphysema extending into the deep and superficial neck spaces bilaterally.    Findings of the visualized thoracic cavity are reported separately.      Impression    IMPRESSION:  1.  No acute intracranial hemorrhage, mass effect, or evidence of an acute transcortical infarct.  2.  Mild chronic aged-related intracranial changes.   CT Chest w/o Contrast    Narrative    EXAM: CT CHEST W/O CONTRAST  LOCATION: [facility]  DATE: 5/22/2025    INDICATION: respiratory failure; ptx  COMPARISON: Chest CT 5/18/2025. Chest x-ray 5/22/2025.  TECHNIQUE: CT chest without IV contrast. Multiplanar reformats were obtained. Dose reduction techniques were used.  CONTRAST: None.    FINDINGS: Respiratory motion artifact.  LUNGS AND PLEURA: Indwelling large bore chest tube entering the lateral aspect of the inferior right hemithorax, with its loop within the right apex. Large, anterior right pneumothorax. This is larger than visualized on the previous CT from 5/18/2025.   This is much larger than seen on today's portable chest x-ray which was probably obtained while the tube was on wall suction. Severe underlying emphysema. Stable right upper lobe consolidation and patchy airspace changes within the left upper lobe. New,   near complete collapse of the left lower lobe. New, moderate-sized right pleural effusion. Indwelling endotracheal tube with its tip at the level the aortic arch.    MEDIASTINUM/AXILLAE: New, moderate pneumomediastinum. Mild calcified atherosclerotic changes of the thoracic aorta without aneurysmal dilatation. No lymphadenopathy.    CORONARY ARTERY CALCIFICATION: Mild.    UPPER ABDOMEN: Small calcified gallstones.    MUSCULOSKELETAL: Markedly increased soft tissue emphysema throughout the visualized chest, extending into the base of the neck and right and posterior aspects of the visualized abdominal wall.      Impression    IMPRESSION:    1.  Large right anterior pneumothorax. CT was likely obtained on water seal. The increase in size consistent with a significant air leak.  2.  Markedly increased soft tissue gas, which has dissected into the mediastinum.  3.  New, near complete collapse of the left lower lobe.  4.  New, moderate right pleural effusion.  5.  Stable right upper lobe consolidation and patchy airspace changes within the left upper lobe, which may be inflammatory/infectious in nature..   XR Chest Port 1 View    Narrative    Exam: XR CHEST PORT 1 VIEW, 5/22/2025 8:55 PM    Comparison: 5/20/2025    History: Endotracheal tube positioning    Findings:  Single AP supine portable view of the chest. Endotracheal tube tip is  in the mid to lower thoracic trachea. Right apically directed chest  tube is repositioned compared to prior. Right upper PICC line is  stable.    Trachea is midline. Mediastinum is within normal limits.  Cardiopulmonary silhouette is within normal limits. Diffuse right lung  interstitial and airspace opacities. Stable large right pneumothorax.  There is no left pneumothorax or significant pleural effusion. Diffuse  subcutaneous emphysema, more prominent on the right side.  The upper abdomen is unremarkable.      Impression    Impression:   1. Endotracheal tube tip is in the mid to lower thoracic trachea.  2. Persistent large right pneumothorax with chest tube.  3. Increased right lung opacities.    I have personally reviewed the examination and initial interpretation  and I agree with the findings.    NOLAN FRAZIER DO         SYSTEM ID:  [id number]   XR Abdomen Port 1 View    Narrative    Exam: XR ABDOMEN 1 VIEW, 5/23/2025 12:49 AM    Indication: Check NG/OG tube placement    Comparison: None    Findings:   Portable 60 degree frontal view of the upper abdomen and lower thorax.  An NG/OG tube projects along the midline passing below the diaphragm  with tip and sidehole projecting over the left upper quadrant the  presumed  region of the stomach. No abnormally dilated loops of bowel.  No pneumatosis or portal venous gas. Heart size within normal limits.  Mixed interstitial and patchy airspace opacities, likely representing  edema and atelectasis. Please see separate chest radiographs.  Extensive subcutaneous emphysema bilaterally.      Impression    Impression: NG/OG tube in the stomach. Nonobstructive bowel gas  pattern.    I have personally reviewed the examination and initial interpretation  and I agree with the findings.    GABRIEL MENDOZA MD         SYSTEM ID:  [id number]   XR Chest Port 1 View    Narrative    Portable chest    INDICATION: Pneumothorax    COMPARISON: Yesterday 2046 hours    Heart size normal. Extensive right-sided more than left-sided  subcutaneous emphysema is unchanged. Diffuse streaky densities in the  lungs are slightly more well defined suggestive of septal thickening  and edema. A right chest catheter remains present and is grossly  similar in its appearance and positioning. Right pneumothorax as  previously described appears grossly unchanged.      Impression    IMPRESSION: Continued edema in the right hemithorax with diffuse  subcutaneous emphysema an right pneumothorax with chest catheter.    ELVIRA STEPHENS MD         SYSTEM ID:  [id number]   XR Chest Port 1 View    Impression    RESIDENT PRELIMINARY INTERPRETATION  Impression:   1. Interval placement of 3 right upper lobe endobronchial valves.  2. Small right-sided pneumothorax with chest tube in place, slightly  decreased in size compared earlier today.  3. Persistent diffuse right-sided pulmonary opacities and nodular  opacity in the left lower lung field, better appreciated on recent CT.  4. Advanced emphysematous changes of the left lung.

## 2025-05-24 ENCOUNTER — APPOINTMENT (OUTPATIENT)
Dept: GENERAL RADIOLOGY | Facility: CLINIC | Age: 70
End: 2025-05-24
Payer: COMMERCIAL

## 2025-05-24 ENCOUNTER — APPOINTMENT (OUTPATIENT)
Dept: GENERAL RADIOLOGY | Facility: CLINIC | Age: 70
End: 2025-05-24
Attending: NURSE PRACTITIONER
Payer: COMMERCIAL

## 2025-05-24 LAB
ANION GAP SERPL CALCULATED.3IONS-SCNC: 4 MMOL/L (ref 7–15)
ANION GAP SERPL CALCULATED.3IONS-SCNC: 6 MMOL/L (ref 7–15)
ANION GAP SERPL CALCULATED.3IONS-SCNC: 6 MMOL/L (ref 7–15)
BASE EXCESS BLDV CALC-SCNC: 8.9 MMOL/L (ref -3–3)
BASE EXCESS BLDV CALC-SCNC: 9.6 MMOL/L (ref -3–3)
BASOPHILS # BLD AUTO: 0.1 10E3/UL (ref 0–0.2)
BASOPHILS NFR BLD AUTO: 0 %
BUN SERPL-MCNC: 56.6 MG/DL (ref 8–23)
BUN SERPL-MCNC: 61.1 MG/DL (ref 8–23)
BUN SERPL-MCNC: 61.3 MG/DL (ref 8–23)
CA-I BLD-MCNC: 4.8 MG/DL (ref 4.4–5.2)
CALCIUM SERPL-MCNC: 7.9 MG/DL (ref 8.8–10.4)
CALCIUM SERPL-MCNC: 8.1 MG/DL (ref 8.8–10.4)
CALCIUM SERPL-MCNC: 8.1 MG/DL (ref 8.8–10.4)
CHLORIDE SERPL-SCNC: 111 MMOL/L (ref 98–107)
CHLORIDE SERPL-SCNC: 112 MMOL/L (ref 98–107)
CHLORIDE SERPL-SCNC: 114 MMOL/L (ref 98–107)
CREAT SERPL-MCNC: 0.77 MG/DL (ref 0.67–1.17)
CREAT SERPL-MCNC: 0.8 MG/DL (ref 0.67–1.17)
CREAT SERPL-MCNC: 0.81 MG/DL (ref 0.67–1.17)
CRP SERPL-MCNC: 137 MG/L
EGFRCR SERPLBLD CKD-EPI 2021: >90 ML/MIN/1.73M2
EOSINOPHIL # BLD AUTO: 0 10E3/UL (ref 0–0.7)
EOSINOPHIL NFR BLD AUTO: 0 %
ERYTHROCYTE [DISTWIDTH] IN BLOOD BY AUTOMATED COUNT: 15.7 % (ref 10–15)
GLUCOSE BLDC GLUCOMTR-MCNC: 100 MG/DL (ref 70–99)
GLUCOSE BLDC GLUCOMTR-MCNC: 100 MG/DL (ref 70–99)
GLUCOSE BLDC GLUCOMTR-MCNC: 108 MG/DL (ref 70–99)
GLUCOSE BLDC GLUCOMTR-MCNC: 111 MG/DL (ref 70–99)
GLUCOSE BLDC GLUCOMTR-MCNC: 95 MG/DL (ref 70–99)
GLUCOSE SERPL-MCNC: 107 MG/DL (ref 70–99)
GLUCOSE SERPL-MCNC: 108 MG/DL (ref 70–99)
GLUCOSE SERPL-MCNC: 99 MG/DL (ref 70–99)
HCO3 BLDV-SCNC: 36 MMOL/L (ref 21–28)
HCO3 BLDV-SCNC: 36 MMOL/L (ref 21–28)
HCO3 SERPL-SCNC: 31 MMOL/L (ref 22–29)
HCO3 SERPL-SCNC: 31 MMOL/L (ref 22–29)
HCO3 SERPL-SCNC: 32 MMOL/L (ref 22–29)
HCT VFR BLD AUTO: 42.7 % (ref 40–53)
HGB BLD-MCNC: 13.8 G/DL (ref 13.3–17.7)
IMM GRANULOCYTES # BLD: 1.1 10E3/UL
IMM GRANULOCYTES NFR BLD: 4 %
LYMPHOCYTES # BLD AUTO: 1.4 10E3/UL (ref 0.8–5.3)
LYMPHOCYTES NFR BLD AUTO: 4 %
MAGNESIUM SERPL-MCNC: 2.6 MG/DL (ref 1.7–2.3)
MAGNESIUM SERPL-MCNC: 2.6 MG/DL (ref 1.7–2.3)
MCH RBC QN AUTO: 28.7 PG (ref 26.5–33)
MCHC RBC AUTO-ENTMCNC: 32.3 G/DL (ref 31.5–36.5)
MCV RBC AUTO: 88 FL (ref 78–100)
MCV RBC AUTO: 89 FL (ref 78–100)
MONOCYTES # BLD AUTO: 0.8 10E3/UL (ref 0–1.3)
MONOCYTES NFR BLD AUTO: 2 %
NEUTROPHILS # BLD AUTO: 28.3 10E3/UL (ref 1.6–8.3)
NEUTROPHILS NFR BLD AUTO: 89 %
NRBC # BLD AUTO: 0 10E3/UL
NRBC BLD AUTO-RTO: 0 /100
O2/TOTAL GAS SETTING VFR VENT: 70 %
O2/TOTAL GAS SETTING VFR VENT: 80 %
OXYHGB MFR BLDV: 77 % (ref 70–75)
OXYHGB MFR BLDV: 82 % (ref 70–75)
PCO2 BLDV: 54 MM HG (ref 40–50)
PCO2 BLDV: 56 MM HG (ref 40–50)
PH BLDV: 7.41 [PH] (ref 7.32–7.43)
PH BLDV: 7.44 [PH] (ref 7.32–7.43)
PHOSPHATE SERPL-MCNC: 3.8 MG/DL (ref 2.5–4.5)
PLATELET # BLD AUTO: 119 10E3/UL (ref 150–450)
PLATELET # BLD AUTO: 127 10E3/UL (ref 150–450)
PO2 BLDV: 41 MM HG (ref 25–47)
PO2 BLDV: 48 MM HG (ref 25–47)
POTASSIUM SERPL-SCNC: 4.3 MMOL/L (ref 3.4–5.3)
POTASSIUM SERPL-SCNC: 4.4 MMOL/L (ref 3.4–5.3)
POTASSIUM SERPL-SCNC: 4.6 MMOL/L (ref 3.4–5.3)
PROCALCITONIN SERPL IA-MCNC: 2.27 NG/ML
RBC # BLD AUTO: 4.81 10E6/UL (ref 4.4–5.9)
SAO2 % BLDV: 78.1 % (ref 70–75)
SAO2 % BLDV: 84 % (ref 70–75)
SODIUM SERPL-SCNC: 148 MMOL/L (ref 135–145)
SODIUM SERPL-SCNC: 148 MMOL/L (ref 135–145)
SODIUM SERPL-SCNC: 151 MMOL/L (ref 135–145)
WBC # BLD AUTO: 31.7 10E3/UL (ref 4–11)

## 2025-05-24 PROCEDURE — 94640 AIRWAY INHALATION TREATMENT: CPT | Mod: 76

## 2025-05-24 PROCEDURE — 250N000013 HC RX MED GY IP 250 OP 250 PS 637: Performed by: NURSE PRACTITIONER

## 2025-05-24 PROCEDURE — 85004 AUTOMATED DIFF WBC COUNT: CPT

## 2025-05-24 PROCEDURE — 80048 BASIC METABOLIC PNL TOTAL CA: CPT

## 2025-05-24 PROCEDURE — 250N000011 HC RX IP 250 OP 636

## 2025-05-24 PROCEDURE — 85049 AUTOMATED PLATELET COUNT: CPT

## 2025-05-24 PROCEDURE — 94003 VENT MGMT INPAT SUBQ DAY: CPT

## 2025-05-24 PROCEDURE — 83735 ASSAY OF MAGNESIUM: CPT

## 2025-05-24 PROCEDURE — 71045 X-RAY EXAM CHEST 1 VIEW: CPT | Mod: 26 | Performed by: RADIOLOGY

## 2025-05-24 PROCEDURE — 84145 PROCALCITONIN (PCT): CPT

## 2025-05-24 PROCEDURE — 999N000157 HC STATISTIC RCP TIME EA 10 MIN

## 2025-05-24 PROCEDURE — 200N000002 HC R&B ICU UMMC

## 2025-05-24 PROCEDURE — 250N000009 HC RX 250

## 2025-05-24 PROCEDURE — 99291 CRITICAL CARE FIRST HOUR: CPT | Mod: GC | Performed by: INTERNAL MEDICINE

## 2025-05-24 PROCEDURE — 94640 AIRWAY INHALATION TREATMENT: CPT

## 2025-05-24 PROCEDURE — 250N000013 HC RX MED GY IP 250 OP 250 PS 637

## 2025-05-24 PROCEDURE — 250N000011 HC RX IP 250 OP 636: Performed by: NURSE PRACTITIONER

## 2025-05-24 PROCEDURE — 87040 BLOOD CULTURE FOR BACTERIA: CPT

## 2025-05-24 PROCEDURE — 36415 COLL VENOUS BLD VENIPUNCTURE: CPT

## 2025-05-24 PROCEDURE — 82330 ASSAY OF CALCIUM: CPT

## 2025-05-24 PROCEDURE — 86140 C-REACTIVE PROTEIN: CPT

## 2025-05-24 PROCEDURE — 82310 ASSAY OF CALCIUM: CPT | Performed by: NURSE PRACTITIONER

## 2025-05-24 PROCEDURE — 250N000013 HC RX MED GY IP 250 OP 250 PS 637: Performed by: INTERNAL MEDICINE

## 2025-05-24 PROCEDURE — 71045 X-RAY EXAM CHEST 1 VIEW: CPT

## 2025-05-24 PROCEDURE — 84100 ASSAY OF PHOSPHORUS: CPT

## 2025-05-24 PROCEDURE — 999N000065 XR CHEST PORT 1 VIEW

## 2025-05-24 PROCEDURE — 258N000003 HC RX IP 258 OP 636

## 2025-05-24 PROCEDURE — 82805 BLOOD GASES W/O2 SATURATION: CPT

## 2025-05-24 RX ORDER — CEFTAZIDIME 2 G/1
2 INJECTION, POWDER, FOR SOLUTION INTRAVENOUS EVERY 8 HOURS
Status: DISCONTINUED | OUTPATIENT
Start: 2025-05-24 | End: 2025-05-25

## 2025-05-24 RX ORDER — DEXTROSE MONOHYDRATE 50 MG/ML
INJECTION, SOLUTION INTRAVENOUS CONTINUOUS
Status: DISCONTINUED | OUTPATIENT
Start: 2025-05-24 | End: 2025-05-25

## 2025-05-24 RX ADMIN — Medication 100 MCG/HR: at 21:44

## 2025-05-24 RX ADMIN — THIAMINE HCL TAB 100 MG 100 MG: 100 TAB at 07:57

## 2025-05-24 RX ADMIN — ENOXAPARIN SODIUM 40 MG: 40 INJECTION SUBCUTANEOUS at 07:57

## 2025-05-24 RX ADMIN — IPRATROPIUM BROMIDE AND ALBUTEROL SULFATE 3 ML: .5; 3 SOLUTION RESPIRATORY (INHALATION) at 20:18

## 2025-05-24 RX ADMIN — SENNOSIDES AND DOCUSATE SODIUM 1 TABLET: 50; 8.6 TABLET ORAL at 20:37

## 2025-05-24 RX ADMIN — DEXTROSE: 5 SOLUTION INTRAVENOUS at 16:24

## 2025-05-24 RX ADMIN — CHLORHEXIDINE GLUCONATE 15 ML: 1.2 SOLUTION ORAL at 20:37

## 2025-05-24 RX ADMIN — CEFTAZIDIME 2 G: 2 INJECTION, POWDER, FOR SOLUTION INTRAVENOUS at 12:10

## 2025-05-24 RX ADMIN — IPRATROPIUM BROMIDE AND ALBUTEROL SULFATE 3 ML: .5; 3 SOLUTION RESPIRATORY (INHALATION) at 12:29

## 2025-05-24 RX ADMIN — IPRATROPIUM BROMIDE AND ALBUTEROL SULFATE 3 ML: .5; 3 SOLUTION RESPIRATORY (INHALATION) at 16:25

## 2025-05-24 RX ADMIN — ACETAMINOPHEN 650 MG: 325 TABLET, FILM COATED ORAL at 12:39

## 2025-05-24 RX ADMIN — Medication 60 ML: at 07:41

## 2025-05-24 RX ADMIN — PROPOFOL 10 MCG/KG/MIN: 10 INJECTION, EMULSION INTRAVENOUS at 00:03

## 2025-05-24 RX ADMIN — SENNOSIDES AND DOCUSATE SODIUM 1 TABLET: 50; 8.6 TABLET ORAL at 07:57

## 2025-05-24 RX ADMIN — CHLORHEXIDINE GLUCONATE 15 ML: 1.2 SOLUTION ORAL at 07:55

## 2025-05-24 RX ADMIN — IPRATROPIUM BROMIDE AND ALBUTEROL SULFATE 3 ML: .5; 3 SOLUTION RESPIRATORY (INHALATION) at 00:36

## 2025-05-24 RX ADMIN — Medication 40 MG: at 12:10

## 2025-05-24 RX ADMIN — THERA TABS 1 TABLET: TAB at 07:57

## 2025-05-24 RX ADMIN — DEXMEDETOMIDINE HYDROCHLORIDE 0.4 MCG/KG/HR: 400 INJECTION INTRAVENOUS at 05:37

## 2025-05-24 RX ADMIN — PROPOFOL 20 MCG/KG/MIN: 10 INJECTION, EMULSION INTRAVENOUS at 15:20

## 2025-05-24 RX ADMIN — CEFTAZIDIME 2 G: 2 INJECTION, POWDER, FOR SOLUTION INTRAVENOUS at 21:47

## 2025-05-24 RX ADMIN — IPRATROPIUM BROMIDE AND ALBUTEROL SULFATE 3 ML: .5; 3 SOLUTION RESPIRATORY (INHALATION) at 09:30

## 2025-05-24 RX ADMIN — CEFTAZIDIME 2 G: 2 INJECTION, POWDER, FOR SOLUTION INTRAVENOUS at 03:04

## 2025-05-24 ASSESSMENT — ACTIVITIES OF DAILY LIVING (ADL)
ADLS_ACUITY_SCORE: 75
ADLS_ACUITY_SCORE: 69
ADLS_ACUITY_SCORE: 69
ADLS_ACUITY_SCORE: 75
ADLS_ACUITY_SCORE: 69
ADLS_ACUITY_SCORE: 75
ADLS_ACUITY_SCORE: 75
ADLS_ACUITY_SCORE: 69
ADLS_ACUITY_SCORE: 69
ADLS_ACUITY_SCORE: 75
ADLS_ACUITY_SCORE: 69
ADLS_ACUITY_SCORE: 75
ADLS_ACUITY_SCORE: 69
ADLS_ACUITY_SCORE: 75
ADLS_ACUITY_SCORE: 75
ADLS_ACUITY_SCORE: 71

## 2025-05-24 NOTE — PROGRESS NOTES
MEDICAL ICU PROGRESS NOTE  05/24/2025      Date of Service (when I saw the patient): 05/24/2025    ASSESSMENT: Armin Terrell is a 70yo M with PMH of chronic respiratory failure (on home O2, 3LPM), severe COPD emphysema type (FEV1 35%), alpha-1 antitrypsin deficiency, lung nodules, hx tobacco use, and prostate cancer status post radical prostatectomy.  He was admitted to [facility] on 5/18 due to acute on chronic hypoxic respiratory failure requiring intubation. Found to have right sided pneumothorax and R side consolidation (pseudomonas + sputum). Chest tube placed but pneumothorax persisted, C/f bronchopleural fistula. Patient started on pressors for hypotension and admitted to the ICU. Started on meropenem. Failed extubation on 5/22 due to repeat episode of tension physiology d/t kink in chest tube following extubation resulting in worsening pneumothorax and emphysema. Re-intubated and transferred to [facility] on 5/22 for continued ICU management and interventional pulmonology consult. Underwent bronch with valve (3) placement on 5/23 however has persistent air leak. Pending possible re-bronch on 5/27. Weaning pressors and sedation as able.     CHANGES and MAJOR THINGS TODAY:   - continue propofol, Dex prn for sedation  - having lots of thin secretions, continue frequent suction  - Increased O2 needs, likely from taking out RUL with valve placement  - increase I:E to decrease FiO2 needs, keep PEEP 5  - continue chest tube to suction and increased Ti 1.5  - Na in PM, continue FWF 10 q4hr  - continue ceftazidime for now, since sputum still + after 5d of meropenem  - PS x 30 min  - CXR in afternoon to evaluate interval changes after pressure support and chest tube to suction      PLAN:    Neuro:  # Pain and sedation  - Propofol gtt  - Fentanyl gtt  - Precedex gtt prn for agitation/sedation  - RASS goal 0 to -1     Pulmonary:  # Acute on chronic hypoxic respiratory failure, multifactorial, s/p intubation  # COPD  exacerbation 2/2 R pneumonia  # Right pneumothorax, c/f bronchopleural fistula, s/p chest tube  # MAULIK Nodule  # Failed extubation on 5/22  # Hx severe COPD emphysema type (FEV1 35% on 5/6/2024)  # Subcutaneous emphysema  # Hx tobacco use, quit 2018  # Alpha 1 antitrypsin deficiency  On 3L O2 and takes trilogy inhaler at home. Presented to ED on 5/14 for SOB sent home oxycodone, augmentin, azithromycin, prednisone for RUL pneumonia. Returned to the ED on 5/18 for worsening shortness of breath generalized weakness. Patient was in severe respiratory distress started on BiPAP therapy and later intubated. CXR showed R pneumothorax- chest tube placed. Follow-up chest CT scan show large R pneumothorax, right pigtail chest tube along the major fissure, collapsed right lower lobe and right middle lobe consolidation of the right upper lobe unchanged left upper lobe nodule. Admitted to ICU and started on abx (discussed in ID section). Completed 5 days of methylpred d/t concern for possible COPD exacerbation component initially. Patient vent settings weaned and trailed extubation on 5/22. After being extubated he had a repeated episode of tension physiology requiring re-intubation and pressors. Continuous air leak concerning for bronchopleural fistula. IP preformed bronchoscopy with placement of 3 valves on 5/23 but there is still a persistent air leak however smaller than before. He may require repeat bronch and valves, IP planning to tentatively do this on 5/27. His lung disease is severe and his condition is tenuous at this time, he may require consultation with transplant pulm or palliative depending on how his clinical course progresses. Goal to keep airway pressures and RR low while reducing sedation as above, however if he is unable to remain synchronous and calm with lighter sedation there is concern that he could develop a left pneumothorax given his extensive emphysema. Has had increased oxygen needs after bronchial  valve placement which is likely from taking out RUL oxygenation with the valve placement.  - IP consulted  - s/p Bronch with valve placement on 5/23  - Possible repeat bronch on 5/27  - Duonebs Q4H  - Chest tube water seal -> suction (5/24)               - CXR after suction in afternoon pending.   - Abx as below  - S/p steroids     FiO2 (%): 70 %, Resp: 22, Vent Mode: VC/AC, Resp Rate (Set): 12 breaths/min, Tidal Volume (Set, mL): 460 mL, PEEP (cm H2O): 5 cmH2O, Resp Rate (Set): 12 breaths/min, Tidal Volume (Set, mL): 460 mL, PEEP (cm H2O): 5 cmH2O       Cardiovascular:  # Mixed shock, obstructive (2/2 PTX)  Hypotensive with systolic BP to 80s, tachycardic, lactate 2.8, and leukocytosis to 35 on admission to [facility] on 5/18. CT PE negative for PE but showed R pneumothorax and R pneumonia. Shock physiology likely 2/2 combination of sepsis and obstruction initially. Chest tube placed but pneumothorax persisted, as described above. Weaned off levo on 5/22 however post extubation his chest tube kinked and the patient quickly decompensated and became hypotensive again requiring re-intubation and pressor support. On pressors on arrival to [facility]. Weaning as able now that he is stable.   - Norepi gtt weaned  - Vasopressin gtt as needed for adjunct   - MAP goal >65  - s/p 5 days solumedrol      GI/Nutrition:  # Nutrition  Feeding tube dislodged during extubation on 5/22. OGT replaced on 5/23.  - Resume TF  - Nutrition consult     # Constipation  - Senna/miralax BID     Renal/Fluids/Electrolytes:  # Hypernatremia, improved  # Respiratory acidosis, improved  # Isolated elevated BUN, 2/2 shock vs steroids  Sodium 149 at OSH.  - FWD 0.9L for goal of 145.  - S/p D5 gtt  -  ml Q4H  - BMP in PM     #Hyperkalemia, resolved  5.9 > 5.6 on 5/23 however sample noted to have slight hemolysis, given kayexalate then lokelma.   - Recheck 5.4 on 5/23 with slight hemolysis   - No BM yet  - If K>6 shift with insulin     Endocrine:  #  C/f hyperglycemia 2/2 stress dose steroids  - SSI  - Hypoglycemia protocol     ID:  # C/f septic shock 2/2 pneumonia  # Pseudomonas pneumonia  # Leukocytosis  Pseudomonas + sputum culture on 5/18 (pansensitive). RVP negative. Blood cultures on 5/18 NGTD. UA unremarkable. Pleural fluid from R pleural cavity on 5/21, transudative effusion per Light's criteria. Currently, pt leukocytosis suspected to be reactive without fevers given worsening pneumothorax and afebrile. Blood cultures from 5/18 NGTD. Second set obtained on 5/22. Procal is mildly elevated, but without other signs of worsening bacterial infection. Ordered repeat sputum sample. Discontinued scheduled tylenol given possibility of masking a fever.   - work up and antibiotics as below        Micro:   - Blood cultures from 5/18 NGTD, repeated cultures collected 5/22  - MRSA nares negative  - UA bland on 5/23  - Sputum Cx (5/23) positive for GNB - follow speciation and sensitivies     Abx:   - Ceftazidime 2 g Q8H (5/23-Current)  - S/p meropenem and azithromycin (5/18-5/23)  - S/p zosyn (5/18)  - S/p vancomycin (5/19)     Hematology:    # Leukocytosis as above  # Thrombocytopenia, resolved  Noted plt 133 at OSH. Had been down trending from 5/14. Possible 2/2 critical illness. On 5/23 stable at 144.  - CTM     Musculoskeletal:  - PTOT to evaluate     Skin:  # Subcutaneous Emphysema 2/2 Pneumothorax  - CTM     General Cares/Prophylaxis:    DVT Prophylaxis: Enoxaparin (Lovenox) SQ  GI Prophylaxis: PPI  Restraints: Soft  Family Communication: WifeTrini [phone]  Code Status: Full     Lines/tubes/drains:   - PICC R brachial  - Urinary catheter  - ETT  - PIV L forearm  - Rt sided Chest Tube  - OGT     Disposition:  - Medical ICU     Patient seen and findings/plan discussed with medical ICU staff, Dr. Dotson.    Fer Francis MD    Clinically Significant Risk Factors        # Hyperkalemia: Highest K = 5.9 mmol/L in last 2 days, will monitor as  appropriate  # Hypernatremia: Highest Na = 148 mmol/L in last 2 days, will monitor as appropriate  # Hyperchloremia: Highest Cl = 111 mmol/L in last 2 days, will monitor as appropriate          # Hypoalbuminemia: Lowest albumin = 1.1 g/dL at 5/22/2025  9:30 PM, will monitor as appropriate   # Thrombocytopenia: Lowest platelets = 119 in last 2 days, will monitor for bleeding               # Severe Malnutrition: based on nutrition assessment and treatment provided per dietitian's recommendations., PRESENT ON ADMISSION   # Financial/Environmental Concerns: none                  ====================================  INTERVAL HISTORY:   Noted to have some increased secretion overnight with chest tube having less bubbling. Interval CXR showed stable pneumothorax. FWF increased from 60 q4h to 120 q4h overnight. This morning he's interactive and follows commands. Denies having any significant pain or difficulty with breathing.     OBJECTIVE:   1. VITAL SIGNS:   Temp:  [97.6  F (36.4  C)-98.8  F (37.1  C)] 98.4  F (36.9  C)  Pulse:  [] 114  Resp:  [12-22] 22  BP: ()/(55-94) 143/90  FiO2 (%):  [50 %-80 %] 70 %  SpO2:  [90 %-98 %] 94 %  FiO2 (%): 70 %, Resp: 22, Vent Mode: VC/AC, Resp Rate (Set): 12 breaths/min, Tidal Volume (Set, mL): 460 mL, PEEP (cm H2O): 5 cmH2O, Resp Rate (Set): 12 breaths/min, Tidal Volume (Set, mL): 460 mL, PEEP (cm H2O): 5 cmH2O  2. INTAKE/ OUTPUT:   I/O last 3 completed shifts:  In: 2964.56 [I.V.:2084.56; NG/GT:680; IV Piggyback:100]  Out: 1610 [Urine:1350; Chest Tube:260]    3. PHYSICAL EXAMINATION:  General: No acute distress. Follows commands  Neuro: Alert and follows commands appropriately. No focal deficits appreciated  Pulm/Resp: Diminished breath sounds scattered.   CV: tachycardic but regular rhythm. No murmurs or gallops  Abdomen: Soft, non-distended, non-tender  :  capone catheter in place, urine yellow and clear  Incisions/Skin: Diffuse subcutaneous empyhysema in chest and  neck. Crepitus present    4. LABS:   Arterial Blood Gases   Recent Labs   Lab 05/22/25  1714 05/22/25  1457 05/21/25  0427 05/20/25  1449   PH 7.41 7.39 7.44 7.40   PCO2 52* 52* 38 39   PO2 122* 86 83 75*   HCO3 33* 31* 26 24     Complete Blood Count   Recent Labs   Lab 05/24/25  0309 05/23/25  0416 05/22/25  2130 05/22/25  0334   WBC 31.7* 45.4* 48.2* 26.9*   HGB 13.8 14.0 15.8 14.5   * 144* 161 133*     Basic Metabolic Panel  Recent Labs   Lab 05/24/25  0315 05/24/25  0309 05/23/25  2312 05/23/25  2043 05/23/25  1641 05/23/25  1625 05/23/25  1127 05/23/25  1005 05/23/25  0755 05/23/25  0416   NA  --  148*  --   --   --  148*  --  146*  146*  --  145   POTASSIUM  --  4.6  --   --   --  5.2  --  5.4*  --  5.6*   CHLORIDE  --  111*  --   --   --  111*  --  109*  --  110*   CO2  --  31*  --   --   --  30*  --  28  --  29   BUN  --  61.1*  --   --   --  64.5*  --  65.2*  --  62.2*   CR  --  0.81  --   --   --  0.91  --  0.91  --  0.87   GLC 95 99 110* 118*   < > 114*   < > 136*   < > 122*    < > = values in this interval not displayed.     Liver Function Tests  Recent Labs   Lab 05/22/25 2130 05/19/25  0445 05/18/25  0205   AST  --  42 45   ALT  --  36 39   ALKPHOS 193* 120 175*   BILITOTAL 0.9 0.8 0.8   ALBUMIN 1.1* 2.7* 3.3*     Coagulation Profile  No lab results found in last 7 days.    5. RADIOLOGY:   Recent Results (from the past 24 hours)   XR Chest Port 1 View    Narrative    Portable chest    INDICATION: Pneumothorax    COMPARISON: Yesterday 2046 hours    Heart size normal. Extensive right-sided more than left-sided  subcutaneous emphysema is unchanged. Diffuse streaky densities in the  lungs are slightly more well defined suggestive of septal thickening  and edema. A right chest catheter remains present and is grossly  similar in its appearance and positioning. Right pneumothorax as  previously described appears grossly unchanged.      Impression    IMPRESSION: Continued edema in the right hemithorax  with diffuse  subcutaneous emphysema an right pneumothorax with chest catheter.    ELVIRA STEPHENS MD         SYSTEM ID:  [id number]   XR Chest Port 1 View    Narrative    Exam: XR CHEST PORT 1 VIEW, 5/23/2025 10:46 AM    Indication: right upper lung valves insertion    Comparison: Chest radiograph 5/23/2025, CT chest 5/18/2025    Findings:   Single portable semiupright frontal view of the chest was obtained.  Endotracheal tube terminates in the mid thoracic trachea. Enteric tube  terminates below the field of view. Right apical chest tube remains in  place. Right upper extremity PICC tip terminates in the region of the  low SVC. Interval placement of the right upper lobe endobronchial  valves. Stable cardiomediastinal silhouette. Small right sided  pneumothorax, slightly decreased in size compared to prior.  Redemonstration of multifocal areas of consolidation throughout the  right lung fields with architectural distortion, as well as focal  nodular consolidation in the left lower lung field, better appreciated  on recent CT. Hyperlucent appearance of the left lung with paucity of  vasculature, consistent with advanced emphysema. Diffuse right greater  than left chest wall and supraclavicular subcutaneous emphysema.      Impression    Impression:   1. Interval placement of 3 right upper lobe endobronchial valves.  2. Small right-sided pneumothorax with chest tube in place, slightly  decreased in size compared earlier today.  3. Persistent diffuse right-sided pulmonary opacities and nodular  opacity in the left lower lung field, better appreciated on recent CT.  4. Advanced emphysematous changes of the left lung.    I have personally reviewed the examination and initial interpretation  and I agree with the findings.    ELVIRA STEPHENS MD         SYSTEM ID:  [id number]   XR Chest Port 1 View    Narrative    Exam: XR CHEST PORT 1 VIEW, 5/23/2025 12:22 PM    Comparison: 5/23/2025    History: s/p endobronchial valve  placement with chest tube on water  seal. assess interval pneumothorax    Findings:  Single AP portable semiupright view of the chest. Endotracheal tube  tip is 3.7 cm above the isa. Right upper extremity PICC in stable  position. Right apically directed chest tube. Enteric tube traverses  into the stomach and below the field-of-view. Right endobronchial  valves x3.    Trachea is midline. Mediastinum is within normal limits.  Cardiopulmonary silhouette is within normal limits. Mixed interstitial  airspace opacities present throughout the right lung.  Chronic-appearing blunting of the left costophrenic angle. Moderate  right pneumothorax. Extensive right greater than left chest wall  subcutaneous emphysema.      Impression    Impression:   1. Increased size of moderate right pneumothorax with chest tube in  place.  2. Slightly increased right lung atelectasis/edema.    I have personally reviewed the examination and initial interpretation  and I agree with the findings.    ELVIRA STEPHENS MD         SYSTEM ID:  [id number]   XR Chest Port 1 View    Narrative    Exam: XR CHEST PORT 1 VIEW  5/23/2025 5:03 PM     History:  chest tube on water seal. Assess for interval changes in R  pneumothorax and subcutaneous emphysema       Comparison: Chest radiograph 5/23/2025 at 12:07    Findings: Portable, supine, AP view of the chest. The endotracheal  tube terminates 3.0 cm above the isa. Right upper extremity PICC  tip is stable. Stable right apical pigtail chest tube. Minimally  decreased size of the right apical, lateral and basilar pneumothorax.  No appreciable left pneumothorax. Similar large volume of subcutaneous  emphysema along the right chest wall and supraclavicular region. Small  amount of subcutaneous emphysema along left chest wall. Enteric tube  tip projects over the stomach. Endobronchial valves present.      Trachea is midline. The cardiomediastinal silhouette is within normal  limits. No significant  pleural effusion. No focal airspace opacity.  The visualized upper abdomen is unremarkable. Unchanged chronic  blunting of the left costophrenic angle. Severe emphysematous changes.      Impression    Impression:   1. Stable right apical chest tube. Minimally decreased size of the  moderate right apical lateral pneumothorax. Improved visualization  basilar portion of the right pneumothorax.   2. Similar right mixed pulmonary opacities.  3. Remainder of support devices are stable.    I have personally reviewed the examination and initial interpretation  and I agree with the findings.    GABRIEL MENDOZA MD         SYSTEM ID:  [id number]   XR Chest Port 1 View    Impression    RESIDENT PRELIMINARY INTERPRETATION  Impression:   1. Unchanged configuration of right pneumothorax. Right chest tube and  other support devices are stable.  2. Similar bilateral atelectasis and probable trace pleural effusions.

## 2025-05-25 ENCOUNTER — APPOINTMENT (OUTPATIENT)
Dept: GENERAL RADIOLOGY | Facility: CLINIC | Age: 70
End: 2025-05-25
Payer: COMMERCIAL

## 2025-05-25 ENCOUNTER — APPOINTMENT (OUTPATIENT)
Dept: GENERAL RADIOLOGY | Facility: CLINIC | Age: 70
End: 2025-05-25
Attending: NURSE PRACTITIONER
Payer: COMMERCIAL

## 2025-05-25 LAB
ANION GAP SERPL CALCULATED.3IONS-SCNC: 4 MMOL/L (ref 7–15)
BASE EXCESS BLDV CALC-SCNC: 10.2 MMOL/L (ref -3–3)
BASE EXCESS BLDV CALC-SCNC: 7.9 MMOL/L (ref -3–3)
BASOPHILS # BLD AUTO: 0.1 10E3/UL (ref 0–0.2)
BASOPHILS NFR BLD AUTO: 0 %
BUN SERPL-MCNC: 57 MG/DL (ref 8–23)
CALCIUM SERPL-MCNC: 7.9 MG/DL (ref 8.8–10.4)
CHLORIDE SERPL-SCNC: 112 MMOL/L (ref 98–107)
CREAT SERPL-MCNC: 0.77 MG/DL (ref 0.67–1.17)
EGFRCR SERPLBLD CKD-EPI 2021: >90 ML/MIN/1.73M2
EOSINOPHIL # BLD AUTO: 0.2 10E3/UL (ref 0–0.7)
EOSINOPHIL NFR BLD AUTO: 1 %
ERYTHROCYTE [DISTWIDTH] IN BLOOD BY AUTOMATED COUNT: 15.8 % (ref 10–15)
GLUCOSE BLDC GLUCOMTR-MCNC: 105 MG/DL (ref 70–99)
GLUCOSE BLDC GLUCOMTR-MCNC: 93 MG/DL (ref 70–99)
GLUCOSE BLDC GLUCOMTR-MCNC: 98 MG/DL (ref 70–99)
GLUCOSE BLDC GLUCOMTR-MCNC: 99 MG/DL (ref 70–99)
GLUCOSE SERPL-MCNC: 117 MG/DL (ref 70–99)
HCO3 BLDV-SCNC: 36 MMOL/L (ref 21–28)
HCO3 BLDV-SCNC: 37 MMOL/L (ref 21–28)
HCO3 SERPL-SCNC: 32 MMOL/L (ref 22–29)
HCT VFR BLD AUTO: 44.9 % (ref 40–53)
HGB BLD-MCNC: 14.2 G/DL (ref 13.3–17.7)
IMM GRANULOCYTES # BLD: 1.2 10E3/UL
IMM GRANULOCYTES NFR BLD: 3 %
LACTATE SERPL-SCNC: 1.7 MMOL/L (ref 0.7–2)
LYMPHOCYTES # BLD AUTO: 1.1 10E3/UL (ref 0.8–5.3)
LYMPHOCYTES NFR BLD AUTO: 3 %
MCH RBC QN AUTO: 28.1 PG (ref 26.5–33)
MCHC RBC AUTO-ENTMCNC: 31.6 G/DL (ref 31.5–36.5)
MCV RBC AUTO: 89 FL (ref 78–100)
MONOCYTES # BLD AUTO: 0.6 10E3/UL (ref 0–1.3)
MONOCYTES NFR BLD AUTO: 2 %
NEUTROPHILS # BLD AUTO: 34 10E3/UL (ref 1.6–8.3)
NEUTROPHILS NFR BLD AUTO: 92 %
NRBC # BLD AUTO: 0 10E3/UL
NRBC BLD AUTO-RTO: 0 /100
O2/TOTAL GAS SETTING VFR VENT: 70 %
O2/TOTAL GAS SETTING VFR VENT: 70 %
OXYHGB MFR BLDV: 73 % (ref 70–75)
OXYHGB MFR BLDV: 94 % (ref 70–75)
PCO2 BLDV: 58 MM HG (ref 40–50)
PCO2 BLDV: 60 MM HG (ref 40–50)
PH BLDV: 7.38 [PH] (ref 7.32–7.43)
PH BLDV: 7.42 [PH] (ref 7.32–7.43)
PHOSPHATE SERPL-MCNC: 3.1 MG/DL (ref 2.5–4.5)
PLATELET # BLD AUTO: 125 10E3/UL (ref 150–450)
PO2 BLDV: 39 MM HG (ref 25–47)
PO2 BLDV: 73 MM HG (ref 25–47)
POTASSIUM SERPL-SCNC: 4.2 MMOL/L (ref 3.4–5.3)
RBC # BLD AUTO: 5.06 10E6/UL (ref 4.4–5.9)
SAO2 % BLDV: 74.7 % (ref 70–75)
SAO2 % BLDV: 95.1 % (ref 70–75)
SODIUM SERPL-SCNC: 146 MMOL/L (ref 135–145)
SODIUM SERPL-SCNC: 146 MMOL/L (ref 135–145)
SODIUM SERPL-SCNC: 148 MMOL/L (ref 135–145)
SODIUM SERPL-SCNC: 149 MMOL/L (ref 135–145)
TRIGL SERPL-MCNC: 133 MG/DL
WBC # BLD AUTO: 37.2 10E3/UL (ref 4–11)

## 2025-05-25 PROCEDURE — 94003 VENT MGMT INPAT SUBQ DAY: CPT

## 2025-05-25 PROCEDURE — 250N000011 HC RX IP 250 OP 636

## 2025-05-25 PROCEDURE — 82805 BLOOD GASES W/O2 SATURATION: CPT

## 2025-05-25 PROCEDURE — 82374 ASSAY BLOOD CARBON DIOXIDE: CPT | Performed by: NURSE PRACTITIONER

## 2025-05-25 PROCEDURE — 94640 AIRWAY INHALATION TREATMENT: CPT | Mod: 76

## 2025-05-25 PROCEDURE — 250N000011 HC RX IP 250 OP 636: Performed by: NURSE PRACTITIONER

## 2025-05-25 PROCEDURE — 99291 CRITICAL CARE FIRST HOUR: CPT | Mod: GC | Performed by: INTERNAL MEDICINE

## 2025-05-25 PROCEDURE — 94640 AIRWAY INHALATION TREATMENT: CPT

## 2025-05-25 PROCEDURE — 250N000013 HC RX MED GY IP 250 OP 250 PS 637

## 2025-05-25 PROCEDURE — 200N000002 HC R&B ICU UMMC

## 2025-05-25 PROCEDURE — 250N000013 HC RX MED GY IP 250 OP 250 PS 637: Performed by: INTERNAL MEDICINE

## 2025-05-25 PROCEDURE — 84295 ASSAY OF SERUM SODIUM: CPT

## 2025-05-25 PROCEDURE — 87070 CULTURE OTHR SPECIMN AEROBIC: CPT

## 2025-05-25 PROCEDURE — A7035 POS AIRWAY PRESS HEADGEAR: HCPCS

## 2025-05-25 PROCEDURE — 84478 ASSAY OF TRIGLYCERIDES: CPT | Performed by: INTERNAL MEDICINE

## 2025-05-25 PROCEDURE — 250N000009 HC RX 250

## 2025-05-25 PROCEDURE — 71045 X-RAY EXAM CHEST 1 VIEW: CPT

## 2025-05-25 PROCEDURE — 84100 ASSAY OF PHOSPHORUS: CPT

## 2025-05-25 PROCEDURE — 83605 ASSAY OF LACTIC ACID: CPT

## 2025-05-25 PROCEDURE — 71045 X-RAY EXAM CHEST 1 VIEW: CPT | Mod: 77

## 2025-05-25 PROCEDURE — 85018 HEMOGLOBIN: CPT

## 2025-05-25 PROCEDURE — 71045 X-RAY EXAM CHEST 1 VIEW: CPT | Mod: 26 | Performed by: RADIOLOGY

## 2025-05-25 PROCEDURE — 999N000157 HC STATISTIC RCP TIME EA 10 MIN

## 2025-05-25 RX ORDER — CEFEPIME HYDROCHLORIDE 2 G/1
2 INJECTION, POWDER, FOR SOLUTION INTRAVENOUS EVERY 8 HOURS
Status: DISCONTINUED | OUTPATIENT
Start: 2025-05-25 | End: 2025-05-29

## 2025-05-25 RX ORDER — ACETYLCYSTEINE 200 MG/ML
2 SOLUTION ORAL; RESPIRATORY (INHALATION) 2 TIMES DAILY
Status: DISCONTINUED | OUTPATIENT
Start: 2025-05-25 | End: 2025-05-31 | Stop reason: HOSPADM

## 2025-05-25 RX ORDER — TOBRAMYCIN INHALATION SOLUTION 300 MG/5ML
300 INHALANT RESPIRATORY (INHALATION)
Status: DISCONTINUED | OUTPATIENT
Start: 2025-05-25 | End: 2025-05-31 | Stop reason: HOSPADM

## 2025-05-25 RX ORDER — FUROSEMIDE 10 MG/ML
20 INJECTION INTRAMUSCULAR; INTRAVENOUS ONCE
Status: COMPLETED | OUTPATIENT
Start: 2025-05-25 | End: 2025-05-25

## 2025-05-25 RX ORDER — ALBUTEROL SULFATE 0.83 MG/ML
2.5 SOLUTION RESPIRATORY (INHALATION)
Status: DISCONTINUED | OUTPATIENT
Start: 2025-05-25 | End: 2025-05-27

## 2025-05-25 RX ADMIN — SENNOSIDES AND DOCUSATE SODIUM 1 TABLET: 50; 8.6 TABLET ORAL at 19:40

## 2025-05-25 RX ADMIN — ACETYLCYSTEINE 2 ML: 200 SOLUTION ORAL; RESPIRATORY (INHALATION) at 09:12

## 2025-05-25 RX ADMIN — ALBUTEROL SULFATE 2.5 MG: 2.5 SOLUTION RESPIRATORY (INHALATION) at 20:36

## 2025-05-25 RX ADMIN — FUROSEMIDE 20 MG: 10 INJECTION, SOLUTION INTRAMUSCULAR; INTRAVENOUS at 09:28

## 2025-05-25 RX ADMIN — PROPOFOL 15 MCG/KG/MIN: 10 INJECTION, EMULSION INTRAVENOUS at 03:37

## 2025-05-25 RX ADMIN — Medication 40 MG: at 09:03

## 2025-05-25 RX ADMIN — CEFTAZIDIME 2 G: 2 INJECTION, POWDER, FOR SOLUTION INTRAVENOUS at 03:37

## 2025-05-25 RX ADMIN — THERA TABS 1 TABLET: TAB at 09:27

## 2025-05-25 RX ADMIN — Medication 50 MCG: at 15:13

## 2025-05-25 RX ADMIN — CEFEPIME 2 G: 2 INJECTION, POWDER, FOR SOLUTION INTRAVENOUS at 09:28

## 2025-05-25 RX ADMIN — Medication 100 MCG/HR: at 21:37

## 2025-05-25 RX ADMIN — CHLORHEXIDINE GLUCONATE 15 ML: 1.2 SOLUTION ORAL at 09:27

## 2025-05-25 RX ADMIN — ACETYLCYSTEINE 2 ML: 200 SOLUTION ORAL; RESPIRATORY (INHALATION) at 20:37

## 2025-05-25 RX ADMIN — Medication 60 ML: at 09:29

## 2025-05-25 RX ADMIN — ALBUTEROL SULFATE 2.5 MG: 2.5 SOLUTION RESPIRATORY (INHALATION) at 09:12

## 2025-05-25 RX ADMIN — ENOXAPARIN SODIUM 40 MG: 40 INJECTION SUBCUTANEOUS at 09:28

## 2025-05-25 RX ADMIN — SENNOSIDES AND DOCUSATE SODIUM 1 TABLET: 50; 8.6 TABLET ORAL at 09:27

## 2025-05-25 RX ADMIN — IPRATROPIUM BROMIDE AND ALBUTEROL SULFATE 3 ML: .5; 3 SOLUTION RESPIRATORY (INHALATION) at 04:58

## 2025-05-25 RX ADMIN — CHLORHEXIDINE GLUCONATE 15 ML: 1.2 SOLUTION ORAL at 19:41

## 2025-05-25 RX ADMIN — CEFEPIME 2 G: 2 INJECTION, POWDER, FOR SOLUTION INTRAVENOUS at 17:24

## 2025-05-25 RX ADMIN — NOREPINEPHRINE BITARTRATE 0.03 MCG/KG/MIN: 0.06 INJECTION, SOLUTION INTRAVENOUS at 06:40

## 2025-05-25 RX ADMIN — IPRATROPIUM BROMIDE AND ALBUTEROL SULFATE 3 ML: .5; 3 SOLUTION RESPIRATORY (INHALATION) at 00:58

## 2025-05-25 RX ADMIN — THIAMINE HCL TAB 100 MG 100 MG: 100 TAB at 09:27

## 2025-05-25 RX ADMIN — POLYETHYLENE GLYCOL 3350 17 G: 17 POWDER, FOR SOLUTION ORAL at 19:40

## 2025-05-25 RX ADMIN — TOBRAMYCIN 300 MG: 300 SOLUTION RESPIRATORY (INHALATION) at 20:36

## 2025-05-25 RX ADMIN — POLYETHYLENE GLYCOL 3350 17 G: 17 POWDER, FOR SOLUTION ORAL at 09:29

## 2025-05-25 RX ADMIN — PROPOFOL 10 MCG/KG/MIN: 10 INJECTION, EMULSION INTRAVENOUS at 21:12

## 2025-05-25 ASSESSMENT — ACTIVITIES OF DAILY LIVING (ADL)
ADLS_ACUITY_SCORE: 69
ADLS_ACUITY_SCORE: 73
ADLS_ACUITY_SCORE: 73
ADLS_ACUITY_SCORE: 69
ADLS_ACUITY_SCORE: 73
ADLS_ACUITY_SCORE: 69
ADLS_ACUITY_SCORE: 73
ADLS_ACUITY_SCORE: 73
ADLS_ACUITY_SCORE: 69
ADLS_ACUITY_SCORE: 73
ADLS_ACUITY_SCORE: 69
ADLS_ACUITY_SCORE: 73
ADLS_ACUITY_SCORE: 69
ADLS_ACUITY_SCORE: 73
ADLS_ACUITY_SCORE: 69
ADLS_ACUITY_SCORE: 69

## 2025-05-25 NOTE — PROGRESS NOTES
ICU Daily Rounding Checklist     Checklist Response Notes   Can sedation be reduced?  Yes  Wean off Propofol, can add Precedex if needed for RASS goal 0 to +1   Can analgesia be reduced? Yes     Is delirium being assessed, addressed and prevented? Yes    Spontaneous awakening trial and/or Spontaneous breathing trial candidate?  Yes    Total fluid balance goal reviewed?  Yes Target Goals:       Diurese with Lasix 20 mg IV, goal net negative 500 mL    Is the patient at goals for lung protective ventilation? Yes    Head of bed elevation (30 degrees)? Yes    Skin breakdown assessment (prevention) completed? Yes    Is enteral nutrition at goal? Yes    Is blood glucose at goal? Yes    Deep venous thrombosis prophylaxis? Yes      Gastric ulcer prophylaxis?  Yes If coagulopathy (INR-1.5 PTT2x normal. Ph<50k), mechanical ventilation 48hr, history of GI bleed/ulcer within past year. TBL, SCI, or burn, or if >= 2 minor risk factors (sepsis, ICU stay 1 week, occult GI bleed > 6 days. glucocorticoid therapy, NSAID use, antiplatelet use)   Can Antibiotics be narrowed or discontinued? No  Discontinue Ceftaz, start Cefepime, add Tobramycin nebs BID. Repeat sputum culture   Early mobility candidate and physical therapy consulted? Yes    Is capone catheter needed? Yes     Is central venous/arterial catheter needed? Yes    Has the family been updated? Yes    Are the patient's goals of care and code status current? Yes

## 2025-05-25 NOTE — PROGRESS NOTES
MEDICAL ICU PROGRESS NOTE  05/25/2025      Date of Service (when I saw the patient): 05/25/2025    ASSESSMENT: Armin Terrell is a 68yo M with PMH of chronic respiratory failure (on home O2, 3LPM), severe COPD emphysema type (FEV1 35%), alpha-1 antitrypsin deficiency, lung nodules, hx tobacco use, and prostate cancer status post radical prostatectomy.  He was admitted to [facility] on 5/18 due to acute on chronic hypoxic respiratory failure requiring intubation. Found to have right sided pneumothorax and R side consolidation (pseudomonas + sputum). Chest tube placed but pneumothorax persisted, C/f bronchopleural fistula. Patient started on pressors for hypotension and admitted to the ICU. Started on meropenem. Failed extubation on 5/22 due to repeat episode of tension physiology d/t kink in chest tube following extubation resulting in worsening pneumothorax and emphysema. Re-intubated and transferred to [facility] on 5/22 for continued ICU management and interventional pulmonology consult. Underwent bronch with valve (3) placement on 5/23 in RUL however has persistent air leak. Pending possible re-bronch on 5/27. Weaning pressors and sedation as able.     CHANGES and MAJOR THINGS TODAY:   - Failed CT to water seal trial today, subcutaneous emphysema appeared to be increasing  - Decreased PEEP to 0  - repeat CXR and VBG  - PST as able  - Wean off propofol, transition to Precedex  - Lasix 20 mg IV  - Mucomyst + Albuterol nebs BID and start Tobramycin nebs BID post airway clearance  - Discontinue Duonebs  - Repeat sputum cx for thicker secretions  - Discontinue Ceftaz  - Start Cefepime  - BUE US to r/o DVT  - Discontinue D5 infusion  - Increase FWF to 200 mL Q4H  - Na check Q4H x 3  - Discontinue sliding scale insulin      PLAN:    Neuro:  # Pain and sedation  - Propofol gtt, wean as able  - Precedex gtt prn for agitation/sedation  - Fentanyl gtt  - RASS goal 0 to +1     Pulmonary:  # Acute on chronic hypoxic  respiratory failure, multifactorial, s/p intubation  # COPD exacerbation 2/2 R pneumonia  # Right pneumothorax, c/f bronchopleural fistula, s/p chest tube and valve placement  # MAULIK Nodule  # Failed extubation on 5/22  # Hx severe COPD emphysema type (FEV1 35% on 5/6/2024)  # Subcutaneous emphysema  # Hx tobacco use, quit 2018  # Alpha 1 antitrypsin deficiency  On 3L O2 and takes trilogy inhaler at home. Presented to ED on 5/14 for SOB sent home oxycodone, augmentin, azithromycin, prednisone for RUL pneumonia. Returned to the ED on 5/18 for worsening shortness of breath generalized weakness. Patient was in severe respiratory distress started on BiPAP therapy and later intubated. CXR showed R pneumothorax- chest tube placed. Follow-up chest CT scan show large R pneumothorax, right pigtail chest tube along the major fissure, collapsed right lower lobe and right middle lobe consolidation of the right upper lobe unchanged left upper lobe nodule. Admitted to ICU and started on abx (discussed in ID section). Completed 5 days of methylpred d/t concern for possible COPD exacerbation component initially. Patient vent settings weaned and trailed extubation on 5/22. After being extubated he had a repeated episode of tension physiology requiring re-intubation and pressors. Continuous air leak concerning for bronchopleural fistula. IP preformed bronchoscopy with placement of 3 valves on 5/23 but there is still a persistent. He may require repeat bronch and valves, IP planning to tentatively do this on 5/27. His lung disease is severe and his condition is tenuous at this time, he may require consultation with transplant pulm or palliative depending on how his clinical course progresses. Goal to keep airway pressures and RR low while reducing sedation as above, however if he is unable to remain synchronous and calm with lighter sedation there is concern that he could develop a left pneumothorax given his extensive emphysema. Has  had increased oxygen needs after bronchial valve placement which is likely from taking out RUL oxygenation with the valve placement. Also having thick secretions.  - IP consulted  - s/p Bronch with valve placement x3 in RUL on 5/23  - Possible repeat bronch on 5/27  - Decrease PEEP to 0  - PS today to limit TVs  - start Albuterol and Mucomyst BID  - start Tobramycin nebs BID post airway clearance  - NO chest percussion or volera to prevent added positive pressure  - Stable CXR on 5/25  - Chest tube water seal -> suction (5/24) -> water seal (5/25)  - Failed trial to water seal on 5/25 due to increasing subcutaneous emphysema   - Repeat CXR and VBG after water seal pending  - S/p steroids     FiO2 (%): 70 %, Resp: 14, Vent Mode: VC/AC, Resp Rate (Set): 12 breaths/min, Tidal Volume (Set, mL): 460 mL, PEEP (cm H2O): 0 cmH2O, Pressure Support (cm H2O): 7 cmH2O, Resp Rate (Set): 12 breaths/min, Tidal Volume (Set, mL): 460 mL, PEEP (cm H2O): 0 cmH2O       Cardiovascular:  # Mixed shock, obstructive (2/2 PTX 5/18) and distributive (5/24)  Hypotensive with systolic BP to 80s, tachycardic, lactate 2.8, and leukocytosis to 35 on admission to [facility] on 5/18. CT PE negative for PE but showed R pneumothorax and R pneumonia. Shock physiology likely 2/2 combination of sepsis and obstruction initially. Chest tube placed but pneumothorax persisted, as described above. Weaned off levo on 5/22 however post extubation his chest tube kinked and the patient quickly decompensated and became hypotensive again requiring re-intubation and pressor support. On pressors on arrival to [facility]. On 5./24, required increased sedation for tachycardia and bcame hypotensive with briefly requiring pressors. Likely from sedation but ddx includes septic shock (as had Tmax 100.6, WBC and HR 110s).  - Norepi gtt, weaning as able  - sedation as above  - see below for sepsis plan  - Vasopressin gtt as needed for adjunct   - MAP goal >65  - s/p 5 days  solumedrol     # Bilateral upper extremity edema  Stable to somewhat increased since arrival at [facility] on 5/23. Nontender and equal bilaterally. Minimal edema in LE. Ordered UE US to evaluate for DVTs. Most likely 2/2 volume overload.  - Bilateral UE US pending  - Lasix 20 mg IV on 5/25  - Strict I/Os     GI/Nutrition:  # Nutrition  Feeding tube dislodged during extubation on 5/22. OGT replaced on 5/23.  - Resume TF  - Nutrition consult     # Constipation  - Senna/miralax BID     Renal/Fluids/Electrolytes:  # Hypernatremia, improved  # Respiratory acidosis, improved  # Isolated elevated BUN, 2/2 shock vs steroids  Sodium 149 at OSH.   - FWD 0.9L for goal of 145.  - S/p D5 gtt  - Lasix 5/25 as above  -  ml Q4H  - BMP Q4h x3     #Hyperkalemia, resolved  5.9 > 5.6 on 5/23 however sample noted to have slight hemolysis, given kayexalate then lokelma.   - Recheck 5.4 on 5/23 with slight hemolysis   - No BM yet  - If K>6 shift with insulin     Endocrine:  # S/p stress dose steroids  - Hypoglycemia protocol  - Discontinued sliding scale insulin on 5/25     ID:  # C/f septic shock 2/2 pneumonia  # Pseudomonas pneumonia  # Leukocytosis  Pseudomonas + sputum culture on 5/18 (pansensitive). RVP negative. Blood cultures on 5/18 NGTD. UA unremarkable. Pleural fluid from R pleural cavity on 5/21, transudative effusion per Light's criteria. Currently, pt leukocytosis suspected to be reactive without fevers given worsening pneumothorax and afebrile. Blood cultures from 5/18 NGTD. Second set obtained on 5/22. Procal is mildly elevated, but without other signs of worsening bacterial infection. Ordered repeat sputum sample on 5/23 which continued to grow pseudomonas, switched to cefepime due to better susceptibly. Repeat sputum culture collected on 5/25 as the patient developed thicker sputum.   - work up and antibiotics as below     Micro:   - Blood cultures from 5/18 NGTD, repeated cultures collected 5/22 and 5/24 NGTD  - MRSA  nares negative  - UA bland on 5/23  - Sputum Cx (5/23) positive for GNB - follow speciation and sensitivities  - Sputum Cx (5/25) pending     Abx:   - Cefepime 2 g (5/25- current)  - Tobramycin neb BID (5/25- current)  - Ceftazidime 2 g Q8H (5/23-5/25)  - S/p meropenem and azithromycin (5/18-5/23)  - S/p zosyn (5/18)  - S/p vancomycin (5/19)     Hematology:    # Leukocytosis as above  # Thrombocytopenia, resolved  Noted plt 133 at OSH. Had been down trending from 5/14. Possible 2/2 critical illness. On 5/23 stable at 144.  - CTM     Musculoskeletal:  - PTOT to evaluate     Skin:  # Subcutaneous Emphysema 2/2 Pneumothorax  - CTM     General Cares/Prophylaxis:    DVT Prophylaxis: Enoxaparin (Lovenox) SQ  GI Prophylaxis: PPI  Restraints: none  Family Communication: Wife, Trini Terrell [phone]  Code Status: Full     Lines/tubes/drains:   - PICC R brachial  - Urinary catheter (has chronic obstruction from prostate cancer)  - ETT  - PIV L forearm  - Rt sided Chest Tube  - OGT     Disposition:  - Medical ICU     Patient seen and findings/plan discussed with medical ICU staff, Joel Milan    Clinically Significant Risk Factors        # Hyperkalemia: Highest K = 5.4 mmol/L in last 2 days, will monitor as appropriate  # Hypernatremia: Highest Na = 151 mmol/L in last 2 days, will monitor as appropriate  # Hyperchloremia: Highest Cl = 114 mmol/L in last 2 days, will monitor as appropriate          # Hypoalbuminemia: Lowest albumin = 1.1 g/dL at 5/22/2025  9:30 PM, will monitor as appropriate   # Thrombocytopenia: Lowest platelets = 119 in last 2 days, will monitor for bleeding               # Severe Malnutrition: based on nutrition assessment and treatment provided per dietitian's recommendations., PRESENT ON ADMISSION   # Financial/Environmental Concerns: none                  ====================================  INTERVAL HISTORY:   Continues to have secretions and needing frequent suctioning. Has a good cough.  Interval CXR with stable pneumothorax. Restarted on D5 gg for hyponatremia. Had been trying to wean propofol overnight, however became more restless and tachycardic this morning requiring more propofol again. Restarted on norepi gtt this morning due to soft pressures. This morning he is interactive and following commands. Denies pain or difficulty breathing.      OBJECTIVE:   1. VITAL SIGNS:   Temp:  [97.2  F (36.2  C)-100.6  F (38.1  C)] 99.3  F (37.4  C)  Pulse:  [103-130] 113  Resp:  [12-23] 14  BP: ()/(50-93) 98/63  FiO2 (%):  [70 %] 70 %  SpO2:  [88 %-99 %] 92 %  FiO2 (%): 70 %, Resp: 14, Vent Mode: VC/AC, Resp Rate (Set): 12 breaths/min, Tidal Volume (Set, mL): 460 mL, PEEP (cm H2O): 0 cmH2O, Pressure Support (cm H2O): 7 cmH2O, Resp Rate (Set): 12 breaths/min, Tidal Volume (Set, mL): 460 mL, PEEP (cm H2O): 0 cmH2O  2. INTAKE/ OUTPUT:   I/O last 3 completed shifts:  In: 4382.72 [I.V.:1732.72; Other:740; NG/GT:980]  Out: 2630 [Urine:2470; Chest Tube:160]    3. PHYSICAL EXAMINATION:  General: No acute distress. Follows commands  Neuro: Alert and follows commands appropriately. No focal deficits appreciated  Pulm/Resp: Diminished breath sounds scattered. Some course breath sounds MAULIK. Coughing intermittently and having thick secretions.   CV: tachycardic but regular rhythm. No murmurs or gallops  Abdomen: Soft, non-distended, non-tender  :  capone catheter in place, urine yellow and clear  Incisions/Skin: Diffuse subcutaneous empyhysema in chest and neck. Crepitus present. UE edema bilaterall (equal and nontender).     4. LABS:   Arterial Blood Gases   Recent Labs   Lab 05/22/25  1714 05/22/25  1457 05/21/25  0427 05/20/25  1449   PH 7.41 7.39 7.44 7.40   PCO2 52* 52* 38 39   PO2 122* 86 83 75*   HCO3 33* 31* 26 24     Complete Blood Count   Recent Labs   Lab 05/25/25  0313 05/24/25  2154 05/24/25  0309 05/23/25  0416 05/22/25  2130   WBC 37.2*  --  31.7* 45.4* 48.2*   HGB 14.2  --  13.8 14.0 15.8   PLT  125* 127* 119* 144* 161     Basic Metabolic Panel  Recent Labs   Lab 05/25/25  0926 05/25/25  0314 05/25/25  0313 05/25/25  0001 05/24/25  2154 05/24/25  1634 05/24/25  1425 05/24/25  0315 05/24/25  0309   NA  --   --  148*  --  148*  --  151*  --  148*   POTASSIUM  --   --  4.2  --  4.3  --  4.4  --  4.6   CHLORIDE  --   --  112*  --  112*  --  114*  --  111*   CO2  --   --  32*  --  32*  --  31*  --  31*   BUN  --   --  57.0*  --  56.6*  --  61.3*  --  61.1*   CR  --   --  0.77  --  0.77  --  0.80  --  0.81   * 99 117* 98 108*   < > 107*   < > 99    < > = values in this interval not displayed.     Liver Function Tests  Recent Labs   Lab 05/22/25 2130 05/19/25  0445   AST  --  42   ALT  --  36   ALKPHOS 193* 120   BILITOTAL 0.9 0.8   ALBUMIN 1.1* 2.7*     Coagulation Profile  No lab results found in last 7 days.    5. RADIOLOGY:   Recent Results (from the past 24 hours)   XR Chest Port 1 View    Narrative    Exam: XR CHEST PORT 1 VIEW, 5/24/2025 12:39 PM    Indication: interval evaluation for pneumothorax and subcutaneous  emphysema after chest tube put back to suction    Comparison: Same day chest x-ray.    Findings:   Portable semiupright AP view of the chest. Endotracheal tube tip  projects 3.9 cm above the isa. Slight advancement and more lateral  positioning of the right apical pigtail chest tube. Gastric tube  courses below the field of view. Right upper extremity PICC tip  projects over the low SVC. Stable endobronchial valves projecting over  the upper right hilum. Trachea is midline. Cardiomediastinal  silhouette is obscured by bilateral pulmonary opacities. Unchanged  mixed perihilar and bibasilar opacities. Decreased size of the right  pneumothorax, now small. No appreciable left pneumothorax. Right  costophrenic angle is incompletely included in the field of view,  however small bilateral pleural effusions appear stable. Similar  extensive subcutaneous emphysema tracking throughout the chest  and  right greater than left neck.      Impression    Impression:  1. Decreased right pneumothorax, now small, with slight advancement  and more lateral positioning of the right apical pigtail chest tube.  2. Similar perihilar and bibasilar mixed opacities, likely  atelectasis/edema.  3. Stable small bilateral pleural effusions.  4. Persistent extensive subcutaneous emphysema.    I have personally reviewed the examination and initial interpretation  and I agree with the findings.    GABRIEL MENDOZA MD         SYSTEM ID:  [id number]   XR Chest Port 1 View    Narrative    Picc Exam: XR CHEST PORT 1 VIEW, 5/25/2025 6:21 AM    Indication: RN placed PICC - verify tip placement    Comparison: 5/24/2025    Findings:   Status post thoracic surgery. An endotracheal tube projects in the mid  thoracic trachea, position unchanged. Bronchial valves seen over the  right hilum. Enteric tube coursing below the diaphragm. Right apically  directed chest tube is in stable position. Right upper extremity PICC  line projects over the mediastinum in the mid to low SVC.    Heart size is stable. Small right apical pneumothorax is unchanged.  Blunting of the left costophrenic angle. Mixed bibasilar pulmonary  opacities. Extensive subcutaneous emphysema.      Impression    Impression:   1. Small right apical pneumothorax with unchanged chest tube. Other  support devices are stable.  2. Bibasilar pulmonary opacities and small pleural effusions.    I have personally reviewed the examination and initial interpretation  and I agree with the findings.    SHERRIE TATE MD         SYSTEM ID:  [id number]

## 2025-05-26 ENCOUNTER — APPOINTMENT (OUTPATIENT)
Dept: GENERAL RADIOLOGY | Facility: CLINIC | Age: 70
End: 2025-05-26
Payer: COMMERCIAL

## 2025-05-26 ENCOUNTER — APPOINTMENT (OUTPATIENT)
Dept: ULTRASOUND IMAGING | Facility: CLINIC | Age: 70
End: 2025-05-26
Payer: COMMERCIAL

## 2025-05-26 LAB
ALBUMIN SERPL BCG-MCNC: 1.5 G/DL (ref 3.5–5.2)
ALP SERPL-CCNC: 200 U/L (ref 40–150)
ALT SERPL W P-5'-P-CCNC: 61 U/L (ref 0–70)
ANION GAP SERPL CALCULATED.3IONS-SCNC: 5 MMOL/L (ref 7–15)
ANION GAP SERPL CALCULATED.3IONS-SCNC: 6 MMOL/L (ref 7–15)
AST SERPL W P-5'-P-CCNC: 85 U/L (ref 0–45)
BACTERIA SPT CULT: ABNORMAL
BASE EXCESS BLDV CALC-SCNC: 7.5 MMOL/L (ref -3–3)
BASE EXCESS BLDV CALC-SCNC: 8.6 MMOL/L (ref -3–3)
BASOPHILS # BLD AUTO: 0.2 10E3/UL (ref 0–0.2)
BASOPHILS NFR BLD AUTO: 0 %
BILIRUB SERPL-MCNC: 0.7 MG/DL
BILIRUBIN DIRECT (ROCHE PRO & PURE): 0.38 MG/DL (ref 0–0.45)
BUN SERPL-MCNC: 59 MG/DL (ref 8–23)
BUN SERPL-MCNC: 67.5 MG/DL (ref 8–23)
CALCIUM SERPL-MCNC: 7.6 MG/DL (ref 8.8–10.4)
CALCIUM SERPL-MCNC: 7.7 MG/DL (ref 8.8–10.4)
CHLORIDE SERPL-SCNC: 109 MMOL/L (ref 98–107)
CHLORIDE SERPL-SCNC: 112 MMOL/L (ref 98–107)
CREAT SERPL-MCNC: 0.78 MG/DL (ref 0.67–1.17)
CREAT SERPL-MCNC: 1.02 MG/DL (ref 0.67–1.17)
CRP SERPL-MCNC: 272 MG/L
CYSTATIN C (ROCHE): 1.7 MG/L (ref 0.6–1)
EGFRCR SERPLBLD CKD-EPI 2021: 80 ML/MIN/1.73M2
EGFRCR SERPLBLD CKD-EPI 2021: >90 ML/MIN/1.73M2
EOSINOPHIL # BLD AUTO: 0.2 10E3/UL (ref 0–0.7)
EOSINOPHIL NFR BLD AUTO: 1 %
ERYTHROCYTE [DISTWIDTH] IN BLOOD BY AUTOMATED COUNT: 15.5 % (ref 10–15)
GFR/BSA.PRED SERPLBLD CYS-BASED-ARV: 37 ML/MIN/1.73M2
GLUCOSE BLDC GLUCOMTR-MCNC: 101 MG/DL (ref 70–99)
GLUCOSE BLDC GLUCOMTR-MCNC: 104 MG/DL (ref 70–99)
GLUCOSE BLDC GLUCOMTR-MCNC: 131 MG/DL (ref 70–99)
GLUCOSE SERPL-MCNC: 103 MG/DL (ref 70–99)
GLUCOSE SERPL-MCNC: 115 MG/DL (ref 70–99)
GRAM STAIN RESULT: ABNORMAL
GRAM STAIN RESULT: ABNORMAL
HCO3 BLDV-SCNC: 33 MMOL/L (ref 21–28)
HCO3 BLDV-SCNC: 36 MMOL/L (ref 21–28)
HCO3 SERPL-SCNC: 29 MMOL/L (ref 22–29)
HCO3 SERPL-SCNC: 32 MMOL/L (ref 22–29)
HCT VFR BLD AUTO: 42.9 % (ref 40–53)
HGB BLD-MCNC: 13.6 G/DL (ref 13.3–17.7)
IMM GRANULOCYTES # BLD: 1.7 10E3/UL
IMM GRANULOCYTES NFR BLD: 4 %
INR PPP: 1.19 (ref 0.85–1.15)
LVEF ECHO: NORMAL
LYMPHOCYTES # BLD AUTO: 1.4 10E3/UL (ref 0.8–5.3)
LYMPHOCYTES NFR BLD AUTO: 4 %
MCH RBC QN AUTO: 28.2 PG (ref 26.5–33)
MCHC RBC AUTO-ENTMCNC: 31.7 G/DL (ref 31.5–36.5)
MCV RBC AUTO: 89 FL (ref 78–100)
MONOCYTES # BLD AUTO: 1 10E3/UL (ref 0–1.3)
MONOCYTES NFR BLD AUTO: 3 %
NEUTROPHILS # BLD AUTO: 36.3 10E3/UL (ref 1.6–8.3)
NEUTROPHILS NFR BLD AUTO: 89 %
NRBC # BLD AUTO: 0 10E3/UL
NRBC BLD AUTO-RTO: 0 /100
O2/TOTAL GAS SETTING VFR VENT: 80 %
O2/TOTAL GAS SETTING VFR VENT: 80 %
OXYHGB MFR BLDV: 75 % (ref 70–75)
OXYHGB MFR BLDV: 83 % (ref 70–75)
PCO2 BLDV: 50 MM HG (ref 40–50)
PCO2 BLDV: 61 MM HG (ref 40–50)
PH BLDV: 7.38 [PH] (ref 7.32–7.43)
PH BLDV: 7.43 [PH] (ref 7.32–7.43)
PHOSPHATE SERPL-MCNC: 3.2 MG/DL (ref 2.5–4.5)
PLATELET # BLD AUTO: 185 10E3/UL (ref 150–450)
PO2 BLDV: 42 MM HG (ref 25–47)
PO2 BLDV: 46 MM HG (ref 25–47)
POTASSIUM SERPL-SCNC: 3.9 MMOL/L (ref 3.4–5.3)
POTASSIUM SERPL-SCNC: 4 MMOL/L (ref 3.4–5.3)
PROCALCITONIN SERPL IA-MCNC: 3.61 NG/ML
PROT SERPL-MCNC: 4.7 G/DL (ref 6.4–8.3)
PROTHROMBIN TIME: 15.4 SECONDS (ref 11.8–14.8)
PSA SERPL DL<=0.01 NG/ML-MCNC: <0.01 NG/ML (ref 0–4.5)
RBC # BLD AUTO: 4.82 10E6/UL (ref 4.4–5.9)
SAO2 % BLDV: 76.8 % (ref 70–75)
SAO2 % BLDV: 84.3 % (ref 70–75)
SODIUM SERPL-SCNC: 146 MMOL/L (ref 135–145)
SODIUM SERPL-SCNC: 147 MMOL/L (ref 135–145)
WBC # BLD AUTO: 40.8 10E3/UL (ref 4–11)

## 2025-05-26 PROCEDURE — 999N000157 HC STATISTIC RCP TIME EA 10 MIN

## 2025-05-26 PROCEDURE — 82805 BLOOD GASES W/O2 SATURATION: CPT

## 2025-05-26 PROCEDURE — 86140 C-REACTIVE PROTEIN: CPT

## 2025-05-26 PROCEDURE — 99233 SBSQ HOSP IP/OBS HIGH 50: CPT | Performed by: INTERNAL MEDICINE

## 2025-05-26 PROCEDURE — 250N000011 HC RX IP 250 OP 636

## 2025-05-26 PROCEDURE — 99418 PROLNG IP/OBS E/M EA 15 MIN: CPT | Performed by: INTERNAL MEDICINE

## 2025-05-26 PROCEDURE — 84450 TRANSFERASE (AST) (SGOT): CPT

## 2025-05-26 PROCEDURE — 200N000002 HC R&B ICU UMMC

## 2025-05-26 PROCEDURE — 94640 AIRWAY INHALATION TREATMENT: CPT | Mod: 76

## 2025-05-26 PROCEDURE — 999N000253 HC STATISTIC WEANING TRIALS

## 2025-05-26 PROCEDURE — 93971 EXTREMITY STUDY: CPT

## 2025-05-26 PROCEDURE — 250N000013 HC RX MED GY IP 250 OP 250 PS 637

## 2025-05-26 PROCEDURE — 999N000185 HC STATISTIC TRANSPORT TIME EA 15 MIN

## 2025-05-26 PROCEDURE — 84145 PROCALCITONIN (PCT): CPT

## 2025-05-26 PROCEDURE — 71045 X-RAY EXAM CHEST 1 VIEW: CPT | Mod: 26 | Performed by: RADIOLOGY

## 2025-05-26 PROCEDURE — G0103 PSA SCREENING: HCPCS

## 2025-05-26 PROCEDURE — 94003 VENT MGMT INPAT SUBQ DAY: CPT

## 2025-05-26 PROCEDURE — 85041 AUTOMATED RBC COUNT: CPT

## 2025-05-26 PROCEDURE — 250N000009 HC RX 250

## 2025-05-26 PROCEDURE — 94640 AIRWAY INHALATION TREATMENT: CPT

## 2025-05-26 PROCEDURE — 99291 CRITICAL CARE FIRST HOUR: CPT | Mod: GC | Performed by: INTERNAL MEDICINE

## 2025-05-26 PROCEDURE — 93971 EXTREMITY STUDY: CPT | Mod: 26 | Performed by: RADIOLOGY

## 2025-05-26 PROCEDURE — 71045 X-RAY EXAM CHEST 1 VIEW: CPT | Mod: 77

## 2025-05-26 PROCEDURE — 82610 CYSTATIN C: CPT

## 2025-05-26 PROCEDURE — 85610 PROTHROMBIN TIME: CPT

## 2025-05-26 PROCEDURE — 93005 ELECTROCARDIOGRAM TRACING: CPT

## 2025-05-26 PROCEDURE — 250N000011 HC RX IP 250 OP 636: Performed by: NURSE PRACTITIONER

## 2025-05-26 PROCEDURE — 82310 ASSAY OF CALCIUM: CPT | Performed by: NURSE PRACTITIONER

## 2025-05-26 PROCEDURE — 84100 ASSAY OF PHOSPHORUS: CPT

## 2025-05-26 PROCEDURE — 250N000013 HC RX MED GY IP 250 OP 250 PS 637: Performed by: INTERNAL MEDICINE

## 2025-05-26 RX ORDER — FORMOTEROL FUMARATE 20 UG/2ML
20 SOLUTION RESPIRATORY (INHALATION) EVERY 12 HOURS
Status: DISCONTINUED | OUTPATIENT
Start: 2025-05-26 | End: 2025-05-29

## 2025-05-26 RX ORDER — BISACODYL 10 MG
10 SUPPOSITORY, RECTAL RECTAL DAILY PRN
Status: DISCONTINUED | OUTPATIENT
Start: 2025-05-26 | End: 2025-05-31 | Stop reason: HOSPADM

## 2025-05-26 RX ORDER — ENOXAPARIN SODIUM 100 MG/ML
40 INJECTION SUBCUTANEOUS EVERY 24 HOURS
Status: CANCELLED | OUTPATIENT
Start: 2025-05-27

## 2025-05-26 RX ORDER — OXYCODONE HYDROCHLORIDE 5 MG/1
5 TABLET ORAL EVERY 6 HOURS
Refills: 0 | Status: DISCONTINUED | OUTPATIENT
Start: 2025-05-26 | End: 2025-05-30

## 2025-05-26 RX ORDER — SENNOSIDES 8.6 MG
2 TABLET ORAL 2 TIMES DAILY
Status: DISCONTINUED | OUTPATIENT
Start: 2025-05-26 | End: 2025-05-31 | Stop reason: HOSPADM

## 2025-05-26 RX ORDER — IOPAMIDOL 755 MG/ML
65 INJECTION, SOLUTION INTRAVASCULAR ONCE
Status: COMPLETED | OUTPATIENT
Start: 2025-05-26 | End: 2025-05-26

## 2025-05-26 RX ADMIN — OXYCODONE HYDROCHLORIDE 5 MG: 5 TABLET ORAL at 11:42

## 2025-05-26 RX ADMIN — Medication 60 ML: at 08:51

## 2025-05-26 RX ADMIN — CEFEPIME 2 G: 2 INJECTION, POWDER, FOR SOLUTION INTRAVENOUS at 16:23

## 2025-05-26 RX ADMIN — THERA TABS 1 TABLET: TAB at 08:50

## 2025-05-26 RX ADMIN — THIAMINE HCL TAB 100 MG 100 MG: 100 TAB at 08:50

## 2025-05-26 RX ADMIN — ACETYLCYSTEINE 2 ML: 200 SOLUTION ORAL; RESPIRATORY (INHALATION) at 08:54

## 2025-05-26 RX ADMIN — CEFEPIME 2 G: 2 INJECTION, POWDER, FOR SOLUTION INTRAVENOUS at 08:50

## 2025-05-26 RX ADMIN — CHLORHEXIDINE GLUCONATE 15 ML: 1.2 SOLUTION ORAL at 08:50

## 2025-05-26 RX ADMIN — ALBUTEROL SULFATE 2.5 MG: 2.5 SOLUTION RESPIRATORY (INHALATION) at 21:27

## 2025-05-26 RX ADMIN — ACETYLCYSTEINE 2 ML: 200 SOLUTION ORAL; RESPIRATORY (INHALATION) at 21:26

## 2025-05-26 RX ADMIN — OXYCODONE HYDROCHLORIDE 5 MG: 5 TABLET ORAL at 22:31

## 2025-05-26 RX ADMIN — ENOXAPARIN SODIUM 40 MG: 40 INJECTION SUBCUTANEOUS at 08:50

## 2025-05-26 RX ADMIN — CHLORHEXIDINE GLUCONATE 15 ML: 1.2 SOLUTION ORAL at 19:55

## 2025-05-26 RX ADMIN — OXYCODONE HYDROCHLORIDE 5 MG: 5 TABLET ORAL at 16:23

## 2025-05-26 RX ADMIN — TOBRAMYCIN 300 MG: 300 SOLUTION RESPIRATORY (INHALATION) at 09:00

## 2025-05-26 RX ADMIN — TOBRAMYCIN 300 MG: 300 SOLUTION RESPIRATORY (INHALATION) at 21:27

## 2025-05-26 RX ADMIN — FORMOTEROL FUMARATE DIHYDRATE 20 MCG: 20 SOLUTION RESPIRATORY (INHALATION) at 21:27

## 2025-05-26 RX ADMIN — CEFEPIME 2 G: 2 INJECTION, POWDER, FOR SOLUTION INTRAVENOUS at 00:57

## 2025-05-26 RX ADMIN — ALBUTEROL SULFATE 2.5 MG: 2.5 SOLUTION RESPIRATORY (INHALATION) at 08:53

## 2025-05-26 RX ADMIN — Medication 100 MCG/HR: at 22:32

## 2025-05-26 RX ADMIN — PROPOFOL 10 MCG/KG/MIN: 10 INJECTION, EMULSION INTRAVENOUS at 16:07

## 2025-05-26 RX ADMIN — Medication 40 MG: at 08:50

## 2025-05-26 RX ADMIN — IOPAMIDOL 65 ML: 755 INJECTION, SOLUTION INTRAVENOUS at 10:48

## 2025-05-26 RX ADMIN — Medication 50 MCG: at 10:28

## 2025-05-26 ASSESSMENT — ACTIVITIES OF DAILY LIVING (ADL)
ADLS_ACUITY_SCORE: 73
ADLS_ACUITY_SCORE: 69
ADLS_ACUITY_SCORE: 73
ADLS_ACUITY_SCORE: 73
ADLS_ACUITY_SCORE: 69
ADLS_ACUITY_SCORE: 73
ADLS_ACUITY_SCORE: 69
ADLS_ACUITY_SCORE: 73
ADLS_ACUITY_SCORE: 69
ADLS_ACUITY_SCORE: 73
ADLS_ACUITY_SCORE: 73
ADLS_ACUITY_SCORE: 69
ADLS_ACUITY_SCORE: 73
ADLS_ACUITY_SCORE: 73

## 2025-05-26 NOTE — PROGRESS NOTES
MEDICAL ICU PROGRESS NOTE  05/26/2025      Date of Service (when I saw the patient): 05/26/2025    ASSESSMENT: Armin Terrell is a 68yo M with PMH of chronic respiratory failure (on home O2, 3LPM), severe COPD emphysema type (FEV1 35%), alpha-1 antitrypsin deficiency, lung nodules, hx tobacco use, and prostate cancer status post radical prostatectomy.  He was admitted to [facility] on 5/18 due to acute on chronic hypoxic respiratory failure requiring intubation. Found to have right sided pneumothorax and R side consolidation (pseudomonas + sputum). Chest tube placed but pneumothorax persisted, C/f bronchopleural fistula. Patient started on pressors for hypotension and admitted to the ICU. Started on meropenem. Failed extubation on 5/22 due to repeat episode of tension physiology d/t kink in chest tube following extubation resulting in worsening pneumothorax and emphysema. Re-intubated and transferred to [facility] on 5/22 for continued ICU management and interventional pulmonology consult. Underwent bronch with valve (3) placement on 5/23 in RUL however has persistent air leak. Pending possible re-bronch on 5/27. Weaning pressors and sedation as able.     CHANGES and MAJOR THINGS TODAY:   - PEEP increased to 5 overnight due to desaturation   - FWF increased to 150 q2h due to hypernatremia   - start formoterol with albuterol and mucomyst nebs  - pressure support 7/5 and check VBG   - Na check in afternoon  - Wean propofol and fentanyl gtt   - Schedule oxycodone Q6H  - Versed prn as anxiolytic   - BUE/BLE DVT ultrasound to evaluate for new DVT and propagation   - TTE to evaluate for right heart strain   - CT PE to evaluate for PE and interval pneumonia changes  - Transplant pulm consulted   - Pseudomonas resistant to meropenem   - trend CRP and procal daily   - discuss with IP about getting CT PE today    PLAN:    Neuro:  # Pain and sedation  - Propofol gtt, wean as able   - Precedex gtt prn for  agitation/sedation  - Oxycodone Q6H  - Midazolam Q2H PRN  - Fentanyl gtt, wean as able  - RASS goal 0 to +1     Pulmonary:  # Acute on chronic hypoxic respiratory failure, multifactorial, s/p intubation  # COPD exacerbation 2/2 R pneumonia  # Right pneumothorax, c/f bronchopleural fistula, s/p chest tube and valve placement  # MAULIK Nodule  # Failed extubation on 5/22  # Hx severe COPD emphysema type (FEV1 35% on 5/6/2024)  # Subcutaneous emphysema  # Hx tobacco use, quit 2018  # Alpha 1 antitrypsin deficiency  On 3L O2 and takes trilogy inhaler at home. Presented to ED on 5/14 for SOB sent home oxycodone, augmentin, azithromycin, prednisone for RUL pneumonia. Returned to the ED on 5/18 for worsening shortness of breath generalized weakness. Patient was in severe respiratory distress started on BiPAP therapy and later intubated. CXR showed R pneumothorax- chest tube placed. Follow-up chest CT scan show large R pneumothorax, right pigtail chest tube along the major fissure, collapsed right lower lobe and right middle lobe consolidation of the right upper lobe unchanged left upper lobe nodule. Admitted to ICU and started on abx (discussed in ID section). Completed 5 days of methylpred d/t concern for possible COPD exacerbation component initially. Patient vent settings weaned and trailed extubation on 5/22. After being extubated he had a repeated episode of tension physiology requiring re-intubation and pressors. Continuous air leak concerning for bronchopleural fistula. IP preformed bronchoscopy with placement of 3 valves on 5/23 but there is still a persistent. He may require repeat bronch and valves, IP planning to tentatively do this on 5/27. Goal to keep airway pressures and RR low while reducing sedation as above, however if he is unable to remain synchronous and calm with lighter sedation there is concern that he could develop a left pneumothorax given his extensive emphysema. Has had increased oxygen needs after  bronchial valve placement which is likely from taking out RUL oxygenation with the valve placement. Also having thick secretions which improved following addition of mucomyst. His lung disease is severe and his condition is tenuous at this time. Unable to tolerate chest tube to water seal. Currently on pressure support but requiring high FiO2 concentrations. Consultation with transplant pulm and palliative ordered given concern for stability if he were to be extubated with bronchopleural fistula.   - IP consulted  - s/p Bronch with valve placement x3 in RUL on 5/23  - Possible repeat bronch on 5/27  - Pulm notified of CT PE  - Decreased PEEP to 0 (5/25), increased back to 5 on 5/25 2/2 increased secretions and desats  - PS 5/26, goal saturations >88% (given history of COPD)  - Formoterol with albuterol and Mucomyst BID  - Tobramycin nebs BID post airway clearance  - NO chest percussion or volera to prevent added positive pressure  - Chest tube to suction  - Failed trial to water seal on 5/25 due to increasing subcutaneous emphysema   - Repeat CXR and VBG 5/25 after water seal stable  - S/p steroids  - Transplant pulm consult  - Palliative care consult     FiO2 (%): 80 %, Resp: 25, Vent Mode: VC/AC, Resp Rate (Set): 12 breaths/min, Tidal Volume (Set, mL): 460 mL, PEEP (cm H2O): 5 cmH2O, Pressure Support (cm H2O): 7 cmH2O, Resp Rate (Set): 12 breaths/min, Tidal Volume (Set, mL): 460 mL, PEEP (cm H2O): 5 cmH2O    Cardiovascular:  # Mixed shock, obstructive (2/2 PTX 5/18) and distributive (5/24)  Hypotensive with systolic BP to 80s, tachycardic, lactate 2.8, and leukocytosis to 35 on admission to [facility] on 5/18. CT PE negative for PE but showed R pneumothorax and R pneumonia. Shock physiology likely 2/2 combination of sepsis and obstruction initially. Chest tube placed but pneumothorax persisted, as described above. Weaned off levo on 5/22 however post extubation his chest tube kinked and the patient quickly  decompensated and became hypotensive again requiring re-intubation and pressor support. On pressors on arrival to [facility]. On 5./24, required increased sedation for tachycardia and bcame hypotensive with briefly requiring pressors. Likely from sedation but ddx includes septic shock (as had Tmax 100.6, WBC and HR 110s).   - Norepi gtt, weaning as able  - sedation as above  - see below for sepsis plan  - Vasopressin gtt as needed for adjunct   - MAP goal >65  - s/p 5 days solumedrol     # Tachycardia  # C/f Pulmonary embolism  Patient has been tachycardic to 100s-110s persistently since 5/24. Tachycardia possibly 2/2 increased WOB or infection. However given superficial venous thrombosis identified on bilateral upper extremity ultrasounds on 5/25, concern for possible PE. He is at moderate risk for PE per Wells Criteria (6 points) for edema, tachycardia, and immobilization. On DVT prophylaxis. Last echo in 7/2024 was unremarkable. Repeat echo and workup below.   - Subcutaneous Lovenox 40 mg daily  - CT PE pending  - EKG ordered  - Formal ECHO pending (last normal in 7/2024)    # Bilateral upper extremity edema  # R superficial venous thrombosis  Stable to somewhat increased since arrival at [facility] on 5/23. Nontender and equal bilaterally. Minimal edema in LE. C/f possible clot vs volume overload although the patient is edematous only in the upper extremities.  - Bilateral UE US on 5/25  - R superficial veinous thrombosis (cephalic and basilic veins)  - No evidence of DVT but some veins hard to see d/t subQ emphysema  - Repeat BUE/BLE DVT US on 5/26 to evaluate for new DVT and propagation   - S/p lasix 20 mg IV on 5/25  - Strict I/Os     GI/Nutrition:  # Nutrition  Feeding tube dislodged during extubation on 5/22. OGT replaced on 5/23.  - Resume TF  - Nutrition consult    # Elevated alk phos and AST  Alk phos and ALT elevated on 5/26. Patient with nontender and nondistended abdomen. C/f hepatic congestion 2/2 right heart  strain (workup pending above) vs infectious etiology. Elevated WBC, although less likely given he is afebrile and nontender.   - Repeat LFTs 5/27     # Constipation  On fentanyl gtt. Last BM on 5/23.   - Senna/miralax BID  - Plan for      Renal/Fluids/Electrolytes:  # Hypernatremia, improved  # Respiratory acidosis, improved  Sodium 149 at OSH. Na 147 on 5/26.  - FWD 1L for goal of 143.  - S/p D5 gtt  - S/p lasix 5/25   -  Q2H  - BMP BID    # Elevated cystatin C  # Elevated BUN   History of isolated elevation of BUN dating back to 12/2024 with normal creatinine. Presenting this admission with consistently elevated BUN and normal creatinine. Cystatin C elevated at 1.7 with GFR 37, suggesting possibly decreased renal function of unclear etiology.   - monitor    #Hyperkalemia, resolved  5.9 > 5.6 on 5/23 however sample noted to have slight hemolysis, given kayexalate then lokelma.   - Recheck 5.4 on 5/23 with slight hemolysis   - No BM yet  - If K>6 shift with insulin     Endocrine:  # S/p stress dose steroids  - Hypoglycemia protocol  - Discontinued sliding scale insulin on 5/25     ID:  # C/f septic shock 2/2 pneumonia  # Meropenem resistant pseudomonas pneumonia  # Leukocytosis  Pseudomonas + sputum culture on 5/18 (pansensitive). RVP negative. Blood cultures on 5/18 NGTD. UA unremarkable. Pleural fluid from R pleural cavity on 5/21, transudative effusion per Light's criteria. Currently, pt leukocytosis suspected to be reactive without fevers given worsening pneumothorax and afebrile. Blood cultures from 5/18 NGTD. Second set obtained on 5/22. Procal is mildly elevated, but without other signs of worsening bacterial infection. Ordered repeat sputum sample on 5/23 which continued to grow pseudomonas, switched to cefepime on 5/25 due to better susceptibility. Culture from 5/23 found to be meropenem resistant. Suspect this is responsible for persistently elevated WBC, procal, and CRP given the patient did not  have full coverage until 5/25. Repeat sputum culture also collected on 5/25 as the patient developed thicker sputum.   - work up and antibiotics as below     Micro:   - Blood cultures from 5/18 NGTD, repeated cultures collected 5/22 and 5/24 NGTD  - MRSA nares negative  - UA bland on 5/23  - Sputum Cx (5/23) positive for Pseudomonas aeruginosa (resistant to meropenem)  - Sputum Cx (5/25) 2+ Gram negative bacilli     Abx:   - Cefepime 2 g (5/25- current)  - Tobramycin neb BID (5/25- current)  - Ceftazidime 2 g Q8H (5/23-5/25)  - S/p meropenem and azithromycin (5/18-5/23)  - S/p zosyn (5/18)  - S/p vancomycin (5/19)     Hematology:    # Leukocytosis as above  # Thrombocytopenia, resolved  Noted plt 133 at OSH. Had been down trending from 5/14. Possible 2/2 critical illness. On 5/23 stable at 144.  - CTM     Musculoskeletal:  - PTOT to evaluate     Skin:  # Subcutaneous Emphysema 2/2 Pneumothorax  - CTM     General Cares/Prophylaxis:    DVT Prophylaxis: Enoxaparin (Lovenox) SQ  GI Prophylaxis: PPI  Restraints: none  Family Communication: Wife, Trini Terrell [phone]  Code Status: Full     Lines/tubes/drains:   - PICC R brachial  - Urinary catheter (has chronic obstruction from prostate cancer)  - ETT  - PIV L forearm  - Rt sided Chest Tube  - OGT     Disposition:  - Medical ICU     Patient seen and findings/plan discussed with medical ICU staff, Dr. Mcdonough.    Alejandrina Milan    Resident/Fellow Attestation   I, Fer Francis MD, was present with the medical/BROOKE student who participated in the service and in the documentation of the note.  I have verified the history and personally performed the physical exam and medical decision making.  I agree with the assessment and plan of care as documented in the note.          Fer Francis MD  PGY1  Date of Service (when I saw the patient): 05/26/25    Clinically Significant Risk Factors         # Hypernatremia: Highest Na = 151 mmol/L in last 2 days, will monitor as  appropriate  # Hyperchloremia: Highest Cl = 114 mmol/L in last 2 days, will monitor as appropriate          # Hypoalbuminemia: Lowest albumin = 1.1 g/dL at 5/22/2025  9:30 PM, will monitor as appropriate   # Thrombocytopenia: Lowest platelets = 125 in last 2 days, will monitor for bleeding               # Severe Malnutrition: based on nutrition assessment and treatment provided per dietitian's recommendations., PRESENT ON ADMISSION   # Financial/Environmental Concerns: none                  ====================================  INTERVAL HISTORY:   Overnight had intermittent desaturations in the setting of increased thin secretions. PEEP increased to 5 for more ventilation support in this setting. Also required increased sedation due to vent dyssynchrony. With increased amounts of propofol he also required more pressor support. Weaned back to 10 of propofol and off norepi in the morning. Continues to have a wet cough and requires frequent suctioning. Responds appropriately to questions with nodding yes/no. Denies pain or increased WOB. No abdominal pain or subjective constipation per patient. Updated wife over the phone regarding pursuing more workup to investigate increasing oxygen needs as well as transplant pulm consult and palliative consult. Also updated son Jamey at bedside      OBJECTIVE:   1. VITAL SIGNS:   Temp:  [96.8  F (36  C)-99.1  F (37.3  C)] 98.8  F (37.1  C)  Pulse:  [] 101  Resp:  [0-26] 25  BP: ()/(49-89) 76/57  FiO2 (%):  [80 %] 80 %  SpO2:  [84 %-96 %] 89 %  FiO2 (%): 80 %, Resp: 25, Vent Mode: VC/AC, Resp Rate (Set): 12 breaths/min, Tidal Volume (Set, mL): 460 mL, PEEP (cm H2O): 5 cmH2O, Pressure Support (cm H2O): 7 cmH2O, Resp Rate (Set): 12 breaths/min, Tidal Volume (Set, mL): 460 mL, PEEP (cm H2O): 5 cmH2O  2. INTAKE/ OUTPUT:   I/O last 3 completed shifts:  In: 3170.56 [I.V.:460.56; NG/GT:1550; IV Piggyback:200]  Out: 2960 [Urine:2810; Chest Tube:150]    3. PHYSICAL  EXAMINATION:  General: No acute distress. Follows commands.  Neuro: Alert and follows commands appropriately. No focal deficits appreciated  Pulm/Resp: Diminished breath sounds scattered. Some course breath sounds and crackles bilaterally. Frequent wet cough requiring suctioning of thin secretions.   CV: tachycardic but regular rhythm. No murmurs or gallops  Abdomen: Soft, non-distended, non-tender  :  capone catheter in place, urine yellow and clear  Incisions/Skin: Diffuse subcutaneous empyhysema in chest and neck. Crepitus present. UE edema bilaterally (equal and nontender).     4. LABS:   Arterial Blood Gases   Recent Labs   Lab 05/22/25  1714 05/22/25  1457 05/21/25  0427 05/20/25  1449   PH 7.41 7.39 7.44 7.40   PCO2 52* 52* 38 39   PO2 122* 86 83 75*   HCO3 33* 31* 26 24     Complete Blood Count   Recent Labs   Lab 05/26/25  0327 05/25/25  0313 05/24/25  2154 05/24/25  0309 05/23/25  0416   WBC 40.8* 37.2*  --  31.7* 45.4*   HGB 13.6 14.2  --  13.8 14.0    125* 127* 119* 144*     Basic Metabolic Panel  Recent Labs   Lab 05/26/25  0327 05/26/25  0325 05/26/25  0002 05/25/25  1954 05/25/25  1601 05/25/25  1203 05/25/25  1154 05/25/25  0314 05/25/25  0313 05/25/25  0001 05/24/25  2154 05/24/25  1634 05/24/25  1425   *  --   --  149* 146*  --  146*  --  148*  --  148*  --  151*   POTASSIUM 4.0  --   --   --   --   --   --   --  4.2  --  4.3  --  4.4   CHLORIDE 112*  --   --   --   --   --   --   --  112*  --  112*  --  114*   CO2 29  --   --   --   --   --   --   --  32*  --  32*  --  31*   BUN 59.0*  --   --   --   --   --   --   --  57.0*  --  56.6*  --  61.3*   CR 0.78  --   --   --   --   --   --   --  0.77  --  0.77  --  0.80   * 104* 131* 99  --    < >  --    < > 117*   < > 108*   < > 107*    < > = values in this interval not displayed.     Liver Function Tests  Recent Labs   Lab 05/26/25  0327 05/22/25  2130   AST 85*  --    ALT 61  --    ALKPHOS 200* 193*   BILITOTAL 0.7 0.9   ALBUMIN  1.5* 1.1*     Coagulation Profile  No lab results found in last 7 days.    5. RADIOLOGY:   Recent Results (from the past 24 hours)   XR Chest Port 1 View    Narrative    Exam: XR CHEST PORT 1 VIEW, 5/26/2025 1:39 AM    Comparison: 5/25/2025    History: Known Tension Pneumo with Endobronchial valve in place now  with increasing Resp Requirements and pressors    Findings:  Single AP portable semiupright view of the chest. Endotracheal tube  tip in the low thoracic trachea. Right chest tube.    Trachea is midline. Mediastinum is within normal limits.  Cardiopulmonary silhouette is within normal limits. Diffuse mixed  opacities. Tiny right apical pneumothorax. Small bilateral pleural  effusions. Extensive rightward and left chest wall subcutaneous  emphysema.      Impression    Impression:   1. Decreased tiny right apical pneumothorax with chest tube in place.  2. Stable small bilateral pleural effusions and pulmonary edema.    I have personally reviewed the examination and initial interpretation  and I agree with the findings.    SHERRIE TATE MD         SYSTEM ID:  [id number]   XR Chest Port 1 View    Narrative    Exam: XR CHEST PORT 1 VIEW, 5/26/2025 6:21 AM    Comparison: 5/26/2025    History: interval assessment of R pneumothorax and subcutaneous  emphysema    Findings:  2 AP semiupright views of the chest. Right apically directed chest  tube. Endotracheal tube tip in the low thoracic trachea. Enteric tube  traverses is in the stomach. Endobronchial valves.    Trachea is midline. Mediastinum is within normal limits.  Cardiopulmonary silhouette is within normal limits. Diffuse mixed  interstitial airspace opacities. Small right pneumothorax. Small left  greater than right pleural effusion. The upper abdomen is  unremarkable. Extensive right greater than left chest wall  subcutaneous emphysema.      Impression    Impression:   1. Slightly increased small right apical pneumothorax with chest tube  in place.  2. Stable  diffuse interstitial and airspace opacities likely pulmonary  edema  3. Stable small pleural effusions.    I have personally reviewed the examination and initial interpretation  and I agree with the findings.    SHERRIE TATE MD         SYSTEM ID:  [id number]   CT Chest Pulmonary Embolism w Contrast    Impression    RESIDENT PRELIMINARY INTERPRETATION  IMPRESSION:   1. Exam is negative for acute pulmonary embolism. No evidence of right  heart strain.    2. New consolidative opacity in the left upper lobe, suspicious for  infection.  3. Decrease in small right pneumothorax with apically oriented chest  tube in place. Decrease in neck/chest subcutaneous emphysema.  4. Small, right greater than left pleural effusions. Left lower lobe  atelectasis.      In the event of a positive result for acute pulmonary embolism  resulting in right heart strain, consider calling the   [facility] patient placement ([phone]) for PERT (Pulmonary Embolism  Response Team) Activation?    PERT -- Pulmonary Embolism Response Team (Multidisciplinary team  including cardiology, interventional radiology, critical care,  hematology)

## 2025-05-26 NOTE — PROGRESS NOTES
Critical Care Attending Note    The patient was seen and examined by me with and independent of EMI Milan , Dr Francis and housestaff team.     I was present with the medical student who participated in the service and in the documentation of the note.      Pertinent vitals, lab results and imaging were reviewed by me as well I have verified the history and personally performed the physical exam and medical decision making.  All physician orders and treatments were placed at my direction for which I  personally managed. Key decisions are reflected in the housestaff note above with my additions  incorporated within and below.     69 M admit w/acute on chronic RF 2/2 A1AD and tobacco induced copd a/w BPF who remains in critical condition today due to respiratory failure  which I personally managed.  Escalating O2 requirements with continue leak despite EBV , vent waveforms with noted air trapping on VC (RR21) and asynchrony. Improved with brief trial on pressure support mode however non sustainable. Following intermittent commands but appears uncomfortable on the vent. Imaging with new MAULIK PNA in context of rising WBC (40) and CRP .  Determined prior PSA meropenem R thus transitioned to Cefepime. Plan - continue sepsis management, low threshold to add back van if no improvement with abx change in next 24 hr. Lung transplant to see ,though given comorbid status not candidate at this time. Continue to optimize vent as able with bronchodilator.  Consult palliative. D/w IP regarding additional interventions for BPF    Niall Mcdonough MD   55 min CCT excluding procedures

## 2025-05-26 NOTE — CONSULTS
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  History of prostate cancer-resolved  PLAN:  Continue aggressive treatment of pseudomonas pneumonia with IV and INH abx  Reviewed CT chest today-improvement in ptx and subcutaneous air. I would recommend reducing suction to -10 to -20 and consider gradual transition to water seal. Suspect if this were to resolve it will take time and with treatment of infection. He will need clamping trial prior to extubation attempt. Defer further considerations for BPF management including blood patch etc to IP.   Currently not a lung transplant candidate given combination of age with deconditioning and high mechanical ventilatory needs precluding PT/OT/rehab etc. Lung nodules also present a concern although no obvious growth and negative workup in the past. Prognosis appears to be poor. If he improves to a point where he is extubated, and able to perform rehab etc, we can be consulted back. At this time, this seems low likelihood.   Transplant team will sign off. Please reach out if any qns.   I spent a total of  100  minutes reviewing chart (previous notes), reviewing test results, reviewing chest x rays and CT scans, talking with and examining patient, formulating plan, adjusting medications, and documentation of my findings and plan on the day of the encounter. Time excludes time spent for PFT.   Casa Laguerre MD [facility]  Associate Professor of Medicine  Pulmonary, Allergy & Critical Care Division  [facility]  [facility] Lung Transplant Program      Chief Complaint:   Armin Terrell is a 69 year old year old male who is being  seen for lung transplant evaluation.     HPI    Armin Terrell is a 69 year old  year old male who presents for lung transplant evaluation. History is limited by patient's clinic status-intubated on the ventilator.    Briefly, he is 69 yr old male with known alpha1 AT deficiency who has been seeing outpatient pulmonary for a while now. He has poor lung function at baseline. He quit smoking and has been abstinent for a while. He has had decline in respiratory and functional status for last few months now. He required hospitalization 2025 for acute exacerbation and now admitted to our ICU, transferred from OSH , for acute right pneumothorax with BPF (persistent air leak). He has had endobronchial valves placed with no benefit. He failed extubation due to tension ptx but in the setting of a kinked tube. Per review of outpatient notes, he has never been on alpha 1 AT replacement. We have been consulted to determine if he is a transplant candidate.    ROS: 12 point ROS negative except mentioned in HPI.        Past Medical History    COPD-Alpha 1 AT deficiency  Prostate cancer       Past Surgical History:   Procedure Laterality Date    IR CHEST TUBE REPOSITION NON TUNNELED RIGHT  2025    PICC TRIPLE LUMEN PLACEMENT  2025    CA LAP,PROSTATECTOMY,RADICAL,W/NERVE SPARE,INCL ROBOTIC Bilateral 3/19/2019    Procedure: ROBOTIC ASSISTED RADICAL RETROPUBIC PROSTATECTOMY BILATERAL PELVIC LYMPH NODE DISSECTION, LYSIS OF ADHESIONS, REPAIR OF RIGHT INGUINAL HERNIA;  Surgeon: Candido Angelo MD;  Location: [facility] [place];  Service: Urology        Social History     Tobacco Use    Smoking status: Former     Current packs/day: 0.00     Average packs/day: 0.8 packs/day for 30.0 years (22.5 ttl pk-yrs)     Types: Cigarettes     Start date: 1987     Quit date: 2017     Years since quittin.6     Passive exposure: Current (Wife smokes)    Smokeless tobacco: Former   Vaping Use    Vaping status: Never Used    Substance Use Topics    Alcohol use: Yes     Alcohol/week: 4.0 - 5.0 standard drinks of alcohol     Comment: Alcoholic Drinks/day: drinks on weekends.    Drug use: No                  No family history on file.              Current Facility-Administered Medications   Medication Dose Route Frequency Provider Last Rate Last Admin    acetaminophen (TYLENOL) tablet 650 mg  650 mg Oral or Feeding Tube Q4H PRN Alfredo Cruzca MARY ALFORDN CNP   650 mg at 05/24/25 1239    Or    acetaminophen (TYLENOL) Suppository 650 mg  650 mg Rectal Q4H PRN Nancy Argentina JORDAN ALFORD CNP        acetylcysteine (MUCOMYST) 20 % nebulizer solution 2 mL  2 mL Nebulization BID Kaitlin Grijalva APRN CNP   2 mL at 05/26/25 0854    albuterol (PROVENTIL) neb solution 2.5 mg  2.5 mg Nebulization 2 times daily Kaitlin Grijalva APRN CNP   2.5 mg at 05/26/25 0853    bisacodyl (DULCOLAX) suppository 10 mg  10 mg Rectal Daily PRN Kaitlin Grijalva APRN CNP        ceFEPIme (MAXIPIME) 2 g vial to attach to  mL bag for ADULTS or NS 50 mL bag for PEDS  2 g Intravenous Q8H Kaitlin Grijalva APRN  mL/hr at 05/26/25 0057 2 g at 05/26/25 0850    chlorhexidine (PERIDEX) 0.12 % solution 15 mL  15 mL Mouth/Throat Q12H Fer Francis MD   15 mL at 05/26/25 0850    dexmedeTOMIDine (PRECEDEX) 4 mcg/mL in sodium chloride 0.9 % 100 mL infusion  0.1-1.2 mcg/kg/hr (Dosing Weight) Intravenous Continuous Fer Francis MD   Stopped at 05/24/25 1019    dextrose 10% infusion   Intravenous Continuous PRFer Gilbert MD        glucose gel 15-30 g  15-30 g Oral Q15 Min PRFer Gilbert MD        Or    dextrose 50 % injection 25-50 mL  25-50 mL Intravenous Q15 Min PRFer Gilbert MD        Or    glucagon injection 1 mg  1 mg Subcutaneous Q15 Min PRFer Gilbert MD        enoxaparin ANTICOAGULANT (LOVENOX) injection 40 mg  40 mg Subcutaneous Q24H Fer Francis MD   40 mg at 05/26/25 0850    fentaNYL (SUBLIMAZE) 50 mcg/mL  bolus from pump  25-50 mcg Intravenous Q1H PRN Fabian Torres MD   50 mcg at 05/26/25 1028    fentaNYL (SUBLIMAZE) infusion   mcg/hr Intravenous Continuous Fabian Torres MD 2 mL/hr at 05/26/25 0600 100 mcg/hr at 05/26/25 0600    formoterol (PERFOROMIST) neb solution 20 mcg  20 mcg Nebulization Q12H Fer Francis MD        propofol (DIPRIVAN) infusion  5-75 mcg/kg/min (Dosing Weight) Intravenous Continuous Elli Cruzecca JORDAN ALFORD CNP 4.3 mL/hr at 05/26/25 1104 10 mcg/kg/min at 05/26/25 1104    And    propofol (DIPRIVAN) bolus from bag or syringe pump  10 mg Intravenous Q15 Min PRN Elli Cruzecca JORDAN ALFORD CNP        And    Medication Instruction   Does not apply Continuous PRN Elli Cruzecca JORDAN ALFORD CNP        midazolam (VERSED) injection 1 mg  1 mg Intravenous Q2H PRN Kaitlin Grijalva APRN CNP        multivitamin, therapeutic (THERA-VIT) tablet 1 tablet  1 tablet Oral or Feeding Tube Daily Bryon Palmer MD   1 tablet at 05/26/25 0850    naloxone (NARCAN) injection 0.2 mg  0.2 mg Intravenous Q2 Min PRN Elli Cruzecca PRASHANTH APRN CNP        Or    naloxone (NARCAN) injection 0.4 mg  0.4 mg Intravenous Q2 Min PRN Elli Cruzecca JORDAN ALFORD CNP        Or    naloxone (NARCAN) injection 0.2 mg  0.2 mg Intramuscular Q2 Min PRN Nancy Argentina A, APRN CNP        Or    naloxone (NARCAN) injection 0.4 mg  0.4 mg Intramuscular Q2 Min PRN Elli Cruzecca A APRN CNP        norepinephrine (LEVOPHED) 16 mg in  mL infusion MAX CONC CENTRAL LINE  0.01-0.6 mcg/kg/min Intravenous Continuous Fer Francis MD 2 mL/hr at 05/26/25 1106 0.03 mcg/kg/min at 05/26/25 1106    ondansetron (ZOFRAN) injection 4 mg  4 mg Intravenous Q6H PRN Fer Francis, MD        oxyCODONE (ROXICODONE) tablet 5 mg  5 mg Oral or Feeding Tube Q6H Kaitlin Grijalva APRN CNP   5 mg at 05/26/25 1142    pantoprazole (PROTONIX) 2 mg/mL suspension 40 mg  40 mg Per Feeding Tube QAM AC Bryon Palmer MD   40 mg at 05/26/25 0877     "polyethylene glycol (MIRALAX) Packet 17 g  17 g Oral or Feeding Tube BID Bryon Palmer MD   17 g at 05/26/25 0850    Prosource TF20 ENfit Compatibl EN LIQD (PROSOURCE TF20) packet 60 mL  1 packet Per Feeding Tube Daily Fer Francis MD   60 mL at 05/26/25 0851    sennosides (SENOKOT) tablet 2 tablet  2 tablet Oral BID Kaitlin Grijalva APRN CNP   2 tablet at 05/26/25 1142    sodium chloride (PF) 0.9% PF flush 10-20 mL  10-20 mL Intracatheter q1 min prn Wieben, Argentina A, APRN CNP        sodium chloride (PF) 0.9% PF flush 10-40 mL  10-40 mL Intracatheter Once PRN Wieben, Argentina A, APRN CNP        sodium chloride (PF) 0.9% PF flush 10-40 mL  10-40 mL Intracatheter Q7 Days Wieben, Argentina A, APRN CNP        sodium chloride (PF) 0.9% PF flush 10-40 mL  10-40 mL Intracatheter Daily Wieben, Argentina A, APRN CNP   20 mL at 05/25/25 0337    sodium chloride (PF) 0.9% PF flush 3 mL  3 mL Intracatheter q1 min prn Wieben, Argentnia A, APRN CNP        sodium chloride (PF) 0.9% PF flush 3 mL  3 mL Intracatheter Q8H Wieben, Argentina A, APRN CNP   3 mL at 05/26/25 0057    thiamine (B-1) tablet 100 mg  100 mg Oral or Feeding Tube Daily Fer Francis MD   100 mg at 05/26/25 0850    tobramycin (PF) (DONELL) neb solution 300 mg  300 mg Nebulization 2 times daily Kaitlin Grijalva APRN CNP   300 mg at 05/26/25 0900                      No Known Allergies              Vital signs:    BP (!) 76/57   Pulse 101   Temp 98.8  F (37.1  C) (Axillary)   Resp 25   Ht 1.829 m (6' 0.01\")   Wt 74.3 kg (163 lb 12.8 oz)   SpO2 (!) 89%   BMI 22.21 kg/m       Body mass index is 22.21 kg/m .        Physical Examination    General: Well developed, well nourished, No apparent distress  Eyes: Anicteric  Ears: Hearing grossly normal  Mouth: Oral mucosa is moist, without any lesions. No oropharyngeal exudate.  Respiratory: Good air movement. No crackles. No rhonchi. No wheezes  Cardiac: RRR, normal S1, S2. No murmurs. No JVD  Abdomen: Soft, " NT/ND  Musculoskeletal: Extremities normal. No edema.  Skin: No rash on limited exam  Neuro: Normal mentation. Normal speech.  Psych:Normal affect         RESULTS:  Chest imaging:    Recent Results (from the past 24 hours)   XR Chest Port 1 View    Narrative    Exam: XR CHEST PORT 1 VIEW, 5/26/2025 1:39 AM    Comparison: 5/25/2025    History: Known Tension Pneumo with Endobronchial valve in place now  with increasing Resp Requirements and pressors    Findings:  Single AP portable semiupright view of the chest. Endotracheal tube  tip in the low thoracic trachea. Right chest tube.    Trachea is midline. Mediastinum is within normal limits.  Cardiopulmonary silhouette is within normal limits. Diffuse mixed  opacities. Tiny right apical pneumothorax. Small bilateral pleural  effusions. Extensive rightward and left chest wall subcutaneous  emphysema.      Impression    Impression:   1. Decreased tiny right apical pneumothorax with chest tube in place.  2. Stable small bilateral pleural effusions and pulmonary edema.    I have personally reviewed the examination and initial interpretation  and I agree with the findings.    SHERRIE TATE MD         SYSTEM ID:  [id number]   XR Chest Port 1 View    Narrative    Exam: XR CHEST PORT 1 VIEW, 5/26/2025 6:21 AM    Comparison: 5/26/2025    History: interval assessment of R pneumothorax and subcutaneous  emphysema    Findings:  2 AP semiupright views of the chest. Right apically directed chest  tube. Endotracheal tube tip in the low thoracic trachea. Enteric tube  traverses is in the stomach. Endobronchial valves.    Trachea is midline. Mediastinum is within normal limits.  Cardiopulmonary silhouette is within normal limits. Diffuse mixed  interstitial airspace opacities. Small right pneumothorax. Small left  greater than right pleural effusion. The upper abdomen is  unremarkable. Extensive right greater than left chest wall  subcutaneous emphysema.      Impression    Impression:    1. Slightly increased small right apical pneumothorax with chest tube  in place.  2. Stable diffuse interstitial and airspace opacities likely pulmonary  edema  3. Stable small pleural effusions.    I have personally reviewed the examination and initial interpretation  and I agree with the findings.    SHERRIE TATE MD         SYSTEM ID:  [id number]   CT Chest Pulmonary Embolism w Contrast    Impression    RESIDENT PRELIMINARY INTERPRETATION  IMPRESSION:   1. Exam is negative for acute pulmonary embolism. No evidence of right  heart strain.    2. New consolidative opacity in the left upper lobe, suspicious for  infection.  3. Decrease in small right pneumothorax with apically oriented chest  tube in place. Decrease in neck/chest subcutaneous emphysema.  4. Small, right greater than left pleural effusions. Left lower lobe  atelectasis.      In the event of a positive result for acute pulmonary embolism  resulting in right heart strain, consider calling the   [facility] patient placement ([phone]) for PERT (Pulmonary Embolism  Response Team) Activation?    PERT -- Pulmonary Embolism Response Team (Multidisciplinary team  including cardiology, interventional radiology, critical care,  hematology)         I personally reviewed and interpreted the chest radiographs.

## 2025-05-27 ENCOUNTER — APPOINTMENT (OUTPATIENT)
Dept: GENERAL RADIOLOGY | Facility: CLINIC | Age: 70
DRG: 870 | End: 2025-05-27
Payer: COMMERCIAL

## 2025-05-27 LAB
ALBUMIN SERPL BCG-MCNC: 1.6 G/DL (ref 3.5–5.2)
ALP SERPL-CCNC: 175 U/L (ref 40–150)
ALT SERPL W P-5'-P-CCNC: 50 U/L (ref 0–70)
ANION GAP SERPL CALCULATED.3IONS-SCNC: 5 MMOL/L (ref 7–15)
ANION GAP SERPL CALCULATED.3IONS-SCNC: 6 MMOL/L (ref 7–15)
AST SERPL W P-5'-P-CCNC: 72 U/L (ref 0–45)
BASE EXCESS BLDV CALC-SCNC: 7.4 MMOL/L (ref -3–3)
BASE EXCESS BLDV CALC-SCNC: 8.6 MMOL/L (ref -3–3)
BASOPHILS # BLD AUTO: 0.1 10E3/UL (ref 0–0.2)
BASOPHILS NFR BLD AUTO: 0 %
BILIRUB SERPL-MCNC: 0.7 MG/DL
BILIRUBIN DIRECT (ROCHE PRO & PURE): 0.37 MG/DL (ref 0–0.45)
BUN SERPL-MCNC: 65.7 MG/DL (ref 8–23)
BUN SERPL-MCNC: 69.1 MG/DL (ref 8–23)
CALCIUM SERPL-MCNC: 7.7 MG/DL (ref 8.8–10.4)
CALCIUM SERPL-MCNC: 7.8 MG/DL (ref 8.8–10.4)
CHLORIDE SERPL-SCNC: 108 MMOL/L (ref 98–107)
CHLORIDE SERPL-SCNC: 109 MMOL/L (ref 98–107)
CREAT SERPL-MCNC: 1.01 MG/DL (ref 0.67–1.17)
CREAT SERPL-MCNC: 1.08 MG/DL (ref 0.67–1.17)
CRP SERPL-MCNC: 273 MG/L
CYSTATIN C (ROCHE): 1.9 MG/L (ref 0.6–1)
EGFRCR SERPLBLD CKD-EPI 2021: 74 ML/MIN/1.73M2
EGFRCR SERPLBLD CKD-EPI 2021: 81 ML/MIN/1.73M2
EOSINOPHIL # BLD AUTO: 0.4 10E3/UL (ref 0–0.7)
EOSINOPHIL NFR BLD AUTO: 2 %
ERYTHROCYTE [DISTWIDTH] IN BLOOD BY AUTOMATED COUNT: 15.4 % (ref 10–15)
GFR/BSA.PRED SERPLBLD CYS-BASED-ARV: 32 ML/MIN/1.73M2
GLUCOSE BLDC GLUCOMTR-MCNC: 103 MG/DL (ref 70–99)
GLUCOSE BLDC GLUCOMTR-MCNC: 89 MG/DL (ref 70–99)
GLUCOSE BLDC GLUCOMTR-MCNC: 95 MG/DL (ref 70–99)
GLUCOSE BLDC GLUCOMTR-MCNC: 97 MG/DL (ref 70–99)
GLUCOSE BLDC GLUCOMTR-MCNC: 98 MG/DL (ref 70–99)
GLUCOSE BLDC GLUCOMTR-MCNC: 99 MG/DL (ref 70–99)
GLUCOSE SERPL-MCNC: 100 MG/DL (ref 70–99)
GLUCOSE SERPL-MCNC: 109 MG/DL (ref 70–99)
HCO3 BLDV-SCNC: 35 MMOL/L (ref 21–28)
HCO3 BLDV-SCNC: 36 MMOL/L (ref 21–28)
HCO3 SERPL-SCNC: 32 MMOL/L (ref 22–29)
HCO3 SERPL-SCNC: 32 MMOL/L (ref 22–29)
HCT VFR BLD AUTO: 39.9 % (ref 40–53)
HGB BLD-MCNC: 12.5 G/DL (ref 13.3–17.7)
IMM GRANULOCYTES # BLD: 0.9 10E3/UL
IMM GRANULOCYTES NFR BLD: 4 %
LYMPHOCYTES # BLD AUTO: 0.9 10E3/UL (ref 0.8–5.3)
LYMPHOCYTES NFR BLD AUTO: 4 %
MCH RBC QN AUTO: 28 PG (ref 26.5–33)
MCHC RBC AUTO-ENTMCNC: 31.3 G/DL (ref 31.5–36.5)
MCV RBC AUTO: 89 FL (ref 78–100)
MONOCYTES # BLD AUTO: 0.7 10E3/UL (ref 0–1.3)
MONOCYTES NFR BLD AUTO: 3 %
NEUTROPHILS # BLD AUTO: 20.9 10E3/UL (ref 1.6–8.3)
NEUTROPHILS NFR BLD AUTO: 88 %
NRBC # BLD AUTO: 0 10E3/UL
NRBC BLD AUTO-RTO: 0 /100
O2/TOTAL GAS SETTING VFR VENT: 60 %
O2/TOTAL GAS SETTING VFR VENT: 80 %
OXYHGB MFR BLDV: 72 % (ref 70–75)
OXYHGB MFR BLDV: 76 % (ref 70–75)
PCO2 BLDV: 61 MM HG (ref 40–50)
PCO2 BLDV: 62 MM HG (ref 40–50)
PH BLDV: 7.36 [PH] (ref 7.32–7.43)
PH BLDV: 7.38 [PH] (ref 7.32–7.43)
PHOSPHATE SERPL-MCNC: 3.7 MG/DL (ref 2.5–4.5)
PLATELET # BLD AUTO: 180 10E3/UL (ref 150–450)
PO2 BLDV: 40 MM HG (ref 25–47)
PO2 BLDV: 43 MM HG (ref 25–47)
POTASSIUM SERPL-SCNC: 3.9 MMOL/L (ref 3.4–5.3)
POTASSIUM SERPL-SCNC: 4.1 MMOL/L (ref 3.4–5.3)
PROCALCITONIN SERPL IA-MCNC: 3.74 NG/ML
PROT SERPL-MCNC: 4.6 G/DL (ref 6.4–8.3)
RBC # BLD AUTO: 4.47 10E6/UL (ref 4.4–5.9)
SAO2 % BLDV: 73.8 % (ref 70–75)
SAO2 % BLDV: 77.8 % (ref 70–75)
SODIUM SERPL-SCNC: 145 MMOL/L (ref 135–145)
SODIUM SERPL-SCNC: 147 MMOL/L (ref 135–145)
WBC # BLD AUTO: 23.8 10E3/UL (ref 4–11)

## 2025-05-27 PROCEDURE — 80053 COMPREHEN METABOLIC PANEL: CPT | Performed by: NURSE PRACTITIONER

## 2025-05-27 PROCEDURE — 71045 X-RAY EXAM CHEST 1 VIEW: CPT

## 2025-05-27 PROCEDURE — 250N000013 HC RX MED GY IP 250 OP 250 PS 637

## 2025-05-27 PROCEDURE — 250N000011 HC RX IP 250 OP 636

## 2025-05-27 PROCEDURE — 82610 CYSTATIN C: CPT

## 2025-05-27 PROCEDURE — 82805 BLOOD GASES W/O2 SATURATION: CPT

## 2025-05-27 PROCEDURE — A7035 POS AIRWAY PRESS HEADGEAR: HCPCS

## 2025-05-27 PROCEDURE — 82248 BILIRUBIN DIRECT: CPT

## 2025-05-27 PROCEDURE — 84145 PROCALCITONIN (PCT): CPT

## 2025-05-27 PROCEDURE — 99291 CRITICAL CARE FIRST HOUR: CPT | Mod: GC | Performed by: INTERNAL MEDICINE

## 2025-05-27 PROCEDURE — 82805 BLOOD GASES W/O2 SATURATION: CPT | Performed by: INTERNAL MEDICINE

## 2025-05-27 PROCEDURE — 82435 ASSAY OF BLOOD CHLORIDE: CPT

## 2025-05-27 PROCEDURE — 97165 OT EVAL LOW COMPLEX 30 MIN: CPT | Mod: GO

## 2025-05-27 PROCEDURE — 94640 AIRWAY INHALATION TREATMENT: CPT

## 2025-05-27 PROCEDURE — 99233 SBSQ HOSP IP/OBS HIGH 50: CPT | Mod: GC | Performed by: INTERNAL MEDICINE

## 2025-05-27 PROCEDURE — 85025 COMPLETE CBC W/AUTO DIFF WBC: CPT

## 2025-05-27 PROCEDURE — 84100 ASSAY OF PHOSPHORUS: CPT

## 2025-05-27 PROCEDURE — 258N000003 HC RX IP 258 OP 636

## 2025-05-27 PROCEDURE — 97535 SELF CARE MNGMENT TRAINING: CPT | Mod: GO

## 2025-05-27 PROCEDURE — 86140 C-REACTIVE PROTEIN: CPT

## 2025-05-27 PROCEDURE — 97162 PT EVAL MOD COMPLEX 30 MIN: CPT | Mod: GP | Performed by: PHYSICAL THERAPIST

## 2025-05-27 PROCEDURE — 99221 1ST HOSP IP/OBS SF/LOW 40: CPT | Mod: GC | Performed by: INTERNAL MEDICINE

## 2025-05-27 PROCEDURE — 97530 THERAPEUTIC ACTIVITIES: CPT | Mod: GP | Performed by: PHYSICAL THERAPIST

## 2025-05-27 PROCEDURE — 250N000009 HC RX 250

## 2025-05-27 PROCEDURE — 94640 AIRWAY INHALATION TREATMENT: CPT | Mod: 76

## 2025-05-27 PROCEDURE — 200N000002 HC R&B ICU UMMC

## 2025-05-27 PROCEDURE — 250N000011 HC RX IP 250 OP 636: Performed by: NURSE PRACTITIONER

## 2025-05-27 PROCEDURE — 999N000157 HC STATISTIC RCP TIME EA 10 MIN

## 2025-05-27 PROCEDURE — 94003 VENT MGMT INPAT SUBQ DAY: CPT

## 2025-05-27 PROCEDURE — 71045 X-RAY EXAM CHEST 1 VIEW: CPT | Mod: 26 | Performed by: RADIOLOGY

## 2025-05-27 PROCEDURE — 250N000013 HC RX MED GY IP 250 OP 250 PS 637: Performed by: INTERNAL MEDICINE

## 2025-05-27 RX ORDER — BUDESONIDE 0.5 MG/2ML
0.5 INHALANT ORAL
Status: DISCONTINUED | OUTPATIENT
Start: 2025-05-27 | End: 2025-05-31 | Stop reason: HOSPADM

## 2025-05-27 RX ORDER — ENOXAPARIN SODIUM 100 MG/ML
40 INJECTION SUBCUTANEOUS DAILY
Status: DISCONTINUED | OUTPATIENT
Start: 2025-05-27 | End: 2025-05-29

## 2025-05-27 RX ORDER — DEXTROSE MONOHYDRATE 50 MG/ML
INJECTION, SOLUTION INTRAVENOUS CONTINUOUS
Status: ACTIVE | OUTPATIENT
Start: 2025-05-27 | End: 2025-05-27

## 2025-05-27 RX ORDER — DEXTROSE MONOHYDRATE 50 MG/ML
INJECTION, SOLUTION INTRAVENOUS CONTINUOUS
Status: DISCONTINUED | OUTPATIENT
Start: 2025-05-28 | End: 2025-05-28

## 2025-05-27 RX ORDER — IPRATROPIUM BROMIDE AND ALBUTEROL SULFATE 2.5; .5 MG/3ML; MG/3ML
3 SOLUTION RESPIRATORY (INHALATION)
Status: DISCONTINUED | OUTPATIENT
Start: 2025-05-27 | End: 2025-05-31 | Stop reason: HOSPADM

## 2025-05-27 RX ADMIN — OXYCODONE HYDROCHLORIDE 5 MG: 5 TABLET ORAL at 22:18

## 2025-05-27 RX ADMIN — Medication 50 MCG: at 14:17

## 2025-05-27 RX ADMIN — OXYCODONE HYDROCHLORIDE 5 MG: 5 TABLET ORAL at 09:58

## 2025-05-27 RX ADMIN — PROPOFOL 10 MCG/KG/MIN: 10 INJECTION, EMULSION INTRAVENOUS at 16:24

## 2025-05-27 RX ADMIN — OXYCODONE HYDROCHLORIDE 5 MG: 5 TABLET ORAL at 04:33

## 2025-05-27 RX ADMIN — FORMOTEROL FUMARATE DIHYDRATE 20 MCG: 20 SOLUTION RESPIRATORY (INHALATION) at 09:55

## 2025-05-27 RX ADMIN — BUDESONIDE 0.5 MG: 0.5 INHALANT RESPIRATORY (INHALATION) at 20:25

## 2025-05-27 RX ADMIN — ENOXAPARIN SODIUM 40 MG: 40 INJECTION SUBCUTANEOUS at 12:14

## 2025-05-27 RX ADMIN — Medication 40 MG: at 07:39

## 2025-05-27 RX ADMIN — ACETYLCYSTEINE 2 ML: 200 SOLUTION ORAL; RESPIRATORY (INHALATION) at 20:25

## 2025-05-27 RX ADMIN — DEXTROSE MONOHYDRATE: 50 INJECTION, SOLUTION INTRAVENOUS at 00:37

## 2025-05-27 RX ADMIN — CEFEPIME 2 G: 2 INJECTION, POWDER, FOR SOLUTION INTRAVENOUS at 02:23

## 2025-05-27 RX ADMIN — ALBUTEROL SULFATE 2.5 MG: 2.5 SOLUTION RESPIRATORY (INHALATION) at 08:30

## 2025-05-27 RX ADMIN — OXYCODONE HYDROCHLORIDE 5 MG: 5 TABLET ORAL at 16:16

## 2025-05-27 RX ADMIN — FORMOTEROL FUMARATE DIHYDRATE 20 MCG: 20 SOLUTION RESPIRATORY (INHALATION) at 20:25

## 2025-05-27 RX ADMIN — CHLORHEXIDINE GLUCONATE 15 ML: 1.2 SOLUTION ORAL at 20:42

## 2025-05-27 RX ADMIN — Medication 50 MCG: at 11:20

## 2025-05-27 RX ADMIN — CEFEPIME 2 G: 2 INJECTION, POWDER, FOR SOLUTION INTRAVENOUS at 16:16

## 2025-05-27 RX ADMIN — TOBRAMYCIN 300 MG: 300 SOLUTION RESPIRATORY (INHALATION) at 20:25

## 2025-05-27 RX ADMIN — CEFEPIME 2 G: 2 INJECTION, POWDER, FOR SOLUTION INTRAVENOUS at 09:58

## 2025-05-27 RX ADMIN — Medication 60 ML: at 07:40

## 2025-05-27 RX ADMIN — Medication 100 MCG/HR: at 02:12

## 2025-05-27 RX ADMIN — IPRATROPIUM BROMIDE AND ALBUTEROL SULFATE 3 ML: .5; 3 SOLUTION RESPIRATORY (INHALATION) at 20:25

## 2025-05-27 RX ADMIN — CHLORHEXIDINE GLUCONATE 15 ML: 1.2 SOLUTION ORAL at 07:39

## 2025-05-27 RX ADMIN — TOBRAMYCIN 300 MG: 300 SOLUTION RESPIRATORY (INHALATION) at 08:30

## 2025-05-27 RX ADMIN — THERA TABS 1 TABLET: TAB at 07:39

## 2025-05-27 RX ADMIN — ACETYLCYSTEINE 2 ML: 200 SOLUTION ORAL; RESPIRATORY (INHALATION) at 08:30

## 2025-05-27 RX ADMIN — THIAMINE HCL TAB 100 MG 100 MG: 100 TAB at 07:39

## 2025-05-27 ASSESSMENT — ACTIVITIES OF DAILY LIVING (ADL)
ADLS_ACUITY_SCORE: 73

## 2025-05-27 NOTE — PROGRESS NOTES
05/27/25 1529   Appointment Info   Signing Clinician's Name / Credentials (PT) Yoli Julian, PT, DPT   Rehab Comments (PT) co-tx w/OT   Living Environment   People in Home spouse   Current Living Arrangements house   Home Accessibility stairs to enter home   Number of Stairs, Main Entrance 2   Stair Railings, Main Entrance railings safe and in good condition   Living Environment Comments per recent OT ([facility]) eval notes: all needs met on main level, spouse does basement laundry. Per chart, 3L home O2   Self-Care   Usual Activity Tolerance moderate   Current Activity Tolerance poor   Equipment Currently Used at Home none   Fall history within last six months no   Activity/Exercise/Self-Care Comment per Palliative notes: They shared that patient had been doing well until last year when he developed pneumonia, required O2 and breathing treatments since then. They are aware that his breathing is compromised due to the leak in his lungs, and that his lungs are very weak due to his lung disease. They are hopeful that further procedures to help fix the air leak can help his lungs work better, and they acknowledge that without lung transplant, he doesn't have a curable fix to his lung disease. Armin is independent, stubborn, likes to be active, and hates going to the doctor or hospitals. They do not think he wants to live like this, and they worry that more interventions to improve his lungs still might not get him to a good place in terms of QoL if he's more restricted than he was pre-hospitalization. Also per chart notes pt was IND with  IADLs and ADLs prior, is a retired , retired 7-8 years ago   General Information   Onset of Illness/Injury or Date of Surgery 05/22/25   Referring Physician Fer Francis MD   Patient/Family Therapy Goals Statement (PT) Unable to state   Pertinent History of Current Problem (include personal factors and/or comorbidities that impact the POC) Per  critical care attending note: 69 M admit w/acute on chronic RF 2/2 A1AD and tobacco induced copd a/w BPF who remains in critical condition today due to respiratory failure  which I personally managed.  Escalating O2 requirements with continue leak despite EBV , vent waveforms with noted air trapping on VC (RR21) and asynchrony. Improved with brief trial on pressure support mode however non sustainable. Following intermittent commands but appears uncomfortable on the vent. Imaging with new MAULIK PNA in context of rising WBC (40) and CRP .  Determined prior PSA meropenem R thus transitioned to Cefepime. Plan - continue sepsis management, low threshold to add back van if no improvement with abx change in next 24 hr. Lung transplant to see ,though given comorbid status not candidate at this time. Continue to optimize vent as able with bronchodilator.  Consult palliative. D/w IP regarding additional interventions for BPF   Existing Precautions/Restrictions oxygen therapy device and L/min;other (see comments)   Cognition   Cognitive Status Comments Pt's ability to complete communication was limited per status; pt attempted to verbalize while orally intubated, pt unable to write with attempts due to UE weakness. Pt is able to nod/shake head. Pt was able to follow some basic commands with some assistance   Posture    Posture Comments reduced upright posture, noted to have L lean in recliner and required pillow placement for upright posture   Strength (Manual Muscle Testing)   Strength Comments pt was able to hold up head during lift out of bed to a recliner chair; required AAROM to complete hip and knee flexion; pt did not demonstrate LAQ when cued while up in sling; overall presents with UE and LE weakness, requires PROM to lift UEs up off bed (shoulder flexion), AAROM for SLRs. Pt was unable to hold a permanent marker to write with trial, needs assistance to place hands on chest, grasp reduced and pt is unable to maintain  position of UEs on chest due to weakness   Bed Mobility   Comment, (Bed Mobility) MAXA x 2   Transfers   Comment, (Transfers) total assist via sling/lift   Gait/Stairs (Locomotion)   Comment, (Gait/Stairs) N/A per status   Balance   Balance Comments impaired, requires postural support in sitting, has reduced trunk control even with support behind pt (leans L)   Clinical Impression   Criteria for Skilled Therapeutic Intervention Yes, treatment indicated   PT Diagnosis (PT) impaired mobility   Influenced by the following impairments fatigue, pain, reduced activity tolerance, balance, strength, ROM   Functional limitations due to impairments below baseline bed mobility, transfers, gait   Clinical Presentation (PT Evaluation Complexity) evolving   Clinical Presentation Rationale clinical judgement, [facility]   Clinical Decision Making (Complexity) moderate complexity   Planned Therapy Interventions (PT) balance training;bed mobility training;gait training;home exercise program;neuromuscular re-education;patient/family education;postural re-education;ROM (range of motion);stair training;strengthening;stretching;transfer training;risk factor education;home program guidelines;progressive activity/exercise   Risk & Benefits of therapy have been explained evaluation/treatment results reviewed;care plan/treatment goals reviewed;risks/benefits reviewed;current/potential barriers reviewed;participants voiced agreement with care plan;participants included;patient   PT Total Evaluation Time   PT Eval, Moderate Complexity Minutes (44607) 5   Physical Therapy Goals   PT Predicted Duration/Target Date for Goal Attainment 06/19/25   PT Goals Bed Mobility;Transfers;Gait;Stairs   PT: Bed Mobility Independent;Supine to/from sit;Rolling;Bridging;Within precautions   PT: Transfers Independent;Modified independent;Sit to/from stand;Bed to/from chair;Assistive device;Within precautions   PT: Gait Modified independent;Independent;Within  precautions;150 feet   PT: Stairs Modified independent;Independent;2 stairs   PT Discharge Planning   PT Plan progress bed mobility, trial sitting at edge of bed, further LE ex/ROM   PT Discharge Recommendation (DC Rec) Transitional Care Facility   PT Rationale for DC Rec Pt is significantly below baseline functional level with reduced activity tolerance and signfiicant weakness. Advise a TCU stay to progress status.   PT Brief overview of current status total assist to lift pt bed<>chair with overhead lift and sling   PT Total Distance Amb During Session (feet) 0   Physical Therapy Time and Intention   Total Session Time (sum of timed and untimed services) 5

## 2025-05-27 NOTE — CONSULTS
"  Palliative Care Consultation Note  [facility]      Patient: Armin Terrell  Date of Admission:  5/22/2025    Requesting Clinician / Team: ICU  Reason for consult: Goals of care       Recommendations & Counseling     GOALS OF CARE:   Restorative without limits   5/27: Discussed with wife Trini and son Jamey on the phone. They shared that patient had been doing well until last year when he developed pneumonia, required O2 and breathing treatments since then. They are aware that his breathing is compromised due to the leak in his lungs, and that his lungs are very weak due to his lung disease. They are hopeful that further procedures to help fix the air leak can help his lungs work better, and they acknowledge that without lung transplant, he doesn't have a curable fix to his lung disease. Armin is independent, stubborn, likes to be active, and hates going to the doctor or hospitals. They do not think he wants to live like this, and they worry that more interventions to improve his lungs still might not get him to a good place in terms of QoL if he's more restricted than he was pre-hospitalization.   If there are big decisions to be made for Armin's care, they prefer to have Trini, Jamey, and Armin's two other children who live in [place] present. Palliative care team would recommend medical teams and specialists approach conversations with clear recommendations if needed, as family seems capable to shared decision making but also seem to allude to burdens of making \"big decisions\" for Armin    ADVANCE CARE PLANNING:  No health care directive on file. Per system policy, Surrogate Decision-makers for Patients With Diminished Decision-making Capacity offers guidance on possible decision-makers. Wife Trini has been identified as a surrogate decision maker.   There is no POLST form on file, defer to patient and/or next of kin for decisions   Code status: Full Code    MEDICAL " MANAGEMENT:   We are not actively managing symptoms at this time.    PSYCHOSOCIAL/SPIRITUAL SUPPORT:  Family lives with wife Trini (31 years together), two children in [place], one lives locally. Was independent in ADLs prior to this .   Retired , retired 7-8 years ago  5 grandchildren, 4 in [place], 1 locally  No significant spiritual needs identified    Palliative Care will continue to follow. Thank you for the consult and allowing us to aid in the care of Armin Terrell.    These recommendations have been discussed with ICU team via [employer].    Molly Oneil MD  [facility], Fellow, Palliative Care  Securely message with the [employer] Web Console (learn more here) or  Text page via [facility] Paging/Directory         Assessment      Armin Terrell is a 69 year old male with a past medical history of A1AT deficiency, tobacco-related COPD who presented on 5/22/25 from OSH for acute on chronic respiratory failure, found to have acute R PTX with bronchopulmonary fistula, no improvement with endobronchial valve placement, failed extubation.      Today, the patient was seen for:  Palliative care encounter  Goals of care  Support  COPD  A1AT deficiency  Acute respiratory failure  bronchopulmonary fistula  Pneumothorax    History of Present Illness   Met with patient, intubated, on propofol and fentanyl gtt. He is able to nod/shake head to questions, follow simple commands, tracks provider.     Called wife Trini, who asked marika Concepcion to be included in call. I introduced our role as an extra layer of support and how we help patients and families dealing with serious, potentially life-limiting illnesses. I explained the composition of the palliative care team.  Palliative care helps patients and families navigate their care while focusing on the whole person; providing emotional, social and spiritual support  Palliative care often assists with symptom management, information sharing about what to expect from the  "illness, available treatment options and what effect those options may have on the disease course, and provide effective communication and caring support.    Trini shared that patient was doing well until last year when he was hospitalized for R pneumonia. Was able to recover, went home on oxygen and nebulizers. He was able to continue his ADLs, had some dyspnea on exertion but able to vacuum, be active outside, ambulate independently. He was having some more coughing leading up to this hospitalization, which is what they think caused the pneumothorax.     Trini and Jamey able to share that pt's vitals seem \"normal\" but he is having trouble with this air leak. They tried the valves which didn't seem to help, and it's all due to his lung disease. They are expecting to hear more options from the pulmonary team today. Wife shared that they are worried they are running out of options. Palliative care asked to explore this statement, and what family might do if told there were no other options. Trini and Jamey shared that would want to hear from the teams that there were no options, then they would \"make decisions, which would be the hardest thing ever\", and would want his other 2 children from [place] to be here. Trini said she already knows \"Alfred would not want to live like this\", meaning being in the hospital, going to therapies. They said Alfred is stubborn, likes being active, doesn't like even the restrictions he had prior to this hospitalization, would not want this quality of life. He hated going to the doctor, hated being in the hospital. They were hoping he could be a transplant candidate, as they see that as the only pathway where he would have an acceptable quality of life. They still have questions as to why he is not a transplant candidate.     They are agreeable to family care conference in the coming days. They want to wait and hear what the pulmonary team has to say.     Prognosis, Goals, & Planning: "   Functional Status just prior to this current hospitalization:  Outpatient Palliative Performance Score (PPS) 70%  Significant evidence of disease.  Full/normal self-care & LOC; normal or reduced intake, reduced ambulation and activity/work.      Code Status was addressed today:   No      Patient's decision making preferences: with input from medical clinicians and loved ones        Patient has decision-making capacity today for complex decisions: Patient does not have capacity to make decisions at this time. Wife Trini surrogate decision maker          Coping, Meaning, & Spirituality:   Mood, coping, and/or meaning in the context of serious illness were addressed today: No    Social:   Family lives with wife Trini (31 years together), two children in [place], one lives locally. Was independent in ADLs prior to this .   Retired , retired 7-8 years ago  5 grandchildren, 4 in [place], 1 locally  No significant spiritual needs identified    Medications:  Reviewed this patient's medication profile and medications from this hospitalization. Notable medications: oxycodone x1   [place] Board of Pharmacy Data Base Reviewed: Yes:   reviewed - controlled substances reflected in medication list..    ROS:  Comprehensive ROS is reviewed and is negative except as here & per HPI:     Physical Exam   Vital Signs with Ranges  Temp:  [98.2  F (36.8  C)-98.8  F (37.1  C)] 98.2  F (36.8  C)  Pulse:  [] 88  Resp:  [11-20] 13  BP: ()/(52-80) 89/56  FiO2 (%):  [70 %-80 %] 70 %  SpO2:  [89 %-98 %] 93 %  Wt Readings from Last 10 Encounters:   05/26/25 74.3 kg (163 lb 12.8 oz)   05/22/25 72.2 kg (159 lb 3.2 oz)   05/14/25 66.2 kg (146 lb)   05/07/25 75.3 kg (166 lb)   02/24/25 67.1 kg (148 lb)   12/20/24 69.4 kg (153 lb)   12/04/24 69.4 kg (153 lb)   11/07/24 70.6 kg (155 lb 9.6 oz)   07/10/24 68 kg (150 lb)   05/06/24 72.7 kg (160 lb 3.2 oz)     163 lbs 12.83 oz    PHYSICAL EXAM:  Gen: sedated on propofol and  fentanyl, propofol paused for SBT. No acute distress  HEENT: intubated, NGT in place  Resp: intubated, SBT in progress. Chest tube in place w/air leak  Cardiac: regular rate and rhythm  Abd: mild tenderness to palpation, active BS  Ext: SCDs in place, no edema noted    Data reviewed:  Results for orders placed or performed during the hospital encounter of 25 (from the past 24 hours)   Echo Complete   Result Value Ref Range    LVEF  60-65%     [facility]    [id number]  [facility]  [id number]  [id number]^ESTEPHANIA^JUAN     [facility]  Echocardiography Laboratory  [address] [place] [address]     Name: AMANDEEP HERRERA  MRN: [id number]  : 1955  Study Date: 2025 12:02 PM  Age: 69 yrs  Gender: Male  [place] [facility] [place] For Study: Respiratory Failure  Ordering Physician: JUAN BEST  Referring Physician: PIERO CAMEJO  Performed By: Donavon Vanegas     BSA: 2.0 m2  Height: 72 in  Weight: 163 lb  BP: 99/74 mmHg  ______________________________________________________________________________  Procedure  Bubble Echocardiogram with two-dimensional, color and spectral Doppler.  ______________________________________________________________________________  Interpretation Summary  Global and regional left ventricular function is normal with an EF of 60-65%.  Moderate right ventricular dilation is present.  Global right ventricular function is mildly reduced.  Unable to assess mean RA pressure given the patient is on a ventilator.  No pericardial effusion is present.  No significant valvular abnormalities present.  There was no shunt at the atrial septal level as assessed by agitated saline  bubble study at rest and with Valsalva maneuver.     This study was compared with the study from 2024 .  RV findings are new.  ______________________________________________________________________________  Left Ventricle  Left ventricular size is normal. Left  ventricular wall thickness is normal.  Global and regional left ventricular function is normal with an EF of 60-65%.  Left ventricular diastolic function is normal.     Right Ventricle  Moderate right ventricular dilation is present. Global right ventricular  function is mildly reduced.     Atria  The left atrium appears normal. Mild right atrial enlargement is present.  There was no shunt at the atrial septal level as assessed by agitated saline  bubble study at rest and with Valsalva maneuver.     Mitral Valve  The mitral valve is normal.     Aortic Valve  Aortic valve is normal in structure and function.     Tricuspid Valve  The tricuspid valve is normal. Pulmonary artery systolic pressure cannot be  assessed.     Pulmonic Valve  On Doppler interrogation, there is no significant stenosis or regurgitation.     Vessels  The aorta root is normal. Unable to assess mean RA pressure given the patient  is on a ventilator.     Pericardium  No pericardial effusion is present.     Miscellaneous  No significant valvular abnormalities present.     Compared to Previous Study  This study was compared with the study from 7/2024 . RV findings are new.  ______________________________________________________________________________  MMode/2D Measurements & Calculations  LVOT diam: 2.3 cm  LVOT area: 4.2 cm2     Doppler Measurements & Calculations  MV E max jasmin: 64.6 cm/sec  MV A max jasmin: 52.5 cm/sec  MV E/A: 1.2  MV dec slope: 352.8 cm/sec2  MV dec time: 0.18 sec  PA acc time: 0.05 sec     ______________________________________________________________________________  Report approved by: LILO FONTANEZ MD on 05/26/2025 01:08 PM         US Lower Extremity Venous Duplex Bilateral    Narrative    EXAMINATION: DOPPLER VENOUS ULTRASOUND OF BILATERAL LOWER EXTREMITIES,  5/26/2025 3:56 PM     COMPARISON: None.    HISTORY: swelling, r/o DVT    TECHNIQUE:  Gray-scale evaluation with compression, spectral flow and  color Doppler assessment of  the deep venous system of both legs from  groin to knee, and then at the ankles.    FINDINGS:  In both lower extremities, the common femoral, femoral, popliteal and  posterior tibial veins demonstrate normal compressibility and blood  flow. Peroneal veins not visualized.      Impression    IMPRESSION:  No evidence of deep venous thrombosis in either lower extremity.    I have personally reviewed the examination and initial interpretation  and I agree with the findings.    SHERRIE TATE MD         SYSTEM ID:  [id number]   US Upper Ext Venous Duplex Limited Bilat    Narrative    EXAMINATION: DOPPLER VENOUS ULTRASOUND OF BILATERAL UPPER EXTREMITIES,  5/26/2025 3:57 PM     COMPARISON: None.    HISTORY:  swelling, interval eval ?progression of thrombi; DONE  YESTERDAY WANT REPEATED TO LOOK FOR EXTENSION - SK    TECHNIQUE:  Gray-scale evaluation with compression, spectral flow, and  color Doppler assessment of the deep venous system of both upper  extremities.    FINDINGS:  Extensive subcutaneous air along the chest and neck limits evaluation.  The right internal jugular and bilateral innominate, subclavian and  axillary veins are not visualized. To and fro flow in the left  internal jugular vein.      Impression    IMPRESSION:  Significantly limited ultrasound evaluation due to subcutaneous air  along the chest/neck.    1. Abnormal to and fro flow in the left internal jugular vein, could  be due to upstream abnormality not visualized on this ultrasound.  Consider follow-up chest CT venogram for further evaluation.  2. Remainder of the vessels are not visualized, as described above.    I have personally reviewed the examination and initial interpretation  and I agree with the findings.    SHERRIE TATE MD         SYSTEM ID:  [id number]   Basic metabolic panel   Result Value Ref Range    Sodium 146 (H) 135 - 145 mmol/L    Potassium 3.9 3.4 - 5.3 mmol/L    Chloride 109 (H) 98 - 107 mmol/L    Carbon Dioxide (CO2) 32 (H) 22 -  29 mmol/L    Anion Gap 5 (L) 7 - 15 mmol/L    Urea Nitrogen 67.5 (H) 8.0 - 23.0 mg/dL    Creatinine 1.02 0.67 - 1.17 mg/dL    GFR Estimate 80 >60 mL/min/1.73m2    Calcium 7.7 (L) 8.8 - 10.4 mg/dL    Glucose 103 (H) 70 - 99 mg/dL   INR   Result Value Ref Range    INR 1.19 (H) 0.85 - 1.15    PT 15.4 (H) 11.8 - 14.8 Seconds   Glucose by meter   Result Value Ref Range    GLUCOSE BY METER POCT 101 (H) 70 - 99 mg/dL   Glucose by meter   Result Value Ref Range    GLUCOSE BY METER POCT 99 70 - 99 mg/dL   CBC with Platelets & Differential    Narrative    The following orders were created for panel order CBC with Platelets & Differential.  Procedure                               Abnormality         Status                     ---------                               -----------         ------                     CBC with platelets and ...[[id number]]  Abnormal            Final result                 Please view results for these tests on the individual orders.   Basic metabolic panel   Result Value Ref Range    Sodium 147 (H) 135 - 145 mmol/L    Potassium 4.1 3.4 - 5.3 mmol/L    Chloride 109 (H) 98 - 107 mmol/L    Carbon Dioxide (CO2) 32 (H) 22 - 29 mmol/L    Anion Gap 6 (L) 7 - 15 mmol/L    Urea Nitrogen 65.7 (H) 8.0 - 23.0 mg/dL    Creatinine 1.01 0.67 - 1.17 mg/dL    GFR Estimate 81 >60 mL/min/1.73m2    Calcium 7.8 (L) 8.8 - 10.4 mg/dL    Glucose 109 (H) 70 - 99 mg/dL   Phosphorus   Result Value Ref Range    Phosphorus 3.7 2.5 - 4.5 mg/dL   Blood gas venous   Result Value Ref Range    pH Venous 7.38 7.32 - 7.43    pCO2 Venous 61 (H) 40 - 50 mm Hg    pO2 Venous 43 25 - 47 mm Hg    Bicarbonate Venous 36 (H) 21 - 28 mmol/L    Base Excess/Deficit Venous 8.6 (H) -3.0 - 3.0 mmol/L    FIO2 80     Oxyhemoglobin Venous 76 (H) 70 - 75 %    O2 Sat, Venous 77.8 (H) 70.0 - 75.0 %    Narrative    In healthy individuals, oxyhemoglobin (O2Hb) and oxygen saturation (SO2) are approximately equal. In the presence of dyshemoglobins,  oxyhemoglobin can be considerably lower than oxygen saturation.   CRP inflammation   Result Value Ref Range    CRP Inflammation 273.00 (H) <5.00 mg/L   Procalcitonin   Result Value Ref Range    Procalcitonin 3.74 (H) <0.50 ng/mL   Hepatic panel   Result Value Ref Range    Protein Total 4.6 (L) 6.4 - 8.3 g/dL    Albumin 1.6 (L) 3.5 - 5.2 g/dL    Bilirubin Total 0.7 <=1.2 mg/dL    Alkaline Phosphatase 175 (H) 40 - 150 U/L    AST 72 (H) 0 - 45 U/L    ALT 50 0 - 70 U/L    Bilirubin Direct 0.37 0.00 - 0.45 mg/dL   Cystatin C with GFR   Result Value Ref Range    Cystatin C 1.9 (H) 0.6 - 1.0 mg/L    GFR Calculated with Cystatin C 32 (L) >=60 mL/min/1.73m2    Narrative    eGFRcys in adults is calculated using the 2012 CKD-EPI cystatin c equation which includes age and gender (Agustina et al., [facility], DOI: 10.1056/RSOPqx0827976)   CBC with platelets and differential   Result Value Ref Range    WBC Count 23.8 (H) 4.0 - 11.0 10e3/uL    RBC Count 4.47 4.40 - 5.90 10e6/uL    Hemoglobin 12.5 (L) 13.3 - 17.7 g/dL    Hematocrit 39.9 (L) 40.0 - 53.0 %    MCV 89 78 - 100 fL    MCH 28.0 26.5 - 33.0 pg    MCHC 31.3 (L) 31.5 - 36.5 g/dL    RDW 15.4 (H) 10.0 - 15.0 %    Platelet Count 180 150 - 450 10e3/uL    % Neutrophils 88 %    % Lymphocytes 4 %    % Monocytes 3 %    % Eosinophils 2 %    % Basophils 0 %    % Immature Granulocytes 4 %    NRBCs per 100 WBC 0 <1 /100    Absolute Neutrophils 20.9 (H) 1.6 - 8.3 10e3/uL    Absolute Lymphocytes 0.9 0.8 - 5.3 10e3/uL    Absolute Monocytes 0.7 0.0 - 1.3 10e3/uL    Absolute Eosinophils 0.4 0.0 - 0.7 10e3/uL    Absolute Basophils 0.1 0.0 - 0.2 10e3/uL    Absolute Immature Granulocytes 0.9 (H) <=0.4 10e3/uL    Absolute NRBCs 0.0 10e3/uL   Glucose by meter   Result Value Ref Range    GLUCOSE BY METER POCT 95 70 - 99 mg/dL   XR Chest Port 1 View    Narrative    Portable chest    INDICATION: Interval assessment of right pneumothorax an subcutaneous  emphysema    COMPARISON: Chest CT yesterday. Portable  plain film also yesterday    FINDINGS: No significant changes in the right greater than left  extensive subcutaneous emphysema at the chest wall and neck regions  bilaterally. Heart size remains normal a large-bore right chest  catheter again present. Endotracheal tube tip again approximately 3.1  cm above the isa. NG/OG tube beyond the inferior margin of the  image. Right PICC line tip in the distal SVC. Pneumothorax likely  obscured by the extensive overlying air.      Impression    IMPRESSION: No significant changes from yesterday morning. CT  yesterday again show diffuse obtained emphysema as well as  pneumomediastinum an prominent right pneumothorax which is somewhat  skewed by the extensive overlying air on this nonupright radiograph    ELVIRA STEPHENS MD         SYSTEM ID:  [id number]   Glucose by meter   Result Value Ref Range    GLUCOSE BY METER POCT 98 70 - 99 mg/dL   Blood gas venous   Result Value Ref Range    pH Venous 7.36 7.32 - 7.43    pCO2 Venous 62 (H) 40 - 50 mm Hg    pO2 Venous 40 25 - 47 mm Hg    Bicarbonate Venous 35 (H) 21 - 28 mmol/L    Base Excess/Deficit Venous 7.4 (H) -3.0 - 3.0 mmol/L    FIO2 60     Oxyhemoglobin Venous 72 70 - 75 %    O2 Sat, Venous 73.8 70.0 - 75.0 %    Narrative    In healthy individuals, oxyhemoglobin (O2Hb) and oxygen saturation (SO2) are approximately equal. In the presence of dyshemoglobins, oxyhemoglobin can be considerably lower than oxygen saturation.   Glucose by meter   Result Value Ref Range    GLUCOSE BY METER POCT 103 (H) 70 - 99 mg/dL       Medical Decision Making

## 2025-05-27 NOTE — CARE CONFERENCE
Additional Information: CANCELLED      Scheduled Care Conference    Date: 5/28  Time: 2 pm with Providers and 215 pm   Location:      Providers:  Pulm: Jacob York - Only available to answer question not available in person  Palliative: Molly Oneil - Confirmed   MICU: Niall Raymundo - Unconfirmed        Family:     Call into conference information:   [phone]   Meeting ID# [id number]  PIN # [id number]          Herber Morejon   Inpatient W  [facility] 4 ICU/ED/OBS  [phone]

## 2025-05-27 NOTE — PROGRESS NOTES
MEDICAL ICU PROGRESS NOTE  05/27/2025      Date of Service (when I saw the patient): 05/27/2025    ASSESSMENT: Armin Terrell is a 68yo M with PMH of chronic respiratory failure (on home O2, 3LPM), severe COPD emphysema type (FEV1 35%), alpha-1 antitrypsin deficiency, lung nodules, hx tobacco use, and prostate cancer status post radical prostatectomy.  He was admitted to [facility] on 5/18 due to acute on chronic hypoxic respiratory failure requiring intubation. Found to have right sided pneumothorax and R side consolidation (pseudomonas + sputum). Chest tube placed but pneumothorax persisted, C/f bronchopleural fistula. Patient started on pressors for hypotension and admitted to the ICU. Started on meropenem. Failed extubation on 5/22 due to repeat episode of tension physiology d/t kink in chest tube following extubation resulting in worsening pneumothorax and emphysema. Re-intubated and transferred to [facility] on 5/22 for continued ICU management and interventional pulmonology consult. Underwent bronch with valve (3) placement on 5/23 in RUL however has persistent large air leak. Pending possible re-bronch on 5/27. Weaning pressors, FiO2, and sedation as able.     CHANGES and MAJOR THINGS TODAY:   - Weaning FiO2 as able, goal spO2 88%  - PS today 5/5 with 70% FiO2 for 90 minutes before desating   - Weaning propofol as able  - BLE DVT US negative. BUE DVT US with abnormal to and fro flow in left internal jugular.  - Seen by transplant team yesterday, deemed to not be a lung transplant candidate due to age and deconditioning.  - Palliative to see patient and discuss with wife today, appreciate assistance  - PE ruled out yesterday  - Na check this afternoon  - Bronch with IP tomorrow at 0830    PLAN:    Neuro:  # Pain and sedation  - Propofol gtt, wean as able   - Precedex gtt prn for agitation/sedation  - Oxycodone Q6H  - Midazolam Q2H PRN  - Fentanyl gtt, wean as able  - RASS goal -1 to +1      Pulmonary:  #  Acute on chronic hypoxic respiratory failure, multifactorial, s/p intubation  # COPD exacerbation 2/2 R pneumonia  # Right pneumothorax with bronchopleural fistula, s/p chest tube and valve placement  # MAULIK Nodule, stable  # Failed extubation on 5/22  # Hx severe COPD emphysema type (FEV1 35% on 5/6/2024)  # Subcutaneous emphysema  # Hx tobacco use, quit 2018  # Alpha 1 antitrypsin deficiency  On 3L O2 and takes trilogy inhaler at home. Presented to ED on 5/14 for SOB sent home oxycodone, augmentin, azithromycin, prednisone for RUL pneumonia. Returned to the ED on 5/18 for worsening shortness of breath generalized weakness. Patient was in severe respiratory distress started on BiPAP therapy and later intubated. CXR showed R pneumothorax- chest tube placed. Follow-up chest CT scan show large R pneumothorax, right pigtail chest tube along the major fissure, collapsed right lower lobe and right middle lobe consolidation of the right upper lobe unchanged left upper lobe nodule. Admitted to ICU and started on abx (discussed in ID section). Completed 5 days of methylpred d/t concern for possible COPD exacerbation component initially. Patient vent settings weaned and trailed extubation on 5/22. After being extubated he had a repeated episode of tension physiology requiring re-intubation and pressors. Continuous air leak concerning for bronchopleural fistula. IP preformed bronchoscopy with placement of 3 valves on 5/23 but there is still a persistent. Goal to keep airway pressures and RR low while reducing sedation as above, however if he is unable to remain synchronous and calm with lighter sedation there is concern that he could develop a left pneumothorax given his extensive emphysema. Has had increased oxygen needs after bronchial valve placement which is likely from taking out RUL oxygenation with the valve placement. Also having thick secretions which improved following addition of mucomyst. His lung disease is severe  and his condition is tenuous at this time. Unable to tolerate chest tube to water seal. Seen by transplant team and deemed not to be a lung transplant candidate at this time. Consultation with palliative ordered given concern for stability if he were to be extubated with bronchopleural fistula and severe emphysematous COPD. PS trial on 5/28 with improvement however on 70% FiO2 and desaturations after 90 minutes. Not a candidate for extubation at this time. Repeat bronch with IP on 5/28.  - IP consulted  - s/p Bronch with valve placement x3 in RUL on 5/23  - Repeat bronch on 5/28  - Pulm notified of CT PE  - Wean vent as able and if possible extubate for optimization of valves  - Formoterol with albuterol and Mucomyst BID  - Tobramycin nebs BID post airway clearance  - NO chest percussion or volera to prevent added positive pressure  - S/p steroids  - Decreased PEEP to 0 (5/25), increased back to 5 on 5/25 2/2 increased secretions and desats  - Chest tube to suction  - Failed trial to water seal on 5/25 due to increasing subcutaneous emphysema   - Repeat CXR and VBG 5/25 after water seal stable  - Failed PS trial on 5/26  - Weaning FiO2 and repeat PS trial on 5/27   - Pending VBG after PS  - MAULIK pneumonia seen on CT PE study 5/26  - Palliative care consult for [facility] discussions and family dynamics, appreciate support     FiO2 (%): 60 %, Resp: 12, Vent Mode: (S) PS (changed by MD), Resp Rate (Set): 12 breaths/min, Tidal Volume (Set, mL): 460 mL, PEEP (cm H2O): 5 cmH2O, Pressure Support (cm H2O): 5 cmH2O, Resp Rate (Set): 12 breaths/min, Tidal Volume (Set, mL): 460 mL, PEEP (cm H2O): 5 cmH2O    Cardiovascular:  # Mixed shock, obstructive (2/2 PTX 5/18) and distributive (5/24)  Hypotensive with systolic BP to 80s, tachycardic, lactate 2.8, and leukocytosis to 35 on admission to [facility] on 5/18. CT PE negative for PE but showed R pneumothorax and R pneumonia. Shock physiology likely 2/2 combination of sepsis and  obstruction initially. Chest tube placed but pneumothorax persisted, as described above. Weaned off levo on 5/22 however post extubation his chest tube kinked and the patient quickly decompensated and became hypotensive again requiring re-intubation and pressor support. On pressors on arrival to [facility]. On 5./24, required increased sedation for tachycardia and bcame hypotensive with briefly requiring pressors. Likely from sedation but ddx includes septic shock (as had Tmax 100.6, WBC and HR 110s).   - Norepi gtt, weaning as able  - sedation as above  - see below for sepsis plan  - Vasopressin gtt as needed for adjunct   - MAP goal >65  - s/p 5 days solumedrol     # Tachycardia, improved  # C/f Pulmonary embolism, ruled out  # C/f R heart strain, ruled out  Patient has been tachycardic to 100s-110s persistently since 5/24-5/26. Moderate risk for PE per Wells Criteria (6 points) for edema, tachycardia, and immobilization. On DVT prophylaxis. CT PE completed without evidence of PE or right heart strain. Tachycardia improving on 5/27 after switching abx on 5/25, suspect tachycardia 2/2 infection.   - Subcutaneous Lovenox 40 mg daily  - CT PE 5/26 - no evidence of PE or R heart strain  - EKG 5/26 - sinus tach, no significant changes  - ECHO 5/26 - New moderate RV dilation and mildly reduced function.     # Bilateral upper extremity edema  # R superficial venous thrombosis  Present since arrival at [facility] on 5/23. Nontender and equal bilaterally. Trace edema in LE. C/f possible clot vs volume overload although the patient is edematous only in the upper extremities. No evidence of DVTs on ultrasounds from 5/25 and 5/26. Some abnormal flow in the left internal jugular on 5/26 however only unilaterally. Low albumin (1.9) could be a component causing increased extravascular volume. Radiology suggesting CTV study to further characterize left internal jugular flow and/or possible obstruction, however will hold off for now pending  further knowledge of clinical course and prognosis after IP evaluates pulmonary condition on 5/27 (see pulm section).  - Bilateral UE US on 5/25  - R superficial veinous thrombosis (cephalic and basilic veins)  - No evidence of DVT but some veins hard to see d/t subQ emphysema  - Repeat BUE/BLE DVT US on 5/26 - abnormal to and fro flow in the L internal jugular. Subcutaneous air prohibiting visualization of other vessels.   - S/p lasix 20 mg IV on 5/25  - Strict I/Os     GI/Nutrition:  # Nutrition  Feeding tube dislodged during extubation on 5/22. OGT replaced on 5/23.  - TF paused on 5/28 for possible procedure, restart following  - Nutrition consult    # Elevated alk phos and AST  Alk phos and ALT elevated on 5/26. Patient with nontender and nondistended abdomen. C/f hepatic congestion 2/2 right heart strain (workup pending above) vs infectious etiology. Moderate dilation of RV on echo 5/26. Elevated WBC, although infectious less likely given he is afebrile and nontender.   - Repeat LFTs 5/27 stable  - Recheck LFTs on 5/28     # Constipation  On fentanyl gtt. Last BM on 5/26.   - Senna/miralax BID       Renal/Fluids/Electrolytes:  # Hypernatremia, improved  # Respiratory acidosis, improved  Sodium 149 at OSH. Na 147 on 5/26.  - FWD 1L for goal of 143.  - S/p D5 gtt  - S/p lasix 5/25   -  Q2H, hold at midnight 5/28 for bronchoscopy in AM   - Will restart D5 gtt in interim   - BMP BID    # Elevated cystatin C  # Elevated BUN   History of isolated elevation of BUN dating back to 12/2024 with normal creatinine. Presenting this admission with consistently elevated BUN and normal creatinine. Cystatin C elevated at 1.7 with GFR 37, suggesting possibly decreased renal function of unclear etiology. Cystatin C remains elevated with reduced GFR on 5/27.  - monitor    #Hyperkalemia, resolved  5.9 > 5.6 on 5/23 however sample noted to have slight hemolysis, given kayexalate then lokelma.   - Recheck 5.4 on 5/23 with  slight hemolysis   - If K>6 shift with insulin     Endocrine:  # S/p stress dose steroids  - Hypoglycemia protocol  - Discontinued sliding scale insulin on 5/25     ID:  # C/f septic shock 2/2 pneumonia  # Meropenem resistant pseudomonas pneumonia  # Leukocytosis, improving  Pseudomonas + sputum culture on 5/18 (pansensitive). RVP negative. Blood cultures on 5/18 NGTD. UA unremarkable. Pleural fluid from R pleural cavity on 5/21, transudative effusion per Light's criteria. Currently, pt leukocytosis suspected to be reactive without fevers given worsening pneumothorax and afebrile. Blood cultures from 5/18 NGTD. Second set obtained on 5/22. Procal is mildly elevated, but without other signs of worsening bacterial infection. Ordered repeat sputum sample on 5/23 which continued to grow pseudomonas, switched to cefepime on 5/25 due to better susceptibility. Culture from 5/23 found to be meropenem resistant. Suspect this is responsible for persistently elevated WBC, procal, and CRP given the patient did not have full coverage until 5/25. Leukocytosis improving on 5/27.  - work up and antibiotics as below     Micro:   - Blood cultures from 5/18 NGTD, repeated cultures collected 5/22 and 5/24 NGTD  - MRSA nares negative  - UA bland on 5/23  - Sputum Cx (5/23) positive for Pseudomonas aeruginosa (resistant to meropenem)  - Sputum Cx (5/25) positive for Pseudomonas aeruginosa (collected right after switching to cefepime) and Aspergillus     Abx:   - Cefepime 2 g (5/25- current)  - Tobramycin neb BID (5/25- current)  - Ceftazidime 2 g Q8H (5/23-5/25)  - S/p meropenem and azithromycin (5/18-5/23)  - S/p zosyn (5/18)  - S/p vancomycin (5/19)     Hematology:    # Leukocytosis as above  # Thrombocytopenia, resolved  Noted plt 133 at OSH. Had been down trending from 5/14. Possible 2/2 critical illness. On 5/23 stable at 144.  - CTM     Musculoskeletal:  - PTOT to evaluate     Skin:  # Subcutaneous Emphysema 2/2 Pneumothorax  -  CTM     General Cares/Prophylaxis:    DVT Prophylaxis: Enoxaparin (Lovenox) subcutaneous (holding pending procedure)  GI Prophylaxis: PPI  Restraints: none  Family Communication: Wife, Trini Terrell [phone]  Code Status: Full     Lines/tubes/drains:   - PICC R brachial  - Urinary catheter (has chronic obstruction from prostate cancer)  - ETT  - PIV L forearm  - Rt sided Chest Tube  - OGT     Disposition:  - Medical ICU     Patient seen and findings/plan discussed with medical ICU staff, Dr. Mcdonough.    Alejandrina Milan, MS4    Resident/Fellow Attestation   I, Fer Francis MD, was present with the medical/BROOKE student who participated in the service and in the documentation of the note.  I have verified the history and personally performed the physical exam and medical decision making.  I agree with the assessment and plan of care as documented in the note.        Fer Francis MD  PGY1  Date of Service (when I saw the patient): 05/27/25     Clinically Significant Risk Factors         # Hypernatremia: Highest Na = 149 mmol/L in last 2 days, will monitor as appropriate  # Hyperchloremia: Highest Cl = 112 mmol/L in last 2 days, will monitor as appropriate          # Hypoalbuminemia: Lowest albumin = 1.1 g/dL at 5/22/2025  9:30 PM, will monitor as appropriate  # Coagulation Defect: INR = 1.19 (Ref range: 0.85 - 1.15) and/or PTT = N/A, will monitor for bleeding                # Severe Malnutrition: based on nutrition assessment and treatment provided per dietitian's recommendations.    # Financial/Environmental Concerns: none                  ====================================  INTERVAL HISTORY:   Overnight patient had some soft blood pressures requiring norepi drip up to 0.05. Continues to have thin secretions requiring intermittent suctioning. Able to wean propofol to 10 and FiO2 to 70 overnight. Pt had large BM overnight. This morning he is alert and interactive, able to answer questions by nodding and  shaking his head. Denies pain or increased work of breathing.      OBJECTIVE:   1. VITAL SIGNS:   Temp:  [98.2  F (36.8  C)-98.8  F (37.1  C)] 98.2  F (36.8  C)  Pulse:  [] 92  Resp:  [11-20] 12  BP: ()/(52-80) 102/59  FiO2 (%):  [60 %-80 %] 60 %  SpO2:  [89 %-98 %] 94 %  FiO2 (%): 60 %, Resp: 12, Vent Mode: (S) PS (changed by MD), Resp Rate (Set): 12 breaths/min, Tidal Volume (Set, mL): 460 mL, PEEP (cm H2O): 5 cmH2O, Pressure Support (cm H2O): 5 cmH2O, Resp Rate (Set): 12 breaths/min, Tidal Volume (Set, mL): 460 mL, PEEP (cm H2O): 5 cmH2O  2. INTAKE/ OUTPUT:   I/O last 3 completed shifts:  In: 3425.56 [I.V.:1020.56; NG/GT:1515; IV Piggyback:210]  Out: 1995 [Urine:1815; Chest Tube:180]    3. PHYSICAL EXAMINATION:  General: No acute distress. Follows commands.  Neuro: Alert and follows commands appropriately. No focal deficits appreciated  Pulm/Resp: Diminished breath sounds scattered. Some course breath sounds and crackles bilaterally. Having thin secretions requiring intermittent suctioning.   CV: Regular rate and rhythm. No murmurs or gallops  Abdomen: Soft, non-distended, non-tender  :  capone catheter in place, urine yellow and clear  Incisions/Skin: Diffuse subcutaneous empyhysema in chest and neck. Crepitus present. UE edema bilaterally (equal and nontender). Trace bilateral lower extremity edema.     4. LABS:   Arterial Blood Gases   Recent Labs   Lab 05/22/25  1714 05/22/25  1457 05/21/25  0427 05/20/25  1449   PH 7.41 7.39 7.44 7.40   PCO2 52* 52* 38 39   PO2 122* 86 83 75*   HCO3 33* 31* 26 24     Complete Blood Count   Recent Labs   Lab 05/27/25  0436 05/26/25  0327 05/25/25  0313 05/24/25  2154 05/24/25  0309   WBC 23.8* 40.8* 37.2*  --  31.7*   HGB 12.5* 13.6 14.2  --  13.8    185 125* 127* 119*     Basic Metabolic Panel  Recent Labs   Lab 05/27/25  0753 05/27/25  0442 05/27/25  0436 05/27/25  0048 05/26/25 2001 05/26/25  1729 05/26/25  0327 05/26/25  0002 05/25/25 1954  05/25/25  0314 05/25/25  0313   NA  --   --  147*  --   --  146* 147*  --  149*   < > 148*   POTASSIUM  --   --  4.1  --   --  3.9 4.0  --   --   --  4.2   CHLORIDE  --   --  109*  --   --  109* 112*  --   --   --  112*   CO2  --   --  32*  --   --  32* 29  --   --   --  32*   BUN  --   --  65.7*  --   --  67.5* 59.0*  --   --   --  57.0*   CR  --   --  1.01  --   --  1.02 0.78  --   --   --  0.77   GLC 98 95 109* 99   < > 103* 115*   < > 99   < > 117*    < > = values in this interval not displayed.     Liver Function Tests  Recent Labs   Lab 05/27/25  0436 05/26/25  1729 05/26/25  0327 05/22/25  2130   AST 72*  --  85*  --    ALT 50  --  61  --    ALKPHOS 175*  --  200* 193*   BILITOTAL 0.7  --  0.7 0.9   ALBUMIN 1.6*  --  1.5* 1.1*   INR  --  1.19*  --   --      Coagulation Profile  Recent Labs   Lab 05/26/25  1729   INR 1.19*       5. RADIOLOGY:   Recent Results (from the past 24 hours)   CT Chest Pulmonary Embolism w Contrast    Narrative    EXAMINATION: CTA pulmonary angiogram, 5/26/2025 11:06 AM     COMPARISON: X-ray same day. CT 5/22/2025, 5/18/2025.    HISTORY: History of severe COPD, emphysema with right pneumothorax.    TECHNIQUE: Volumetric helical acquisition of CT images of the chest  from the lung apices to the kidneys were acquired after the  administration of 80 mL of Isovue-370 IV contrast. .  Post-processed  multiplanar and/or MIP reformations were obtained, archived to PACS  and used in interpretation of this study.     FINDINGS:    Contrast bolus is: excellent.  Exam is negative for acute pulmonary  embolism. No evidence of right heart strain. No paradoxical  intraventricular bowing, no reflux of contrast into the hepatic veins.        : Unremarkable.    Lines and tubes: Endotracheal tube is in the mid thoracic trachea.  Enteric tube tip enters the stomach, exits the field of view. Right  apically oriented chest tube.  Right-sided PICC with tip in the low  SVC.    Thyroid: Thyroid appears  unremarkable.    Mediastinum: The heart is not enlarged. Small pericardial effusion.     Vessels: Thoracic aorta and main pulmonary artery are normal in  caliber.    Lymph nodes: No enlarged axillary or mediastinal lymph nodes by short  axis criteria.    Airways: Central airways are patent.     Lungs/Pleura:   Small right pneumothorax, decreased from CT 5/18/2025.  Destructive upper lobe predominant centrilobular emphysema. Increased  opacities in the left lower and upper lobes, and left lower lobe  atelectasis. Small atelectasis in the right lower lobe. Small right  and trace left pleural effusions. Improved aeration in the right upper  lobe.    Upper abdomen: Visualized portions of the upper abdomen are  unremarkable. Cholelithiasis.    Soft Tissues: Extensive subcutaneous emphysema in the bilateral neck,  chest and back.    Bones:  No acute or suspicious osseous abnormality.        Impression    IMPRESSION:   1. Exam is negative for acute pulmonary embolism. No evidence of right  heart strain.    2. New consolidative opacity in the left upper lobe, suspicious for  infection.  3. Decrease in small right pneumothorax with apically oriented chest  tube in place. Decrease in neck/chest subcutaneous emphysema.  4. Small, right greater than left pleural effusions. Left lower lobe  atelectasis.      In the event of a positive result for acute pulmonary embolism  resulting in right heart strain, consider calling the   [facility] patient placement ([phone]) for PERT (Pulmonary Embolism  Response Team) Activation?    PERT -- Pulmonary Embolism Response Team (Multidisciplinary team  including cardiology, interventional radiology, critical care,  hematology)    I have personally reviewed the examination and initial interpretation  and I agree with the findings.    SHERRIE TATE MD         SYSTEM ID:  [id number]   Echo Complete   Result Value    LVEF  60-65%    Narrative    [id number]  [id number]  [id number]  [id number]^ESTEPHANIA^JUAN      [facility],[place]  Echocardiography Laboratory  [address] [place] [address]     Name: AMANDEEP HERRERA  MRN: [id number]  : 1955  Study Date: 2025 12:02 PM  Age: 69 yrs  Gender: Male  [place] [facility] [place] For Study: Respiratory Failure  Ordering Physician: JUAN BEST  Referring Physician: PIERO CAMEJO  Performed By: Donavon Vanegas     BSA: 2.0 m2  Height: 72 in  Weight: 163 lb  BP: 99/74 mmHg  ______________________________________________________________________________  Procedure  Bubble Echocardiogram with two-dimensional, color and spectral Doppler.  ______________________________________________________________________________  Interpretation Summary  Global and regional left ventricular function is normal with an EF of 60-65%.  Moderate right ventricular dilation is present.  Global right ventricular function is mildly reduced.  Unable to assess mean RA pressure given the patient is on a ventilator.  No pericardial effusion is present.  No significant valvular abnormalities present.  There was no shunt at the atrial septal level as assessed by agitated saline  bubble study at rest and with Valsalva maneuver.     This study was compared with the study from 2024 .  RV findings are new.  ______________________________________________________________________________  Left Ventricle  Left ventricular size is normal. Left ventricular wall thickness is normal.  Global and regional left ventricular function is normal with an EF of 60-65%.  Left ventricular diastolic function is normal.     Right Ventricle  Moderate right ventricular dilation is present. Global right ventricular  function is mildly reduced.     Atria  The left atrium appears normal. Mild right atrial enlargement is present.  There was no shunt at the atrial septal level as assessed by agitated saline  bubble study at rest and with Valsalva maneuver.     Mitral Valve  The  mitral valve is normal.     Aortic Valve  Aortic valve is normal in structure and function.     Tricuspid Valve  The tricuspid valve is normal. Pulmonary artery systolic pressure cannot be  assessed.     Pulmonic Valve  On Doppler interrogation, there is no significant stenosis or regurgitation.     Vessels  The aorta root is normal. Unable to assess mean RA pressure given the patient  is on a ventilator.     Pericardium  No pericardial effusion is present.     Miscellaneous  No significant valvular abnormalities present.     Compared to Previous Study  This study was compared with the study from 7/2024 . RV findings are new.  ______________________________________________________________________________  MMode/2D Measurements & Calculations  LVOT diam: 2.3 cm  LVOT area: 4.2 cm2     Doppler Measurements & Calculations  MV E max jasmin: 64.6 cm/sec  MV A max jasmin: 52.5 cm/sec  MV E/A: 1.2  MV dec slope: 352.8 cm/sec2  MV dec time: 0.18 sec  PA acc time: 0.05 sec     ______________________________________________________________________________  Report approved by: LILO FONTANEZ MD on 05/26/2025 01:08 PM         US Lower Extremity Venous Duplex Bilateral    Narrative    EXAMINATION: DOPPLER VENOUS ULTRASOUND OF BILATERAL LOWER EXTREMITIES,  5/26/2025 3:56 PM     COMPARISON: None.    HISTORY: swelling, r/o DVT    TECHNIQUE:  Gray-scale evaluation with compression, spectral flow and  color Doppler assessment of the deep venous system of both legs from  groin to knee, and then at the ankles.    FINDINGS:  In both lower extremities, the common femoral, femoral, popliteal and  posterior tibial veins demonstrate normal compressibility and blood  flow. Peroneal veins not visualized.      Impression    IMPRESSION:  No evidence of deep venous thrombosis in either lower extremity.    I have personally reviewed the examination and initial interpretation  and I agree with the findings.    SHERRIE TATE MD         SYSTEM ID:  [id number]    US Upper Ext Venous Duplex Limited Bilat    Narrative    EXAMINATION: DOPPLER VENOUS ULTRASOUND OF BILATERAL UPPER EXTREMITIES,  5/26/2025 3:57 PM     COMPARISON: None.    HISTORY:  swelling, interval eval ?progression of thrombi; DONE  YESTERDAY WANT REPEATED TO LOOK FOR EXTENSION - SK    TECHNIQUE:  Gray-scale evaluation with compression, spectral flow, and  color Doppler assessment of the deep venous system of both upper  extremities.    FINDINGS:  Extensive subcutaneous air along the chest and neck limits evaluation.  The right internal jugular and bilateral innominate, subclavian and  axillary veins are not visualized. To and fro flow in the left  internal jugular vein.      Impression    IMPRESSION:  Significantly limited ultrasound evaluation due to subcutaneous air  along the chest/neck.    1. Abnormal to and fro flow in the left internal jugular vein, could  be due to upstream abnormality not visualized on this ultrasound.  Consider follow-up chest CT venogram for further evaluation.  2. Remainder of the vessels are not visualized, as described above.    I have personally reviewed the examination and initial interpretation  and I agree with the findings.    SHERRIE TATE MD         SYSTEM ID:  [id number]   XR Chest Port 1 View    Narrative    Portable chest    INDICATION: Interval assessment of right pneumothorax an subcutaneous  emphysema    COMPARISON: Chest CT yesterday. Portable plain film also yesterday    FINDINGS: No significant changes in the right greater than left  extensive subcutaneous emphysema at the chest wall and neck regions  bilaterally. Heart size remains normal a large-bore right chest  catheter again present. Endotracheal tube tip again approximately 3.1  cm above the isa. NG/OG tube beyond the inferior margin of the  image. Right PICC line tip in the distal SVC. Pneumothorax likely  obscured by the extensive overlying air.      Impression    IMPRESSION: No significant changes from  yesterday morning. CT  yesterday again show diffuse obtained emphysema as well as  pneumomediastinum an prominent right pneumothorax which is somewhat  skewed by the extensive overlying air on this nonupright radiograph    ELVIRA STEPHENS MD         SYSTEM ID:  [id number]

## 2025-05-27 NOTE — PROGRESS NOTES
Interventional Pulmonology          Inpatient progress Note                   May 27, 2025            Patient: Armin Terrell    Date of Admission: 5/22/2025  Reason for Consultation: BP fistula  Requesting Physician: JORDAN Ramos CNP  [address] [place] [address]      Assessment:   A 68 YO M with PMH significant for chronic hypoxic respiratory failure, severe emphysematous COPD, alpha-1 antitrypsin deficiency, and prostate cancer S/p radical prostatectomy.  He was transferred from [facility] last night for BP assessment and valve insertion. He  was admitted there to the ICU and intubated due to pseudomonas PNA, septic shock and PTX with continuous air leak. He failed extubation on 5/22 due to repeat episode of tension physiology d/t kink in chest tube following extubation resulting in worsening pneumothorax.    I personally revived his CT scans and CXR from the outside hospital. Showing significant emphysema with right sided large PTX and PNA changes. Patient is currently sedated and intubated on 5 of peep and 60 % FIO2. Chest tube closely examined seemed to be in good position, continuous air leak is seen on the pleural vac container.     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     Plan:  - We recommend extubation as soon as the patient qualifies for extubation.  - If he stays on the vent until tomorrow then we will do bedside bronchoscopy in the morning at 8:30 am to inspect/revise the RUL valves and possibly block the RML as well.   - He doesn't have to stay on the vent today if he qualify for extubation.   - NPO after midnight and stop the lovenox please.     Patient seen and discussed with Dr. Davis .     Jacob York  Interventional Pulmonary Fellow    Pager: [phone]  [phone]            Still on the vent   Still having air leak.            Data:   All pertinent laboratory and imaging data reviewed.           Past Medical History:   No past medical history on file.         Past Surgical History:     Past Surgical History:   Procedure Laterality Date    IR CHEST TUBE REPOSITION NON TUNNELED RIGHT  2025    PICC TRIPLE LUMEN PLACEMENT  2025    CO LAP,PROSTATECTOMY,RADICAL,W/NERVE SPARE,INCL ROBOTIC Bilateral 3/19/2019    Procedure: ROBOTIC ASSISTED RADICAL RETROPUBIC PROSTATECTOMY BILATERAL PELVIC LYMPH NODE DISSECTION, LYSIS OF ADHESIONS, REPAIR OF RIGHT INGUINAL HERNIA;  Surgeon: Candido Angelo MD;  [place] [facility] [place]  Service: Urology            Social History:     Social History     Socioeconomic History    Marital status:      Spouse name: Not on file    Number of children: Not on file    Years of education: Not on file    Highest education level: Not on file   Occupational History    Not on file   Tobacco Use    Smoking status: Former     Current packs/day: 0.00     Average packs/day: 0.8 packs/day for 30.0 years (22.5 ttl pk-yrs)     Types: Cigarettes     Start date: 1987     Quit date: 2017     Years since quittin.6     Passive exposure: Current (Wife smokes)    Smokeless tobacco: Former   Vaping Use    Vaping status: Never Used   Substance and Sexual Activity    Alcohol use: Yes     Alcohol/week: 4.0 - 5.0 standard drinks of alcohol     Comment: Alcoholic Drinks/day: drinks on weekends.    Drug use: No    Sexual activity: Not on file   Other Topics Concern    Not on file   Social History Narrative    Not on file     Social Drivers of Health     Financial Resource Strain: Unknown (2025)    Financial Resource Strain     Within the past 12 months, have you or your family members you live with been unable to get utilities (heat, electricity) when it was really needed?: Patient unable to answer   Food Insecurity: Unknown  (5/23/2025)    Food Insecurity     Within the past 12 months, did you worry that your food would run out before you got money to buy more?: Patient unable to answer     Within the past 12 months, did the food you bought just not last and you didn t have money to get more?: Patient unable to answer   Transportation Needs: Unknown (5/23/2025)    Transportation Needs     Within the past 12 months, has lack of transportation kept you from medical appointments, getting your medicines, non-medical meetings or appointments, work, or from getting things that you need?: Patient unable to answer   Physical Activity: Not on file   Stress: Not on file   Social Connections: Not on file   Interpersonal Safety: Unknown (5/23/2025)    Interpersonal Safety     Do you feel physically and emotionally safe where you currently live?: Patient unable to answer     Within the past 12 months, have you been hit, slapped, kicked or otherwise physically hurt by someone?: Patient unable to answer     Within the past 12 months, have you been humiliated or emotionally abused in other ways by your partner or ex-partner?: Patient unable to answer   Housing Stability: Unknown (5/23/2025)    Housing Stability     Do you have housing? : Patient unable to answer     Are you worried about losing your housing?: Patient unable to answer            Family History:   No family history on file.         Allergies:   No Known Allergies         Medications:     Current Facility-Administered Medications   Medication Dose Route Frequency Provider Last Rate Last Admin    acetylcysteine (MUCOMYST) 20 % nebulizer solution 2 mL  2 mL Nebulization BID Kaitlin Grijalva APRN CNP   2 mL at 05/27/25 0830    albuterol (PROVENTIL) neb solution 2.5 mg  2.5 mg Nebulization 2 times daily Kaitlin Grijalva APRN CNP   2.5 mg at 05/27/25 0830    ceFEPIme (MAXIPIME) 2 g vial to attach to  mL bag for ADULTS or NS 50 mL bag for PEDS  2 g Intravenous Q8H Kaitlin Grijalva APRN CNP  200 mL/hr at 05/26/25 0057 2 g at 05/27/25 0958    chlorhexidine (PERIDEX) 0.12 % solution 15 mL  15 mL Mouth/Throat Q12H Fer Francis MD   15 mL at 05/27/25 0739    enoxaparin ANTICOAGULANT (LOVENOX) injection 40 mg  40 mg Subcutaneous Daily Fer Francis MD   40 mg at 05/27/25 1214    formoterol (PERFOROMIST) neb solution 20 mcg  20 mcg Nebulization Q12H Fer Francis MD   20 mcg at 05/27/25 0955    multivitamin, therapeutic (THERA-VIT) tablet 1 tablet  1 tablet Oral or Feeding Tube Daily Bryon Palmer MD   1 tablet at 05/27/25 0739    oxyCODONE (ROXICODONE) tablet 5 mg  5 mg Oral or Feeding Tube Q6H Kaitlin Grijalva APRN CNP   5 mg at 05/27/25 0958    pantoprazole (PROTONIX) 2 mg/mL suspension 40 mg  40 mg Per Feeding Tube QAM AC Bryon Palmer MD   40 mg at 05/27/25 0739    polyethylene glycol (MIRALAX) Packet 17 g  17 g Oral or Feeding Tube BID Bryon Palmer MD   17 g at 05/25/25 1940    Prosource TF20 ENfit Compatibl EN LIQD (PROSOURCE TF20) packet 60 mL  1 packet Per Feeding Tube Daily Fer Francis MD   60 mL at 05/27/25 0740    sennosides (SENOKOT) tablet 2 tablet  2 tablet Oral BID Kaitlin Grijalva APRN CNP        sodium chloride (PF) 0.9% PF flush 10-40 mL  10-40 mL Intracatheter Q7 Days Nancy Argentina PRASHANTH APRN CNP        sodium chloride (PF) 0.9% PF flush 10-40 mL  10-40 mL Intracatheter Daily Elli Cruzecca PRASHANTH APRN CNP   10 mL at 05/27/25 0433    sodium chloride (PF) 0.9% PF flush 3 mL  3 mL Intracatheter Q8H Nancy Argentina PRASHANTH APRN CNP   3 mL at 05/27/25 0100    tobramycin (PF) (DONELL) neb solution 300 mg  300 mg Nebulization 2 times daily Kaitlin Grijalva APRN CNP   300 mg at 05/27/25 0830         Review of Systems:  Unable to assess.          Physical Exam:   Temp:  [98.2  F (36.8  C)-98.8  F (37.1  C)] 98.6  F (37  C)  Pulse:  [] 113  Resp:  [11-21] 19  BP: ()/(52-80) 103/66  FiO2 (%):  [60 %-80 %] 60 %  SpO2:  [90 %-96 %] 92 %      General: sedated and  intubated.   Neck: Trachea supple/midline.   Pulm: Clear breath sounds b/l, no crackles, no wheezes. Chest tube in place.   CV: RRR, no murmurs  Abdomen: Soft, non-tender, non-distended   MSK: No edema, no clubbing   Neurologic: No focal deficits  Skin: sub-q emphysema seen on the upper torso and neck.   Psych: unable to assess.

## 2025-05-28 PROBLEM — N17.9 ACUTE KIDNEY FAILURE, UNSPECIFIED: Status: ACTIVE | Noted: 2025-01-01

## 2025-05-28 LAB
ALBUMIN SERPL BCG-MCNC: 1.4 G/DL (ref 3.5–5.2)
ALP SERPL-CCNC: 164 U/L (ref 40–150)
ALT SERPL W P-5'-P-CCNC: 46 U/L (ref 0–70)
ANION GAP SERPL CALCULATED.3IONS-SCNC: 7 MMOL/L (ref 7–15)
ANION GAP SERPL CALCULATED.3IONS-SCNC: 7 MMOL/L (ref 7–15)
AST SERPL W P-5'-P-CCNC: 72 U/L (ref 0–45)
ATRIAL RATE - MUSE: 115 BPM
BACTERIA SPEC CULT: NO GROWTH
BACTERIA SPEC CULT: NO GROWTH
BACTERIA SPT CULT: ABNORMAL
BASE EXCESS BLDV CALC-SCNC: 4.1 MMOL/L (ref -3–3)
BASE EXCESS BLDV CALC-SCNC: 5.1 MMOL/L (ref -3–3)
BASE EXCESS BLDV CALC-SCNC: 5.3 MMOL/L (ref -3–3)
BASOPHILS # BLD AUTO: 0.1 10E3/UL (ref 0–0.2)
BASOPHILS NFR BLD AUTO: 0 %
BILIRUB SERPL-MCNC: 0.8 MG/DL
BILIRUBIN DIRECT (ROCHE PRO & PURE): 0.46 MG/DL (ref 0–0.45)
BUN SERPL-MCNC: 65.8 MG/DL (ref 8–23)
BUN SERPL-MCNC: 68.1 MG/DL (ref 8–23)
CALCIUM SERPL-MCNC: 7.3 MG/DL (ref 8.8–10.4)
CALCIUM SERPL-MCNC: 7.9 MG/DL (ref 8.8–10.4)
CHLORIDE SERPL-SCNC: 100 MMOL/L (ref 98–107)
CHLORIDE SERPL-SCNC: 106 MMOL/L (ref 98–107)
CREAT SERPL-MCNC: 1.06 MG/DL (ref 0.67–1.17)
CREAT SERPL-MCNC: 1.16 MG/DL (ref 0.67–1.17)
CRP SERPL-MCNC: 245 MG/L
CYSTATIN C (ROCHE): 1.8 MG/L (ref 0.6–1)
DIASTOLIC BLOOD PRESSURE - MUSE: NORMAL MMHG
EGFRCR SERPLBLD CKD-EPI 2021: 68 ML/MIN/1.73M2
EGFRCR SERPLBLD CKD-EPI 2021: 76 ML/MIN/1.73M2
EOSINOPHIL # BLD AUTO: 0.3 10E3/UL (ref 0–0.7)
EOSINOPHIL NFR BLD AUTO: 2 %
ERYTHROCYTE [DISTWIDTH] IN BLOOD BY AUTOMATED COUNT: 15.5 % (ref 10–15)
GFR/BSA.PRED SERPLBLD CYS-BASED-ARV: 34 ML/MIN/1.73M2
GLUCOSE BLDC GLUCOMTR-MCNC: 104 MG/DL (ref 70–99)
GLUCOSE BLDC GLUCOMTR-MCNC: 112 MG/DL (ref 70–99)
GLUCOSE BLDC GLUCOMTR-MCNC: 83 MG/DL (ref 70–99)
GLUCOSE BLDC GLUCOMTR-MCNC: 96 MG/DL (ref 70–99)
GLUCOSE SERPL-MCNC: 101 MG/DL (ref 70–99)
GLUCOSE SERPL-MCNC: 368 MG/DL (ref 70–99)
GRAM STAIN RESULT: ABNORMAL
GRAM STAIN RESULT: ABNORMAL
HCO3 BLDV-SCNC: 30 MMOL/L (ref 21–28)
HCO3 BLDV-SCNC: 32 MMOL/L (ref 21–28)
HCO3 BLDV-SCNC: 33 MMOL/L (ref 21–28)
HCO3 SERPL-SCNC: 29 MMOL/L (ref 22–29)
HCO3 SERPL-SCNC: 30 MMOL/L (ref 22–29)
HCT VFR BLD AUTO: 36.1 % (ref 40–53)
HGB BLD-MCNC: 11.5 G/DL (ref 13.3–17.7)
IMM GRANULOCYTES # BLD: 0.6 10E3/UL
IMM GRANULOCYTES NFR BLD: 3 %
INTERPRETATION ECG - MUSE: NORMAL
LYMPHOCYTES # BLD AUTO: 1 10E3/UL (ref 0.8–5.3)
LYMPHOCYTES NFR BLD AUTO: 5 %
MCH RBC QN AUTO: 28.2 PG (ref 26.5–33)
MCHC RBC AUTO-ENTMCNC: 31.9 G/DL (ref 31.5–36.5)
MCV RBC AUTO: 89 FL (ref 78–100)
MONOCYTES # BLD AUTO: 0.8 10E3/UL (ref 0–1.3)
MONOCYTES NFR BLD AUTO: 3 %
NEUTROPHILS # BLD AUTO: 20.4 10E3/UL (ref 1.6–8.3)
NEUTROPHILS NFR BLD AUTO: 88 %
NRBC # BLD AUTO: 0 10E3/UL
NRBC BLD AUTO-RTO: 0 /100
O2/TOTAL GAS SETTING VFR VENT: 55 %
O2/TOTAL GAS SETTING VFR VENT: 60 %
O2/TOTAL GAS SETTING VFR VENT: 80 %
OXYHGB MFR BLDV: 63 % (ref 70–75)
OXYHGB MFR BLDV: 72 % (ref 70–75)
OXYHGB MFR BLDV: 82 % (ref 70–75)
P AXIS - MUSE: 77 DEGREES
PCO2 BLDV: 50 MM HG (ref 40–50)
PCO2 BLDV: 58 MM HG (ref 40–50)
PCO2 BLDV: 62 MM HG (ref 40–50)
PH BLDV: 7.33 [PH] (ref 7.32–7.43)
PH BLDV: 7.35 [PH] (ref 7.32–7.43)
PH BLDV: 7.39 [PH] (ref 7.32–7.43)
PHOSPHATE SERPL-MCNC: 3.5 MG/DL (ref 2.5–4.5)
PLAT MORPH BLD: NORMAL
PLATELET # BLD AUTO: 264 10E3/UL (ref 150–450)
PO2 BLDV: 32 MM HG (ref 25–47)
PO2 BLDV: 39 MM HG (ref 25–47)
PO2 BLDV: 46 MM HG (ref 25–47)
POTASSIUM SERPL-SCNC: 3.8 MMOL/L (ref 3.4–5.3)
POTASSIUM SERPL-SCNC: 4 MMOL/L (ref 3.4–5.3)
PR INTERVAL - MUSE: 120 MS
PROCALCITONIN SERPL IA-MCNC: 3.61 NG/ML
PROT SERPL-MCNC: 4.6 G/DL (ref 6.4–8.3)
QRS DURATION - MUSE: 66 MS
QT - MUSE: 290 MS
QTC - MUSE: 401 MS
R AXIS - MUSE: 40 DEGREES
RBC # BLD AUTO: 4.08 10E6/UL (ref 4.4–5.9)
RBC MORPH BLD: NORMAL
SAO2 % BLDV: 64.4 % (ref 70–75)
SAO2 % BLDV: 73.3 % (ref 70–75)
SAO2 % BLDV: 83.3 % (ref 70–75)
SODIUM SERPL-SCNC: 136 MMOL/L (ref 135–145)
SODIUM SERPL-SCNC: 143 MMOL/L (ref 135–145)
SYSTOLIC BLOOD PRESSURE - MUSE: NORMAL MMHG
T AXIS - MUSE: 74 DEGREES
VENTRICULAR RATE- MUSE: 115 BPM
WBC # BLD AUTO: 23.3 10E3/UL (ref 4–11)

## 2025-05-28 PROCEDURE — 84100 ASSAY OF PHOSPHORUS: CPT

## 2025-05-28 PROCEDURE — 999N000185 HC STATISTIC TRANSPORT TIME EA 15 MIN

## 2025-05-28 PROCEDURE — 250N000011 HC RX IP 250 OP 636

## 2025-05-28 PROCEDURE — 94640 AIRWAY INHALATION TREATMENT: CPT

## 2025-05-28 PROCEDURE — 0BP08DZ REMOVAL OF INTRALUMINAL DEVICE FROM TRACHEOBRONCHIAL TREE, VIA NATURAL OR ARTIFICIAL OPENING ENDOSCOPIC: ICD-10-PCS | Performed by: INTERNAL MEDICINE

## 2025-05-28 PROCEDURE — 258N000003 HC RX IP 258 OP 636

## 2025-05-28 PROCEDURE — 99291 CRITICAL CARE FIRST HOUR: CPT | Mod: 25 | Performed by: INTERNAL MEDICINE

## 2025-05-28 PROCEDURE — 94003 VENT MGMT INPAT SUBQ DAY: CPT

## 2025-05-28 PROCEDURE — 0BH48GZ INSERTION OF ENDOBRONCHIAL VALVE INTO RIGHT UPPER LOBE BRONCHUS, VIA NATURAL OR ARTIFICIAL OPENING ENDOSCOPIC: ICD-10-PCS | Performed by: INTERNAL MEDICINE

## 2025-05-28 PROCEDURE — 250N000013 HC RX MED GY IP 250 OP 250 PS 637: Performed by: INTERNAL MEDICINE

## 2025-05-28 PROCEDURE — 999N000157 HC STATISTIC RCP TIME EA 10 MIN

## 2025-05-28 PROCEDURE — 84145 PROCALCITONIN (PCT): CPT

## 2025-05-28 PROCEDURE — 85014 HEMATOCRIT: CPT

## 2025-05-28 PROCEDURE — 272N000069 HC FORCEP BIOPSY BRONCH

## 2025-05-28 PROCEDURE — 80053 COMPREHEN METABOLIC PANEL: CPT

## 2025-05-28 PROCEDURE — 250N000013 HC RX MED GY IP 250 OP 250 PS 637

## 2025-05-28 PROCEDURE — 200N000002 HC R&B ICU UMMC

## 2025-05-28 PROCEDURE — 80051 ELECTROLYTE PANEL: CPT

## 2025-05-28 PROCEDURE — 82610 CYSTATIN C: CPT

## 2025-05-28 PROCEDURE — 250N000011 HC RX IP 250 OP 636: Performed by: NURSE PRACTITIONER

## 2025-05-28 PROCEDURE — 0BH58GZ INSERTION OF ENDOBRONCHIAL VALVE INTO RIGHT MIDDLE LOBE BRONCHUS, VIA NATURAL OR ARTIFICIAL OPENING ENDOSCOPIC: ICD-10-PCS | Performed by: INTERNAL MEDICINE

## 2025-05-28 PROCEDURE — 94640 AIRWAY INHALATION TREATMENT: CPT | Mod: 76

## 2025-05-28 PROCEDURE — 82805 BLOOD GASES W/O2 SATURATION: CPT

## 2025-05-28 PROCEDURE — 250N000009 HC RX 250

## 2025-05-28 PROCEDURE — 31625 BRONCHOSCOPY W/BIOPSY(S): CPT

## 2025-05-28 PROCEDURE — 86140 C-REACTIVE PROTEIN: CPT

## 2025-05-28 RX ORDER — VORICONAZOLE 40 MG/ML
200 POWDER, FOR SUSPENSION ORAL EVERY 12 HOURS SCHEDULED
Status: DISCONTINUED | OUTPATIENT
Start: 2025-05-29 | End: 2025-05-31 | Stop reason: HOSPADM

## 2025-05-28 RX ORDER — LIDOCAINE HYDROCHLORIDE 10 MG/ML
INJECTION, SOLUTION EPIDURAL; INFILTRATION; INTRACAUDAL; PERINEURAL
Status: DISPENSED
Start: 2025-05-28 | End: 2025-05-28

## 2025-05-28 RX ORDER — FUROSEMIDE 10 MG/ML
20 INJECTION INTRAMUSCULAR; INTRAVENOUS ONCE
Status: COMPLETED | OUTPATIENT
Start: 2025-05-28 | End: 2025-05-28

## 2025-05-28 RX ADMIN — CEFEPIME 2 G: 2 INJECTION, POWDER, FOR SOLUTION INTRAVENOUS at 10:48

## 2025-05-28 RX ADMIN — BUDESONIDE 0.5 MG: 0.5 INHALANT RESPIRATORY (INHALATION) at 19:44

## 2025-05-28 RX ADMIN — DEXTROSE MONOHYDRATE: 50 INJECTION, SOLUTION INTRAVENOUS at 00:05

## 2025-05-28 RX ADMIN — FUROSEMIDE 20 MG: 10 INJECTION, SOLUTION INTRAMUSCULAR; INTRAVENOUS at 20:42

## 2025-05-28 RX ADMIN — IPRATROPIUM BROMIDE AND ALBUTEROL SULFATE 3 ML: .5; 3 SOLUTION RESPIRATORY (INHALATION) at 08:04

## 2025-05-28 RX ADMIN — TOBRAMYCIN 300 MG: 300 SOLUTION RESPIRATORY (INHALATION) at 08:06

## 2025-05-28 RX ADMIN — POLYETHYLENE GLYCOL 3350 17 G: 17 POWDER, FOR SOLUTION ORAL at 20:35

## 2025-05-28 RX ADMIN — FORMOTEROL FUMARATE DIHYDRATE 20 MCG: 20 SOLUTION RESPIRATORY (INHALATION) at 08:05

## 2025-05-28 RX ADMIN — Medication 50 MCG: at 08:21

## 2025-05-28 RX ADMIN — ACETYLCYSTEINE 2 ML: 200 SOLUTION ORAL; RESPIRATORY (INHALATION) at 08:04

## 2025-05-28 RX ADMIN — Medication 10 MG: at 08:20

## 2025-05-28 RX ADMIN — CEFEPIME 2 G: 2 INJECTION, POWDER, FOR SOLUTION INTRAVENOUS at 00:07

## 2025-05-28 RX ADMIN — MIDAZOLAM 2 MG: 1 INJECTION INTRAMUSCULAR; INTRAVENOUS at 09:50

## 2025-05-28 RX ADMIN — OXYCODONE HYDROCHLORIDE 5 MG: 5 TABLET ORAL at 04:23

## 2025-05-28 RX ADMIN — FORMOTEROL FUMARATE DIHYDRATE 20 MCG: 20 SOLUTION RESPIRATORY (INHALATION) at 19:44

## 2025-05-28 RX ADMIN — CHLORHEXIDINE GLUCONATE 15 ML: 1.2 SOLUTION ORAL at 07:58

## 2025-05-28 RX ADMIN — ACETYLCYSTEINE 2 ML: 200 SOLUTION ORAL; RESPIRATORY (INHALATION) at 19:44

## 2025-05-28 RX ADMIN — SENNOSIDES 2 TABLET: 8.6 TABLET, FILM COATED ORAL at 20:35

## 2025-05-28 RX ADMIN — BUDESONIDE 0.5 MG: 0.5 INHALANT RESPIRATORY (INHALATION) at 08:04

## 2025-05-28 RX ADMIN — SENNOSIDES 2 TABLET: 8.6 TABLET, FILM COATED ORAL at 07:58

## 2025-05-28 RX ADMIN — Medication 40 MG: at 07:58

## 2025-05-28 RX ADMIN — DEXTROSE MONOHYDRATE: 50 INJECTION, SOLUTION INTRAVENOUS at 09:10

## 2025-05-28 RX ADMIN — IPRATROPIUM BROMIDE AND ALBUTEROL SULFATE 3 ML: .5; 3 SOLUTION RESPIRATORY (INHALATION) at 19:44

## 2025-05-28 RX ADMIN — Medication 60 ML: at 07:59

## 2025-05-28 RX ADMIN — PROPOFOL 20 MCG/KG/MIN: 10 INJECTION, EMULSION INTRAVENOUS at 18:18

## 2025-05-28 RX ADMIN — Medication 50 MCG: at 17:57

## 2025-05-28 RX ADMIN — Medication 50 MCG/HR: at 10:07

## 2025-05-28 RX ADMIN — THERA TABS 1 TABLET: TAB at 07:58

## 2025-05-28 RX ADMIN — MIDAZOLAM 2 MG: 1 INJECTION INTRAMUSCULAR; INTRAVENOUS at 11:05

## 2025-05-28 RX ADMIN — PROPOFOL 20 MCG/KG/MIN: 10 INJECTION, EMULSION INTRAVENOUS at 09:14

## 2025-05-28 RX ADMIN — Medication 50 MCG: at 09:48

## 2025-05-28 RX ADMIN — SODIUM CHLORIDE 425 MG: 9 INJECTION, SOLUTION INTRAVENOUS at 13:27

## 2025-05-28 RX ADMIN — CEFEPIME 2 G: 2 INJECTION, POWDER, FOR SOLUTION INTRAVENOUS at 17:30

## 2025-05-28 RX ADMIN — TOBRAMYCIN 300 MG: 300 SOLUTION RESPIRATORY (INHALATION) at 19:44

## 2025-05-28 RX ADMIN — CHLORHEXIDINE GLUCONATE 15 ML: 1.2 SOLUTION ORAL at 20:36

## 2025-05-28 ASSESSMENT — ACTIVITIES OF DAILY LIVING (ADL)
ADLS_ACUITY_SCORE: 73

## 2025-05-28 NOTE — PROGRESS NOTES
05/27/25 1530   Appointment Info   Signing Clinician's Name / Credentials (OT) Hema Masters, OTR/L   Living Environment   People in Home spouse   Current Living Arrangements house   Home Accessibility stairs to enter home   Number of Stairs, Main Entrance 2   Stair Railings, Main Entrance railings safe and in good condition   Living Environment Comments All needs met on one level.   Self-Care   Usual Activity Tolerance moderate   Current Activity Tolerance poor   Equipment Currently Used at Home none   Fall history within last six months no   General Information   Onset of Illness/Injury or Date of Surgery 05/22/25   Referring Physician Fer Francis MD   Patient/Family Therapy Goal Statement (OT) Pt unable to provide goals given intubation.   Additional Occupational Profile Info/Pertinent History of Current Problem Armin Terrell is a 68yo M with PMH of chronic respiratory failure (on home O2, 3LPM), severe COPD emphysema type (FEV1 35%), alpha-1 antitrypsin deficiency, lung nodules, hx tobacco use, and prostate cancer status post radical prostatectomy.  He was admitted to [facility] on 5/18 due to acute on chronic hypoxic respiratory failure requiring intubation. Found to have right sided pneumothorax and R side consolidation (pseudomonas + sputum). Chest tube placed but pneumothorax persisted, C/f bronchopleural fistula. Patient started on pressors for hypotension and admitted to the ICU. Started on meropenem. Failed extubation on 5/22 due to repeat episode of tension physiology d/t kink in chest tube following extubation resulting in worsening pneumothorax and emphysema. Re-intubated and transferred to [facility] on 5/22 for continued ICU management and interventional pulmonology consult. Underwent bronch with valve (3) placement on 5/23 in RUL however has persistent large air leak. Pending possible re-bronch on 5/27. Weaning pressors, FiO2, and sedation as able.   Existing Precautions/Restrictions  fall;oxygen therapy device and L/min  (3L at baseline)   Left Upper Extremity (Weight-bearing Status) full weight-bearing (FWB)   Right Upper Extremity (Weight-bearing Status) full weight-bearing (FWB)   Left Lower Extremity (Weight-bearing Status) full weight-bearing (FWB)   Right Lower Extremity (Weight-bearing Status) full weight-bearing (FWB)   Cognitive Status Examination   Orientation Status person   Cognitive Status Comments Pt intubated but following commands with slight delay.   Visual Perception   Visual Impairment/Limitations WFL   Sensory   Sensory Quick Adds sensation intact   Range of Motion Comprehensive   Comment, General Range of Motion BUE AROM WFL   Strength Comprehensive (MMT)   Comment, General Manual Muscle Testing (MMT) Assessment Pt presents with generalized weakness and deconditioning throughout.   Bed Mobility   Comment (Bed Mobility) Mod A x 2 and vc's.   Transfers   Transfer Comments OH lift   Activities of Daily Living   BADL Assessment/Intervention bathing;lower body dressing;upper body dressing;grooming;toileting   Bathing Assessment/Intervention   [place] Level (Bathing) set up;verbal cues;dependent (less than 25% patient effort)   Comment, (Bathing) per clinical judgement.   Upper Body Dressing Assessment/Training   [place] Level (Upper Body Dressing) set up;verbal cues;maximum assist (25% patient effort)   Lower Body Dressing Assessment/Training   [place] Level (Lower Body Dressing) set up;verbal cues;dependent (less than 25% patient effort)   Grooming Assessment/Training   [place] Level (Grooming) set up;verbal cues;maximum assist (25% patient effort)   Toileting   [place] Level (Toileting) set up;verbal cues;dependent (less than 25% patient effort)   Comment, (Toileting) per clinical judgement.   Clinical Impression   Criteria for Skilled Therapeutic Interventions Met (OT) Yes, treatment indicated   OT Diagnosis Decreased independence with ADLS and  functional transfers.   OT Problem List-Impairments impacting ADL problems related to;activity tolerance impaired;cognition;fear & anxiety;flexibility;mobility;range of motion (ROM);strength   Assessment of Occupational Performance 5 or more Performance Deficits   Identified Performance Deficits Decreased independence with functional transfers and ADLS.   Planned Therapy Interventions (OT) ADL retraining;IADL retraining;bed mobility training;ROM;strengthening;stretching;transfer training;home program guidelines;progressive activity/exercise;risk factor education   Clinical Decision Making Complexity (OT) problem focused assessment/low complexity   Risk & Benefits of therapy have been explained evaluation/treatment results reviewed;care plan/treatment goals reviewed;risks/benefits reviewed;patient   OT Total Evaluation Time   OT Eval, Low Complexity Minutes (06603) 5   OT Goals   Therapy Frequency (OT) 5 times/week   OT Predicted Duration/Target Date for Goal Attainment 06/10/25   OT Discharge Planning   OT Plan OT: Functional transfers, EOB, ADLS. UE strength/endurance.   OT Discharge Recommendation (DC Rec) Transitional Care Facility   OT Rationale for DC Rec Pt is currently well below baseline function. pt will require continued therapy to regain function.   OT Brief overview of current status OH lift.   Total Session Time   Total Session Time (sum of timed and untimed services) 5

## 2025-05-28 NOTE — PROGRESS NOTES
MEDICAL ICU PROGRESS NOTE  05/28/2025      Date of Service (when I saw the patient): 05/28/2025    ASSESSMENT: Armin Terrell is a 70yo M with PMH of chronic respiratory failure (on home O2, 3LPM), severe COPD emphysema type (FEV1 35%), alpha-1 antitrypsin deficiency, lung nodules, hx tobacco use, and prostate cancer status post radical prostatectomy.  He was admitted to [facility] on 5/18 due to acute on chronic hypoxic respiratory failure requiring intubation. Found to have right sided pneumothorax and R side consolidation (pseudomonas + sputum). Chest tube placed but pneumothorax persisted. Has bronchopleural fistula. Started on pressors for hypotension and admitted to the ICU. Started on meropenem. Failed extubation on 5/22 due to repeat episode of tension physiology d/t kink in chest tube following extubation resulting in worsening pneumothorax and emphysema. Re-intubated and transferred to [facility] on 5/22 for continued ICU management and interventional pulmonology consult. Pseudomonas found to be meropenem resistent, switched to cefepime. Underwent bronch with valve (3) placement on 5/23 in RUL however has persistent large air leak. Had valve revision on 5/28, now has 4 valves total (3 in RUL and 1 RML). Air leak smaller however still present. Deeply sedated for optimal vent synchrony and air way pressures for best conditions to re-epithelialize the pleura. His condition remains critical at this time.     CHANGES and MAJOR THINGS TODAY:   - Rass goal -2, sedation with prop gtt, fent gtt, and PRN versed  - wean oxygen as tolerated  - Had bronch with IP at 0830  - IP replaced 2/3 valves in RUL and added 1 valve in RML  - Had no air leak immediately after bronch, needs to be synchronous with the vent to re-epithelialize the pleura  - Reduce CT suction to -10, if stable will trial water seal later today  - Repeat CXR after each change with CT suction  - Repeat VBG s/p bronch  - Has contrast-induced SHAKIRA  -  Stopped D5  - Restarted FWF  - Voriconazole for aspergillus. Monitor daily LFTs  - pending care conference date       Neuro:  # Pain and sedation  - Propofol gtt, wean as able   - Precedex gtt prn for agitation/sedation  - Oxycodone Q6H  - Midazolam Q2H PRN  - Fentanyl gtt  - RASS goal -2      Pulmonary:  # Acute on chronic hypoxic respiratory failure, multifactorial, s/p intubation  # COPD exacerbation 2/2 R pneumonia  # Right pneumothorax with bronchopleural fistula, s/p chest tube and valve placement  # MAULIK Nodule, stable  # Failed extubation on 5/22  # Hx severe COPD emphysema type (FEV1 35% on 5/6/2024)  # Subcutaneous emphysema  # Hx tobacco use, quit 2018  # Alpha 1 antitrypsin deficiency  On 3L O2 and takes trilogy inhaler at home. Presented to ED on 5/14 for SOB sent home oxycodone, augmentin, azithromycin, prednisone for RUL pneumonia. Returned to the ED on 5/18 for SOB and weakness. Patient was in severe respiratory distress started on BiPAP therapy then intubated. CXR showed R pneumothorax- chest tube placed. Follow-up chest CT scan show large R pneumothorax, R sided consolidation and unchanged left upper lobe nodule. Admitted to ICU and started on abx (see ID section). Completed 5 days of methylpred d/t concern for possible COPD exacerbation component. Extubated on 5/22, then chest tube was kinked had repeated tension physiology requiring re-intubation and pressors. Large continuous air leak d/t bronchopleural fistula. IP preformed bronchoscopy with placement of 3 valves on 5/23. Large air leak persisted. Unable to bring chest tube to water seal or wean patient off vent. Also having thick secretions which improved with mucomyst. Seen by transplant team and deemed not to be a lung transplant candidate d/t deconditioning and poor rehab ability. Consultation with palliative ordered given concern for stability and quality of life if he were to be extubated with bronchopleural fistula and severe  emphysematous COPD. Repeat bronch with IP on 5/28 with replacement of 2/3 valves in RUL and added 1 valve in RML. Air leak smaller after bronch with less suction to chest tube. Prioritizing synchrony with the vent to re-epithelialize the pleura which should happen in the next 48 hours or so. Also noted to have increased LLL opacities on CXR 5/28. See infectious section for details below.  - IP consulted  - s/p bronch on 5/23 - placed 3 valves in RUL  - s/p repeat bronch on 5/28 - replaced 2/3 valves in RUL and added 1 valve in RML  - Wean chest tube suction as able  - Formoterol with duoneb and Mucomyst BID  - Tobramycin nebs BID post airway clearance  - NO chest percussion or volera to prevent added positive pressure  - S/p steroids  - MAULIK pneumonia seen on CT PE study 5/26  - Palliative care consult for GOC discussions and family dynamics, appreciate support  - CXR 5/28 increased LLL opacities    FiO2 (%): 80 %, Resp: 18, Vent Mode: VC/AC, Resp Rate (Set): 12 breaths/min, Tidal Volume (Set, mL): 460 mL, PEEP (cm H2O): 5 cmH2O, Pressure Support (cm H2O): 5 cmH2O, Resp Rate (Set): 12 breaths/min, Tidal Volume (Set, mL): 460 mL, PEEP (cm H2O): 5 cmH2O    Cardiovascular:  # Mixed shock, obstructive (2/2 PTX 5/18) and distributive (5/24)  Hypotensive with systolic BP to 80s, tachycardic, lactate 2.8, and leukocytosis to 35 on admission to [facility] on 5/18. CT PE negative for PE but showed R pneumothorax and R pneumonia. Shock physiology likely 2/2 combination of sepsis and obstruction initially. Chest tube placed but pneumothorax persisted, as described above. Weaned off levo on 5/22 however post extubation his chest tube kinked and the patient quickly decompensated and became hypotensive again requiring re-intubation and pressor support. On pressors on arrival to [facility]. On 5./24, required increased sedation for tachycardia and bcame hypotensive with briefly requiring pressors. Likely from sedation but ddx includes  sepsis (as had Tmax 100.6, WBC and HR 110s).   - Norepi gtt, weaning as able  - sedation as above  - see below for sepsis plan  - Vasopressin gtt as needed for adjunct   - MAP goal >65  - s/p 5 days solumedrol     # Tachycardia, improved  # C/f Pulmonary embolism, ruled out  # C/f R heart strain, ruled out  Patient has been tachycardic to 100s-110s persistently since 5/24-5/26. Moderate risk for PE per Wells Criteria (6 points) for edema, tachycardia, and immobilization. On DVT prophylaxis. CT PE completed without evidence of PE or right heart strain. Tachycardia improving on 5/27 after switching abx on 5/25, suspect tachycardia 2/2 infection.   - Subcutaneous Lovenox 40 mg daily  - CT PE 5/26 - no evidence of PE or R heart strain  - EKG 5/26 - sinus tach, no significant changes  - ECHO 5/26 - New moderate RV dilation and mildly reduced function.     # Bilateral upper extremity edema  # R superficial venous thrombosis  # Hypoalbuminemia  # C/f L internal jugular obstruction  Present since arrival at [facility] on 5/23. Nontender and equal bilaterally. Trace edema in LE. C/f possible clot vs volume overload although the patient is edematous only in the upper extremities. No evidence of DVTs on ultrasounds from 5/25 and 5/26. Some abnormal flow in the left internal jugular on 5/26 however only unilaterally. Low albumin (1.9) could be a component causing increased extravascular volume. Radiology suggesting CTV study to further characterize left internal jugular flow and/or possible obstruction, however will hold off for now pending further knowledge of clinical course and prognosis after valve revision on 5/28  - Bilateral UE US on 5/25  - R superficial veinous thrombosis (cephalic and basilic veins)  - No evidence of DVT but some veins hard to see d/t subQ emphysema  - Repeat BUE/BLE DVT US on 5/26 - abnormal to and fro flow in the L internal jugular. Subcutaneous air prohibiting visualization of other vessels.   - S/p  lasix 20 mg IV on 5/25  - Strict I/Os     GI/Nutrition:  # Nutrition  OGT replaced on 5/23.  - Resume TF  - Nutrition consult    # Elevated alk phos and AST, stable  Alk phos and ALT elevated on 5/26. Patient with nontender and nondistended abdomen. C/f hepatic congestion 2/2 right heart strain (workup pending above) vs infectious etiology. Moderate dilation of RV on echo 5/26. Elevated WBC, although infectious less likely given he is afebrile and nontender. Concern for possible chronic liver pathology given A1AT deficiency. LFTs on 5/28 stable to improved. Low suspicion for acute liver process. Will continue to monitor daily LFTs with addition on voriconazole on 5/28  - Daily LFTs     # Constipation  On fentanyl gtt.   - Senna/miralax BID     Renal/Fluids/Electrolytes:  # Hypernatremia, improved  # Respiratory acidosis, improved  Sodium 149 at OSH. Na 147 on 5/26.  Sodium improved to 143 on 5/8. FWF below for maintenance.   - S/p D5 gtt  -  Q2H  - BMP BID    # SHAKIRA 2/2 likely contrast nephropathy  # Elevated cystatin C  # Elevated BUN   History of isolated elevation of BUN dating back to 12/2024 with normal creatinine. Presenting this admission with consistently elevated BUN and normal creatinine. Cystatin C elevated at 1.7 with GFR 37, suggesting possibly decreased renal function of unclear etiology. Creatinine elevation following contrast CT scan on 5/26. Suspect contrast-induced SHAKIRA.  - monitor    #Hyperkalemia, resolved  5.9 > 5.6 on 5/23 however sample noted to have slight hemolysis, given kayexalate then lokelma.   - Recheck 5.4 on 5/23 with slight hemolysis   - If K>6 shift with insulin     Endocrine:  # S/p stress dose steroids  - Hypoglycemia protocol  - Discontinued sliding scale insulin on 5/25     ID:  # C/f septic shock 2/2 pneumonia  # Meropenem resistant pseudomonas pneumonia  # Leukocytosis, improving  Pseudomonas + sputum culture on 5/18. RVP negative. Blood cultures x3 NGTD. UA unremarkable.  Pleural fluid from R pleural cavity on 5/21, transudative effusion per Light's criteria. Repeat sputum culture from 5/23 found to be meropenem resistant. Suspect this is responsible for persistently elevated WBC, procal, and CRP given the patient did not have full coverage until 5/25. Following meropenem initiation, leukocytosis improving.   - work up and antibiotics as below    # Aspergillus + sputum  Possibly colonization however continued high oxygen needs. Secretions somewhat improved. Afebrile. CXR on 5/28 with worsening LLL opacity in blooming pattern despite treatment with abx for pseudomonas as described above. Pt was on steroid burst however is not on chronic steroids and not considered immunocompromised. Concern for possible aspergillus bronchitis (chronic necrotizing aspergillosis). Given the patients severely lung disease will treat with voriconazole and monitor daily LFTs.  - daily LFTs    Micro:   - Blood cultures from 5/18 NGTD, repeated cultures collected 5/22 and 5/24 NGTD  - MRSA nares negative  - UA bland on 5/23  - Sputum Cx (5/23) positive for Pseudomonas aeruginosa (resistant to meropenem)  - Sputum Cx (5/25) positive for Pseudomonas aeruginosa (resistant to meropenem), aspergillus, and staph epi     Abx:   - Cefepime 2 g (5/25- current)  - Tobramycin neb BID (5/25- current)  - Ceftazidime 2 g Q8H (5/23-5/25)  - S/p meropenem and azithromycin (5/18-5/23)  - S/p zosyn (5/18)  - S/p vancomycin (5/19)    Antifungals:  - Voriconazole (5/28-current)     Hematology:    # Leukocytosis as above  # Thrombocytopenia, resolved  Noted plt 133 at OSH. Had been down trending from 5/14. Possible 2/2 critical illness. On 5/23 stable at 144.  - CTM     Musculoskeletal:  - PTOT to evaluate     Skin:  # Subcutaneous Emphysema 2/2 Pneumothorax, improving  - CTM     General Cares/Prophylaxis:    DVT Prophylaxis: Enoxaparin (Lovenox) subcutaneous (holding pending procedure)  GI Prophylaxis: PPI  Restraints:  none  Family Communication: Wife, Trini Terrell [phone]  Code Status: Full     Lines/tubes/drains:   - PICC R brachial  - Urinary catheter (has chronic obstruction from prostate cancer)  - ETT  - PIV L forearm  - Rt sided Chest Tube  - OGT     Disposition:  - Medical ICU     Patient seen and findings/plan discussed with medical ICU staff, Dr. Mcdonough.    Alejandrina Milan, MS4    Resident/Fellow Attestation   I, Fer Francis MD, was present with the medical/BROOKE student who participated in the service and in the documentation of the note.  I have verified the history and personally performed the physical exam and medical decision making.  I agree with the assessment and plan of care as documented in the note.        Fer Francis MD  PGY1  Date of Service (when I saw the patient): 05/28/25      Clinically Significant Risk Factors         # Hypernatremia: Highest Na = 147 mmol/L in last 2 days, will monitor as appropriate  # Hyperchloremia: Highest Cl = 109 mmol/L in last 2 days, will monitor as appropriate          # Hypoalbuminemia: Lowest albumin = 1.1 g/dL at 5/22/2025  9:30 PM, will monitor as appropriate  # Coagulation Defect: INR = 1.19 (Ref range: 0.85 - 1.15) and/or PTT = N/A, will monitor for bleeding                # Severe Malnutrition: based on nutrition assessment and treatment provided per dietitian's recommendations.    # Financial/Environmental Concerns: none              ====================================  INTERVAL HISTORY:   Patient requiring elevated concentrations of FiO2 overnight for saturations above 88%. Intermittently needing levo for soft pressures. Synchronous with the vent overnight and continues to have thin yellow secretions. Responding to commands and answers questions with nodding yes/no. IP did valve revision this morning. Now deeply sedated for vent synchrony and low airway pressures to give valves best possible chance for successful epithelialization over next 48 hours.  Will update Trini and Jamey over the phone. Planning for care conference in coming days for GOC discussion and code status given ongoing critical condition. Palliative team and care coordinator assistance greatly appreciated.     OBJECTIVE:   1. VITAL SIGNS:   Temp:  [97.7  F (36.5  C)-98.6  F (37  C)] 97.7  F (36.5  C)  Pulse:  [] 87  Resp:  [12-28] 18  BP: ()/(53-79) 89/66  FiO2 (%):  [60 %-80 %] 80 %  SpO2:  [83 %-97 %] 97 %  FiO2 (%): 80 %, Resp: 18, Vent Mode: VC/AC, Resp Rate (Set): 12 breaths/min, Tidal Volume (Set, mL): 460 mL, PEEP (cm H2O): 5 cmH2O, Pressure Support (cm H2O): 5 cmH2O, Resp Rate (Set): 12 breaths/min, Tidal Volume (Set, mL): 460 mL, PEEP (cm H2O): 5 cmH2O  2. INTAKE/ OUTPUT:   I/O last 3 completed shifts:  In: 3107.29 [I.V.:1407.29; NG/GT:990; IV Piggyback:210]  Out: 1792 [Urine:1665; Chest Tube:127]    3. PHYSICAL EXAMINATION:  General: No acute distress. Follows commands.  Neuro: Alert and follows commands appropriately. No focal deficits appreciated  Pulm/Resp: Diminished breath sounds scattered. Hard to appreciate with severe emphysema and chest tube to suction. Course breath sounds and crackles bilaterally. Thin secretions requiring intermittent suctioning.   CV: Regular rate and rhythm. No murmurs or gallops  Abdomen: Soft, non-distended, non-tender  :  capone catheter in place, urine yellow and clear  Incisions/Skin: Diffuse subcutaneous empyhysema in chest and neck. Crepitus present. UE edema bilaterally (equal and nontender). Trace bilateral lower extremity edema.     4. LABS:   Arterial Blood Gases   Recent Labs   Lab 05/22/25  1714 05/22/25  1457   PH 7.41 7.39   PCO2 52* 52*   PO2 122* 86   HCO3 33* 31*     Complete Blood Count   Recent Labs   Lab 05/28/25  0428 05/27/25  0436 05/26/25  0327 05/25/25  0313   WBC 23.3* 23.8* 40.8* 37.2*   HGB 11.5* 12.5* 13.6 14.2    180 185 125*     Basic Metabolic Panel  Recent Labs   Lab 05/28/25  1109 05/28/25  08  05/28/25  0800 05/28/25  0437 05/28/25  0428 05/27/25  1637 05/27/25  1356 05/27/25  0442 05/27/25  0436   NA  --   --  143  --  136  --  145  --  147*   POTASSIUM  --   --  4.0  --  3.8  --  3.9  --  4.1   CHLORIDE  --   --  106  --  100  --  108*  --  109*   CO2  --   --  30*  --  29  --  32*  --  32*   BUN  --   --  68.1*  --  65.8*  --  69.1*  --  65.7*   CR  --   --  1.16  --  1.06  --  1.08  --  1.01   * 104* 101* 112* 368*   < > 100*   < > 109*    < > = values in this interval not displayed.     Liver Function Tests  Recent Labs   Lab 05/28/25  0428 05/27/25  0436 05/26/25  1729 05/26/25  0327 05/22/25  2130   AST 72* 72*  --  85*  --    ALT 46 50  --  61  --    ALKPHOS 164* 175*  --  200* 193*   BILITOTAL 0.8 0.7  --  0.7 0.9   ALBUMIN 1.4* 1.6*  --  1.5* 1.1*   INR  --   --  1.19*  --   --      Coagulation Profile  Recent Labs   Lab 05/26/25  1729   INR 1.19*       5. RADIOLOGY:   Recent Results (from the past 24 hours)   XR Abdomen Port 1 View    Narrative    EXAMINATION:  XR ABDOMEN PORT 1 VIEW 5/27/2025     COMPARISON: 5/23/2025    HISTORY: reconfirm OGT positioning prior to feeding.    TECHNIQUE: One frontal supine view of the abdomen.    FINDINGS: Portable 60 degrees upright frontal view of the upper  abdomen and lower thorax demonstrates an NG/OG tube coursing midline  with the tip and sidehole projecting over the left upper quadrant , in  the body of the stomach. No evidence of bowel obstruction,  pneumatosis, or portal venous gas.  Heart size is normal. Mixed interstitial and patchy airspace opacities  likely reflect pulmonary edema and atelectasis. Extensive bilateral  subcutaneous emphysema is noted. Partially visualized right chest  tube.      Impression    IMPRESSION:   Enteric tube tip and sidehole in the stomach.    I have personally reviewed the examination and initial interpretation  and I agree with the findings.    REBEKA PANG,          SYSTEM ID:  [id number]   XR Chest Port 1  View    Narrative    Exam: XR CHEST PORT 1 VIEW, 5/28/2025 6:55 AM    Comparison: 5/27/2025    History: interval assessment of R pneumothorax and subcutaneous  emphysema    Findings:  Single AP portable semiupright view of the chest. Endotracheal tube  tip in the low thoracic trachea. The isa is difficult to visualize  however the endotracheal tube tip appears advanced compared to prior  when comparing against the aortic arch. Right apically directed chest  tube. Right upper extremity PICC. Right-sided endobronchial valves.    Trachea is midline. Mediastinum is within normal limits.  Cardiopulmonary silhouette is within normal limits. Severe apical  predominant emphysematous changes. Perihilar and left basilar mixed  interstitial and airspace opacities. Small right pneumothorax. Small  bilateral pleural effusions. Extensive subcutaneous emphysema.      Impression    Impression:   1. Small right apical pneumothorax which is likely unchanged  considering slight differences in technique.  2. Slight advancement of the endotracheal tube with tip in the low  thoracic trachea. Consider slight retraction.  3. Increase in left basilar pulmonary opacities likely edema and  atelectasis.    I have personally reviewed the examination and initial interpretation  and I agree with the findings.    SHERRIE TATE MD         SYSTEM ID:  [id number]   XR Chest Port 1 View    Narrative    Portable chest 5/20/2025 at 1026    INDICATION: Endobronchial valve placement    COMPARISON: 0617 same day    Continued appearance of right-sided endobronchial valves with new  visible valve to the right lower lobe. Right chest catheter unchanged.  Increased conspicuity of right pneumothorax. No tension physiology in  the interim. Patchy densities over much of the left lower hemithorax  unchanged. Vague densities right lower lung zone unchanged. Extensive  subcutaneous emphysema right chest wall and neck slightly increased.      Impression     IMPRESSION: Increased callus q.d. right pneumothorax with new right  endobronchial valve to the lower lobe with other previous right-sided  endobronchial valves again redemonstrated.    ELVIRA STEPHENS MD         SYSTEM ID:  [id number]

## 2025-05-28 NOTE — PROGRESS NOTES
Critical Care Attending Note    The patient was seen and examined by me with and independent of MS4 Bjorn and housestaff team.     I was present with the medical student who participated in the service and in the documentation of the note.      Pertinent vitals, lab results and imaging were reviewed by me as well I have verified the history and personally performed the physical exam and medical decision making.  All physician orders and treatments were placed at my direction for which  I personally managed. Key decisions are reflected in the housestaff note above with my additions  incorporated within and below.     Armin Terrell remains in critical condition today due to acute on chronic respiratory failure in setting of severe emphysema and pneumothorax with bronchopleural fistula, which I personally managed. Continued air leak on chest tube. SBT for 1.5 hrs with appropriate mechanics however desat at requiring 80%. Sputum with pseudomonas and aspergillus fumigatus complex. Suspect latter is colonization vs though CPA given status of parenchyma, however given complex and severe lung disease would favor treatment given persistent inflammatory markers. IP to revised EBV in RUL as well placed addition RML valve with perform serial clinical and vent assessment and imaging to eval with goal for water seal. Continue to optimize vent  and given synchrony issues post procedure with deepen RASS goal -2 for today  goal sats 88-90%.  Pending family conference in coming days to plan path forward    Niall Mcdonough MD  45 min CCT excluding procedures and teaching

## 2025-05-28 NOTE — PROGRESS NOTES
"Bronchoscopy Risk Assessment Guidelines      A. Patient symptoms to consider when assessing pulmonary TB risk are:    I. Cough greater than 3 weeks; and fever, hemoptysis, pleuritic chest    pain, weight loss greater than 10 lbs, night sweats, fatigue, infiltrates on    upper lobes or superior segments of lower lobes, cavitation on chest    x-ray.   B. Patient risk factors to consider when assessing pulmonary TB risk are:    I. Exposure to known TB case, foreign-born persons (within 5 years of    arrival to [place]), residence in a crowded setting (correctional facility,     long-term care center, etc.), persons with HIV or immunosuppression.    Patients with symptoms and risk factors should generally be considered \"suspect risk\" and bronchoscopies should be performed in airborne precautions.    This patient has NO KNOWN RISK of Tuberculosis (proceed with bronchoscopy)    Specimens sent: no  Complications: None  Scope used: #[id number] Therapeutic  Attending Physician: Dr. Ryan Ellsworth, RRT on 5/28/2025 at 10:29 AM  "

## 2025-05-28 NOTE — PROCEDURES
INTERVENTIONAL PULMONOLOGY       Procedure(s):    Airway exam  Intrabronchial valve placement (3 sites)  Intrabronchial valve removal (2 sites)     Indication:  BP fistula valves revision     Attending of Record:  Beth Davis MD    Interventional Pulmonary Fellow   Jacob York    Trainees Present:   None     Medications:    continued on ICU gtt medications (see MAR)    Sedation Time:   N/A    Time Out:  Performed    The patient's medical record has been reviewed.  The indication for the procedure was reviewed.  The necessary history and physical examination was performed and reviewed.  The risks, benefits and alternatives of the procedure were discussed with the the patient in detail and he had the opportunity to ask questions.  I discussed in particular the potential complications including risks of minor or life-threatening bleeding and/or infection, respiratory failure, vocal cord trauma / paralysis, pneumothorax, and discomfort. Sedation risks were also discussed including abnormal heart rhythms, low blood pressure, and respiratory failure. All questions were answered to the best of my ability.  Verbal and written informed consent was obtained.  The proposed procedure and the patient's identification were verified prior to the procedure by the physician and the nurse.    The patient was assessed for the adequacy for the procedure and to receive medications.   Mental Status:  Alert and oriented x 3  Airway examination:  Class I (Complete visualization of soft palate)  Pulmonary:  Non labored respirations  CV:  Regular rate  ASA Grade:  (II)  Mild systemic disease    After clinical evaluation and reviewing the indication, risks, alternatives and benefits of the procedure the patient was deemed to be in satisfactory condition to undergo the procedure.      Immediately before administration of medications the patient was re-assessed for adequacy to receive sedatives including the heart rate,  respiratory rate, mental status, oxygen saturation, blood pressure and adequacy of pulmonary ventilation. These same parameters were continuously monitored throughout the procedure.    A Tuberculosis risk assessment was performed:  The patient has no known RISK of Tuberculosis    The procedure was performed in a negative airflow room: Yes    Maneuvers / Procedure:      Airway Examination: A complete airway examination was performed from the distal trachea to the subsegmental level in each lobe of both lungs.  Pertinent findings include RUL anterior and apical valves were not sealing the airways.     Intrabronchial valve removal: Two Valves - The bronchoscope was inserted and the valve was identified in the RUL Apical.  Forceps were used to grasp the center asuncion and the valve was removed.  Fluoroscopic guidance was not needed.  The next valve was identified in the RUL Anterior and the forceps were used to grasp the center asuncion and the valve was removed.  Fluoroscopic guidance was not needed.  The valvse were inspected and they were intact.    Intrabronchial valve placement: A total of 3 IBV's were placed. Locations include: RUL Apical size 7 , RUL Anterior size 7 , and RML size 6    Any disposable equipment was visually inspected and deemed to be intact immediately post procedure.      Relevant Pictures  Not taken     Assessment and Recommendations:     Successful completion of RUL valves revision, currently has 3 valves size in the RUL. And new valve placement in the RML size 6.   Keep chest tube on -10 cm suction.   Follow up CXR.   Will cont to follow           Jacob York  Interventional pulmonary fellow  Pager: [phone]

## 2025-05-28 NOTE — PROGRESS NOTES
Care Management Follow Up    Additional Information     5/28: CHW was delegated to contact patient's relatives to schedule a care conference. CHW spoke with the patient's spouse, who stated that she would prefer to wait until next week for the conference, as the patient had surgery today. She would like to first hear from the doctor and also ensure that all of their children, who are traveling from [place], can attend. She noted that her son is currently away for the weekend, so any day except Thursday next weekend would work.    Herber Morejon   Inpatient CHW  [facility] 4 ICU/ED/OBS   [phone]

## 2025-05-28 NOTE — TELEPHONE ENCOUNTER
Nena Bolivar, RN  P Sot Intake Pod  Please start a new lung transplant referral for this patient.      FYI: I do not need anything for this referral other than having it open. We are not moving forward with this referral but would like to have a referral episode to document MD discussion.        Referring MD: Buddy Genao  Referring Facility : [facility]  Patient Diagnosis: COPD  Assigned Coordinator: Hussein Bolivar    Has a referral order been placed?: Yes  Is this an urgent referral?: No  Is the patient currently inpatient?: Yes  Is this referral being opened for documentation purposes only?: YES  Does the Intake Team need to call the patient?: NO  Does the Intake Team need to send insurance request to PFR?: NO    Thank you    Nena Bolivar RN, BSN, [facility]  Lung Transplant Coordinator  [phone]          Previous Messages       ----- Message -----  From: Paula Toth MD  Sent: 5/25/2025  11:21 AM CDT  To: Casa Laguerre MD; Leilani Fields, *  Subject: Transplant potential referral                    Dennis Myers,    I'm in the ICU for a day and this patient I just spoke with Buddy about b/c and they may put in a consult tomorrow regarding transplant evaluation.    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  Presented to [facility] on 5/18 with intubation with right sided ptx and pneumonia, chest tube placed, persistent air leak/BPF. Failed extubation with tension physiology with kinked chest tube. Reintubated and transferred to [facility] on 5/22. EBVs placed on 5/23 to RUL but still with air leak. Can't get him extubated, took his PEEP to 0.    He does wake up and follows commands. Fairly unsophisticated wife but present. BMI wnl. No clear weight loss. He does have lung nodules followed by Buddy RODRIGUEZ, last seen in clinic for them in  "February and waxing and waning at that time. Not a robust picture of health, he looks frail currently.    Asked the team to place a palliative consult as well, jic.    Wanted to give you the heads up. I think the nodules are an issue for an \"urgent\" transplant eval. Jim will take over the service tomorrow.    Thanks,    Paula  "

## 2025-05-29 VITALS
TEMPERATURE: 98.1 F | SYSTOLIC BLOOD PRESSURE: 102 MMHG | OXYGEN SATURATION: 92 % | RESPIRATION RATE: 12 BRPM | HEIGHT: 72 IN | BODY MASS INDEX: 22.08 KG/M2 | DIASTOLIC BLOOD PRESSURE: 62 MMHG | WEIGHT: 163 LBS | HEART RATE: 82 BPM

## 2025-05-29 LAB
ALBUMIN SERPL BCG-MCNC: 1.2 G/DL (ref 3.5–5.2)
ALP SERPL-CCNC: 167 U/L (ref 40–150)
ALT SERPL W P-5'-P-CCNC: 44 U/L (ref 0–70)
ANION GAP SERPL CALCULATED.3IONS-SCNC: 10 MMOL/L (ref 7–15)
ANION GAP SERPL CALCULATED.3IONS-SCNC: 12 MMOL/L (ref 7–15)
AST SERPL W P-5'-P-CCNC: 78 U/L (ref 0–45)
ATRIAL RATE - MUSE: 95 BPM
BACTERIA SPEC CULT: NO GROWTH
BACTERIA SPEC CULT: NO GROWTH
BASE EXCESS BLDV CALC-SCNC: 3.6 MMOL/L (ref -3–3)
BASOPHILS # BLD AUTO: 0.1 10E3/UL (ref 0–0.2)
BASOPHILS NFR BLD AUTO: 0 %
BILIRUB SERPL-MCNC: 0.5 MG/DL
BILIRUBIN DIRECT (ROCHE PRO & PURE): 0.27 MG/DL (ref 0–0.45)
BUN SERPL-MCNC: 86.9 MG/DL (ref 8–23)
BUN SERPL-MCNC: 95.4 MG/DL (ref 8–23)
CALCIUM SERPL-MCNC: 7.2 MG/DL (ref 8.8–10.4)
CALCIUM SERPL-MCNC: 7.2 MG/DL (ref 8.8–10.4)
CHLORIDE SERPL-SCNC: 105 MMOL/L (ref 98–107)
CHLORIDE SERPL-SCNC: 105 MMOL/L (ref 98–107)
CREAT SERPL-MCNC: 1.53 MG/DL (ref 0.67–1.17)
CREAT SERPL-MCNC: 1.82 MG/DL (ref 0.67–1.17)
CRP SERPL-MCNC: 252 MG/L
CYSTATIN C (ROCHE): 2.6 MG/L (ref 0.6–1)
DIASTOLIC BLOOD PRESSURE - MUSE: NORMAL MMHG
EGFRCR SERPLBLD CKD-EPI 2021: 40 ML/MIN/1.73M2
EGFRCR SERPLBLD CKD-EPI 2021: 49 ML/MIN/1.73M2
EOSINOPHIL # BLD AUTO: 0.3 10E3/UL (ref 0–0.7)
EOSINOPHIL NFR BLD AUTO: 2 %
ERYTHROCYTE [DISTWIDTH] IN BLOOD BY AUTOMATED COUNT: 15.3 % (ref 10–15)
GFR/BSA.PRED SERPLBLD CYS-BASED-ARV: 21 ML/MIN/1.73M2
GLUCOSE BLDC GLUCOMTR-MCNC: 101 MG/DL (ref 70–99)
GLUCOSE BLDC GLUCOMTR-MCNC: 112 MG/DL (ref 70–99)
GLUCOSE BLDC GLUCOMTR-MCNC: 123 MG/DL (ref 70–99)
GLUCOSE BLDC GLUCOMTR-MCNC: 85 MG/DL (ref 70–99)
GLUCOSE BLDC GLUCOMTR-MCNC: 98 MG/DL (ref 70–99)
GLUCOSE SERPL-MCNC: 103 MG/DL (ref 70–99)
GLUCOSE SERPL-MCNC: 90 MG/DL (ref 70–99)
GRAM STAIN RESULT: NORMAL
GRAM STAIN RESULT: NORMAL
HCO3 BLDV-SCNC: 29 MMOL/L (ref 21–28)
HCO3 SERPL-SCNC: 24 MMOL/L (ref 22–29)
HCO3 SERPL-SCNC: 25 MMOL/L (ref 22–29)
HCT VFR BLD AUTO: 35.2 % (ref 40–53)
HGB BLD-MCNC: 11.4 G/DL (ref 13.3–17.7)
IMM GRANULOCYTES # BLD: 0.7 10E3/UL
IMM GRANULOCYTES NFR BLD: 4 %
INTERPRETATION ECG - MUSE: NORMAL
LACTATE SERPL-SCNC: 1.5 MMOL/L (ref 0.7–2)
LACTATE SERPL-SCNC: 1.9 MMOL/L (ref 0.7–2)
LACTATE SERPL-SCNC: 2.5 MMOL/L (ref 0.7–2)
LYMPHOCYTES # BLD AUTO: 1.4 10E3/UL (ref 0.8–5.3)
LYMPHOCYTES NFR BLD AUTO: 8 %
MCH RBC QN AUTO: 28.1 PG (ref 26.5–33)
MCHC RBC AUTO-ENTMCNC: 32.4 G/DL (ref 31.5–36.5)
MCV RBC AUTO: 87 FL (ref 78–100)
MONOCYTES # BLD AUTO: 0.8 10E3/UL (ref 0–1.3)
MONOCYTES NFR BLD AUTO: 4 %
NEUTROPHILS # BLD AUTO: 14.8 10E3/UL (ref 1.6–8.3)
NEUTROPHILS NFR BLD AUTO: 82 %
NRBC # BLD AUTO: 0 10E3/UL
NRBC BLD AUTO-RTO: 0 /100
O2/TOTAL GAS SETTING VFR VENT: 60 %
OXYHGB MFR BLDV: 70 % (ref 70–75)
P AXIS - MUSE: 76 DEGREES
PCO2 BLDV: 49 MM HG (ref 40–50)
PH BLDV: 7.39 [PH] (ref 7.32–7.43)
PLATELET # BLD AUTO: 337 10E3/UL (ref 150–450)
PO2 BLDV: 37 MM HG (ref 25–47)
POTASSIUM SERPL-SCNC: 3.8 MMOL/L (ref 3.4–5.3)
POTASSIUM SERPL-SCNC: 3.9 MMOL/L (ref 3.4–5.3)
PR INTERVAL - MUSE: 148 MS
PROCALCITONIN SERPL IA-MCNC: 8.33 NG/ML
PROT SERPL-MCNC: 4.7 G/DL (ref 6.4–8.3)
QRS DURATION - MUSE: 68 MS
QT - MUSE: 304 MS
QTC - MUSE: 382 MS
R AXIS - MUSE: -9 DEGREES
RBC # BLD AUTO: 4.05 10E6/UL (ref 4.4–5.9)
SAO2 % BLDV: 71.5 % (ref 70–75)
SODIUM SERPL-SCNC: 140 MMOL/L (ref 135–145)
SODIUM SERPL-SCNC: 141 MMOL/L (ref 135–145)
SYSTOLIC BLOOD PRESSURE - MUSE: NORMAL MMHG
T AXIS - MUSE: 73 DEGREES
TRIGL SERPL-MCNC: 236 MG/DL
VENTRICULAR RATE- MUSE: 95 BPM
WBC # BLD AUTO: 18 10E3/UL (ref 4–11)

## 2025-05-29 PROCEDURE — 250N000011 HC RX IP 250 OP 636: Mod: JZ

## 2025-05-29 PROCEDURE — 82310 ASSAY OF CALCIUM: CPT | Performed by: NURSE PRACTITIONER

## 2025-05-29 PROCEDURE — 999N000157 HC STATISTIC RCP TIME EA 10 MIN

## 2025-05-29 PROCEDURE — 200N000002 HC R&B ICU UMMC

## 2025-05-29 PROCEDURE — 99233 SBSQ HOSP IP/OBS HIGH 50: CPT | Mod: 25 | Performed by: INTERNAL MEDICINE

## 2025-05-29 PROCEDURE — 83605 ASSAY OF LACTIC ACID: CPT

## 2025-05-29 PROCEDURE — 250N000009 HC RX 250: Performed by: NURSE PRACTITIONER

## 2025-05-29 PROCEDURE — 250N000011 HC RX IP 250 OP 636: Performed by: NURSE PRACTITIONER

## 2025-05-29 PROCEDURE — 83605 ASSAY OF LACTIC ACID: CPT | Performed by: NURSE PRACTITIONER

## 2025-05-29 PROCEDURE — 94640 AIRWAY INHALATION TREATMENT: CPT

## 2025-05-29 PROCEDURE — 250N000011 HC RX IP 250 OP 636: Performed by: INTERNAL MEDICINE

## 2025-05-29 PROCEDURE — 87070 CULTURE OTHR SPECIMN AEROBIC: CPT | Performed by: NURSE PRACTITIONER

## 2025-05-29 PROCEDURE — 80053 COMPREHEN METABOLIC PANEL: CPT

## 2025-05-29 PROCEDURE — 84075 ASSAY ALKALINE PHOSPHATASE: CPT

## 2025-05-29 PROCEDURE — 93005 ELECTROCARDIOGRAM TRACING: CPT

## 2025-05-29 PROCEDURE — 250N000013 HC RX MED GY IP 250 OP 250 PS 637: Performed by: INTERNAL MEDICINE

## 2025-05-29 PROCEDURE — 82610 CYSTATIN C: CPT

## 2025-05-29 PROCEDURE — 250N000013 HC RX MED GY IP 250 OP 250 PS 637

## 2025-05-29 PROCEDURE — 86140 C-REACTIVE PROTEIN: CPT

## 2025-05-29 PROCEDURE — 85004 AUTOMATED DIFF WBC COUNT: CPT

## 2025-05-29 PROCEDURE — 44500 INTRO GASTROINTESTINAL TUBE: CPT

## 2025-05-29 PROCEDURE — 84145 PROCALCITONIN (PCT): CPT

## 2025-05-29 PROCEDURE — 82805 BLOOD GASES W/O2 SATURATION: CPT

## 2025-05-29 PROCEDURE — 99292 CRITICAL CARE ADDL 30 MIN: CPT | Mod: 25 | Performed by: STUDENT IN AN ORGANIZED HEALTH CARE EDUCATION/TRAINING PROGRAM

## 2025-05-29 PROCEDURE — 84478 ASSAY OF TRIGLYCERIDES: CPT

## 2025-05-29 PROCEDURE — 250N000011 HC RX IP 250 OP 636

## 2025-05-29 PROCEDURE — 258N000003 HC RX IP 258 OP 636

## 2025-05-29 PROCEDURE — 250N000009 HC RX 250

## 2025-05-29 PROCEDURE — 99291 CRITICAL CARE FIRST HOUR: CPT | Mod: 25 | Performed by: INTERNAL MEDICINE

## 2025-05-29 PROCEDURE — 03HY32Z INSERTION OF MONITORING DEVICE INTO UPPER ARTERY, PERCUTANEOUS APPROACH: ICD-10-PCS | Performed by: STUDENT IN AN ORGANIZED HEALTH CARE EDUCATION/TRAINING PROGRAM

## 2025-05-29 PROCEDURE — 94003 VENT MGMT INPAT SUBQ DAY: CPT

## 2025-05-29 PROCEDURE — 36620 INSERTION CATHETER ARTERY: CPT | Mod: GC | Performed by: STUDENT IN AN ORGANIZED HEALTH CARE EDUCATION/TRAINING PROGRAM

## 2025-05-29 PROCEDURE — G0463 HOSPITAL OUTPT CLINIC VISIT: HCPCS

## 2025-05-29 PROCEDURE — 94640 AIRWAY INHALATION TREATMENT: CPT | Mod: 76

## 2025-05-29 RX ORDER — HEPARIN SODIUM 5000 [USP'U]/.5ML
5000 INJECTION, SOLUTION INTRAVENOUS; SUBCUTANEOUS EVERY 8 HOURS
Status: DISCONTINUED | OUTPATIENT
Start: 2025-05-30 | End: 2025-05-31 | Stop reason: HOSPADM

## 2025-05-29 RX ORDER — CEFEPIME HYDROCHLORIDE 2 G/1
2 INJECTION, POWDER, FOR SOLUTION INTRAVENOUS EVERY 12 HOURS
Status: DISCONTINUED | OUTPATIENT
Start: 2025-05-29 | End: 2025-05-30

## 2025-05-29 RX ORDER — LIDOCAINE HYDROCHLORIDE 20 MG/ML
5 SOLUTION OROPHARYNGEAL ONCE
Status: COMPLETED | OUTPATIENT
Start: 2025-05-29 | End: 2025-05-29

## 2025-05-29 RX ADMIN — TOBRAMYCIN 300 MG: 300 SOLUTION RESPIRATORY (INHALATION) at 07:48

## 2025-05-29 RX ADMIN — SENNOSIDES 2 TABLET: 8.6 TABLET, FILM COATED ORAL at 20:22

## 2025-05-29 RX ADMIN — ENOXAPARIN SODIUM 40 MG: 40 INJECTION SUBCUTANEOUS at 08:00

## 2025-05-29 RX ADMIN — ACETYLCYSTEINE 2 ML: 200 SOLUTION ORAL; RESPIRATORY (INHALATION) at 21:24

## 2025-05-29 RX ADMIN — CHLORHEXIDINE GLUCONATE 15 ML: 1.2 SOLUTION ORAL at 07:59

## 2025-05-29 RX ADMIN — CHLORHEXIDINE GLUCONATE 15 ML: 1.2 SOLUTION ORAL at 20:22

## 2025-05-29 RX ADMIN — MIDAZOLAM 2 MG: 1 INJECTION INTRAMUSCULAR; INTRAVENOUS at 17:29

## 2025-05-29 RX ADMIN — CEFEPIME 2 G: 2 INJECTION, POWDER, FOR SOLUTION INTRAVENOUS at 09:50

## 2025-05-29 RX ADMIN — Medication 1500 MG: at 10:39

## 2025-05-29 RX ADMIN — SODIUM CHLORIDE, SODIUM LACTATE, POTASSIUM CHLORIDE, AND CALCIUM CHLORIDE 500 ML: .6; .31; .03; .02 INJECTION, SOLUTION INTRAVENOUS at 11:24

## 2025-05-29 RX ADMIN — CEFEPIME 2 G: 2 INJECTION, POWDER, FOR SOLUTION INTRAVENOUS at 00:26

## 2025-05-29 RX ADMIN — FORMOTEROL FUMARATE DIHYDRATE 20 MCG: 20 SOLUTION RESPIRATORY (INHALATION) at 07:48

## 2025-05-29 RX ADMIN — ACETYLCYSTEINE 2 ML: 200 SOLUTION ORAL; RESPIRATORY (INHALATION) at 07:48

## 2025-05-29 RX ADMIN — TOBRAMYCIN 300 MG: 300 SOLUTION RESPIRATORY (INHALATION) at 21:25

## 2025-05-29 RX ADMIN — PROPOFOL 20 MCG/KG/MIN: 10 INJECTION, EMULSION INTRAVENOUS at 05:32

## 2025-05-29 RX ADMIN — VORICONAZOLE 200 MG: 40 POWDER, FOR SUSPENSION ORAL at 21:40

## 2025-05-29 RX ADMIN — IPRATROPIUM BROMIDE AND ALBUTEROL SULFATE 3 ML: .5; 3 SOLUTION RESPIRATORY (INHALATION) at 07:47

## 2025-05-29 RX ADMIN — BUDESONIDE 0.5 MG: 0.5 INHALANT RESPIRATORY (INHALATION) at 21:24

## 2025-05-29 RX ADMIN — Medication 60 ML: at 08:01

## 2025-05-29 RX ADMIN — MIDAZOLAM 2 MG: 1 INJECTION INTRAMUSCULAR; INTRAVENOUS at 10:51

## 2025-05-29 RX ADMIN — Medication 200 MCG/HR: at 15:44

## 2025-05-29 RX ADMIN — NOREPINEPHRINE BITARTRATE 0.14 MCG/KG/MIN: 0.06 INJECTION, SOLUTION INTRAVENOUS at 00:15

## 2025-05-29 RX ADMIN — BUDESONIDE 0.5 MG: 0.5 INHALANT RESPIRATORY (INHALATION) at 07:47

## 2025-05-29 RX ADMIN — SODIUM CHLORIDE, SODIUM LACTATE, POTASSIUM CHLORIDE, AND CALCIUM CHLORIDE 500 ML: .6; .31; .03; .02 INJECTION, SOLUTION INTRAVENOUS at 14:09

## 2025-05-29 RX ADMIN — VORICONAZOLE 200 MG: 40 POWDER, FOR SUSPENSION ORAL at 08:00

## 2025-05-29 RX ADMIN — THERA TABS 1 TABLET: TAB at 07:59

## 2025-05-29 RX ADMIN — POLYETHYLENE GLYCOL 3350 17 G: 17 POWDER, FOR SOLUTION ORAL at 08:00

## 2025-05-29 RX ADMIN — CEFEPIME HYDROCHLORIDE 2 G: 2 INJECTION, POWDER, FOR SOLUTION INTRAVENOUS at 21:53

## 2025-05-29 RX ADMIN — IPRATROPIUM BROMIDE AND ALBUTEROL SULFATE 3 ML: .5; 3 SOLUTION RESPIRATORY (INHALATION) at 21:24

## 2025-05-29 RX ADMIN — Medication 40 MG: at 08:01

## 2025-05-29 RX ADMIN — Medication 25 MCG: at 00:48

## 2025-05-29 RX ADMIN — SENNOSIDES 2 TABLET: 8.6 TABLET, FILM COATED ORAL at 07:59

## 2025-05-29 RX ADMIN — SODIUM CHLORIDE 425 MG: 9 INJECTION, SOLUTION INTRAVENOUS at 00:10

## 2025-05-29 RX ADMIN — LIDOCAINE HYDROCHLORIDE 5 ML: 20 SOLUTION ORAL at 11:36

## 2025-05-29 ASSESSMENT — ACTIVITIES OF DAILY LIVING (ADL)
ADLS_ACUITY_SCORE: 73

## 2025-05-29 NOTE — PROGRESS NOTES
Major Shift Events:    Neuro: Alert, RASS 0-/-2, given versed 2mg x2 to help meet RASS goal and keep pt comfortable. BERRY. FC. Sensation appears intact. Pupils 2, round, brisk, equal.   CV: SR/ST, HR 70-100s.MAP >65. Afebrile. Diaphoretic. Titrating pressors to meet map goal. Starling device used to assess fluid status and given bolus 500mL LR x2. Improvement of CI from 2.5->3.2   Resp: CMV 60%, 460, 12, 5. Thick tan/pink secretions in ETT and oral clear, thin secretions. ETT 26 teeth.   GI: NG 63 cm, TF 40 qhr,  q2h. BM x1.   : capone, UOP 0-30, team aware, monitoring.   Skin: lip scab/possible PI vaseline jelly placed on lip.   IV/Drips:   R TL PICC  L PIV  Levo 0.16   Fent 200   Plan: RASS -2, capone to be removed at 0600 on 5/30. Monitor respiratory   For vital signs and complete assessments, please see documentation flowsheets.

## 2025-05-29 NOTE — PHARMACY-VANCOMYCIN DOSING SERVICE
Pharmacy Vancomycin Initial Note  Date of Service May 29, 2025  Patient's  1955  69 year old, male    Indication: Healthcare-Associated Pneumonia    Current estimated CrCl = Estimated Creatinine Clearance: 47.6 mL/min (A) (based on SCr of 1.53 mg/dL (H)).    Creatinine for last 3 days  2025:  5:29 PM Creatinine 1.02 mg/dL  2025:  4:36 AM Creatinine 1.01 mg/dL;  1:56 PM Creatinine 1.08 mg/dL  2025:  4:28 AM Creatinine 1.06 mg/dL;  8:00 AM Creatinine 1.16 mg/dL  2025:  3:43 AM Creatinine 1.53 mg/dL    Recent Vancomycin Level(s) for last 3 days  No results found for requested labs within last 3 days.      Vancomycin IV Administrations (past 72 hours)                     vancomycin (VANCOCIN) 1,500 mg in 0.9% NaCl 250 mL intermittent infusion (mg) 1,500 mg New Bag 25 1039                    Nephrotoxins and other renal medications (From now, onward)      Start     Dose/Rate Route Frequency Ordered Stop    25 1210  vancomycin place olea - receiving intermittent dosing         1 each Intravenous SEE ADMIN INSTRUCTIONS 25 1210      25 0900  tobramycin (PF) (DONELL) neb solution 300 mg         300 mg Nebulization 2 TIMES DAILY RT 25 0832      25 2100  norepinephrine (LEVOPHED) 16 mg in  mL infusion MAX CONC CENTRAL LINE         0.01-0.6 mcg/kg/min × 72.2 kg  0.7-40.6 mL/hr  Intravenous CONTINUOUS 25 2042              Contrast Orders - past 72 hours (72h ago, onward)      None                Plan:  Give loading dose of vancomycin  1500 mg IV (~20mg/kg), future doses dependent on levels. Next lab level has been ordered.  Vancomycin monitoring method: Trough (Method 2 = manual dose calculation)  Vancomycin therapeutic monitoring goal: 15-20 mg/L  Pharmacy will check vancomycin levels as appropriate in 1-3 Days.    Serum creatinine levels will be ordered daily for the first week of therapy and at least twice weekly for subsequent weeks.      Gary  SAURABH Justice, [facility]

## 2025-05-29 NOTE — PROGRESS NOTES
Additional Information:     Scheduled Care Conference (over phone)    Date: 5/30  Time: 11 am  Location: over phone     Providers:  MICU: Dr. Francis - Confirmed  Palliative: Dr. Grande - Confirmed      Family:  Spouse: Trini - Confirmed by phone  Erendiraon: Jamey - Confirmed by phone  Son: Vinnie -  Confirmed by phone   Daughter: Gisele - Left voicemail (Vinnie will contact sister)     Call into conference information:   [phone]   Meeting ID# [id number]  PIN # [phone]

## 2025-05-29 NOTE — PROGRESS NOTES
Interventional Pulmonology          Inpatient progress Note                   May 29, 2025            Patient: Armin Terrell    Date of Admission: 5/22/2025  Reason for Consultation: BP fistula  Requesting Physician: JORDAN Ramos CNP  [address] [place] [address]      Assessment:   A 70 YO M with PMH significant for chronic hypoxic respiratory failure, severe emphysematous COPD, alpha-1 antitrypsin deficiency, and prostate cancer S/p radical prostatectomy. Transferred from [facility] for bronchopleural fistula assessment and management. He  was admitted there to the ICU and intubated due to pseudomonas PNA, septic shock and PTX with continuous air leak. He failed extubation on 5/22 due to repeat episode of tension physiology d/t kink in chest tube following extubation resulting in worsening pneumothorax.    I personally revived his CT scans and CXR from the outside hospital. Showing significant emphysema with right sided large PTX and PNA changes. Patient is currently sedated and intubated on 5 of peep and 60 % FIO2. Chest tube closely examined seemed to be in good position, continuous air leak is seen on the pleural vac container.     The patient currently S/p RUL Spiration valves insertion on 2 different occasion (last was on 5/28 when RUL valves revised/replaced and a new valve deployed in the RML) for bronchopleural fistula.    Currently has exhalation only bubbling on the atrium.     Personally reviewed, CXRs, and most current CT chest from 5/28/2025    Plan:  - Extubation as soon as the patient qualifies for extubation.  - As long as on the [facility] vent, apply minimal to no wall suction (-5 to -10 or water seal), if able    Beth Davis MD           Data:   All pertinent laboratory and imaging data reviewed.           Past Medical History:   No past medical history on file.         Past Surgical History:     Past Surgical History:   Procedure Laterality Date    IR CHEST  TUBE REPOSITION NON TUNNELED RIGHT  2025    PICC TRIPLE LUMEN PLACEMENT  2025    MD LAP,PROSTATECTOMY,RADICAL,W/NERVE SPARE,INCL ROBOTIC Bilateral 3/19/2019    Procedure: ROBOTIC ASSISTED RADICAL RETROPUBIC PROSTATECTOMY BILATERAL PELVIC LYMPH NODE DISSECTION, LYSIS OF ADHESIONS, REPAIR OF RIGHT INGUINAL HERNIA;  Surgeon: Candido Angelo MD;  Location: [facility];  Service: Urology            Social History:     Social History     Socioeconomic History    Marital status:      Spouse name: Not on file    Number of children: Not on file    Years of education: Not on file    Highest education level: Not on file   Occupational History    Not on file   Tobacco Use    Smoking status: Former     Current packs/day: 0.00     Average packs/day: 0.8 packs/day for 30.0 years (22.5 ttl pk-yrs)     Types: Cigarettes     Start date: 1987     Quit date: 2017     Years since quittin.6     Passive exposure: Current (Wife smokes)    Smokeless tobacco: Former   Vaping Use    Vaping status: Never Used   Substance and Sexual Activity    Alcohol use: Yes     Alcohol/week: 4.0 - 5.0 standard drinks of alcohol     Comment: Alcoholic Drinks/day: drinks on weekends.    Drug use: No    Sexual activity: Not on file   Other Topics Concern    Not on file   Social History Narrative    Not on file     Social Drivers of Health     Financial Resource Strain: Unknown (2025)    Financial Resource Strain     Within the past 12 months, have you or your family members you live with been unable to get utilities (heat, electricity) when it was really needed?: Patient unable to answer   Food Insecurity: Unknown (2025)    Food Insecurity     Within the past 12 months, did you worry that your food would run out before you got money to buy more?: Patient unable to answer     Within the past 12 months, did the food you bought just not last and you didn t have money to get more?: Patient unable to answer    Transportation Needs: Unknown (5/23/2025)    Transportation Needs     Within the past 12 months, has lack of transportation kept you from medical appointments, getting your medicines, non-medical meetings or appointments, work, or from getting things that you need?: Patient unable to answer   Physical Activity: Not on file   Stress: Not on file   Social Connections: Not on file   Interpersonal Safety: Unknown (5/23/2025)    Interpersonal Safety     Do you feel physically and emotionally safe where you currently live?: Patient unable to answer     Within the past 12 months, have you been hit, slapped, kicked or otherwise physically hurt by someone?: Patient unable to answer     Within the past 12 months, have you been humiliated or emotionally abused in other ways by your partner or ex-partner?: Patient unable to answer   Housing Stability: Unknown (5/23/2025)    Housing Stability     Do you have housing? : Patient unable to answer     Are you worried about losing your housing?: Patient unable to answer            Family History:   No family history on file.         Allergies:   No Known Allergies         Medications:     Current Facility-Administered Medications   Medication Dose Route Frequency Provider Last Rate Last Admin    acetylcysteine (MUCOMYST) 20 % nebulizer solution 2 mL  2 mL Nebulization BID Kaitlin Grijalva APRN CNP   2 mL at 05/29/25 0748    budesonide (PULMICORT) neb solution 0.5 mg  0.5 mg Nebulization 2 times daily Fer Francis MD   0.5 mg at 05/29/25 0747    ceFEPIme (MAXIPIME) 2 g vial to attach to  mL bag for ADULTS or NS 50 mL bag for PEDS  2 g Intravenous Q8H Kaitlin Grijalva APRN  mL/hr at 05/26/25 0057 2 g at 05/29/25 0950    chlorhexidine (PERIDEX) 0.12 % solution 15 mL  15 mL Mouth/Throat Q12H Fer Francis MD   15 mL at 05/29/25 0759    enoxaparin ANTICOAGULANT (LOVENOX) injection 40 mg  40 mg Subcutaneous Daily Fer Francis MD   40 mg at 05/29/25 0800     formoterol (PERFOROMIST) neb solution 20 mcg  20 mcg Nebulization Q12H Fer Francis MD   20 mcg at 05/29/25 0748    ipratropium - albuterol 0.5 mg/2.5 mg/3 mL (DUONEB) neb solution 3 mL  3 mL Nebulization 2 times daily Fer Francis MD   3 mL at 05/29/25 0747    lidocaine (viscous) (XYLOCAINE) 2 % solution 5 mL  5 mL Mouth/Throat Once Giovana Swartz APRN CNP        multivitamin, therapeutic (THERA-VIT) tablet 1 tablet  1 tablet Oral or Feeding Tube Daily Bryon Palmer MD   1 tablet at 05/29/25 0759    [Held by provider] oxyCODONE (ROXICODONE) tablet 5 mg  5 mg Oral or Feeding Tube Q6H Kaitlin Grijalva APRN CNP   5 mg at 05/28/25 0423    pantoprazole (PROTONIX) 2 mg/mL suspension 40 mg  40 mg Per Feeding Tube QAM AC Bryon Palmer MD   40 mg at 05/29/25 0801    polyethylene glycol (MIRALAX) Packet 17 g  17 g Oral or Feeding Tube BID Bryon Palmer MD   17 g at 05/29/25 0800    Prosource TF20 ENfit Compatibl EN LIQD (PROSOURCE TF20) packet 60 mL  1 packet Per Feeding Tube Daily Fer Francis MD   60 mL at 05/29/25 0801    sennosides (SENOKOT) tablet 2 tablet  2 tablet Oral BID Kaitlin Grijalva APRN CNP   2 tablet at 05/29/25 0759    sodium chloride (PF) 0.9% PF flush 10-40 mL  10-40 mL Intracatheter Q7 Days Argentina Cruz APRN CNP        sodium chloride (PF) 0.9% PF flush 10-40 mL  10-40 mL Intracatheter Daily Argentina Cruz APRN CNP   10 mL at 05/29/25 0349    sodium chloride (PF) 0.9% PF flush 3 mL  3 mL Intracatheter Q8H Argentina Cruz APRN CNP   3 mL at 05/29/25 0025    tobramycin (PF) (DONELL) neb solution 300 mg  300 mg Nebulization 2 times daily Kaitlin Grijalva APRN CNP   300 mg at 05/29/25 0748    vancomycin (VANCOCIN) 1,500 mg in 0.9% NaCl 250 mL intermittent infusion  1,500 mg Intravenous Once Niall Mcdonough MD        voriconazole (VFEND) suspension 200 mg  200 mg Oral or Feeding Tube Q12H [facility] (08/20) Niall Mcdonough MD   200 mg at 05/29/25 0800          Review of Systems:  Unable to assess.          Physical Exam:   Temp:  [97.4  F (36.3  C)-99  F (37.2  C)] 97.8  F (36.6  C)  Pulse:  [] 96  Resp:  [12-23] 17  BP: ()/(50-71) 96/60  FiO2 (%):  [50 %-60 %] 60 %  SpO2:  [85 %-96 %] 94 %      General: sedated and intubated.   Neck: Trachea supple/midline.   Pulm: Clear breath sounds b/l, no crackles, no wheezes. Chest tube in place.   CV: RRR, no murmurs  Abdomen: Soft, non-tender, non-distended   MSK: No edema, no clubbing   Neurologic: No focal deficits  Skin: sub-q emphysema seen on the upper torso and neck.   Psych: unable to assess.

## 2025-05-29 NOTE — PROCEDURES
"Small Bowel Feeding Tube Placement Assessment  Reason for Feeding Tube Placement: small bore enteral access, gastric position acceptable  Cortrak Start Time: 1120   Cortrak End Time: 1130  Medicine Delivered During Procedure: 2% viscous lidocaine gel   Placement Successful: yes per Cortrak tracing pending AXR confirmation      Procedure Complications: none  Final Placement Jovon at exit of nare:  65 cm  Face to Face time with patient: 10 minutes    Bridle Placement:   Reason for bridle placement: securement of nasogastric feeding tube   Medicine delivered during procedure: lidocaine gel   Procedure: Successful   Location of top of clip on FT: @ 67 cm marker   Condition of nose/skin at time of bridle placement: Unremarkable   Face to Face time with patient: < 5 minutes.    Teri Jerry, MS, RDN, LD  4C MICU RD  Vocera - \"4C Clinical Dietitian\"  Weekend/Holiday RD - \"Weekend Clinical Dietitian\"    "

## 2025-05-29 NOTE — PROGRESS NOTES
Critical Care Attending Note    The patient was seen and examined by me with and independent of MS4 Bjorn and housestaff team.     I was present with the medical student who participated in the service and in the documentation of the note.      Pertinent vitals, lab results and imaging were reviewed by me as well I have verified the history and personally performed the physical exam and medical decision making.  All physician orders and treatments were placed at my direction for which  I personally managed. Key decisions are reflected in the housestaff note above with my additions  incorporated within and below.     Armin Terrell remains in critical condition today due to acute on chronic respiratory failure in setting of severe emphysema and pneumothorax with bronchopleural fistula, with sepsis , shock (multifactorial) which I personally managed. S/p valve revision with decrease in leak, though seems to have increased overnight, vent volumes (insp/exp) no longer congruent, CT to water seal Imaging with blossoming of MAULIK PNA as well decreasing UOP and mild increase in Cr - concern for mild intravascular depletion, particularly with increased vasopressor requirement  POCUS with large RV and underfilled LV , IVC 2.6 cm . Hemodynamic SV1 26 >33 and CI 2.8 >3.3 with PLR  . Jered give targeted fluid and re-eval and broaden abx and evaluate additional source (though HCAP predominant).  Continue to optimize vent synchrony as well adjust sedation/analgesia for comfort and lung rest/healing .     Niall Mcdonough MD  75 min CCT excluding procedures and teaching

## 2025-05-29 NOTE — PROGRESS NOTES
CLINICAL NUTRITION SERVICES - REASSESSMENT NOTE     RECOMMENDATIONS FOR MDs/PROVIDERS TO ORDER:  Consider giving PRN dulcolax suppository d/t last BM 3 days ago even on scheduled bowel medications. Discussed in rounds.    Registered Dietitian Interventions:  Continue current TF as ordered    Future/Additional Recommendations:  Monitor for xray confirmation of NGT placement and TF resumption  Monitor pressor support and propofol infusion     INFORMATION OBTAINED  Assessed patient in room.    CURRENT NUTRITION ORDERS  Diet: NPO + TF  Nutrition Support: access: OGT -- replacing with NGT today per ICU protocol    Formula/schedule/modulars:  - ___ : Novasource Renal (or equivalent) @ 40 ml/hr (960 ml) + 1 Prosource TF20 provides 2000 kcal (30 kcal/kg), 107 g pro (1.5 g/kg), 176 g CHO, 688 ml free water, and 0 g fiber daily.       Free water flushes: 150 mL every 2 hours per provider    CURRENT INTAKE/TOLERANCE  Tube Feedin day average tube feeding infusion of 643 mL (67 % of goal volume) + 6 day average intake of 1 Prosource TF20 packet(s) (100 % of prescribed packets)  = average intake of 1366 kcal/day and 78 g protein/day.    NEW FINDINGS  GI symptoms: Last BM . On scheduled and PRN bowel meds.    Skin/wounds: Reviewed   Nutrition-relevant labs: Reviewed  Nutrition-relevant medications: Norepi @ 0.1 mcg/kg/min, propofol @ 8.7 ml/h. Multivitamin  Weight:   Most Recent Weight: 73.9 kg (163 lb)  on 25 via Bed scale  Body mass index is 22.1 kg/m .  Wt up 3.5 kg from admission. + 7.6 L from admission per I/O.   Respiratory:  intubated      MALNUTRITION  % Intake: Decreased intake does not meet criteria  % Weight Loss: None noted, -- likely confounded by fluid status  Subcutaneous Fat Loss: Orbital: Moderate, Buccal: Moderate, and Triceps: Moderate  Muscle Loss: Temples (temporalis muscle): Moderate and Clavicles (pectoralis and deltoids): Severe  Fluid Accumulation/Edema: Moderate to severe,  "2-4+  Malnutrition Diagnosis: Severe malnutrition in the context of acute illness/disease  Malnutrition Present on Admission: Yes    EVALUATION OF THE PROGRESS TOWARD GOALS   Previous Goals  Total avg nutritional intake to meet a minimum of 25 kcal/kg and 1.5 g PRO/kg daily (per dosing wt 70.5 kg).   Evaluation: Progressing    Previous Nutrition Diagnosis  Inadequate oral intake related to intubation as evidenced by NPO with nutrition support needed to meet nutrition needs.   Evaluation: No change    NUTRITION DIAGNOSIS  Inadequate oral intake related to intubation as evidenced by NPO with nutrition support needed to meet nutrition needs.     INTERVENTIONS  See nutrition interventions above    GOALS  Total avg nutritional intake to meet a minimum of 25 kcal/kg and 1.5 g PRO/kg daily (per dosing wt 70.5 kg).     MONITORING/EVALUATION  Progress toward goals will be monitored and evaluated per policy.    Teri Jerry, MS, RDN, LD  4C MICU RD  Vocera - \"4C Clinical Dietitian\"  Weekend/Holiday RD - \"Weekend Clinical Dietitian\"  "

## 2025-05-29 NOTE — PROGRESS NOTES
MEDICAL ICU PROGRESS NOTE  05/29/2025      Date of Service (when I saw the patient): 05/29/2025    ASSESSMENT: Armin Terrell is a 70yo M with PMH of chronic respiratory failure (on home O2, 3LPM), severe COPD emphysema type (FEV1 35%), alpha-1 antitrypsin deficiency, lung nodules, hx tobacco use, and prostate cancer status post radical prostatectomy.  He was admitted to [facility] on 5/18 due to acute on chronic hypoxic respiratory failure requiring intubation. Found to have right sided pneumothorax and R side consolidation (pseudomonas + sputum). Chest tube placed but pneumothorax persisted. Has bronchopleural fistula. Started on pressors for hypotension and admitted to the ICU. Started on meropenem. Failed extubation on 5/22 due to repeat episode of tension physiology d/t kink in chest tube following extubation resulting in worsening pneumothorax and emphysema. Re-intubated and transferred to [facility] on 5/22 for continued ICU management and interventional pulmonology consult. Pseudomonas found to be meropenem resistent, switched to cefepime. Underwent bronch with valve (3) placement on 5/23 in RUL however has persistent large air leak. Had valve revision on 5/28, now has 4 valves total (3 in RUL and 1 RML). Air leak smaller however still present. Deeply sedated for optimal vent synchrony and air way pressures for best conditions to re-epithelialize the pleura. His condition remains critical at this time.     CHANGES and MAJOR THINGS TODAY:   - CXR with stable pneumo and possible increase in lingular consolidation  - EKG with normal QTC  - increased secretions overnight and lower expiratory tidal volume   - RASS to -1 to -2   - Off propofol   - Increase fentanyl gtt with versed prn  - chest tube back to -10 suction with increased pressor requirements   - one dose of vancomycin for worsening pneumonia   - infectious work up including sputum cx and UA w/o reflex  - TOV and PVR; exchange capone if TOV fails  -  BMP in afternoon to check sodium  - POCUS indicating pt hypovolemic  - lactate elevated 2.5. 500 ml LR bolus.  - WOC consulted for chest tube site rash   - OG replaced with NG     Neuro:  # Pain and sedation  - wean propofol  - Midazolam Q2H PRN  - Fentanyl gtt  - RASS goal -1 to -2      Pulmonary:  # Acute on chronic hypoxic respiratory failure, multifactorial, s/p intubation  # COPD exacerbation 2/2 R pneumonia  # LLL opacities c/f pneumonia  # Right pneumothorax with bronchopleural fistula, s/p chest tube and valve placement x2  # MAULIK Nodule, stable  # Failed extubation on 5/22  # Hx severe COPD emphysema type (FEV1 35% on 5/6/2024)  # Subcutaneous emphysema  # Hx tobacco use, quit 2018  # Alpha 1 antitrypsin deficiency  On 3L O2 and takes trilogy inhaler at home. Presented to ED on 5/14 for SOB sent home oxycodone, augmentin, azithromycin, prednisone for RUL pneumonia. Returned to the ED on 5/18 for SOB and weakness. Patient was in severe respiratory distress started on BiPAP therapy then intubated. CXR showed R pneumothorax- chest tube placed. Follow-up chest CT scan show large R pneumothorax, R sided consolidation and unchanged left upper lobe nodule. Admitted to ICU and started on abx (see ID section). Completed 5 days of methylpred d/t concern for possible COPD exacerbation component. Extubated on 5/22, then chest tube was kinked had repeated tension physiology requiring re-intubation and pressors. Large continuous air leak d/t bronchopleural fistula. IP preformed bronchoscopy with placement of 3 valves on 5/23. Large air leak persisted. Unable to bring chest tube to water seal or wean patient off vent. Also having thick secretions which improved with mucomyst. Seen by transplant team and deemed not to be a lung transplant candidate d/t deconditioning and poor rehab ability. Consultation with palliative ordered given concern for stability and quality of life if he were to be extubated with bronchopleural  fistula and severe emphysematous COPD. Repeat bronch with IP on 5/28 with replacement of 2/3 valves in RUL and added 1 valve in RML. Air persisting after bronch with less suction to chest tube. Continues to have lower expiratory tidal volumes also indicating persistent air leak. Prioritizing synchrony with the vent to allow the pleura to re-epithelialize. Also noted to have increased LLL opacities on CXR 5/28. Opacities continuing to worsen and sputum quality now thick and yellow on 5/29. See infectious section for details below.  - IP consulted  - s/p bronch on 5/23 - placed 3 valves in RUL  - s/p repeat bronch on 5/28 - replaced 2/3 valves in RUL and added 1 valve in RML  - Chest tube to to suction  - Formoterol with duoneb and Mucomyst BID  - Tobramycin nebs BID post airway clearance  - NO chest percussion or volera to prevent added positive pressure  - S/p steroids  - antimicrobials as below   - Palliative care consult for GOC discussions and family dynamics, appreciate support      FiO2 (%): 60 %, Resp: 14, Vent Mode: VC/AC, Resp Rate (Set): 12 breaths/min, Tidal Volume (Set, mL): 460 mL, PEEP (cm H2O): 5 cmH2O, Pressure Support (cm H2O): 10 cmH2O, Resp Rate (Set): 12 breaths/min, Tidal Volume (Set, mL): 460 mL, PEEP (cm H2O): 5 cmH2O    Cardiovascular:  # Mixed shock, obstructive (2/2 PTX 5/18), distributive (5/24), and hypovolemic (5/29)  Hypotensive with systolic BP to 80s, tachycardic, lactate 2.8, and leukocytosis to 35 on admission to [facility] on 5/18. CT PE negative for PE but showed R pneumothorax and R pneumonia. Shock physiology likely 2/2 combination of sepsis and obstruction initially. Chest tube placed but pneumothorax persisted, as described above. Weaned off levo on 5/22 however post extubation his chest tube kinked and the patient quickly decompensated and became hypotensive again requiring re-intubation and pressor support. On pressors on arrival to [facility]. Continues to intermittently need  pressors during hospitalization with propofol likely component of hypotension 2/2 sedation. Increasing pressor requirements on 5/29 with worsening expiratory volumes c/f possible obstructive component. Patient also noted to have hyperdynamic LV with plump IVC and dilated RV on bedside cardiac ultrasound indicating the patient may be preload dependent. Patient was given lasix on 5/28. Lactate elevated, hypotensive, and tachycardic on 5/29. Concern for hypovolemia 2/2 diuresis contributing to shock picture vs. Tension physiology from chest tube being on water seal.   - Norepi gtt, weaning as able  - sedation as above  - see below for sepsis plan  - MAP goal >65  - s/p 5 days solumedrol   - POCUS 5/29 demonstrating hyperdynamic LV and RV dilation with IVC ~2.6 cm  - Diuretics as below  - 500 ml bolus LR on 5/29    # Tachycardia, improved  # C/f Pulmonary embolism, ruled out  # C/f R heart strain, ruled out  Patient has been tachycardic to 100s-110s persistently since 5/24-5/26. Moderate risk for PE per Wells Criteria (6 points) for edema, tachycardia, and immobilization. On DVT prophylaxis. CT PE completed without evidence of PE or right heart strain. Tachycardia improving on 5/27 after switching abx on 5/25, suspect tachycardia 2/2 infection.  EKG repeated on 5/29 to evaluate Qtc while patient is on voriconazole and WNL.   - Subcutaneous Lovenox 40 mg daily  - CT PE 5/26 - no evidence of PE or R heart strain  - EKG 5/26 - sinus tach, no significant changes  - ECHO 5/26 - New moderate RV dilation and mildly reduced function.     # Bilateral upper extremity edema, improving  # Penile edema  # R superficial venous thrombosis  # Hypoalbuminemia  # C/f L internal jugular obstruction  Present since arrival at [facility] on 5/23. Nontender and equal bilaterally. Trace edema in LE. C/f possible clot vs volume overload although the patient is edematous only in the upper extremities. No evidence of DVTs on ultrasounds from 5/25 and  5/26. Some abnormal flow in the left internal jugular on 5/26 however only unilaterally. Low albumin (1.9) could be a component causing increased extravascular volume. Radiology suggesting CTV study to further characterize left internal jugular flow and/or possible obstruction, however will hold off for now pending further knowledge of clinical course and prognosis after valve revision on 5/28. Penile edema noted on 5/28. S/p lasix, improved upper extremity edema but persistent penile edema on 5/29.   - Bilateral UE US on 5/25  - R superficial veinous thrombosis (cephalic and basilic veins)  - No evidence of DVT but some veins hard to see d/t subQ emphysema  - Repeat BUE/BLE DVT US on 5/26 - abnormal to and fro flow in the L internal jugular. Subcutaneous air prohibiting visualization of other vessels.   - S/p lasix 20 mg IV, 5/25, 5/28  - Strict I/Os     GI/Nutrition:  # Nutrition  OGT replaced on 5/23.  - Resume TF  - Nutrition consult    # Elevated alk phos and AST, stable  Alk phos and ALT elevated on 5/26. Patient with nontender and nondistended abdomen. C/f hepatic congestion 2/2 right heart strain (workup pending above) vs infectious etiology. Moderate dilation of RV on echo 5/26. Elevated WBC, although infectious less likely given he is afebrile and nontender. Concern for possible chronic liver pathology given A1AT deficiency. LFTs on 5/28 stable to improved. Low suspicion for acute liver process. Will continue to monitor daily LFTs with addition on voriconazole on 5/28  - Daily LFTs     # Constipation  On fentanyl gtt.   - Senna/miralax BID     Renal/Fluids/Electrolytes:  # Hypernatremia, improved  # Respiratory acidosis, improved  Sodium 149 at OSH. Na 147 on 5/26.   Sodium improved to 143 on 5/8. FWF below for maintenance.   - S/p D5 gtt  -  Q2H   - PM BMP  - BMP BID    # SHAKIRA 2/2 likely contrast nephropathy vs prerenal   # Elevated cystatin C  # Elevated BUN   History of isolated elevation of BUN  dating back to 12/2024 with normal creatinine. Presenting this admission with consistently elevated BUN and normal creatinine. Cystatin C elevated at 1.7 with GFR 37, suggesting possibly decreased renal function of unclear etiology. Creatinine elevation following contrast CT scan on 5/26. Suspect contrast-induced SHAKIRA. Worsening creatinine and cystatin C, increasing pressure needs, and decreased UOP on 5/29 following diuresis on 5/28. Likely prerenal component.   - Monitor  - Exchange capone  - Urinalysis without reflex    #Hyperkalemia, resolved  5.9 > 5.6 on 5/23 however sample noted to have slight hemolysis, given kayexalate then lokelma.   - Recheck 5.4 on 5/23 with slight hemolysis   - If K>6 shift with insulin     Endocrine:  # S/p stress dose steroids  - Hypoglycemia protocol  - Discontinued sliding scale insulin on 5/25     ID:  # Septic shock 2/2 pneumonia  # Meropenem resistant pseudomonas pneumonia  # C/f hospital acquired pneumonia  # Leukocytosis, improving  Pseudomonas + sputum culture on 5/18. RVP negative. Blood cultures x3 NGTD. UA unremarkable. Pleural fluid from R pleural cavity on 5/21, transudative effusion per Light's criteria. Repeat sputum culture from 5/23 found to be meropenem resistant. Suspect this is responsible for persistently elevated WBC, procal, and CRP given the patient did not have full coverage until 5/25. Following meropenem initiation, leukocytosis improving. However sputum again thicker and yellow on 5/29 and worsening opacities on CXR. Concern for worsening pneumonia vs hospital acquired pneumonia. Plan to re culture sputum and broaden abx to cover hospital acquired pneumonia.  - work up and antibiotics as below    # Aspergillus + sputum  Possibly colonization however continued high oxygen needs. Secretions somewhat improved. Afebrile. CXR on 5/28 with worsening LLL opacity in blooming pattern despite treatment with abx for pseudomonas as described above. Pt was on steroid  burst however is not on chronic steroids and not considered immunocompromised. Concern for possible aspergillus bronchitis (chronic necrotizing aspergillosis). Given the patients severely lung disease will treat with voriconazole and monitor daily LFTs.  - daily LFTs    Micro:   - Blood cultures from 5/18 NGTD, repeated cultures collected 5/22 and 5/24 NGTD  - MRSA nares negative  - UA bland on 5/23  - Sputum Cx (5/23) positive for Pseudomonas aeruginosa (resistant to meropenem)  - Sputum Cx (5/25) positive for Pseudomonas aeruginosa (resistant to meropenem), aspergillus, and staph epi  - UA without reflex (5/29)  - Sputum Cx (5/29) pending     Abx:   - Cefepime 2 g (5/25- current)  - Tobramycin neb BID (5/25- current)  - Vancomycin (5/29)  - Ceftazidime 2 g Q8H (5/23-5/25)  - S/p meropenem and azithromycin (5/18-5/23)  - S/p zosyn (5/18)  - S/p vancomycin (5/19)    Antifungals:  - Voriconazole (5/28-current)     Hematology:    # Leukocytosis as above  # Thrombocytopenia, resolved  Noted plt 133 at OSH. Had been down trending from 5/14. Possible 2/2 critical illness. On 5/23 stable at 144.  - CTM     Musculoskeletal:  - PTOT to evaluate     Skin:  # Subcutaneous Emphysema 2/2 Pneumothorax, improving  - CTM    # R flank chest tube insertion site rash  - WOC consulted     General Cares/Prophylaxis:    DVT Prophylaxis: Enoxaparin (Lovenox) subcutaneous (holding pending procedure)  GI Prophylaxis: PPI  Restraints: none  Family Communication: Wife, Trini Terrell [phone]  Code Status: Full     Lines/tubes/drains:   - PICC R brachial  - Urinary catheter (has chronic obstruction from prostate cancer)  - ETT  - PIV L forearm  - Rt sided Chest Tube  - NG     Disposition:  - Medical ICU     Patient seen and findings/plan discussed with medical ICU staff, Dr. Mcdonough.    Alejandrina Milan, MS4    Resident/Fellow Attestation   I, Fer Francis MD, was present with the medical/BROOKE student who participated in the service and in  the documentation of the note.  I have verified the history and personally performed the physical exam and medical decision making.  I agree with the assessment and plan of care as documented in the note.          Fer Francis MD  PGY1  Date of Service (when I saw the patient): 05/29/25     Clinically Significant Risk Factors          # Hyperchloremia: Highest Cl = 108 mmol/L in last 2 days, will monitor as appropriate          # Hypoalbuminemia: Lowest albumin = 1.1 g/dL at 5/22/2025  9:30 PM, will monitor as appropriate    # Acute Kidney Injury, unspecified: based on a >150% or 0.3 mg/dL increase in last creatinine compared to past 90 day average, will monitor renal function              # Severe Malnutrition: based on nutrition assessment and treatment provided per dietitian's recommendations.    # Financial/Environmental Concerns: none              ====================================  INTERVAL HISTORY:   Overnight having thicker yellow secretions requiring suctioning intermittently. Urine output decreasing. Increased levo needs to keep MAP >65. Chest tube to suction. C/f hypovolemia, given 500ml bolus. Responds appropriately with head shake/nod. Denies pain or discomfort. Motions that he is thirsty. Updated wife, lola over the phone.       OBJECTIVE:   1. VITAL SIGNS:   Temp:  [97.4  F (36.3  C)-99  F (37.2  C)] 97.8  F (36.6  C)  Pulse:  [] 99  Resp:  [12-26] 14  BP: ()/(50-80) 92/62  FiO2 (%):  [50 %-60 %] 60 %  SpO2:  [85 %-97 %] 93 %  FiO2 (%): 60 %, Resp: 14, Vent Mode: VC/AC, Resp Rate (Set): 12 breaths/min, Tidal Volume (Set, mL): 460 mL, PEEP (cm H2O): 5 cmH2O, Pressure Support (cm H2O): 10 cmH2O, Resp Rate (Set): 12 breaths/min, Tidal Volume (Set, mL): 460 mL, PEEP (cm H2O): 5 cmH2O  2. INTAKE/ OUTPUT:   I/O last 3 completed shifts:  In: 3758.47 [I.V.:876.47; NG/GT:1660; IV Piggyback:462]  Out: 1125 [Urine:1050; Chest Tube:75]    3. PHYSICAL EXAMINATION:  General: No acute distress.  Follows commands.  Neuro: Alert and follows commands appropriately. No focal deficits appreciated  Pulm/Resp: Diminished breath sounds scattered. Hard to appreciate with severe emphysema and chest tube to suction. Course breath sounds and crackles bilaterally. Thick, yellow secretions requiring intermittent suctioning.   CV: Regular rate and rhythm. No murmurs or gallops  Abdomen: Soft, non-distended, non-tender  :  capone catheter in place, urine yellow and clear  Incisions/Skin: Diffuse subcutaneous empyhysema in chest and neck. Crepitus present. UE edema bilaterally (equal and nontender). Trace bilateral lower extremity edema.     4. LABS:   Arterial Blood Gases   Recent Labs   Lab 05/22/25  1714 05/22/25  1457   PH 7.41 7.39   PCO2 52* 52*   PO2 122* 86   HCO3 33* 31*     Complete Blood Count   Recent Labs   Lab 05/29/25  0343 05/28/25  0428 05/27/25  0436 05/26/25  0327   WBC 18.0* 23.3* 23.8* 40.8*   HGB 11.4* 11.5* 12.5* 13.6    264 180 185     Basic Metabolic Panel  Recent Labs   Lab 05/29/25  0755 05/29/25  0348 05/29/25  0343 05/29/25  0032 05/28/25  0825 05/28/25  0800 05/28/25  0437 05/28/25  0428 05/27/25  1637 05/27/25  1356   NA  --   --  140  --   --  143  --  136  --  145   POTASSIUM  --   --  3.9  --   --  4.0  --  3.8  --  3.9   CHLORIDE  --   --  105  --   --  106  --  100  --  108*   CO2  --   --  25  --   --  30*  --  29  --  32*   BUN  --   --  86.9*  --   --  68.1*  --  65.8*  --  69.1*   CR  --   --  1.53*  --   --  1.16  --  1.06  --  1.08   * 85 90 123*   < > 101*   < > 368*   < > 100*    < > = values in this interval not displayed.     Liver Function Tests  Recent Labs   Lab 05/29/25 0343 05/28/25  0428 05/27/25  0436 05/26/25  1729 05/26/25  0327   AST 78* 72* 72*  --  85*   ALT 44 46 50  --  61   ALKPHOS 167* 164* 175*  --  200*   BILITOTAL 0.5 0.8 0.7  --  0.7   ALBUMIN 1.2* 1.4* 1.6*  --  1.5*   INR  --   --   --  1.19*  --      Coagulation Profile  Recent Labs   Lab  05/26/25  1729   INR 1.19*       5. RADIOLOGY:   Recent Results (from the past 24 hours)   XR Chest Port 1 View    Narrative    Exam: XR CHEST PORT 1 VIEW, 5/28/2025 1:22 PM    Indication: chest tube to water seal assess interval changes in  pneumothorax and subq emphysema    Comparison: Chest radiograph 5/28/2025, 5/27/2025    Findings:   A single portable semiupright view of the chest was obtained. Stable  positioning of right upper extremity PICC, right apical chest tube,  and partially visualized enteric tube. Endotracheal tube has been  slightly advanced, terminating in the low trachea. Stable right sided  endobronchial valves. Stable cardiomediastinal silhouette and  bilateral hilar prominence. No left-sided pneumothorax but persistent  hyperlucency/possibility of vasculature at the left apex in the  setting of advanced emphysematous change. Moderate right pneumothorax,  slightly increased in size compared to earlier today at 10:26 AM, more  conspicuously increased compared to earlier today at 6:17 AM.  Persistent left basilar consolidation and patchy right-sided pulmonary  opacities, unchanged compared to prior. Basilar bullous changes.  Stable right chest wall and supraclavicular emphysema.      Impression    Impression:   1. Moderate right-sided pneumothorax, similar to slightly increased in  size compared to earlier today. Chest tube remains in place.  2. Stable right chest wall and supraclavicular subcutaneous emphysema.  3. Stable mixed bilateral pulmonary opacities and chronic  emphysematous changes.    I have personally reviewed the examination and initial interpretation  and I agree with the findings.    ELVIRA STEPHENS MD         SYSTEM ID:  [id number]   CT Chest w/o Contrast    Narrative    EXAMINATION: CT CHEST W/O CONTRAST, 5/28/2025 2:06 PM    CLINICAL HISTORY: post endobronchial valve placement for pneumothorax  assessment    COMPARISON: CT 5/26/2025.    TECHNIQUE: CT imaging obtained through  the chest without contrast.  Coronal, sagittal and axial MIP reformatted images obtained and  reviewed.     FINDINGS:    Lines, tubes, devices: Right upper extremity PICC with tip terminating  in the mid SVC. Enteric tube tip coursing into the diaphragm and out  of the field of view. Endotracheal tube tip above the isa. Apically  directed right-sided chest tube.    Lungs: Moderate right pneumothorax. Extensive upper lobe predominant  emphysematous changes. Interval placement of multiple right-sided  endobronchial valves with mildly increased atelectasis/consolidation  involving the right upper lobe. No left pneumothorax. Dependent patchy  consolidative opacity seen within the dependent portions of the right  and left lung fields, most pronounced within the left lower lobe.  Small right and trace left pleural effusions with compressive  atelectasis involving the dependent portions of the lower lobes.    Mediastinum: The visualized thyroid is unremarkable.Heart size is  within normal limits. Small pericardial effusion. The thoracic aorta  is within normal limits. Main pulmonary artery measures up to 3.5 cm.  Standard branching pattern of the great vessels. Multiple enlarged  thoracic lymph nodes, mildly increased compared to prior.    Bones and soft tissues: No acute or suspicious osseous abnormality.   Multilevel degenerative changes of the spine. Extensive subcutaneous  emphysema along the right supraclavicular and right lateral chest wall  soft tissues. Additional dissection of subcutaneous emphysema seen  extending across the chest wall midline into the left anterior lateral  chest wall soft tissues.    Upper abdomen:  Limited evaluation of the upper abdomen. No acute  pathology of the visualized solid organs. Cholelithiasis. Layering  hyperdensities within the stomach lumen, favored to represent sequelae  of ingested material.      Impression    IMPRESSION:   1. No significant change of moderate right  pneumothorax when compared  to same day chest radiograph 5/28/2025, with indwelling directed  right-sided chest tube. However, this is likely slightly increased  when compared to CT 5/26/2025.  2. New right-sided endobronchial valves with increased  atelectasis/consolidation involving the right upper lobe.   3. Dependent patchy bibasilar consolidative opacities, left greater  than right, suspicious for infection.  4. Small bilateral pleural effusions, right greater than left, and  compressive atelectasis.  5. Extensive supraclavicular and chest wall subcutaneous emphysema,  right greater than left.    I have personally reviewed the examination and initial interpretation  and I agree with the findings.    ELVIRA STEPHENS MD         SYSTEM ID:  [id number]   XR Chest Port 1 View    Narrative    Exam: XR CHEST PORT 1 VIEW, 5/28/2025 6:21 PM    Indication: interval assessment of pneumothorax    Comparison: 5/28/2025    Findings:   50 degree frontal view of the chest. Postoperative chest status post  repair of bronchopleural fistula. Persistent but improved extensive  subcutaneous emphysema and intrabronchial valves projecting over the  right hilum. Endotracheal tube is stable in the mid to low thoracic  trachea. Enteric tube below the diaphragm. Right upper extremity PICC  line with tip in the SVC. Right apical chest tube position is  unchanged.    Heart size and pulmonary vasculature are stable. Lung volumes are  within normal limits with similar configuration of the hemidiaphragms.  Mixed interstitial and patchy airspace opacities are similar, slightly  increased at the left base. Essentially unchanged configuration of the  right-sided pneumothorax. No left pneumothorax. Blunting of the  costophrenic angles bilaterally, likely scarring with small bilateral  pleural fluid. No acute osseous abnormality.      Impression    Impression:   1. Unchanged right apical pneumothorax with chest tube in place.  2. Similar  slightly worsened mixed pulmonary opacities most evident at  the lung bases, likely edema and atelectasis.  3. Improving subcutaneous emphysema.  4. Support devices are stable.    I have personally reviewed the examination and initial interpretation  and I agree with the findings.    NOLAN FRAZIER DO         SYSTEM ID:  [id number]   XR Chest Port 1 View    Narrative    Exam: XR CHEST PORT 1 VIEW, 5/29/2025 6:47 AM    Comparison: 5/28/2025    History: interval assessment of R pneumothorax and subcutaneous  emphyseam    Findings:  Single AP portable semiupright view of the chest. Right apically  directed chest tube. Right upper extremity PICC. Endobronchial valves.  Enteric tube traverses into the stomach and below the field-of-view.  Stable position of the endotracheal tube.    Trachea is midline. Mediastinum is within normal limits.  Cardiopulmonary silhouette is within normal limits. Perihilar and left  basilar opacities. Moderate right pneumothorax. The upper abdomen is  unremarkable. Extensive chest wall emphysema      Impression    Impression:   1. Moderate right pneumothorax with chest tube in place. This is  likely unchanged in size considering differences in technique.  2. Increase in left basilar consolidation.    I have personally reviewed the examination and initial interpretation  and I agree with the findings.    MIKE GOOD MD         SYSTEM ID:  [id number]

## 2025-05-29 NOTE — PROGRESS NOTES
SPIRITUAL HEALTH SERVICES - Consult Note  [facility] 4C  Referral Source/Reason for Visit: Consult    Summary and Recommendations -  [facility] received a message from distant family requesting visit from a .  Family were not certain of his spiritual/Anabaptist beliefs.   Armin is able to nod yes and no at times.  offered comforting words and prayer.    Plan: [facility] is available to further support upon request.    Nila Abrams  Staff

## 2025-05-29 NOTE — CONSULTS
[facility] Nurse Inpatient Assessment     Consulted for: right flank    Summary: spoke with bedside nurse, he stated there was no skin tear but redness around chest tube. Surgery had already made recommendations for care    [facility] nurse follow-up plan: signing off    Patient History (according to provider note(s):      A 70 YO M with PMH significant for chronic hypoxic respiratory failure, severe emphysematous COPD, alpha-1 antitrypsin deficiency, and prostate cancer S/p radical prostatectomy. Transferred from [facility] for bronchopleural fistula assessment and management. He  was admitted there to the ICU and intubated due to pseudomonas PNA, septic shock and PTX with continuous air leak. He failed extubation on 5/22 due to repeat episode of tension physiology d/t kink in chest tube following extubation resulting in worsening pneumothorax.     I personally revived his CT scans and CXR from the outside hospital. Showing significant emphysema with right sided large PTX and PNA changes. Patient is currently sedated and intubated on 5 of peep and 60 % FIO2. Chest tube closely examined seemed to be in good position, continuous air leak is seen on the pleural vac container.      The patient currently S/p RUL Spiration valves insertion on 2 different occasion (last was on 5/28 when RUL valves revised/replaced and a new valve deployed in the RML) for bronchopleural fistula.     Currently has exhalation only bubbling on the atrium.      Personally reviewed, CXRs, and most current CT chest from 5/28/2025     Plan:  - Extubation as soon as the patient qualifies for extubation.  - As long [facility], apply minimal to no wall suction (-5 to -10 or water seal), if able    Assessment:      Skin Injury Location: R chest wall/chest tube          5/28(photo from staff)                                  5/29    Last photo: 5/29  Skin injury due to: Unclear etiology and  "redness  Skin history and plan of care:   no skin tear noted, slight redness at tube insertion site, one tight suture noted  Affected area:      Skin assessment: Erythema     Measurements (length x width x depth, in cm) see photo     Color: pink     Temperature  warm     Drainage: none .      Color: none      Odor: none  Pain: tension to hands, feet and body and facial expression of distress, tender  Pain interventions prior to dressing change: slow and gentle cares   Treatment goal: Drainage control, Infection control/prevention, and Protection  STATUS: initial assessment  Supplies ordered: supplies stored on unit      Bedside nurse stated there is a lot of drainage around tube at site, staff already using xeroform around tube and mextra for drainage absorption.          Bedside RN alerted woc to lip issues. Dry/flaking and brown skin to lip. Possible PI vs dry skin. ET tube is being moved every few hours, recommended vaseline and then reassessment once the skin is cleaned up to properly assess for deeper tissue damage. Awaiting consult for this site.     Treatment Plan:     R chest tube site  Cleanse with NS, pat dry  Cut strip of xeroform gauze to wrap around tube insertion site  Cover with Mextra and tape  Change Daily + PRN if mextra is soiled, saturated, falls off (can extend to every other day once drainage decreases)    Please send the following:Supplies: Mextra 5 x 5 PS# [id number] and Xeroform 3\" x 8\" PS# [id number]      Orders: Written    RECOMMEND PRIMARY TEAM ORDER: None, at this time  Education provided: plan of care  Discussed plan of care with: Patient and Nurse  Notify WOC if wound(s) deteriorate.  Nursing to notify the Provider(s) and re-consult the WOC Nurse if new skin concern.    DATA:     Current support surface: Standard  Low air loss (MARIANGEL pump, Isolibrium, Pulsate)  Containment of urine/stool: Incontinence Protocol  BMI: Body mass index is 22.1 kg/m .   Active diet order: Orders Placed This " Encounter      NPO for Medical/Clinical Reasons Except for: Meds     Output: I/O last 3 completed shifts:  In: 3758.47 [I.V.:876.47; NG/GT:1660; IV Piggyback:462]  Out: 1125 [Urine:1050; Chest Tube:75]     Labs:   Recent Labs   Lab 05/29/25  0343 05/27/25  0436 05/26/25  1729   ALBUMIN 1.2*   < >  --    HGB 11.4*   < >  --    INR  --   --  1.19*   WBC 18.0*   < >  --     < > = values in this interval not displayed.     Pressure injury risk assessment:   Sensory Perception: 2-->very limited  Moisture: 3-->occasionally moist  Activity: 1-->bedfast  Mobility: 1-->completely immobile  Nutrition: 3-->adequate  Friction and Shear: 1-->problem  Dominic Score: 11    Pager no longer is use, please contact through [employer]   Josseline group: [facility] Nurse [place]   Dept. Office Number: [phone]

## 2025-05-30 VITALS
WEIGHT: 170.8 LBS | BODY MASS INDEX: 23.13 KG/M2 | TEMPERATURE: 97.9 F | SYSTOLIC BLOOD PRESSURE: 91 MMHG | DIASTOLIC BLOOD PRESSURE: 61 MMHG | OXYGEN SATURATION: 94 % | HEIGHT: 72 IN

## 2025-05-30 LAB
ALBUMIN SERPL BCG-MCNC: 1.3 G/DL (ref 3.5–5.2)
ALBUMIN SERPL BCG-MCNC: 1.4 G/DL (ref 3.5–5.2)
ALLEN'S TEST: ABNORMAL
ALP SERPL-CCNC: 173 U/L (ref 40–150)
ALP SERPL-CCNC: 177 U/L (ref 40–150)
ALT SERPL W P-5'-P-CCNC: 50 U/L (ref 0–70)
ALT SERPL W P-5'-P-CCNC: 50 U/L (ref 0–70)
ANION GAP SERPL CALCULATED.3IONS-SCNC: 15 MMOL/L (ref 7–15)
ANION GAP SERPL CALCULATED.3IONS-SCNC: 16 MMOL/L (ref 7–15)
AST SERPL W P-5'-P-CCNC: 88 U/L (ref 0–45)
AST SERPL W P-5'-P-CCNC: 89 U/L (ref 0–45)
BASE EXCESS BLDA CALC-SCNC: -3.2 MMOL/L (ref -3–3)
BASE EXCESS BLDA CALC-SCNC: -4 MMOL/L (ref -3–3)
BASE EXCESS BLDA CALC-SCNC: -4 MMOL/L (ref -3–3)
BASE EXCESS BLDV CALC-SCNC: -0.6 MMOL/L (ref -3–3)
BASOPHILS # BLD MANUAL: 0 10E3/UL (ref 0–0.2)
BASOPHILS NFR BLD MANUAL: 0 %
BILIRUB SERPL-MCNC: 0.4 MG/DL
BILIRUB SERPL-MCNC: 0.5 MG/DL
BILIRUBIN DIRECT (ROCHE PRO & PURE): 0.24 MG/DL (ref 0–0.45)
BILIRUBIN DIRECT (ROCHE PRO & PURE): 0.32 MG/DL (ref 0–0.45)
BUN SERPL-MCNC: 110 MG/DL (ref 8–23)
BUN SERPL-MCNC: 116 MG/DL (ref 8–23)
CALCIUM SERPL-MCNC: 7.7 MG/DL (ref 8.8–10.4)
CALCIUM SERPL-MCNC: 7.8 MG/DL (ref 8.8–10.4)
CHLORIDE SERPL-SCNC: 103 MMOL/L (ref 98–107)
CHLORIDE SERPL-SCNC: 103 MMOL/L (ref 98–107)
CK SERPL-CCNC: 32 U/L (ref 39–308)
CREAT SERPL-MCNC: 2.72 MG/DL (ref 0.67–1.17)
CREAT SERPL-MCNC: 3.03 MG/DL (ref 0.67–1.17)
CRP SERPL-MCNC: 315 MG/L
EGFRCR SERPLBLD CKD-EPI 2021: 22 ML/MIN/1.73M2
EGFRCR SERPLBLD CKD-EPI 2021: 25 ML/MIN/1.73M2
EOSINOPHIL # BLD MANUAL: 0.3 10E3/UL (ref 0–0.7)
EOSINOPHIL NFR BLD MANUAL: 7 %
ERYTHROCYTE [DISTWIDTH] IN BLOOD BY AUTOMATED COUNT: 15.5 % (ref 10–15)
ERYTHROCYTE [DISTWIDTH] IN BLOOD BY AUTOMATED COUNT: 15.5 % (ref 10–15)
GLUCOSE BLDC GLUCOMTR-MCNC: 111 MG/DL (ref 70–99)
GLUCOSE BLDC GLUCOMTR-MCNC: 92 MG/DL (ref 70–99)
GLUCOSE BLDC GLUCOMTR-MCNC: 96 MG/DL (ref 70–99)
GLUCOSE BLDC GLUCOMTR-MCNC: 99 MG/DL (ref 70–99)
GLUCOSE SERPL-MCNC: 109 MG/DL (ref 70–99)
GLUCOSE SERPL-MCNC: 94 MG/DL (ref 70–99)
HCO3 BLD-SCNC: 26 MMOL/L (ref 21–28)
HCO3 BLDV-SCNC: 27 MMOL/L (ref 21–28)
HCO3 SERPL-SCNC: 21 MMOL/L (ref 22–29)
HCO3 SERPL-SCNC: 23 MMOL/L (ref 22–29)
HCT VFR BLD AUTO: 36.5 % (ref 40–53)
HCT VFR BLD AUTO: 37.1 % (ref 40–53)
HGB BLD-MCNC: 11.9 G/DL (ref 13.3–17.7)
HGB BLD-MCNC: 12.1 G/DL (ref 13.3–17.7)
LACTATE SERPL-SCNC: 1.7 MMOL/L (ref 0.7–2)
LIPASE SERPL-CCNC: 53 U/L (ref 13–60)
LYMPHOCYTES # BLD MANUAL: 1.3 10E3/UL (ref 0.8–5.3)
LYMPHOCYTES NFR BLD MANUAL: 28 %
MCH RBC QN AUTO: 27.9 PG (ref 26.5–33)
MCH RBC QN AUTO: 28.7 PG (ref 26.5–33)
MCHC RBC AUTO-ENTMCNC: 32.1 G/DL (ref 31.5–36.5)
MCHC RBC AUTO-ENTMCNC: 33.2 G/DL (ref 31.5–36.5)
MCV RBC AUTO: 87 FL (ref 78–100)
MCV RBC AUTO: 87 FL (ref 78–100)
MONOCYTES # BLD MANUAL: 0.5 10E3/UL (ref 0–1.3)
MONOCYTES NFR BLD MANUAL: 10 %
MYELOCYTES # BLD MANUAL: 0.1 10E3/UL
MYELOCYTES NFR BLD MANUAL: 3 %
NEUTROPHILS # BLD MANUAL: 2.5 10E3/UL (ref 1.6–8.3)
NEUTROPHILS NFR BLD MANUAL: 52 %
NT-PROBNP SERPL-MCNC: 996 PG/ML (ref 0–229)
O2/TOTAL GAS SETTING VFR VENT: 70 %
OXYHGB MFR BLDA: 88 % (ref 92–100)
OXYHGB MFR BLDA: 89 % (ref 92–100)
OXYHGB MFR BLDA: 93 % (ref 92–100)
OXYHGB MFR BLDV: 81 % (ref 70–75)
PCO2 BLD: 66 MM HG (ref 35–45)
PCO2 BLD: 70 MM HG (ref 35–45)
PCO2 BLD: 70 MM HG (ref 35–45)
PCO2 BLDV: 57 MM HG (ref 40–50)
PEEP: 5 CM H2O
PH BLD: 7.17 [PH] (ref 7.35–7.45)
PH BLD: 7.18 [PH] (ref 7.35–7.45)
PH BLD: 7.2 [PH] (ref 7.35–7.45)
PH BLDV: 7.29 [PH] (ref 7.32–7.43)
PLAT MORPH BLD: ABNORMAL
PLATELET # BLD AUTO: 386 10E3/UL (ref 150–450)
PLATELET # BLD AUTO: 393 10E3/UL (ref 150–450)
PO2 BLD: 64 MM HG (ref 80–105)
PO2 BLD: 65 MM HG (ref 80–105)
PO2 BLD: 71 MM HG (ref 80–105)
PO2 BLDV: 49 MM HG (ref 25–47)
POTASSIUM SERPL-SCNC: 4 MMOL/L (ref 3.4–5.3)
POTASSIUM SERPL-SCNC: 4.4 MMOL/L (ref 3.4–5.3)
PROCALCITONIN SERPL IA-MCNC: 15.5 NG/ML
PROT SERPL-MCNC: 4.8 G/DL (ref 6.4–8.3)
PROT SERPL-MCNC: 5.1 G/DL (ref 6.4–8.3)
RBC # BLD AUTO: 4.21 10E6/UL (ref 4.4–5.9)
RBC # BLD AUTO: 4.27 10E6/UL (ref 4.4–5.9)
RBC MORPH BLD: ABNORMAL
SAO2 % BLDA: 89.4 % (ref 95–96)
SAO2 % BLDA: 90.2 % (ref 95–96)
SAO2 % BLDA: 93.7 % (ref 95–96)
SAO2 % BLDV: 82 % (ref 70–75)
SODIUM SERPL-SCNC: 140 MMOL/L (ref 135–145)
SODIUM SERPL-SCNC: 141 MMOL/L (ref 135–145)
T4 FREE SERPL-MCNC: 0.58 NG/DL (ref 0.9–1.7)
TARGETS BLD QL SMEAR: SLIGHT
TSH SERPL DL<=0.005 MIU/L-ACNC: 6.79 UIU/ML (ref 0.3–4.2)
VANCOMYCIN SERPL-MCNC: 16 UG/ML (ref ?–25)
WBC # BLD AUTO: 4.8 10E3/UL (ref 4–11)
WBC # BLD AUTO: 5.3 10E3/UL (ref 4–11)

## 2025-05-30 PROCEDURE — 258N000003 HC RX IP 258 OP 636

## 2025-05-30 PROCEDURE — 84443 ASSAY THYROID STIM HORMONE: CPT

## 2025-05-30 PROCEDURE — 250N000011 HC RX IP 250 OP 636: Mod: JZ | Performed by: INTERNAL MEDICINE

## 2025-05-30 PROCEDURE — 80048 BASIC METABOLIC PNL TOTAL CA: CPT | Performed by: NURSE PRACTITIONER

## 2025-05-30 PROCEDURE — 250N000013 HC RX MED GY IP 250 OP 250 PS 637: Performed by: INTERNAL MEDICINE

## 2025-05-30 PROCEDURE — 83880 ASSAY OF NATRIURETIC PEPTIDE: CPT

## 2025-05-30 PROCEDURE — 250N000011 HC RX IP 250 OP 636

## 2025-05-30 PROCEDURE — 84439 ASSAY OF FREE THYROXINE: CPT

## 2025-05-30 PROCEDURE — 99291 CRITICAL CARE FIRST HOUR: CPT | Mod: GC | Performed by: INTERNAL MEDICINE

## 2025-05-30 PROCEDURE — 80202 ASSAY OF VANCOMYCIN: CPT | Performed by: INTERNAL MEDICINE

## 2025-05-30 PROCEDURE — 82805 BLOOD GASES W/O2 SATURATION: CPT

## 2025-05-30 PROCEDURE — 83605 ASSAY OF LACTIC ACID: CPT

## 2025-05-30 PROCEDURE — 250N000009 HC RX 250

## 2025-05-30 PROCEDURE — 85041 AUTOMATED RBC COUNT: CPT

## 2025-05-30 PROCEDURE — 250N000013 HC RX MED GY IP 250 OP 250 PS 637

## 2025-05-30 PROCEDURE — 84145 PROCALCITONIN (PCT): CPT

## 2025-05-30 PROCEDURE — 999N000157 HC STATISTIC RCP TIME EA 10 MIN

## 2025-05-30 PROCEDURE — 200N000002 HC R&B ICU UMMC

## 2025-05-30 PROCEDURE — 94003 VENT MGMT INPAT SUBQ DAY: CPT

## 2025-05-30 PROCEDURE — 250N000009 HC RX 250: Performed by: INTERNAL MEDICINE

## 2025-05-30 PROCEDURE — 99233 SBSQ HOSP IP/OBS HIGH 50: CPT | Performed by: INTERNAL MEDICINE

## 2025-05-30 PROCEDURE — 82550 ASSAY OF CK (CPK): CPT

## 2025-05-30 PROCEDURE — 84155 ASSAY OF PROTEIN SERUM: CPT

## 2025-05-30 PROCEDURE — 85014 HEMATOCRIT: CPT

## 2025-05-30 PROCEDURE — 85007 BL SMEAR W/DIFF WBC COUNT: CPT

## 2025-05-30 PROCEDURE — 99418 PROLNG IP/OBS E/M EA 15 MIN: CPT | Performed by: INTERNAL MEDICINE

## 2025-05-30 PROCEDURE — 94640 AIRWAY INHALATION TREATMENT: CPT

## 2025-05-30 PROCEDURE — 83690 ASSAY OF LIPASE: CPT

## 2025-05-30 PROCEDURE — 258N000003 HC RX IP 258 OP 636: Performed by: INTERNAL MEDICINE

## 2025-05-30 PROCEDURE — 86140 C-REACTIVE PROTEIN: CPT

## 2025-05-30 RX ORDER — INDOMETHACIN 25 MG/1
50 CAPSULE ORAL ONCE
Status: COMPLETED | OUTPATIENT
Start: 2025-05-30 | End: 2025-05-30

## 2025-05-30 RX ORDER — HYDROCORTISONE SODIUM SUCCINATE 100 MG/2ML
50 INJECTION INTRAMUSCULAR; INTRAVENOUS EVERY 6 HOURS
Status: DISCONTINUED | OUTPATIENT
Start: 2025-05-30 | End: 2025-05-31 | Stop reason: HOSPADM

## 2025-05-30 RX ORDER — GLYCOPYRROLATE 0.2 MG/ML
0.1 INJECTION, SOLUTION INTRAMUSCULAR; INTRAVENOUS EVERY 4 HOURS PRN
Status: DISCONTINUED | OUTPATIENT
Start: 2025-05-30 | End: 2025-05-31 | Stop reason: HOSPADM

## 2025-05-30 RX ORDER — PHENYLEPHRINE HCL IN 0.9% NACL 50MG/250ML
.1-6 PLASTIC BAG, INJECTION (ML) INTRAVENOUS CONTINUOUS
Status: DISCONTINUED | OUTPATIENT
Start: 2025-05-30 | End: 2025-05-30

## 2025-05-30 RX ORDER — CEFEPIME HYDROCHLORIDE 2 G/1
2 INJECTION, POWDER, FOR SOLUTION INTRAVENOUS EVERY 24 HOURS
Status: DISCONTINUED | OUTPATIENT
Start: 2025-05-30 | End: 2025-05-31 | Stop reason: HOSPADM

## 2025-05-30 RX ORDER — LIDOCAINE 40 MG/G
CREAM TOPICAL
Status: DISCONTINUED | OUTPATIENT
Start: 2025-05-30 | End: 2025-05-31 | Stop reason: HOSPADM

## 2025-05-30 RX ORDER — GLYCOPYRROLATE 0.2 MG/ML
0.2 INJECTION, SOLUTION INTRAMUSCULAR; INTRAVENOUS ONCE
Status: COMPLETED | OUTPATIENT
Start: 2025-05-30 | End: 2025-05-30

## 2025-05-30 RX ORDER — ATROPINE SULFATE 10 MG/ML
2 SOLUTION/ DROPS OPHTHALMIC EVERY 4 HOURS PRN
Status: DISCONTINUED | OUTPATIENT
Start: 2025-05-30 | End: 2025-05-31 | Stop reason: HOSPADM

## 2025-05-30 RX ORDER — INDOMETHACIN 25 MG/1
CAPSULE ORAL
Status: COMPLETED
Start: 2025-05-30 | End: 2025-05-30

## 2025-05-30 RX ADMIN — ACETYLCYSTEINE 2 ML: 200 SOLUTION ORAL; RESPIRATORY (INHALATION) at 07:44

## 2025-05-30 RX ADMIN — Medication 50 MCG: at 21:53

## 2025-05-30 RX ADMIN — Medication 60 ML: at 10:02

## 2025-05-30 RX ADMIN — HEPARIN SODIUM 5000 UNITS: 5000 INJECTION, SOLUTION INTRAVENOUS; SUBCUTANEOUS at 03:43

## 2025-05-30 RX ADMIN — VORICONAZOLE 200 MG: 40 POWDER, FOR SUSPENSION ORAL at 09:47

## 2025-05-30 RX ADMIN — GLYCOPYRROLATE 0.2 MG: 0.2 INJECTION INTRAMUSCULAR; INTRAVENOUS at 21:10

## 2025-05-30 RX ADMIN — THERA TABS 1 TABLET: TAB at 09:48

## 2025-05-30 RX ADMIN — CHLORHEXIDINE GLUCONATE 15 ML: 1.2 SOLUTION ORAL at 09:48

## 2025-05-30 RX ADMIN — HYDROCORTISONE SODIUM SUCCINATE 50 MG: 100 INJECTION, POWDER, FOR SOLUTION INTRAMUSCULAR; INTRAVENOUS at 17:45

## 2025-05-30 RX ADMIN — Medication 0.5 MCG/KG/MIN: at 02:42

## 2025-05-30 RX ADMIN — Medication 50 MCG: at 13:35

## 2025-05-30 RX ADMIN — Medication 50 MCG: at 11:59

## 2025-05-30 RX ADMIN — MIDAZOLAM 2 MG: 1 INJECTION INTRAMUSCULAR; INTRAVENOUS at 20:30

## 2025-05-30 RX ADMIN — NOREPINEPHRINE BITARTRATE 0.23 MCG/KG/MIN: 0.06 INJECTION, SOLUTION INTRAVENOUS at 00:21

## 2025-05-30 RX ADMIN — VASOPRESSIN 2.4 UNITS/HR: 20 INJECTION, SOLUTION INTRAMUSCULAR; SUBCUTANEOUS at 09:52

## 2025-05-30 RX ADMIN — MIDAZOLAM 2 MG: 1 INJECTION INTRAMUSCULAR; INTRAVENOUS at 21:59

## 2025-05-30 RX ADMIN — MIDAZOLAM 2 MG: 1 INJECTION INTRAMUSCULAR; INTRAVENOUS at 21:06

## 2025-05-30 RX ADMIN — TOBRAMYCIN 300 MG: 300 SOLUTION RESPIRATORY (INHALATION) at 07:44

## 2025-05-30 RX ADMIN — Medication 150 MCG/HR: at 05:37

## 2025-05-30 RX ADMIN — IPRATROPIUM BROMIDE AND ALBUTEROL SULFATE 3 ML: .5; 3 SOLUTION RESPIRATORY (INHALATION) at 07:44

## 2025-05-30 RX ADMIN — Medication 40 MG: at 09:48

## 2025-05-30 RX ADMIN — SODIUM BICARBONATE 50 MEQ: 84 INJECTION INTRAVENOUS at 13:10

## 2025-05-30 RX ADMIN — Medication 200 MCG/HR: at 20:10

## 2025-05-30 RX ADMIN — HYDROCORTISONE SODIUM SUCCINATE 50 MG: 100 INJECTION, POWDER, FOR SOLUTION INTRAMUSCULAR; INTRAVENOUS at 10:41

## 2025-05-30 RX ADMIN — INDOMETHACIN 50 MEQ: 25 CAPSULE ORAL at 13:10

## 2025-05-30 RX ADMIN — VANCOMYCIN HYDROCHLORIDE 750 MG: 1 INJECTION, POWDER, LYOPHILIZED, FOR SOLUTION INTRAVENOUS at 09:43

## 2025-05-30 RX ADMIN — HEPARIN SODIUM 5000 UNITS: 5000 INJECTION, SOLUTION INTRAVENOUS; SUBCUTANEOUS at 11:21

## 2025-05-30 RX ADMIN — BUDESONIDE 0.5 MG: 0.5 INHALANT RESPIRATORY (INHALATION) at 07:44

## 2025-05-30 RX ADMIN — ONDANSETRON 4 MG: 2 INJECTION, SOLUTION INTRAMUSCULAR; INTRAVENOUS at 20:31

## 2025-05-30 RX ADMIN — VASOPRESSIN 2.4 UNITS/HR: 20 INJECTION, SOLUTION INTRAMUSCULAR; SUBCUTANEOUS at 17:58

## 2025-05-30 RX ADMIN — Medication 0.5 MCG/KG/MIN: at 11:14

## 2025-05-30 RX ADMIN — SODIUM CHLORIDE, SODIUM LACTATE, POTASSIUM CHLORIDE, AND CALCIUM CHLORIDE 500 ML: .6; .31; .03; .02 INJECTION, SOLUTION INTRAVENOUS at 11:08

## 2025-05-30 RX ADMIN — MIDAZOLAM 2 MG: 1 INJECTION INTRAMUSCULAR; INTRAVENOUS at 21:45

## 2025-05-30 ASSESSMENT — ACTIVITIES OF DAILY LIVING (ADL)
ADLS_ACUITY_SCORE: 68
ADLS_ACUITY_SCORE: 68
ADLS_ACUITY_SCORE: 66
ADLS_ACUITY_SCORE: 73
ADLS_ACUITY_SCORE: 66
ADLS_ACUITY_SCORE: 68
ADLS_ACUITY_SCORE: 70
ADLS_ACUITY_SCORE: 68
ADLS_ACUITY_SCORE: 66
ADLS_ACUITY_SCORE: 68
ADLS_ACUITY_SCORE: 68
ADLS_ACUITY_SCORE: 66
ADLS_ACUITY_SCORE: 67
ADLS_ACUITY_SCORE: 68
ADLS_ACUITY_SCORE: 73
ADLS_ACUITY_SCORE: 68
ADLS_ACUITY_SCORE: 66
ADLS_ACUITY_SCORE: 68
ADLS_ACUITY_SCORE: 73

## 2025-05-30 NOTE — PROGRESS NOTES
"  PALLIATIVE CARE PROGRESS NOTE  [facility]     Patient Name: Armin Terrell  Date of Admission: 5/22/2025   Today the patient was seen for: goals of care, patient and family support, support with coping     Recommendations & Counseling       GOALS OF CARE:   Continue current cares for now to allow family time to travel in to see Alfred and be together to say goodbyes. Estimate prognosis at this time to be hours to few days.  Care conference held today with ICU team and Palliative care team in person at bedside with bedside nurse present also and patient's family members by speakerphone (wife Ursula, marika Concepcion, son Candido, daughter Gisele). Alfred was asked if he wanted to be a part of today's meeting with his family and he said yes and so care conference was completed in his room   ICU team reviewed clinical course to date, including that Armin unfortunately has continued with what is now irreversible respiratory failure that is multifactorial and includes pneumonia and refractory pneumothorax. Over the last day, Alfred is clinically declining with increasing pressor support needs and nearing anuric acute renal failure. ICU team shared that despite aggressive treatment in ICU, Alfred is continuing to clinically decline and overall prognosis is changing now to hours to days. Family expressed understanding that time is short even with full aggressive treatment in ICU and they want to continue with current cares to allow time for them to travel into the hospital to see Alfred and say goodbyes. Alfred nodded yes when his family asked if he understood they were coming in to the hospital to see him. Code status discussed in detail today and family agree with code status change to DNR, continuing intubation with ventilation to allow time for family to come in to see him.  Alfred is able to answer many yes/no questions and sometimes shrugs \"I don't know\" and tries to mouth " "words, communication is limited by his fatigue and likely some attention challenges due to severe illness and also also intubation. Alfred needs support from a healthcare surrogate decision-maker for complex decisions, but he should be included in discussions of his care at this time as he has requested to be included--this supports dignity and autonomy for Alfred as he navigates end of life here in the hospital.    ADVANCE CARE PLANNING:  No health care directive on file. Per system policy, Surrogate Decision-makers for Patients With Diminished Decision-making Capacity offers guidance on possible decision-makers. Wife Ursula has been identified as a surrogate decision maker.   There is no POLST form on file, defer to patient and/or next of kin for decisions   Code status: No CPR- Pre-arrest intubation OK    MEDICAL MANAGEMENT:   Alfred denied pain or air hunger with me on interview this morning.    Once family arrives and has time with Alfred, if they and he decide to proceed with extubation to comfort focused cares, recommend the following:   --Please add fentanyl boluses 100-200mcg Q10 minutes PRN   --Please start a non-opiate medication for sedation and ensure patient is sedated prior to extubation to comfort cares (medication selection per ICU team), sedating medications should not be withheld (especially in awake patients) when death following ventilator withdrawal is expected. Please do not use opiate for sedation at end of life as sedation is less predictable and this can lead to respiratory depression when titrated to sedation.    1. Extubation to comfort focused cares:   Recommend initiating comfort care orders through the order set \"ICU Care of Patient At End of Life\". Through the order set, initiate the following (please note differences in medication doses/frecuency recommended below that stray from the pre-set orders in order set):    - Patient care- initiate all patient care orders, nursing preparation " orders and RT orders    - prior to extubation orders:    -- glycopyrrolate 0.2mg 30 min before extubation   -- continue current fentanyl drip 150mcg/hr and give 100mcg bolus dose 10 min prior to extubation   -- continue infusion for sedation (midazolam vs propofol per ICU) and give bolus of midazolam 2mg 10 minutes prior to extubation              -- after pre-medications are given wean vent to pressure support setting and monitor for ongoing comfort on this setting. If remains comfortable (10min) then proceed with extubation. If not comfortable use PRN midazolam and fentanyl orders to obtain comfort prior to extubation. Encourage use of bolus medications over a continuous infusion as the infusions take some time to reach peak effect.     --Stop pressor support at the same time as extubation.    - post-extubation comfort medications:     --for dyspnea/pain: fentanyl 100-200mcg Q10 minutes PRN. Nurse should have 3 doses ready for immediate infusion if necessary.   -- for anxiety/ agitation: midazolam 2-4mg IV Q10 minutes PRN. Nurse should have 3 doses full and ready for immediate infusion if necessary.   -- for secretions: PRN glycopyrrolate 0.1-0.2mg IV Q 4 hours PRN and 1-2 atropine drops sublingual Q15min PRN    - Medication adjustment/titration recs:  - If needing 3 or more prn fentanyl doses per hour for 2-3 hours, would increase continuous infusion to a rate that equals total hourly use over past 1-2 hours.  -If needing 3 or more prn midazolam doses per hour for 2-3 hours, would start continuous infusion at a rate that equals total hourly use over past 1-2 hours.   - if only moderate benefit from max dose fentanyl or midazolam would do 50% dose increase  - if minimal to no benefit from max dose fentanyl or midazolam would do 100% dose increase        PSYCHOSOCIAL/SPIRITUAL:  Family lives with wife Trini (31 years together), two children in [place], one lives locally. Was independent in ADLs prior to this .    Retired , retired 7-8 years ago  5 grandchildren, 4 in [place], 1 locally  No significant spiritual needs identified    Palliative Care will continue to follow. Thank you for the consult and allowing us to aid in the care of Armin Terrell.    These recommendations have been discussed with ICU team by note, in person.    Lilly Grande MD  [facility], Palliative Care  Securely message with the [employer] Web Console (learn more here) or  Text page via [facility] Paging/Directory        Assessment          Armin Terrell is a 69 year old male with a past medical history of A1AT deficiency, tobacco-related COPD who presented on 5/22/25 from OSH for acute on chronic respiratory failure, found to have acute R PTX with bronchopulmonary fistula, no improvement with endobronchial valve placement, failed extubation. Pt now with acute renal failure and shock with increasing pressor needs. Palliative Care consulted to assist with goals of care discussions and family support.     Today, the patient was seen for:  Palliative care encounter  Goals of care  Support  COPD  A1AT deficiency  Acute respiratory failure  bronchopulmonary fistula  Pneumothorax     Interval History:     Multidisciplinary collaboration:  Care conference today, see above  Notable medications:  Fentanyl infusion 150mcg/hr, midazolam 1-2mg IV q2hrs PRN   Patient/family narrative  Care conference today, see above for details and for discussion of symptoms.    Review of Systems:     Besides above, ROS was reviewed and is unremarkable        Physical Exam:   Temp:  [97.4  F (36.3  C)-98.5  F (36.9  C)] 98.2  F (36.8  C)  Pulse:  [] 104  Resp:  [9-18] 12  BP: ()/(52-72) 109/66  MAP:  [25 mmHg-95 mmHg] 76 mmHg  Arterial Line BP: ()/(45-93) 115/55  FiO2 (%):  [55 %-70 %] 70 %  SpO2:  [88 %-99 %] 92 %  170 lbs 12.8 oz    Physical Exam  Gen: NAD, appears tired, lying supine in bed with HOB raised  HEENT: normocephalic, no scleral  "icterus, ETT in place  CV: extremities warm  Pulm: no tachypnea or increased work of breathing  Skin: no rash or jaundice on limited exam  Neuro: tired but awake and answering many yes/no questions and will sometimes also shrug shoulders or turn palms upwards like \"I don't know\" gesture, follows commands like \"wiggle your toes\"  Psych: no agitation at time of exam          Data Reviewed:     Last Comprehensive Metabolic Panel:  Lab Results   Component Value Date     05/30/2025    POTASSIUM 4.0 05/30/2025    CHLORIDE 103 05/30/2025    CO2 21 (L) 05/30/2025    ANIONGAP 16 (H) 05/30/2025     (H) 05/30/2025    .0 (H) 05/30/2025    CR 2.72 (H) 05/30/2025    GFRESTIMATED 25 (L) 05/30/2025    PIYUSH 7.8 (L) 05/30/2025       CBC RESULTS:   Recent Labs   Lab Test 05/30/25 0335   WBC 4.8   RBC 4.27*   HGB 11.9*   HCT 37.1*   MCV 87   MCH 27.9   MCHC 32.1   RDW 15.5*        Liver Function Studies -   Recent Labs   Lab Test 05/30/25 0335   PROTTOTAL 5.1*   ALBUMIN 1.4*   BILITOTAL 0.5   ALKPHOS 177*   AST 89*   ALT 50           Medical Decision Making       70 MINUTES SPENT BY ME on the date of service doing chart review, history, exam, documentation & further activities per the note.       "

## 2025-05-30 NOTE — PROCEDURES
ARTERIAL LINE INSERTION PROCEDURE NOTE  (NON-OR)    Procedure Date: 5/29/2025   Performing Physician: Fabian Torres MD    Procedure: Right Radial Arterial Line  Indications:  Undifferentiated Shock       Estimated Blood Loss:  2  ml  Complications: NONE     Procedure Details:   Procedure was done as an emergency : No  The risks, benefits, complications, treatment options, and expected outcomes were discussed with SON .  The risks and potential complications of their problem and purposed procedure include but are not limited to infection, bleeding, pain,  the need for additional procedures, creating a complication requiring transfusion or operation, and nerve and vessel injury.  The patient/alternate (see above) concurred with the proposed plan, giving informed consent.  The site of the procedure was properly noted/marked. The patient was identified as Armin Terrell with Date of Birth 1955 and the procedure verified as Insertion of Arterial Line.  A Time Out was held and the above information confirmed.    Under sterile conditions the skin over the  Rt Radial Arterial Line was prepped with Chlorhexidine and covered with a sterile drape.  Strict sterile conditions were maintained,  Cap, mask, and sterile gloves were worn by all participants.  5 ml of Lidocaine 1% local anesthetic was infiltrated into the skin and subcutaneous tissues.  A needle was used to identify the right radial artery.  Using Sledinger technique a needle was then inserted into the vein.  A guide wire was then passed easily through the catheter. An arterial line catheter  was then inserted into the vessel over the guide wire.  The catheter was then secured using a suture with sterile bandage.      Number of attempts:  2      Condition: stable    Dr. Sherif Peters was present and assisted with the procedure.   ________________________________________________________________________  Fabian Torres MD  Internal Medicine  PGY3  5/29/202511:47 PM

## 2025-05-30 NOTE — PROGRESS NOTES
MEDICAL ICU PROGRESS NOTE  05/30/2025      Date of Service (when I saw the patient): 05/30/2025    ASSESSMENT: Armin Terrell is a 68yo M with PMH of chronic respiratory failure (on home O2, 3LPM), severe COPD emphysema type (FEV1 35%), alpha-1 antitrypsin deficiency, lung nodules, hx tobacco use, and prostate cancer status post radical prostatectomy.  He was admitted to [facility] on 5/18 due to acute on chronic hypoxic respiratory failure requiring intubation. Found to have right sided pneumothorax and R side consolidation (pseudomonas + sputum). Chest tube placed but pneumothorax persisted. Has bronchopleural fistula. Started on pressors for hypotension and admitted to the ICU. Started on meropenem. Failed extubation on 5/22 due to repeat episode of tension physiology d/t kink in chest tube following extubation resulting in worsening pneumothorax and emphysema. Re-intubated and transferred to [facility] on 5/22 for continued ICU management and interventional pulmonology consult. Pseudomonas found to be meropenem resistent, switched to cefepime. Underwent bronch with valve (3) placement on 5/23 in RUL however has persistent large air leak. Had valve revision on 5/28, now has 4 valves total (3 in RUL and 1 RML). Continues to have persistent air leak. Now requiring 3 pressors. Deeply sedated for optimal vent synchrony and air way pressures. His condition remains critical at this time.     CHANGES and MAJOR THINGS TODAY:   - RASS to -1 to -2, to facilitate vent synchrony  - CXR unchanged from yesterday regarding pneumo   - some increased opacities on R side c/f ARDS  - chest tube to remain to suction -10  - ABG with pH 7.18  - bedside POCUS demonstrating good squeeze and hypovolemia  - start stress dose steroids and vasopressin   - wean phenyl as able  - holding parameters for bowel regimen for multiple stools  - now anuric with worsening SHAKIRA, cf kidney failure  - cf multiple organ failure, care conference today  at 1100.   - additional 500cc IVF bolus today  - Patient code status changed to DNR    Goals of Care:  Care conference with with the patient at the bedside on 5/30. Participants included, the Mr. Terrell, MICU physician team, palliative, nursing, and family members over the phone (Jamey Feliz, Gisele, and Vinnie). Discussed course of illness and updated regarding his worsening medical condition and decompensation over the past 24 hours. Decision was made to continue efforts to support his respiratory status and hemodynamics while keeping him comfortable so the family can make arrangements to come see the patient as soon as possible. However, if he were to arrest, he would not want CPR and resuscitation. Patient was alert and intermittently participating in conversation by nodding/shaking his head.     Neuro:  # Pain and sedation  - Midazolam Q2H PRN  - Fentanyl gtt  - RASS goal -1 to -2      Pulmonary:  # Acute on chronic hypoxic respiratory failure, multifactorial, s/p intubation  # COPD exacerbation 2/2 R pneumonia  # LLL opacities c/f pneumonia  # Right pneumothorax with bronchopleural fistula, s/p chest tube and valve placement x2  # MAULIK Nodule, stable  # Failed extubation on 5/22  # Hx severe COPD emphysema type (FEV1 35% on 5/6/2024)  # Subcutaneous emphysema  # Hx tobacco use, quit 2018  # Alpha 1 antitrypsin deficiency  On 3L O2 and takes trilogy inhaler at home. Presented to ED on 5/14 for SOB sent home oxycodone, augmentin, azithromycin, prednisone for RUL pneumonia. Returned to the ED on 5/18 for SOB and weakness. Patient was in severe respiratory distress started on BiPAP therapy then intubated. CXR showed R pneumothorax- chest tube placed. Follow-up chest CT scan show large R pneumothorax, R sided consolidation and unchanged left upper lobe nodule. Admitted to ICU and started on abx (see ID section). Completed 5 days of methylpred d/t concern for possible COPD exacerbation component. Extubated on 5/22,  then chest tube was kinked had repeated tension physiology requiring re-intubation and pressors. Large continuous air leak d/t bronchopleural fistula. IP preformed bronchoscopy with placement of 3 valves on 5/23. Large air leak persisted. Unable to bring chest tube to water seal or wean patient off vent. Also having thick secretions which improved with mucomyst. Seen by transplant team and deemed not to be a lung transplant candidate d/t deconditioning and poor rehab ability. Consultation with palliative ordered given concern for stability and quality of life if he were to be extubated with bronchopleural fistula and severe emphysematous COPD. Repeat bronch with IP on 5/28 with replacement of 2/3 valves in RUL and added 1 valve in RML. Air persisting after bronch with less suction to chest tube. Continues to have lower expiratory tidal volumes also indicating persistent air leak. Prioritizing synchrony with the vent to allow the pleura to re-epithelialize. Also noted to have increased LLL opacities on CXR 5/28. Opacities continuing to worsen and sputum quality now thick and yellow on 5/29. CXR indicating pneumothorax stable with CT to -10 suction however he continue to have a large air leak. New diffuse hazy opacities on right side from CXR 5/30 concerning for possible developing ARDS.   - IP consulted  - s/p bronch on 5/23 - placed 3 valves in RUL  - s/p repeat bronch on 5/28 - replaced 2/3 valves in RUL and added 1 valve in RML  - Chest tube to to suction  - Formoterol with duoneb and Mucomyst BID  - Tobramycin nebs BID post airway clearance  - NO chest percussion or volera to prevent added positive pressure  - S/p steroids  - Antimicrobials as below   - Palliative care consult for [facility] discussions and family dynamics, appreciate support      FiO2 (%): 70 %, Resp: 19, Vent Mode: VC/AC, Resp Rate (Set): 12 breaths/min, Tidal Volume (Set, mL): 460 mL, PEEP (cm H2O): 5 cmH2O, Pressure Support (cm H2O): 10 cmH2O, Resp  Rate (Set): 12 breaths/min, Tidal Volume (Set, mL): 460 mL, PEEP (cm H2O): 5 cmH2O    Cardiovascular:  # Mixed shock, obstructive (2/2 PTX 5/18), distributive (5/24), and hypovolemic (5/29)  Hypotensive with systolic BP to 80s, tachycardic, lactate 2.8, and leukocytosis to 35 on admission to [facility] on 5/18. CT PE negative for PE but showed R pneumothorax and R pneumonia. Shock physiology likely 2/2 combination of sepsis and obstruction initially. Chest tube placed but pneumothorax persisted, as described above. Weaned off levo on 5/22 however post extubation his chest tube kinked and the patient quickly decompensated and became hypotensive again requiring re-intubation and pressor support. On pressors on arrival to [facility]. Continues to intermittently need pressors during hospitalization with propofol likely component of hypotension 2/2 sedation. Increasing pressor requirements on 5/29 with worsening expiratory volumes c/f possible obstructive component. Patient also noted to have hyperdynamic LV with plump IVC and dilated RV on bedside cardiac ultrasound indicating the patient may be preload dependent. Patient was given lasix on 5/28. Lactate elevated, hypotensive, and tachycardic on 5/29. Concern for hypovolemia 2/2 diuresis contributing to shock picture vs. Tension physiology from chest tube being on water seal. Minimal improvement following chest tube to suction and 1L fluid bolus on 5/29. Continued to increase pressor requirements overnight and now on 3 pressors. Evaluated fluid status using starling maneuver on 5/30 which indicated SVI of 41 and a change of 32.9% indicating that the patient is fluid responsive. Ordered additional 500ml LR on 5/30. Also started stress dose steroids given worsening hemodynamic stability and respiratory failure as above.   - Norepi gtt  - Vasopressin gtt  - Phenyl gtt, weaning as able  - Hydrocortisone 50 mg Q6H  - sedation as above  - see below for sepsis plan  - MAP goal >65  -  POCUS 5/29 demonstrating hyperdynamic LV and RV dilation with IVC ~2.6 cm  - 500 ml LR bolus on 5/30    # Tachycardia, improved  # C/f Pulmonary embolism, ruled out  # C/f R heart strain, ruled out  Patient has been tachycardic to 100s-110s persistently since 5/24-5/26. Moderate risk for PE per Wells Criteria (6 points) for edema, tachycardia, and immobilization. On DVT prophylaxis. CT PE completed without evidence of PE or right heart strain. Tachycardia improving on 5/27 after switching abx on 5/25, suspect tachycardia 2/2 infection. EKG repeated on 5/29 to evaluate Qtc while patient is on voriconazole and WNL.   - heparin gtt for dvt prophylaxis  - CT PE 5/26 - no evidence of PE or R heart strain  - EKG 5/26 - sinus tach, no significant changes  - ECHO 5/26 - New moderate RV dilation and mildly reduced function.     # Bilateral upper extremity edema, improving  # Penile edema  # R superficial venous thrombosis  # Hypoalbuminemia  Present since arrival at [facility] on 5/23. Nontender and equal bilaterally. Trace edema in LE. C/f possible clot vs volume overload although the patient is edematous only in the upper extremities. No evidence of DVTs on ultrasounds from 5/25 and 5/26. Some abnormal flow in the left internal jugular on 5/26 however only unilaterally. Low albumin (1.9) could be a component causing increased extravascular volume. Radiology suggesting CTV study to further characterize left internal jugular flow and/or possible obstruction, however will hold off given goals of care discussion on 5/30. Penile edema noted on 5/28. S/p lasix, improved upper extremity edema but persistent penile edema on 5/29.   - Bilateral UE US on 5/25  - R superficial veinous thrombosis (cephalic and basilic veins)  - No evidence of DVT but some veins hard to see d/t subQ emphysema  - Repeat BUE/BLE DVT US on 5/26 - abnormal to and fro flow in the L internal jugular. Subcutaneous air prohibiting visualization of other vessels.   -  S/p lasix 20 mg IV, 5/25, 5/28  - Strict I/Os     GI/Nutrition:  # Nutrition  OGT replaced on 5/23.  - Resume TF  - Nutrition consult    # Elevated alk phos and AST, stable  Alk phos and ALT elevated on 5/26. Patient with nontender and nondistended abdomen. C/f hepatic congestion 2/2 right heart strain (workup pending above) vs infectious etiology. Moderate dilation of RV on echo 5/26. Elevated WBC, although infectious less likely given he is afebrile and nontender. Concern for possible chronic liver pathology given A1AT deficiency. LFTs on 5/28 stable to improved. Low suspicion for acute liver process. Will continue to monitor daily LFTs with addition on voriconazole on 5/28     # Constipation  On fentanyl gtt.   - Senna/miralax BID     Renal/Fluids/Electrolytes:  # Hypernatremia, improved  Sodium 149 at OSH. Na 147 on 5/26. Sodium improved to 143 on 5/8. FWF below for maintenance.   - S/p D5 gtt  -  Q2H   - PM BMP  - BMP BID    # Mixed respiratory and metabolic acidosis  # HAGMA  Patient noted to have ABG with pH 7.18 on 5/30. Anion gap 16. Delta delta -2. Suspect mixed acidosis 2/2 respiratory acidosis with poor compensation metabolic acidosis given kidney failure with significantly elevated BUN.  - Optimize vent settings  - Repeat ABG  - Bicarb 50 mEq     # C/f renal failure  # SHAKIRA 2/2 likely contrast nephropathy vs prerenal   # Elevated cystatin C  # Elevated BUN   History of isolated elevation of BUN dating back to 12/2024 with normal creatinine. Presenting this admission with consistently elevated BUN and normal creatinine. Cystatin C elevated at 1.7 with GFR 37, suggesting possibly decreased renal function of unclear etiology. Creatinine elevation following contrast CT scan on 5/26. Suspect contrast-induced SHAKIRA. Worsening creatinine and cystatin C, increasing pressure needs, and decreased UOP on 5/29 following diuresis on 5/28. Likely prerenal component. Removed capone on 5/30 and trial to void however  his UOP continued to decrease overnight so there is concern he may not be making urine 2/2 renal failure. Patient continued to appear volume down on starling evaluation from 5/30. Given fluid bolus as above.   - Monitor  - Trial of void  - Neff removed 5/30 AM  - Urinalysis pending    #Hyperkalemia, resolved  5.9 > 5.6 on 5/23 however sample noted to have slight hemolysis, given kayexalate then lokelma.   - Recheck 5.4 on 5/23 with slight hemolysis   - If K>6 shift with insulin     Endocrine:  # S/p stress dose steroids  - Hypoglycemia protocol  - Discontinued sliding scale insulin on 5/25     ID:  # Septic shock 2/2 pneumonia  # Meropenem resistant pseudomonas pneumonia  # C/f hospital acquired pneumonia  # Leukkopenia  Pseudomonas + sputum culture on 5/18. RVP negative. Blood cultures x3 NGTD. UA unremarkable. Pleural fluid from R pleural cavity on 5/21, transudative effusion per Light's criteria. Repeat sputum culture from 5/23 found to be meropenem resistant. Suspect this is responsible for persistently elevated WBC, procal, and CRP given the patient did not have full coverage until 5/25. Following meropenem initiation, leukocytosis improving. However sputum again thicker and yellow on 5/29 and worsening opacities on CXR. Concern for worsening pneumonia vs hospital acquired pneumonia. Re cultured sputum and broadened abx to cover hospital acquired pneumonia 5/30.   - work up and antibiotics as below    # Aspergillus + sputum  Possibly colonization however continued high oxygen needs. Secretions somewhat improved. Afebrile. CXR on 5/28 with worsening LLL opacity in blooming pattern despite treatment with abx for pseudomonas as described above. Pt was on steroid burst however is not on chronic steroids and not considered immunocompromised. Concern for possible aspergillus bronchitis (chronic necrotizing aspergillosis). Given the patients severely lung disease will treat with voriconazole and monitor daily LFTs.  WBC inappropriately WNL on 5/30 concerning for leukopenia. Patient demonstrating weakened ability to mount an immune response in the setting of sepsis. Abx and antifungals continued as below.     Micro:   - Blood cultures from 5/18 NGTD, repeated cultures collected 5/22 and 5/24 NGTD  - MRSA nares negative  - UA bland on 5/23  - Sputum Cx (5/23) positive for Pseudomonas aeruginosa (resistant to meropenem)  - Sputum Cx (5/25) positive for Pseudomonas aeruginosa (resistant to meropenem), aspergillus, and staph epi  - UA without reflex (5/29)  - Sputum Cx (5/29) pending     Abx:   - Cefepime 2 g (5/25- current)  - Tobramycin neb BID (5/25- current)  - Vancomycin (5/29- current)  - Ceftazidime 2 g Q8H (5/23-5/25)  - S/p meropenem and azithromycin (5/18-5/23)  - S/p zosyn (5/18)  - S/p vancomycin (5/19)    Antifungals:  - Voriconazole (5/28-current)     Hematology:    # Leukopenia  # Thrombocytopenia, resolved  Noted plt 133 at OSH. Had been down trending from 5/14. Possible 2/2 critical illness. On 5/23 stable at 144.  - CTM     Musculoskeletal:  - PTOT to evaluate     Skin:  # Subcutaneous Emphysema 2/2 Pneumothorax  - CTM    # R flank chest tube insertion site rash  - WOC consulted     General Cares/Prophylaxis:    DVT Prophylaxis: Heparin gtt  GI Prophylaxis: PPI  Restraints: none  Family Communication: Wife, Trini Terrell [phone]  Code Status: DNR     Lines/tubes/drains:   - PICC R brachial  - ETT  - PIV L forearm  - Rt sided Chest Tube  - NG     Disposition:  - Medical ICU     Patient seen and findings/plan discussed with medical ICU staff, Dr. Mcdonough.    Alejandrina Milan, MS4    Resident/Fellow Attestation   I, Fer Francis MD, was present with the medical/BROOKE student who participated in the service and in the documentation of the note.  I have verified the history and personally performed the physical exam and medical decision making.  I agree with the assessment and plan of care as documented in the note.           Fer Francis MD  PGY1  Date of Service (when I saw the patient): 05/30/25   Clinically Significant Risk Factors               # Hypoalbuminemia: Lowest albumin = 1.1 g/dL at 5/22/2025  9:30 PM, will monitor as appropriate    # Acute Kidney Injury, unspecified: based on a >150% or 0.3 mg/dL increase in last creatinine compared to past 90 day average, will monitor renal function      # Acute Hypercapnic Respiratory Failure: based on arterial blood gas results.  Continue supplemental oxygen and ventilatory support as indicated.  # Acute Hypercapnic Respiratory Failure: based on venous blood gas results.  Continue supplemental oxygen and ventilatory support as indicated.          # Severe Malnutrition: based on nutrition assessment and treatment provided per dietitian's recommendations.    # Financial/Environmental Concerns: none        Code Status: No CPR- Pre-arrest intubation OK       ====================================  INTERVAL HISTORY:   Increasing pressor requirements overnight. Concern for elevated PIPs and plateau pressures. Expiratory time increased. Art line placed. Now on 3 pressors. Continues to have large air leak with chest tube to -10 suction. Diminished UOP and significantly elevated BUN/Cr concerning for renal failure and also with mixed respiratory and metabolic acidosis. Care conference at the bedside this morning, patient's code status changed to DNR. Family en route to be with patient.        OBJECTIVE:   1. VITAL SIGNS:   Temp:  [97.4  F (36.3  C)-98.5  F (36.9  C)] 98.2  F (36.8  C)  Pulse:  [] 112  Resp:  [9-19] 19  BP: ()/(52-72) 109/66  MAP:  [25 mmHg-95 mmHg] 79 mmHg  Arterial Line BP: ()/(45-93) 131/58  FiO2 (%):  [55 %-70 %] 70 %  SpO2:  [88 %-99 %] 93 %  FiO2 (%): 70 %, Resp: 19, Vent Mode: VC/AC, Resp Rate (Set): 12 breaths/min, Tidal Volume (Set, mL): 460 mL, PEEP (cm H2O): 5 cmH2O, Pressure Support (cm H2O): 10 cmH2O, Resp Rate (Set): 12 breaths/min,  Tidal Volume (Set, mL): 460 mL, PEEP (cm H2O): 5 cmH2O  2. INTAKE/ OUTPUT:   I/O last 3 completed shifts:  In: 3958.28 [I.V.:553.28; NG/GT:1125; IV Piggyback:1460]  Out: 496 [Urine:414; Chest Tube:82]    3. PHYSICAL EXAMINATION:  General: No acute distress. Follows commands.  Neuro: Alert and follows commands appropriately. No focal deficits appreciated  Pulm/Resp: Diminished breath sounds scattered. Hard to appreciate with severe emphysema and chest tube to suction. Course breath sounds and crackles bilaterally. Thick, yellow secretions requiring intermittent suctioning.   CV: Regular rate and rhythm. No murmurs or gallops  Abdomen: Soft, non-distended, non-tender  :  capone catheter in place, minimal urine output  Incisions/Skin: Diffuse subcutaneous empyhysema in chest and neck, increased compared to yesterday. Crepitus present. UE edema bilaterally (equal and nontender). Trace bilateral lower extremity edema.     4. LABS:   Arterial Blood Gases   Recent Labs   Lab 05/30/25  1055   PH 7.18*   PCO2 70*   PO2 65*   HCO3 26       Complete Blood Count   Recent Labs   Lab 05/30/25  0335 05/30/25  0236 05/29/25  0343 05/28/25  0428   WBC 4.8 5.3 18.0* 23.3*   HGB 11.9* 12.1* 11.4* 11.5*    386 337 264     Basic Metabolic Panel  Recent Labs   Lab 05/30/25  1106 05/30/25  0335 05/30/25  0333 05/30/25  0004 05/29/25  2128 05/29/25  1334 05/29/25  0348 05/29/25  0343 05/28/25  0825 05/28/25  0800   NA  --  140  --   --   --  141  --  140  --  143   POTASSIUM  --  4.0  --   --   --  3.8  --  3.9  --  4.0   CHLORIDE  --  103  --   --   --  105  --  105  --  106   CO2  --  21*  --   --   --  24  --  25  --  30*   BUN  --  110.0*  --   --   --  95.4*  --  86.9*  --  68.1*   CR  --  2.72*  --   --   --  1.82*  --  1.53*  --  1.16   * 94 92 96   < > 103*   < > 90   < > 101*    < > = values in this interval not displayed.     Liver Function Tests  Recent Labs   Lab 05/30/25  0335 05/29/25  0343 05/28/25  0428  05/27/25  0436 05/26/25  1729   AST 89* 78* 72* 72*  --    ALT 50 44 46 50  --    ALKPHOS 177* 167* 164* 175*  --    BILITOTAL 0.5 0.5 0.8 0.7  --    ALBUMIN 1.4* 1.2* 1.4* 1.6*  --    INR  --   --   --   --  1.19*     Coagulation Profile  Recent Labs   Lab 05/26/25  1729   INR 1.19*       5. RADIOLOGY:   Recent Results (from the past 24 hours)   XR Chest Port 1 View    Narrative    EXAM: XR CHEST PORT 1 VIEW  5/29/2025 1:40 PM     HISTORY:  chest tube back on suction. evaluate for interval change in  pneumothorax       COMPARISON:  5/29/2025    FINDINGS:   Stable apically directed right-sided chest tube, right Upper extremity  PICC, endotracheal tube, and enteric tube coursing below diaphragm and  out of the field-of-view.    Trachea is midline. Multiple right-sided endobronchial valves.  Cardiomediastinal silhouette and pulmonary vasculature are stable.  Layering fluid along the right minor fissure appears more conspicuous  in this examination. Unchanged multifocal opacities and left basilar  consolidation. Small bilateral pleural effusions. Decreased small to  moderate right pneumothorax. No left pneumothorax. Increased  conspicuity of curvilinear opacity along the right upper zone possibly  atelectatic.    . Extensive subcutaneous emphysema most pronounced overlying the right  lateral chest wall. Partially visualized tubings and presumed drains  projecting over the left axilla, possibly external to the patient.  Consider correlation      Impression    IMPRESSION:   1. Decreased small right pneumothorax with stable indwelling support  devices.  2. Bilateral multifocal mixed opacities and left basilar confluent  appearing opacities are not significantly changed. Increased  conspicuity of curvilinear opacity along the right upper zone possibly  atelectatic.  3. Small bilateral pleural effusions.    I have personally reviewed the examination and initial interpretation  and I agree with the findings.    JACOBO ALFORD  MD LOUISE         SYSTEM ID:  [id number]   XR Abdomen Port 1 View    Narrative    XR ABDOMEN PORT 1 VIEW  5/29/2025 1:40 PM     HISTORY:  TECH: Please locate the END of the tube to verify gastric  feeding tube placement     COMPARISON:  Abdominal radiograph 5/27/2025    TECHNIQUE: Supine abdominal radiograph(s).    FINDINGS:   Enteric tube removed. Feeding tube tip projected over the stomach. No  significant change in the pulmonary opacities and pleural effusions.  Please see separate chest radiographs.    Nonobstructive bowel gas pattern. No pneumatosis or portal venous gas.  No abnormal calcifications.       Impression    IMPRESSION:   Feeding tube tip projects over the stomach.      I have personally reviewed the examination and initial interpretation  and I agree with the findings.    GABRIEL MENDOZA MD         SYSTEM ID:  [id number]   XR Chest Port 1 View    Narrative    Exam: XR CHEST PORT 1 VIEW, 5/29/2025 10:48 PM    Indication: re-assess pneumothorax    Comparison: Earlier today    Findings:   65 deformity of the chest. Endotracheal tube, enteric tube, and right  apical chest tube positions are stable. Right upper extremity PICC  line with tip in the SVC. Bronchial valves seen projecting over the  right hilum. Cardiomediastinal silhouette and pulmonary vasculature  are stable. Improved right minor fissure fluid and right upper lobe  consolidation. Slightly increased consolidation of the left lower  lobe. Bilateral pleural effusions. Unchanged trace right pneumothorax.  Similar extensive subcutaneous emphysema particularly over the right  thorax.      Impression    Impression:   1. Stable small right pneumothorax and right apical chest tube.  2. Remainder support devices are stable.  3. Shifting pulmonary opacities with similar consolidation in the  right apex and left lung base.  4. Small bilateral pleural effusions persist.    I have personally reviewed the examination and initial interpretation  and I  agree with the findings.    SHERRIE TATE MD         SYSTEM ID:  [id number]   XR Chest Port 1 View    Narrative    Exam: XR CHEST PORT 1 VIEW, 5/30/2025 5:36 AM    Comparison: 5/29/2025    History: interval assessment of R pneumothorax and subcutaneous  emphysema    Findings:  Single AP portable view of the chest. Endotracheal tube tip in the mid  to lower thoracic trachea. Right apically directed chest tube. Right  endobronchial valves. Enteric tube traverses into the stomach and  below the field-of-view.    Trachea is midline. Mediastinum is within normal limits.  Cardiopulmonary silhouette is within normal limits. Right hilar and  left lingular and basilar consolidative opacities. Severe  emphysematous changes of the lungs. Small right apical pneumothorax.  Small bilateral pleural effusions.  Extensive right greater than left  chest wall subcutaneous emphysema.      Impression    Impression:   1. Small right apical pneumothorax with chest tube in place  2. Slightly decreased consolidative opacities in the right lung apex,  lingula, and left lung base.    I have personally reviewed the examination and initial interpretation  and I agree with the findings.    SHERRIE TATE MD         SYSTEM ID:  [id number]

## 2025-05-30 NOTE — PLAN OF CARE
Goal Outcome Evaluation:         ICU End of Shift Summary. See flowsheets for vital signs and detailed assessment.    Changes this shift: RASS -2/-3. R chest tube to -10 suction following bronchoscopy and intrabronchial valve replacement/removal. Transitioned to water seal with saturations of 88-92 on 60% FiO2, 460 TV, 12 RR, PEEP 5. Continuous airleak still noted, leaking serous fluid at chest tube site. Crepitus also present on R chest and back. Chest CT completed. TF resumed after procedure. BUE continue to weep, increasing penile edema.               
Goal Outcome Evaluation:         ICU End of Shift Summary. See flowsheets for vital signs and detailed assessment.    Changes this shift: RASS 0/-1. Alert. Follows commands and moves all extremities- mechanical lift to chair for 2 hours. Afebrile. Propofol gtt infusing @ 10. Fentanyl infusing @ 50. SAT trial performed and passed. SBT 5/5 60% for 1.5 hrs. Trial ended d/t desaturation to low 80s. Levo utilized intermittently to maintain MAP>65. Persistent continuous air leak in R chest tube. Copious secretions.TF running at 40ml/hr w/ Q2 150ml water flushes.     Plan: Hold TF and blood thinners d/t bronchoscopy planned for 0830 5/28.                    
Goal Outcome Evaluation:    Plan of Care Reviewed With: other (see comments) chart    Overall Patient Progress: no change    Outcome Evaluation: Plan for discharge is unknown at this time          
ICU End of Shift Summary. See flowsheets for vital signs and detailed assessment.    Changes this shift: No acute changes.  Pt follows commands.  Continues on high vent settings. CT PE completed.      Goal Outcome Evaluation:      Plan of Care Reviewed With: patient    Overall Patient Progress: no changeOverall Patient Progress: no change    Outcome Evaluation: VSS          
ICU End of Shift Summary. See flowsheets for vital signs and detailed assessment.    Changes this shift: Patient currently RASS -1; able to open eyes to voice and follow commands. Prop currently at and fent at 100. Vent settings FiO2 70%// RR 12/ PEEP 5. Right chest tube with known air leak with 90 mL of output for the shift. TF currently at goal rate of 40 ml/hr and free water flushes currently at 120 mL every 4 hours along with D5 going 75 ml/hr to help decrease sodium.     Plan: Monitor electrolytes. Wean sedation as tolerated. Wean Fio2 as tolerated.     Goal Outcome Evaluation:      Plan of Care Reviewed With: patient    Overall Patient Progress: no changeOverall Patient Progress: no change    Outcome Evaluation: See note.          
ICU End of Shift Summary. See flowsheets for vital signs and detailed assessment.    Changes this shift: Patient currently RASS -1; able to open eyes to voice and follows commands. Prop currently at 10, fent at 100, dex at 0.3 for sedation. Vent settings FiO2 70%//RR 12/ PEEP 5. Right chest tube with known large air leak currently to water seal. OG has TF going at 30 ml/hr which will increase to 40ml/hr at 1000; free water flushes increased from 60 mL to 120 mL.     Plan: Wean sedation as tolerated. Wean FiO2 as tolerated.     Goal Outcome Evaluation:      Plan of Care Reviewed With: patient, spouse    Overall Patient Progress: no changeOverall Patient Progress: no change    Outcome Evaluation: See note.          
ICU End of Shift Summary. See flowsheets for vital signs and detailed assessment.    Changes this shift: Patient opens eyes spontaneously and obeys commands. Patient calm and cooperative. Patient on 15 of propofol at the beginning of shift and weaned to 10 of propofol by end of shift. Per day shift team, ok to continue to wean patient. If sedation is needed after that, the team wants patient to have precedex. Patient  to 115 sinus tachycardia. Patient has MAP goal of 65. Levophed needed at times to achieve - highest dose 0.05. Patient on ventilator with ETT. FiO2 increased from 70% to 80%. PEEP decreased this morning from 5 to 0 due to air leak in patient pleural cavity. Rate 12. Team attempted to put patient chest tube to water seal this morning, but patient began to have worsening air leak from pleural space evidenced by swelling to right side of chest. Patient chest tube was set back to -20 of suction. Tidal volume 420. Patient having adequate urine output to Neff catheter with lasix IV push. Patient has hypernatremia but sodium level trending down from 148 to 146. Patient had palliative care consult entered today.    Plan:                       
ICU End of Shift Summary. See flowsheets for vital signs and detailed assessment.    Changes this shift: RASS -1 -2, Follows commands, moving all extremities. Nodding yes/no to questions appropriately. Denies pain Fent gtt at 175/hr. NSR with rare PVC's. Hypotensive on levo at 0.27, phenylephrine added around 0245 and titrate to 1, levo down to 0.12. Afebrile. Arms edematous and weeping. Foreskin edema improved. Suctioned q2h or more often for thick orange/yellow secretions. Lungs are coarse. R CT with cont to intermittent air leak to -10 suction. Minimal output in CT. UOP 5-35/hr team updated. Loose stool X1, miralax held.     Plan: Care conference today at 1100. Wean pressors as able. Wean O2 as able.     Goal Outcome Evaluation:      Plan of Care Reviewed With: patient    Overall Patient Progress: decliningOverall Patient Progress: declining    Outcome Evaluation: See note          
ICU End of Shift Summary. See flowsheets for vital signs and detailed assessment.    Changes this shift: RASS -1, -2, PERRL, Follows commands as able. Prop at 20, fentanyl at 50 with PRN of 25 X1 during CT dressing change. NSR with rare PVC's. Levo titrated to maintain MAP >65, ranged from 0.08-0.16. Edema to hands improved from +4 to +2 with elevation and lasix X1. Foreskin is severely swollen. R CT to water seal with continues leak, MD updated, minimal drainage in tube however large amount of thin yellow drainage at the sit, dressing changed X1. Crepitus unchanged. OG tube feed at goal, tolerated well, no BM this shift. Neff patent, UOP slowed down this morning, MD updated to 35mL over 2 hours. Pt has red area to right side near CT. Procal elevated this morning, team updated.     Plan: Wean levo as able. Wean o2 as able for goal >88%. Monitor chest tube site, air leak and output.     Goal Outcome Evaluation:      Plan of Care Reviewed With: patient    Overall Patient Progress: improvingOverall Patient Progress: improving    Outcome Evaluation: See note          
ICU End of Shift Summary. See flowsheets for vital signs and detailed assessment.    Changes this shift: RASS -1, following commands, nodding to y/n questions. This  morning on Levo, Phenyl, and Vaso tp MAP>65. Phenyl now off. VC/AC FiO2 70% Peep 5, suctioning thick yellow/ pink tinged secretions.  R CT with cont air leak to -10 suction. Minimal output, leaking serous drainage at CT site. No urine output. Cr 3.03. pH 7.17 pCO2 70 PO2 71 Bicarb 26, Bicarb push given x1. Improved  pH 7.20. Family at bedside.    Plan: Family plans to extubate and move to comfort care this evening.        Goal Outcome Evaluation:      Plan of Care Reviewed With: patient, spouse, family    Overall Patient Progress: decliningOverall Patient Progress: declining               
ICU End of Shift Summary. See flowsheets for vital signs and detailed assessment.    Changes this shift: RASS -2 to -4 increased sedation for vent synchrony. On prop and fent for sedation. PERRL. SR in 70-80s. MAP>65 on levo. Phenyl weaned off. Vent settings 60/460/12/5. Tmax 99.7.  R CT w/Lg air leak to cont -20 suction. Lg air pocket./subQ emphysema on R chest. OG placed. Neff in place w/decreasing UOP. No BM on shift. D5W started for high Na. Kayexalate and lokelma given for K 5.9. WBC increased from 26 to 48, BC drawn.      Plan: continue plan of care. Bronch today to possible fix bronchopulmonary fistula          Goal Outcome Evaluation:      Plan of Care Reviewed With: patient    Overall Patient Progress: no changeOverall Patient Progress: no change    Outcome Evaluation: on fent and prop for sedation. levo at .07. lokelma and kayexlate given for K 5.9          
ICU End of Shift Summary. See flowsheets for vital signs and detailed assessment.    Changes this shift: RASS currently -1; patient currently on propofol of 15 fent of 100. Able to follow commands and open eyes to voice. Patient has been ST with HR in the 110s to 120s for most of the night and levo is currently at 0.03 to maintain MAP above 65. AC/VC settings with FiO2 of 80%//RR 12/ PEEP 5. Right chest tube with known air leak currently to -20 suction and  No BM on the overnight shift and last BM was on 05/23. TF currently going at goal rate of 40 ml/hr with flushes increased to 150 mL every 2 hours. Neff having adequate urine output.     Plan: Wean sedation as tolerated. Wean FiO2 as tolerated.     Goal Outcome Evaluation:      Plan of Care Reviewed With: patient    Overall Patient Progress: no changeOverall Patient Progress: no change    Outcome Evaluation: See note.          
ICU End of Shift Summary. See flowsheets for vital signs and detailed assessment.    Changes this shift: RASS goal 0 to -1. Current RASS -1. Pt opens eyes, follows commands. Bronch today at bedside, 3 valves placed. See Notes. MAP goal 65, Levo off since noon. No change in vent settings. Right chest tube to water seal, large known air leak. 180 ml output from chest tube this shift (possibly some of that may be from lavage during bronch procedure.) Trending labs, elevated K=5.4. Pt received kayexalate last night. Large stool x1 this afternoon.  Elevated sodium is now trending down, D5 stopped and TF started. Neff catheter with 900 ml urine output since midnight. Pt is net positive 1.1 liters for the day. Spoke with pt's wife, Trini, by phone today and answered questions.     Plan:  Wean sedation as tolerated, monitor labs.        Problem: Adult Inpatient Plan of Care  Goal: Optimal Comfort and Wellbeing  Intervention: Monitor Pain and Promote Comfort  Recent Flowsheet Documentation  Taken 5/23/2025 1600 by eJnny Reilly, RN  Pain Management Interventions: medication (see MAR)  Taken 5/23/2025 1200 by Jenny Reilly, RN  Pain Management Interventions: medication (see MAR)  Taken 5/23/2025 0938 by Jenny Reilly, RN  Pain Management Interventions: medication (see MAR)  Taken 5/23/2025 0858 by Jenny Reilly, RN  Pain Management Interventions: medication (see MAR)   Goal Outcome Evaluation:                            
ICU End of Shift Summary. See flowsheets for vital signs and detailed assessment.    Changes this shift: Rass 0/-1, Pupils equal and reactive,follows commands and moves all of his extremities. CMV with similar vent settings except  FiO2 that was increased up to 80% to maintain SPO2>88%. Coarse dim LS with copious yellow tan thin inline secretions. CT in place with continued air leak and minimal serous output. Afebrile, SR with a goal MAP>65. Levo restarted @2330 to maintain MAP goal and eventually weaned off. TF stopped at midnight for pending Bronchoscopy in the AM and D5w started @100ml/hr with stable BG readings. Neff in place with adequate UOP. T/R    Plan: Bronch at 8.30 AM, wean vent and pressors as able.    Goal Outcome Evaluation:      Plan of Care Reviewed With: patient    Overall Patient Progress: no changeOverall Patient Progress: no change    Outcome Evaluation: Rass 0/-1 remains intubated and sedated          
ICU End of Shift Summary. See flowsheets for vital signs and detailed assessment.    Changes this shift: Rass 0/-1, follows commands and moving all of his extremities. Sedated on fentanyl and propofol. Afebrile, SR required Levo to keep MAP>65.Intubated with copious yellow thin secretions. FiO2 decreased to 70% with SPO2>90%. CT in place with baseline large air leak.Neff intact with adequate UOP during the shift. Pt also had a large loose BM x 1. TF stopped at midnight for pending procedure and Dw5 started.     Plan: Continue with current POC    Goal Outcome Evaluation:      Plan of Care Reviewed With: patient    Overall Patient Progress: no changeOverall Patient Progress: no change    Outcome Evaluation: Rass 0/-1, required levo to keep MAP>65          
ICU End of Shift Summary. See flowsheets for vital signs and detailed assessment.    Changes this shift: SBT 5/7 wih FIO2 70% x 1.5 hours tolerated well.  Right chest tube now to -20 70 ml out x 12 hrs w/a significant air leak and pt has crepitus. Febrile temp high 100.6 (ax), MD notified, Tylenol given,BC x2, CRP and procalcitonin ordered and collected. Weaned off  precedex drip, propofol drip increased with fentanyl drip tolerating well.    Wife updated via phone and will be visiting tomorrow morning.    Plan: Continue to monitor, assess and notify the team of any changes in status.     Goal Outcome Evaluation: Remains hemodynamically stable.      Plan of Care Reviewed With: patient    Outcome Evaluation: Febrile tolerating SBT      Problem: Adult Inpatient Plan of Care  Goal: Optimal Comfort and Wellbeing  Intervention: Provide Person-Centered Care  Recent Flowsheet Documentation  Taken 5/24/2025 1600 by Eli Crocker RN  Trust Relationship/Rapport:   care explained   choices provided   reassurance provided  Taken 5/24/2025 1200 by Eli Crocker RN  Trust Relationship/Rapport:   care explained   choices provided   reassurance provided  Taken 5/24/2025 0800 by Eli Crocker, RN  Trust Relationship/Rapport:   care explained   choices provided   reassurance provided             
No indicators present

## 2025-05-30 NOTE — PHARMACY-VANCOMYCIN DOSING SERVICE
Pharmacy Vancomycin Note  Date of Service May 30, 2025  Patient's  1955   69 year old, male    Indication: [facility] Pneumonia  Day of Therapy: 2  Current vancomycin regimen:  iVanc  Current vancomycin monitoring method: Trough (Method 2 = manual dose calculation)  Current vancomycin therapeutic monitoring goal: 15-20 mg/L    Current estimated CrCl = Estimated Creatinine Clearance: 28.1 mL/min (A) (based on SCr of 2.72 mg/dL (H)).    Creatinine for last 3 days  2025:  1:56 PM Creatinine 1.08 mg/dL  2025:  4:28 AM Creatinine 1.06 mg/dL;  8:00 AM Creatinine 1.16 mg/dL  2025:  3:43 AM Creatinine 1.53 mg/dL;  1:34 PM Creatinine 1.82 mg/dL  2025:  3:35 AM Creatinine 2.72 mg/dL    Recent Vancomycin Levels (past 3 days)  2025:  3:35 AM Vancomycin 16.0 ug/mL    Vancomycin IV Administrations (past 72 hours)                     vancomycin (VANCOCIN) 1,500 mg in 0.9% NaCl 250 mL intermittent infusion (mg) 1,500 mg New Bag 25 1039                    Nephrotoxins and other renal medications (From now, onward)      Start     Dose/Rate Route Frequency Ordered Stop    25 1210  vancomycin place olea - receiving intermittent dosing         1 each Intravenous SEE ADMIN INSTRUCTIONS 25 1210      25 0900  tobramycin (PF) (DONELL) neb solution 300 mg         300 mg Nebulization 2 TIMES DAILY RT 25 0832      25 2100  norepinephrine (LEVOPHED) 16 mg in  mL infusion MAX CONC CENTRAL LINE         0.01-0.6 mcg/kg/min × 72.2 kg  0.7-40.6 mL/hr  Intravenous CONTINUOUS 25 2042                 Contrast Orders - past 72 hours (72h ago, onward)      None            Interpretation of levels and current regimen:  Vancomycin level is reflective of therapeutic level    Has serum creatinine changed greater than 50% in last 72 hours: Yes    Urine output:  diminished urine output    Renal Function: Worsening      Plan:  Give one dose of 750 mg IV vancomycin. Will  get levels in future to determine next doses.  Vancomycin monitoring method: Trough (Method 2 = manual dose calculation)  Vancomycin therapeutic monitoring goal: 15-20 mg/L  Pharmacy will check vancomycin levels as appropriate in 1-3 Days.  Serum creatinine levels will be ordered daily for the first week of therapy and at least twice weekly for subsequent weeks.    Gary Justice, BRENDAN

## 2025-05-30 NOTE — PROGRESS NOTES
70yo male admitted to the MICU for respiratory failure in the setting of BPF. Tonight, patient has had increase in pressor requirement. Currently on 0.23 of levophed (was on 0.08 earlier in the day). Clinically, he is intubated on fio2 55%, peep of 5. I noticed some moderate amounts of auto peep on the ventilator graphics. Exam reveals moderate subcu emphysema over the right chest wall and neck area.     Plan for tonight.   -chest x-ray performed. Doesn't show any worsening of pneumothorax on the right side.   -will place an arterial line given increase in pressors requirement. May need to add a vasopressin infusion.   -I decreased his inspiratory time to increase his E time, which he responded to nicely. His BP starting going up after this as did his spo2.   -cont chest tube to  -10 suction. Brisk air leak noted.     Critical care time independent of procedures 32 min.

## 2025-05-30 NOTE — PROGRESS NOTES
Critical Care Attending Note    The patient was seen and examined by me with and independent of MS4 Bjorn and housestaff team.     I was present with the medical student who participated in the service and in the documentation of the note.      Pertinent vitals, lab results and imaging were reviewed by me as well I have verified the history and personally performed the physical exam and medical decision making.  All physician orders and treatments were placed at my direction for which  I personally managed. Key decisions are reflected in the housestaff note above with my additions  incorporated within and below.     Armin Terrell remains in critical condition today due to acute on chronic respiratory failure in setting of severe emphysema and pneumothorax with bronchopleural fistula, with sepsis , shock (multifactorial) which I personally managed. S/p valve revision with no improvement in leak and actually worsening ventilatory status. Overnight with air trapping noted and steadily increasing vasopressor requirements and worsening renal parameters - picture evolving of multiorgan failure despite current aggressive measures. POCUS this AM LV with good squeeze and fill , SVI 33% with PLR suggestive of volume responsive. Family meeting held this AM with pt present - reviewed events and current decompensation into multiorgan failure Family accumulating at bedside  - will continue to support but not escalate if he continues to worsen.  Code status updated.     Niall Mcdonough MD  70 min CCT excluding procedures and teaching

## 2025-05-30 NOTE — CARE CONFERENCE
A phone care conference was held 5/30 11:00 am. Alfred was asked if he wanted to be part of the conference and said yes so conference was held at bedside. Medical team present at bedside included all members of the primary MICU team, palliative care and bedside nurse. Family members present on the phone call included Trini (wife), Jamey (stepson), Vinnie (son) and Nila (daughter). All members of the care conference went around and introduced themselves over the phone.     A brief summary of his hospital course was shared including his persistent respiratory failure on the ventilator with an ongoing air leak after valve placement, pneumothorax and aggressive treatment of his pneumonia with antibiotics and antifungals. We shared with the family that he is now having multi-organ failure with him needing 3 pressors to sustain his blood pressure and his kidneys starting to shut down and becoming anuric. Given his decline and tenuous status, we expressed our recommendation that time may be limited and now would be the time for any family member who wants to say goodbyes come travel to see him. Decision was made to continue efforts to support his respiratory status and hemodynamics while also making him comfortable so that family can arrange travel to come see Alfred as soon as possible. Code status was also discussed and family did not want any aggressive resuscitation measures such as CPR and wanted to not cause further suffering. Code status changed to DNR/pre-arrest intubation ok and we will consider transitioning to further comfort measures once family arrives in person and is able to say goodbyes. Patient was alert and intermittently participated in the conversation by nodding/shaking his head and hands.    Fer Francis MD/PhD  Internal Medicine, PGY1

## 2025-05-31 LAB
ATRIAL RATE - MUSE: 95 BPM
BACTERIA ASPIRATE CULT: ABNORMAL
DIASTOLIC BLOOD PRESSURE - MUSE: NORMAL MMHG
GRAM STAIN RESULT: ABNORMAL
GRAM STAIN RESULT: ABNORMAL
INTERPRETATION ECG - MUSE: NORMAL
P AXIS - MUSE: 76 DEGREES
PR INTERVAL - MUSE: 148 MS
QRS DURATION - MUSE: 68 MS
QT - MUSE: 304 MS
QTC - MUSE: 382 MS
R AXIS - MUSE: -9 DEGREES
SYSTOLIC BLOOD PRESSURE - MUSE: NORMAL MMHG
T AXIS - MUSE: 73 DEGREES
VENTRICULAR RATE- MUSE: 95 BPM

## 2025-05-31 ASSESSMENT — ACTIVITIES OF DAILY LIVING (ADL): ADLS_ACUITY_SCORE: 67

## 2025-05-31 NOTE — DISCHARGE SUMMARY
[facility]    Death Summary - Hospitalist Service     Date of Admission:  5/22/2025  Date of Death: 5/30/2025  Attending Provider: Sharonda Salazar Diagnoses     # Mixed shock, obstructive (2/2 PTX 5/18), distributive (5/24), and hypovolemic (5/29)  # Acute on chronic hypoxic respiratory failure, multifactorial, s/p intubation  # COPD exacerbation 2/2 R pneumonia  # LLL opacities c/f pneumonia  # Right pneumothorax with bronchopleural fistula, s/p chest tube and valve placement x2  # MAULIK Nodule, stable  # Failed extubation on 5/22  # Hx severe COPD emphysema type (FEV1 35% on 5/6/2024)  # Subcutaneous emphysema  2/2 Pneumothorax  # Alpha 1 antitrypsin deficiency  # Anasarca  # R superficial venous thrombosis  # Hypoalbuminemia  # Mixed respiratory and metabolic acidosis  # Acute renal failure 2/2 likely contrast nephropathy vs prerenal   # Septic shock 2/2 pneumonia  # Meropenem resistant pseudomonas pneumonia  # Hospital acquired pneumonia  # Aspergillus + sputum  # R flank chest tube insertion site rash    Cause of death: multiorgan failure secondary to multifactorial shock from sepsis, obstruction from pneumothorax and bronchopleural fistula.    Hospital Course   Armin Terrell was admitted on 5/22/2025 for acute respiratory failure as a transfer from OSH . He presented to OSH ED on 5/14 with respiratory distress and found to vae right lung pneumonia. Returned again on 5/18 with severe respiratory distress and was intubated. CXR showed Right pneumothorax with chest tube placed. Follow-up chest CT scan show large R pneumothorax, R sided consolidation and unchanged left upper lobe nodule. Admitted to ICU and treated for community acquired pneumonia and possible COPD exacerbation component. Extubated on 5/22 - his right chest tube was kinked causing a tension physiology requiring re-intubation and pressors. Noted continuous large air leak concerning for a  bronchopleural fistula. Transferred to [facility] ICU where IP preformed bronchoscopy with placement of 3 valves on 5/23. Large air leak persisted. Unable to bring chest tube to water seal or wean patient off vent. Seen by transplant team and deemed not to be a lung transplant candidate due to deconditioning and poor rehab ability. Repeat bronchoscopy with IP on 5/28 with replacement of 2/3 valves in RUL and added 1 valve in RML. Air persisting after bronch with less suction to chest tube. Continues to have lower expiratory tidal volumes also indicating persistent air leak. Worsening lung condition leading to concern for possible developing ARDS on 5/30. Course complicated by multiorgan failure including acute kidney failure, respiratory failure, sepsis, leukopenia . Given trajectory, multiple discussion with family were held leading to a care conference on 5/30. The decision was made to continue efforts to support his respiratory status and hemodynamics while also making him comfortable so that family can arrange travel to come see Alfred as soon as possible. Code status changed to DNR/pre-arrest intubation ok. In the evening of 5/30/2025, Alfred transitioned to comfort cares after family were able to say goodbyes and he passed surrounded with family after being extubated. See progress note on 5/30 for further details on hospitalization.      Mateo Bazan MD  [facility]    ______________________________________________________________________      Significant Results and Procedures   See progress note 5/30    Consultations This Hospital Stay   PHYSICAL THERAPY ADULT IP CONSULT  OCCUPATIONAL THERAPY ADULT IP CONSULT  NUTRITION SERVICES ADULT IP CONSULT  INTERVENTIONAL PULMONARY ADULT IP CONSULT  PHARMACY IP CONSULT  NURSING TO CONSULT FOR VASCULAR ACCESS CARE IP CONSULT  CARE MANAGEMENT / SOCIAL WORK IP CONSULT  PHARMACY IP CONSULT  PALLIATIVE CARE ADULT IP  CONSULT  PULMONARY CF/TRANSPLANT ADULT IP CONSULT  WOUND OSTOMY CONTINENCE NURSE  IP CONSULT  PHARMACY TO DOSE VANCO  NUTRITION SERVICES ADULT IP CONSULT  SPIRITUAL HEALTH SERVICES IP CONSULT  PALLIATIVE CARE ADULT IP CONSULT  PULMONARY CF/TRANSPLANT ADULT IP CONSULT    Primary Care Physician   Júnior Salvador

## 2025-05-31 NOTE — PROGRESS NOTES
Pt , see provider note for details. Family at bedside. No belongings with pt. Security transported pt to [facility] 25 at 0157.

## 2025-05-31 NOTE — CONSULTS
I came in response to the staff request as they prepare to transition Armin to comfort cares. He is expected to die shortly after extubation, and family requested clergy support.    I met Alfred's wife Trini, daughter Gisele, sons Vinnie and Jamey, and several other family members. Trini, Gisele, and Vinnie shared a bit about Alfred with me as we waited for the rest of the family to come from the family [facility], including Alfred's recent medical history, his career driving trucks, and the colin he derived from doing housework. He loved everything from mowing the lawn to painting to scrubbing the tile in the bathroom.    Trini was raised Moravian and believes that Alfred is Pentecostalism. Vinnie agreed, sharing that his first Bible was from a Pentecostalism Spiritism and that he belonged to a Pentecostalism  troop. Neither the specific Confucianist label nor going to Spiritism were important to them, however; to them, the more important aspects are the tamiko and the traditions (prayer, Scripture reading, etc.).    We gathered in prayer at Alfred's bedside, after which the family expressed thanks. They expressed no additional needs, and I assured them of ongoing prayerful support availability.    Rev. Dr. Ally Pang, [facility] (they/he)

## 2025-05-31 NOTE — PROGRESS NOTES
"Extubated per MD order.    BP 91/61   Pulse (!) 0   Temp 97.9  F (36.6  C) (Oral)   Resp (!) 0   Ht 1.829 m (6' 0.01\")   Wt 77.5 kg (170 lb 12.8 oz)   SpO2 94%   BMI 23.16 kg/m      Buddy Hopper, RT on 5/31/2025 at 12:29 AM    "

## 2025-06-05 LAB
PATH REPORT.COMMENTS IMP SPEC: NORMAL
PATH REPORT.FINAL DX SPEC: NORMAL
PATH REPORT.GROSS SPEC: NORMAL
PATH REPORT.MICROSCOPIC SPEC OTHER STN: NORMAL

## 2025-06-10 NOTE — TELEPHONE ENCOUNTER
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     Presented to [facility] on 5/18 with intubation with right sided ptx and pneumonia, chest tube placed, persistent air leak/BPF. Failed extubation with tension physiology with kinked chest tube. Reintubated and transferred to [facility] on 5/22. EBVs placed on 5/23 to RUL but still with air leak. Can't get him extubated, took his PEEP to 0.     He does wake up and follows commands. Fairly unsophisticated wife but present. BMI wnl. No clear weight loss. He does have lung nodules followed by [facility] R[facility]RIGUE[facility], last seen in clinic for them in February and waxing and waning at that time. Not a robust picture of health, he looks frail currently.     Asked the team to place a palliative consult.     Patient discussed at lung transplant committee and deemed too sick for transplant. Referral closed.

## 2025-06-12 LAB
PATH REPORT.ADDENDUM SPEC: NORMAL
PATH REPORT.COMMENTS IMP SPEC: NORMAL
PATH REPORT.FINAL DX SPEC: NORMAL
PATH REPORT.GROSS SPEC: NORMAL
PATH REPORT.MICROSCOPIC SPEC OTHER STN: NORMAL

## (undated) RX ORDER — LIDOCAINE HYDROCHLORIDE 10 MG/ML
INJECTION, SOLUTION INFILTRATION; PERINEURAL
Status: DISPENSED
Start: 2025-05-20